# Patient Record
Sex: FEMALE | Race: BLACK OR AFRICAN AMERICAN | NOT HISPANIC OR LATINO
[De-identification: names, ages, dates, MRNs, and addresses within clinical notes are randomized per-mention and may not be internally consistent; named-entity substitution may affect disease eponyms.]

---

## 2017-03-07 ENCOUNTER — RECORD ABSTRACTING (OUTPATIENT)
Age: 42
End: 2017-03-07

## 2017-03-07 DIAGNOSIS — D50.8 OTHER IRON DEFICIENCY ANEMIAS: ICD-10-CM

## 2017-03-07 DIAGNOSIS — Z82.49 FAMILY HISTORY OF ISCHEMIC HEART DISEASE AND OTHER DISEASES OF THE CIRCULATORY SYSTEM: ICD-10-CM

## 2017-03-07 DIAGNOSIS — Z83.3 FAMILY HISTORY OF DIABETES MELLITUS: ICD-10-CM

## 2017-03-08 PROBLEM — Z82.49 FAMILY HISTORY OF CEREBRAL ANEURYSM: Status: ACTIVE | Noted: 2017-03-08

## 2017-03-08 PROBLEM — Z82.49 FAMILY HISTORY OF HYPERTENSION: Status: ACTIVE | Noted: 2017-03-08

## 2017-03-08 PROBLEM — Z83.3 FAMILY HISTORY OF DIABETES MELLITUS: Status: ACTIVE | Noted: 2017-03-08

## 2017-03-08 PROBLEM — D50.8 OTHER IRON DEFICIENCY ANEMIA: Status: ACTIVE | Noted: 2017-03-08

## 2017-03-08 RX ORDER — CHLORHEXIDINE GLUCONATE 4 %
325 (65 FE) LIQUID (ML) TOPICAL
Refills: 0 | Status: ACTIVE | COMMUNITY
Start: 2017-03-08

## 2017-03-08 RX ORDER — ELASTIC BANDAGE 2"X2.2YD
0.8 BANDAGE TOPICAL
Refills: 0 | Status: ACTIVE | COMMUNITY
Start: 2017-03-08

## 2017-03-15 ENCOUNTER — APPOINTMENT (OUTPATIENT)
Dept: ANTEPARTUM | Facility: CLINIC | Age: 42
End: 2017-03-15

## 2017-03-15 ENCOUNTER — EMERGENCY (EMERGENCY)
Facility: HOSPITAL | Age: 42
LOS: 1 days | Discharge: HOME | End: 2017-03-15
Attending: EMERGENCY MEDICINE | Admitting: EMERGENCY MEDICINE
Payer: MEDICAID

## 2017-03-15 ENCOUNTER — ASOB RESULT (OUTPATIENT)
Age: 42
End: 2017-03-15

## 2017-03-15 VITALS
TEMPERATURE: 100 F | RESPIRATION RATE: 16 BRPM | DIASTOLIC BLOOD PRESSURE: 99 MMHG | HEART RATE: 89 BPM | OXYGEN SATURATION: 100 % | SYSTOLIC BLOOD PRESSURE: 159 MMHG

## 2017-03-15 VITALS
OXYGEN SATURATION: 100 % | RESPIRATION RATE: 18 BRPM | HEART RATE: 90 BPM | SYSTOLIC BLOOD PRESSURE: 178 MMHG | DIASTOLIC BLOOD PRESSURE: 100 MMHG

## 2017-03-15 DIAGNOSIS — Z98.89 OTHER SPECIFIED POSTPROCEDURAL STATES: Chronic | ICD-10-CM

## 2017-03-15 LAB
ALBUMIN SERPL ELPH-MCNC: 3.8 G/DL — SIGNIFICANT CHANGE UP (ref 3.3–5)
ALP SERPL-CCNC: 74 U/L — SIGNIFICANT CHANGE UP (ref 40–120)
ALT FLD-CCNC: 42 U/L — HIGH (ref 4–33)
AST SERPL-CCNC: 23 U/L — SIGNIFICANT CHANGE UP (ref 4–32)
BASOPHILS # BLD AUTO: 0.01 K/UL — SIGNIFICANT CHANGE UP (ref 0–0.2)
BASOPHILS NFR BLD AUTO: 0.2 % — SIGNIFICANT CHANGE UP (ref 0–2)
BILIRUB SERPL-MCNC: 0.2 MG/DL — SIGNIFICANT CHANGE UP (ref 0.2–1.2)
BUN SERPL-MCNC: 5 MG/DL — LOW (ref 7–23)
CALCIUM SERPL-MCNC: 9.3 MG/DL — SIGNIFICANT CHANGE UP (ref 8.4–10.5)
CHLORIDE SERPL-SCNC: 98 MMOL/L — SIGNIFICANT CHANGE UP (ref 98–107)
CO2 SERPL-SCNC: 22 MMOL/L — SIGNIFICANT CHANGE UP (ref 22–31)
CREAT SERPL-MCNC: 0.73 MG/DL — SIGNIFICANT CHANGE UP (ref 0.5–1.3)
EOSINOPHIL # BLD AUTO: 0.21 K/UL — SIGNIFICANT CHANGE UP (ref 0–0.5)
EOSINOPHIL NFR BLD AUTO: 4.7 % — SIGNIFICANT CHANGE UP (ref 0–6)
GLUCOSE SERPL-MCNC: 120 MG/DL — HIGH (ref 70–99)
HCG SERPL-ACNC: SIGNIFICANT CHANGE UP MIU/ML
HCT VFR BLD CALC: 37.2 % — SIGNIFICANT CHANGE UP (ref 34.5–45)
HGB BLD-MCNC: 11.8 G/DL — SIGNIFICANT CHANGE UP (ref 11.5–15.5)
IMM GRANULOCYTES NFR BLD AUTO: 0.2 % — SIGNIFICANT CHANGE UP (ref 0–1.5)
LYMPHOCYTES # BLD AUTO: 1.58 K/UL — SIGNIFICANT CHANGE UP (ref 1–3.3)
LYMPHOCYTES # BLD AUTO: 35 % — SIGNIFICANT CHANGE UP (ref 13–44)
MCHC RBC-ENTMCNC: 26.6 PG — LOW (ref 27–34)
MCHC RBC-ENTMCNC: 31.7 % — LOW (ref 32–36)
MCV RBC AUTO: 84 FL — SIGNIFICANT CHANGE UP (ref 80–100)
MONOCYTES # BLD AUTO: 0.66 K/UL — SIGNIFICANT CHANGE UP (ref 0–0.9)
MONOCYTES NFR BLD AUTO: 14.6 % — HIGH (ref 2–14)
NEUTROPHILS # BLD AUTO: 2.04 K/UL — SIGNIFICANT CHANGE UP (ref 1.8–7.4)
NEUTROPHILS NFR BLD AUTO: 45.3 % — SIGNIFICANT CHANGE UP (ref 43–77)
PLATELET # BLD AUTO: 261 K/UL — SIGNIFICANT CHANGE UP (ref 150–400)
PMV BLD: 10.1 FL — SIGNIFICANT CHANGE UP (ref 7–13)
POTASSIUM SERPL-MCNC: 3.8 MMOL/L — SIGNIFICANT CHANGE UP (ref 3.5–5.3)
POTASSIUM SERPL-SCNC: 3.8 MMOL/L — SIGNIFICANT CHANGE UP (ref 3.5–5.3)
PROT SERPL-MCNC: 7.3 G/DL — SIGNIFICANT CHANGE UP (ref 6–8.3)
RBC # BLD: 4.43 M/UL — SIGNIFICANT CHANGE UP (ref 3.8–5.2)
RBC # FLD: 15.5 % — HIGH (ref 10.3–14.5)
SODIUM SERPL-SCNC: 138 MMOL/L — SIGNIFICANT CHANGE UP (ref 135–145)
WBC # BLD: 4.51 K/UL — SIGNIFICANT CHANGE UP (ref 3.8–10.5)
WBC # FLD AUTO: 4.51 K/UL — SIGNIFICANT CHANGE UP (ref 3.8–10.5)

## 2017-03-15 PROCEDURE — 76705 ECHO EXAM OF ABDOMEN: CPT | Mod: 26

## 2017-03-15 PROCEDURE — 99284 EMERGENCY DEPT VISIT MOD MDM: CPT

## 2017-03-15 PROCEDURE — 76830 TRANSVAGINAL US NON-OB: CPT | Mod: 26

## 2017-03-15 RX ORDER — SODIUM CHLORIDE 9 MG/ML
1000 INJECTION INTRAMUSCULAR; INTRAVENOUS; SUBCUTANEOUS ONCE
Qty: 0 | Refills: 0 | Status: COMPLETED | OUTPATIENT
Start: 2017-03-15 | End: 2017-03-15

## 2017-03-15 RX ORDER — MORPHINE SULFATE 50 MG/1
4 CAPSULE, EXTENDED RELEASE ORAL ONCE
Qty: 0 | Refills: 0 | Status: DISCONTINUED | OUTPATIENT
Start: 2017-03-15 | End: 2017-03-15

## 2017-03-15 RX ORDER — FAMOTIDINE 10 MG/ML
20 INJECTION INTRAVENOUS ONCE
Qty: 0 | Refills: 0 | Status: COMPLETED | OUTPATIENT
Start: 2017-03-15 | End: 2017-03-15

## 2017-03-15 RX ADMIN — MORPHINE SULFATE 4 MILLIGRAM(S): 50 CAPSULE, EXTENDED RELEASE ORAL at 20:28

## 2017-03-15 RX ADMIN — SODIUM CHLORIDE 1000 MILLILITER(S): 9 INJECTION INTRAMUSCULAR; INTRAVENOUS; SUBCUTANEOUS at 20:08

## 2017-03-15 RX ADMIN — FAMOTIDINE 20 MILLIGRAM(S): 10 INJECTION INTRAVENOUS at 23:03

## 2017-03-15 NOTE — ED ADULT NURSE NOTE - OBJECTIVE STATEMENT
Patient received to room 8 awake, alert, oriented x3, able to make needs known verbally.  Patient complaining of LLQ pain and positive pregnancy.  Patient vitals recorded, hypertensive, MD aware.  Labwork obtained, peripheral IV started, IV fluids administered as per provider order.  Call bell within reach, safety maintained, will continue to monitor.

## 2017-03-15 NOTE — ED ADULT TRIAGE NOTE - CHIEF COMPLAINT QUOTE
states" I am 14 weeks pregnant and feel dizzy, weak and lower abdominal pain and feel bloated and has discharge. " patient had a sonogram done few weeks ago with confirmed IUP. patient is high risk pregnancy with cerclage

## 2017-03-15 NOTE — ED PROVIDER NOTE - ATTENDING CONTRIBUTION TO CARE
41f, pmhx short cervix and cerclage, uterine fibroids, . per pt, was supposed to get fibroid removed but found out she was pregnant. pt suffers from years of chronic lower abdominal pain. acute exac of chronic pain today so came to ED. pt states she has f/u in 2 weeks but has been difficult arranging her f/u. no urianry symptoms. +n/v during the pregnancy. pain is the same in nature as previous. exam, vs wnl, nad. hs and lungs normal, abdo benign. US shows 1 cm cervix, +IUP. RUQ us neg also. I had a d/w pt on pain control while pregnant, advised tylenol. pt prefers to live with pain then endanger the baby. declined meds for n/v. will d/w OB about best plan for short cervix.

## 2017-03-15 NOTE — ED PROVIDER NOTE - OBJECTIVE STATEMENT
41F  h/o uterine fibroids, multiple miscarriages presenting with abdominal pain. Notes chronic lower abdominal pain from fibroids that acutely worsened today, suprapubic radiating to LLQ and L flank associated with chills, nausea. Pt had ultrasound done earlier today which showed IUP. Denies F, CP/SOB, V/D, vaginal bleeding. 41F  h/o uterine fibroids, multiple miscarriages presenting with abdominal pain. Notes chronic lower abdominal pain from fibroids that acutely worsened today, suprapubic radiating to LLQ and L flank associated with chills, nausea. Pt had ultrasound done earlier today which showed IUP. Denies F, CP/SOB, V/D, vaginal bleeding.    OB CRISTELA Abreu (HIP)

## 2017-03-15 NOTE — ED PROVIDER NOTE - MEDICAL DECISION MAKING DETAILS
41F  h/o fibroids, multiple miscarriages, presenting with acutely worsened abdominal/pelvic pain  -labs, fluids, US

## 2017-03-15 NOTE — ED PROVIDER NOTE - PROGRESS NOTE DETAILS
Yamile: US showed cervix 1 cm. d/w OB. OB looked on allscripts. pt has an appt in 2 days for an OB ultrasound and nuchal translucency. her appt is at 1 pm at [facility]. she also has a high risk ob appt on 3/27. per OB resident, if more urgent cerclage needs to be done, that can be arranged after her US because it's at the same place. pt provided with appt info.  The patient was given verbal and written discharge instructions. Specifically, instructions when to return to the ED and when to seek follow-up from their pcp was discussed. Any specialty follow-up was discussed, including how to make an appointment.  Instructions were discussed in simple, plain language and was understood by the patient. The patient understands that should their symptoms worsen or any new symptoms arise, they should return to the ED immediately for further evaluation.

## 2017-03-16 ENCOUNTER — RESULT REVIEW (OUTPATIENT)
Age: 42
End: 2017-03-16

## 2017-03-17 ENCOUNTER — APPOINTMENT (OUTPATIENT)
Dept: ANTEPARTUM | Facility: CLINIC | Age: 42
End: 2017-03-17

## 2017-03-17 ENCOUNTER — INPATIENT (INPATIENT)
Facility: HOSPITAL | Age: 42
LOS: 0 days | Discharge: HOME | DRG: 770 | End: 2017-03-17
Attending: OBSTETRICS & GYNECOLOGY | Admitting: OBSTETRICS & GYNECOLOGY
Payer: MEDICAID

## 2017-03-17 ENCOUNTER — TRANSCRIPTION ENCOUNTER (OUTPATIENT)
Age: 42
End: 2017-03-17

## 2017-03-17 VITALS
SYSTOLIC BLOOD PRESSURE: 148 MMHG | OXYGEN SATURATION: 99 % | HEART RATE: 86 BPM | DIASTOLIC BLOOD PRESSURE: 70 MMHG | RESPIRATION RATE: 18 BRPM

## 2017-03-17 VITALS
RESPIRATION RATE: 18 BRPM | HEART RATE: 74 BPM | SYSTOLIC BLOOD PRESSURE: 185 MMHG | DIASTOLIC BLOOD PRESSURE: 80 MMHG | TEMPERATURE: 97 F | OXYGEN SATURATION: 96 %

## 2017-03-17 DIAGNOSIS — Z98.89 OTHER SPECIFIED POSTPROCEDURAL STATES: Chronic | ICD-10-CM

## 2017-03-17 DIAGNOSIS — O02.1 MISSED ABORTION: ICD-10-CM

## 2017-03-17 DIAGNOSIS — O03.9 COMPLETE OR UNSPECIFIED SPONTANEOUS ABORTION WITHOUT COMPLICATION: ICD-10-CM

## 2017-03-17 LAB
ALBUMIN SERPL ELPH-MCNC: 3.7 G/DL — SIGNIFICANT CHANGE UP (ref 3.3–5)
ALP SERPL-CCNC: 84 U/L — SIGNIFICANT CHANGE UP (ref 40–120)
ALT FLD-CCNC: 37 U/L RC — SIGNIFICANT CHANGE UP (ref 10–45)
ANION GAP SERPL CALC-SCNC: 18 MMOL/L — HIGH (ref 5–17)
APPEARANCE UR: CLEAR — SIGNIFICANT CHANGE UP
APTT BLD: 31.3 SEC — SIGNIFICANT CHANGE UP (ref 27.5–37.4)
AST SERPL-CCNC: 22 U/L — SIGNIFICANT CHANGE UP (ref 10–40)
BASOPHILS # BLD AUTO: 0 K/UL — SIGNIFICANT CHANGE UP (ref 0–0.2)
BASOPHILS # BLD AUTO: 0 K/UL — SIGNIFICANT CHANGE UP (ref 0–0.2)
BASOPHILS NFR BLD AUTO: 0.1 % — SIGNIFICANT CHANGE UP (ref 0–2)
BASOPHILS NFR BLD AUTO: 0.2 % — SIGNIFICANT CHANGE UP (ref 0–2)
BILIRUB SERPL-MCNC: 0.2 MG/DL — SIGNIFICANT CHANGE UP (ref 0.2–1.2)
BILIRUB UR-MCNC: NEGATIVE — SIGNIFICANT CHANGE UP
BLD GP AB SCN SERPL QL: NEGATIVE — SIGNIFICANT CHANGE UP
BUN SERPL-MCNC: 5 MG/DL — LOW (ref 7–23)
CALCIUM SERPL-MCNC: 9.7 MG/DL — SIGNIFICANT CHANGE UP (ref 8.4–10.5)
CHLORIDE SERPL-SCNC: 97 MMOL/L — SIGNIFICANT CHANGE UP (ref 96–108)
CO2 SERPL-SCNC: 21 MMOL/L — LOW (ref 22–31)
COLOR SPEC: YELLOW — SIGNIFICANT CHANGE UP
CREAT SERPL-MCNC: 0.75 MG/DL — SIGNIFICANT CHANGE UP (ref 0.5–1.3)
DIFF PNL FLD: ABNORMAL
EOSINOPHIL # BLD AUTO: 0.1 K/UL — SIGNIFICANT CHANGE UP (ref 0–0.5)
EOSINOPHIL # BLD AUTO: 0.1 K/UL — SIGNIFICANT CHANGE UP (ref 0–0.5)
EOSINOPHIL NFR BLD AUTO: 0.8 % — SIGNIFICANT CHANGE UP (ref 0–6)
EOSINOPHIL NFR BLD AUTO: 2.6 % — SIGNIFICANT CHANGE UP (ref 0–6)
EPI CELLS # UR: SIGNIFICANT CHANGE UP /HPF
GAS PNL BLDV: SIGNIFICANT CHANGE UP
GLUCOSE SERPL-MCNC: 165 MG/DL — HIGH (ref 70–99)
GLUCOSE UR QL: NEGATIVE — SIGNIFICANT CHANGE UP
HCG SERPL-ACNC: SIGNIFICANT CHANGE UP MIU/ML
HCT VFR BLD CALC: 25.9 % — LOW (ref 34.5–45)
HCT VFR BLD CALC: 36.7 % — SIGNIFICANT CHANGE UP (ref 34.5–45)
HGB BLD-MCNC: 12 G/DL — SIGNIFICANT CHANGE UP (ref 11.5–15.5)
HGB BLD-MCNC: 8.6 G/DL — LOW (ref 11.5–15.5)
INR BLD: 1.11 RATIO — SIGNIFICANT CHANGE UP (ref 0.88–1.16)
KETONES UR-MCNC: NEGATIVE — SIGNIFICANT CHANGE UP
LEUKOCYTE ESTERASE UR-ACNC: NEGATIVE — SIGNIFICANT CHANGE UP
LYMPHOCYTES # BLD AUTO: 1 K/UL — SIGNIFICANT CHANGE UP (ref 1–3.3)
LYMPHOCYTES # BLD AUTO: 1.2 K/UL — SIGNIFICANT CHANGE UP (ref 1–3.3)
LYMPHOCYTES # BLD AUTO: 14.8 % — SIGNIFICANT CHANGE UP (ref 13–44)
LYMPHOCYTES # BLD AUTO: 24.4 % — SIGNIFICANT CHANGE UP (ref 13–44)
MCHC RBC-ENTMCNC: 27.6 PG — SIGNIFICANT CHANGE UP (ref 27–34)
MCHC RBC-ENTMCNC: 28.5 PG — SIGNIFICANT CHANGE UP (ref 27–34)
MCHC RBC-ENTMCNC: 32.7 GM/DL — SIGNIFICANT CHANGE UP (ref 32–36)
MCHC RBC-ENTMCNC: 33.3 GM/DL — SIGNIFICANT CHANGE UP (ref 32–36)
MCV RBC AUTO: 84.5 FL — SIGNIFICANT CHANGE UP (ref 80–100)
MCV RBC AUTO: 85.5 FL — SIGNIFICANT CHANGE UP (ref 80–100)
MONOCYTES # BLD AUTO: 0.4 K/UL — SIGNIFICANT CHANGE UP (ref 0–0.9)
MONOCYTES # BLD AUTO: 0.6 K/UL — SIGNIFICANT CHANGE UP (ref 0–0.9)
MONOCYTES NFR BLD AUTO: 11.5 % — SIGNIFICANT CHANGE UP (ref 2–14)
MONOCYTES NFR BLD AUTO: 5.7 % — SIGNIFICANT CHANGE UP (ref 2–14)
NEUTROPHILS # BLD AUTO: 3 K/UL — SIGNIFICANT CHANGE UP (ref 1.8–7.4)
NEUTROPHILS # BLD AUTO: 5.4 K/UL — SIGNIFICANT CHANGE UP (ref 1.8–7.4)
NEUTROPHILS NFR BLD AUTO: 61.2 % — SIGNIFICANT CHANGE UP (ref 43–77)
NEUTROPHILS NFR BLD AUTO: 78.6 % — HIGH (ref 43–77)
NITRITE UR-MCNC: NEGATIVE — SIGNIFICANT CHANGE UP
PH UR: 6 — SIGNIFICANT CHANGE UP (ref 4.8–8)
PLATELET # BLD AUTO: 189 K/UL — SIGNIFICANT CHANGE UP (ref 150–400)
PLATELET # BLD AUTO: 227 K/UL — SIGNIFICANT CHANGE UP (ref 150–400)
POTASSIUM SERPL-MCNC: 3.6 MMOL/L — SIGNIFICANT CHANGE UP (ref 3.5–5.3)
POTASSIUM SERPL-SCNC: 3.6 MMOL/L — SIGNIFICANT CHANGE UP (ref 3.5–5.3)
PROT SERPL-MCNC: 7.2 G/DL — SIGNIFICANT CHANGE UP (ref 6–8.3)
PROT UR-MCNC: 150 MG/DL
PROTHROM AB SERPL-ACNC: 12 SEC — SIGNIFICANT CHANGE UP (ref 10–13.1)
RBC # BLD: 3.03 M/UL — LOW (ref 3.8–5.2)
RBC # BLD: 4.35 M/UL — SIGNIFICANT CHANGE UP (ref 3.8–5.2)
RBC # FLD: 13.5 % — SIGNIFICANT CHANGE UP (ref 10.3–14.5)
RBC # FLD: 13.9 % — SIGNIFICANT CHANGE UP (ref 10.3–14.5)
RBC CASTS # UR COMP ASSIST: >50 /HPF (ref 0–2)
RH IG SCN BLD-IMP: POSITIVE — SIGNIFICANT CHANGE UP
SODIUM SERPL-SCNC: 136 MMOL/L — SIGNIFICANT CHANGE UP (ref 135–145)
SP GR SPEC: 1.02 — SIGNIFICANT CHANGE UP (ref 1.01–1.02)
UROBILINOGEN FLD QL: NEGATIVE — SIGNIFICANT CHANGE UP
WBC # BLD: 5 K/UL — SIGNIFICANT CHANGE UP (ref 3.8–10.5)
WBC # BLD: 6.9 K/UL — SIGNIFICANT CHANGE UP (ref 3.8–10.5)
WBC # FLD AUTO: 5 K/UL — SIGNIFICANT CHANGE UP (ref 3.8–10.5)
WBC # FLD AUTO: 6.9 K/UL — SIGNIFICANT CHANGE UP (ref 3.8–10.5)
WBC UR QL: SIGNIFICANT CHANGE UP /HPF (ref 0–5)

## 2017-03-17 PROCEDURE — 88304 TISSUE EXAM BY PATHOLOGIST: CPT | Mod: 26

## 2017-03-17 PROCEDURE — 99285 EMERGENCY DEPT VISIT HI MDM: CPT

## 2017-03-17 PROCEDURE — 58120 DILATION AND CURETTAGE: CPT

## 2017-03-17 PROCEDURE — 88305 TISSUE EXAM BY PATHOLOGIST: CPT | Mod: 26

## 2017-03-17 PROCEDURE — 88309 TISSUE EXAM BY PATHOLOGIST: CPT | Mod: 26

## 2017-03-17 PROCEDURE — 76830 TRANSVAGINAL US NON-OB: CPT | Mod: 26

## 2017-03-17 RX ORDER — KETOROLAC TROMETHAMINE 30 MG/ML
30 SYRINGE (ML) INJECTION ONCE
Qty: 0 | Refills: 0 | Status: DISCONTINUED | OUTPATIENT
Start: 2017-03-17 | End: 2017-03-17

## 2017-03-17 RX ORDER — SODIUM CHLORIDE 9 MG/ML
1000 INJECTION INTRAMUSCULAR; INTRAVENOUS; SUBCUTANEOUS ONCE
Qty: 0 | Refills: 0 | Status: COMPLETED | OUTPATIENT
Start: 2017-03-17 | End: 2017-03-17

## 2017-03-17 RX ORDER — ACETAMINOPHEN 500 MG
975 TABLET ORAL ONCE
Qty: 0 | Refills: 0 | Status: COMPLETED | OUTPATIENT
Start: 2017-03-17 | End: 2017-03-17

## 2017-03-17 RX ORDER — SODIUM CHLORIDE 9 MG/ML
1000 INJECTION, SOLUTION INTRAVENOUS
Qty: 0 | Refills: 0 | Status: DISCONTINUED | OUTPATIENT
Start: 2017-03-17 | End: 2017-03-17

## 2017-03-17 RX ORDER — ONDANSETRON 8 MG/1
4 TABLET, FILM COATED ORAL ONCE
Qty: 0 | Refills: 0 | Status: DISCONTINUED | OUTPATIENT
Start: 2017-03-17 | End: 2017-03-17

## 2017-03-17 RX ORDER — HYDROMORPHONE HYDROCHLORIDE 2 MG/ML
0.5 INJECTION INTRAMUSCULAR; INTRAVENOUS; SUBCUTANEOUS
Qty: 0 | Refills: 0 | Status: DISCONTINUED | OUTPATIENT
Start: 2017-03-17 | End: 2017-03-17

## 2017-03-17 RX ORDER — MORPHINE SULFATE 50 MG/1
4 CAPSULE, EXTENDED RELEASE ORAL ONCE
Qty: 0 | Refills: 0 | Status: DISCONTINUED | OUTPATIENT
Start: 2017-03-17 | End: 2017-03-17

## 2017-03-17 RX ORDER — OXYTOCIN 10 UNIT/ML
10 VIAL (ML) INJECTION ONCE
Qty: 0 | Refills: 0 | Status: COMPLETED | OUTPATIENT
Start: 2017-03-17 | End: 2017-03-17

## 2017-03-17 RX ADMIN — Medication 30 MILLIGRAM(S): at 12:40

## 2017-03-17 RX ADMIN — Medication 975 MILLIGRAM(S): at 11:45

## 2017-03-17 RX ADMIN — SODIUM CHLORIDE 1000 MILLILITER(S): 9 INJECTION INTRAMUSCULAR; INTRAVENOUS; SUBCUTANEOUS at 11:58

## 2017-03-17 RX ADMIN — Medication 10 UNIT(S): at 14:06

## 2017-03-17 RX ADMIN — Medication 30 MILLIGRAM(S): at 14:06

## 2017-03-17 RX ADMIN — Medication 30 MILLIGRAM(S): at 20:00

## 2017-03-17 RX ADMIN — SODIUM CHLORIDE 125 MILLILITER(S): 9 INJECTION, SOLUTION INTRAVENOUS at 18:27

## 2017-03-17 RX ADMIN — Medication 30 MILLIGRAM(S): at 19:30

## 2017-03-17 RX ADMIN — MORPHINE SULFATE 4 MILLIGRAM(S): 50 CAPSULE, EXTENDED RELEASE ORAL at 15:01

## 2017-03-17 RX ADMIN — MORPHINE SULFATE 4 MILLIGRAM(S): 50 CAPSULE, EXTENDED RELEASE ORAL at 15:54

## 2017-03-17 RX ADMIN — Medication 975 MILLIGRAM(S): at 11:21

## 2017-03-17 NOTE — DISCHARGE NOTE ADULT - PATIENT PORTAL LINK FT
“You can access the Follow[employer] Patient Portal, offered by [facility], by registering with the following website: [email or web address]”

## 2017-03-17 NOTE — DISCHARGE NOTE ADULT - CARE PLAN
Principal Discharge DX:	Miscarriage  Goal:	resolution of anemia  Instructions for follow-up, activity and diet:	No restrictions  Follow up in clinic in 2 weeks

## 2017-03-17 NOTE — ED PROVIDER NOTE - NS ED ROS FT
No fever/chills, no change in vision, no throat pain, no chest pain, no nausea/vomiting,  no dysuria, no joint pain, no rashes, no focal neurologic complaints, no known mental health issues

## 2017-03-17 NOTE — H&P ADULT. - ATTENDING COMMENTS
Pt s/p passage of fetus and placenta in ED and with continued bleeding. Estimated loss approx 800-1000cc total. Pt counseled regarding r/b/a of suction D&C for retained POCs. Informed consent signed. Pt to proceed to OR for suction D&C.

## 2017-03-17 NOTE — ED PROVIDER NOTE - MEDICAL DECISION MAKING DETAILS
First Trimeester Vaginal bleeding - hemodynamically stable - possible threatened  1)ABS/UA/HCG/T&S/Transvaginal US 2) pelvic exam 3) reassess

## 2017-03-17 NOTE — BRIEF OPERATIVE NOTE - OPERATION/FINDINGS
large fibroid uterus. retained productions of conception removed with suction curettage and sharp curettage.

## 2017-03-17 NOTE — ED PROVIDER NOTE - ATTENDING CONTRIBUTION TO CARE
I have seen and evaluated this patient with the resident.   I agree with the findings  unless other wise stated.  I have made appropriate changes in documentations where needed, After my face to face bedside evaluation, I am further  noting: Pt with pregnancy and lower abdominal cramps has large uterine fibroids h/o multiple miscarriages Pt has moderate bleeding and seems  in progress admitted to OB service for definitive care --Garvey

## 2017-03-17 NOTE — ED PROVIDER NOTE - PHYSICAL EXAMINATION
AAOX3, NAD, NCAT, EOMI, PERRL, MMM, Neck Supple, RRR, CTABL, ABD Firm, distended out of proportion to EGA, lower abd tenderness. : brb, scant. No CVAT, EXT warm, well perfused, No Edema, Distal Pulses Intact, No Rash,  MAEx4, Sensation to soft touch grossly intact, normal strength/tone. AAOX3, NAD, NCAT, EOMI, PERRL, MMM, Neck Supple, RRR, CTABL, ABD Firm, distended out of proportion to EGA, lower abd tenderness. : Chaperone: Preeti melgoza, nL ext genitalia, scant dark blood in vaginal vault, no adnexal or cmt tenderness., scant. No CVAT, EXT warm, well perfused, No Edema, Distal Pulses Intact, No Rash,  MAEx4, Sensation to soft touch grossly intact, normal strength/tone.

## 2017-03-17 NOTE — DISCHARGE NOTE ADULT - CARE PROVIDER_API CALL
Kraig Ortiz), Obstetrics and Gynecology  [address] [place] [address]  Phone: [phone]  Fax: [phone]

## 2017-03-17 NOTE — DISCHARGE NOTE ADULT - HOSPITAL COURSE
41  at 13w2d presented to ED with vaginal bleeding, abdominal pain, and gas pain. Found to have fetus in cervical canal on sono. after delivery of fetus intact, . patient received hemabate and 10IM pitocin in ED given for bleeding, additional . placenta delivered spontaneously with additional EBL of 500 and patient continued to bleed. Decision was made to admit patient for dilation and vacuum curettage, for which EBL was 50cc.  She had a CBC immediately postop which was _______ 41  at 13w2d presented to ED with vaginal bleeding, abdominal pain, and gas pain. Found to have fetus in cervical canal on sono. after delivery of fetus intact, . patient received hemabate and 10IM pitocin in ED given for bleeding, additional . placenta delivered spontaneously with additional EBL of 500 and patient continued to bleed. Decision was made to admit patient for dilation and vacuum curettage, for which EBL was 50cc.  She had a CBC immediately postop which was 25.9.

## 2017-03-17 NOTE — ED PROVIDER NOTE - OBJECTIVE STATEMENT
41F  multiple miscarriages, hx of large fibroid, EGA 13wks by ultrasound pw lower abdominal pain since last night. Endorses vaginal bleeding that began minutes ago.     OBGYN: Roberto

## 2017-03-17 NOTE — H&P ADULT. - HISTORY OF PRESENT ILLNESS
41  at 13w2d presented to ED with vaginal bleeding, abdominal pain, and gas pain. found to have fetus in cervical canal on sono. after delivery of fetus intact, . patient received hemabate and 10IM pitocin in ED given for bleeding, additional . placenta delivered spontaneously with additional EBL of 500 and patient continued to bleed. decision was made to admit patient for dilation and vacuum curettage. 41  at 13w2d presented to ED with vaginal bleeding, abdominal pain, and gas pain. found to have fetus in cervical canal on sono. after delivery of fetus intact, . patient received hemabate and 10IM pitocin in ED given for bleeding, additional . placenta delivered spontaneously with additional EBL of 500 and patient continued to bleed. decision was made to admit patient for dilation and vacuum curettage.   POB: 1 c/s at 6mo for PTL c/b elevated BP, previa, cerclage. 2 first trimester SB, 3snd trimester SAB (16-20w)  PGYN: fibroid (10cm)  PMH: anemia, thyroid cyst  PSH: LTCSx1, gastric bypass , D+C  ALL: NKDA  Meds: PNV, Iron     pelvic exam by dr bey at time of fetal delivery: 5cm dilated     CBC: WBC 5, H/H 12/36.7 Plt 227   TVUS;  in progress, cervix open, 7cm subserosal fibroid noted 41  at 13w2d presented to ED with vaginal bleeding, abdominal pain, and gas pain. found to have fetus in cervical canal on sono. after delivery of fetus intact, . patient received hemabate and 10IM pitocin in ED given for bleeding, additional . placenta delivered spontaneously with additional EBL of 500 and patient continued to bleed. decision was made to admit patient for dilation and vacuum curettage.   POB: 1 c/s at 6mo for PTL c/b elevated BP, previa, cerclage. 2 first trimester SB, 3snd trimester SAB (16-20w)  PGYN: fibroid (10cm largest)  PMH: anemia, thyroid cyst  PSH: LTCSx1, gastric bypass , D+C  ALL: NKDA  Meds: PNV, Iron     pelvic exam by dr bey at time of fetal delivery: 5cm dilated     CBC: WBC 5, H/H 12/36.7 Plt 227   TVUS;  in progress, cervix open, 7cm subserosal fibroid noted

## 2017-03-17 NOTE — DISCHARGE NOTE ADULT - MEDICATION SUMMARY - MEDICATIONS TO TAKE
I will START or STAY ON the medications listed below when I get home from the hospital:    ibuprofen  --  by mouth   -- Indication: For pain    ferrous sulfate 325 mg (65 mg elemental iron) oral tablet  -- 1 tab(s) by mouth 3 times a day  -- Indication: For home

## 2017-03-17 NOTE — ED PROVIDER NOTE - FAMILY HISTORY
<<-----Click on this checkbox to enter Family History Family history of diabetes mellitus     Grandparent  Still living? Unknown  Family history of hypertension, Age at diagnosis: Age Unknown

## 2017-03-17 NOTE — ED ADULT NURSE NOTE - OBJECTIVE STATEMENT
40yo f a&ox4 ambulated into ED c/o vaginal bleeding and abd pain. pt is 14 weeks pregnant , 1 child at home age 12. pt states she has h/o fibroids and had fibroid removal planned but then found out she was pregnant. pt also states she is anemic and has had transfusion in the past. pt denies sob, denies n/v/d, denies diaphoresis, denies cp, denies pain when urinating.

## 2017-03-17 NOTE — ED PROVIDER NOTE - PROGRESS NOTE DETAILS
OBGYN Consulted for persistent pain. Pt delivered fetus at bedside with OB present. Placenta not delivered. OB requesting Pitocin.

## 2017-03-19 ENCOUNTER — EMERGENCY (EMERGENCY)
Facility: HOSPITAL | Age: 42
LOS: 1 days | Discharge: HOME | End: 2017-03-19
Attending: EMERGENCY MEDICINE | Admitting: EMERGENCY MEDICINE
Payer: MEDICAID

## 2017-03-19 VITALS
TEMPERATURE: 99 F | DIASTOLIC BLOOD PRESSURE: 94 MMHG | SYSTOLIC BLOOD PRESSURE: 172 MMHG | WEIGHT: 220.02 LBS | HEIGHT: 66 IN | RESPIRATION RATE: 22 BRPM | HEART RATE: 111 BPM | OXYGEN SATURATION: 99 %

## 2017-03-19 DIAGNOSIS — Z98.89 OTHER SPECIFIED POSTPROCEDURAL STATES: Chronic | ICD-10-CM

## 2017-03-19 DIAGNOSIS — Z98.890 OTHER SPECIFIED POSTPROCEDURAL STATES: ICD-10-CM

## 2017-03-19 DIAGNOSIS — R10.33 PERIUMBILICAL PAIN: ICD-10-CM

## 2017-03-19 DIAGNOSIS — R51 HEADACHE: ICD-10-CM

## 2017-03-19 DIAGNOSIS — K59.00 CONSTIPATION, UNSPECIFIED: ICD-10-CM

## 2017-03-19 DIAGNOSIS — R50.9 FEVER, UNSPECIFIED: ICD-10-CM

## 2017-03-19 DIAGNOSIS — R11.2 NAUSEA WITH VOMITING, UNSPECIFIED: ICD-10-CM

## 2017-03-19 LAB
ALBUMIN SERPL ELPH-MCNC: 3.3 G/DL — SIGNIFICANT CHANGE UP (ref 3.3–5)
ALP SERPL-CCNC: 67 U/L — SIGNIFICANT CHANGE UP (ref 40–120)
ALT FLD-CCNC: 19 U/L RC — SIGNIFICANT CHANGE UP (ref 10–45)
ANION GAP SERPL CALC-SCNC: 14 MMOL/L — SIGNIFICANT CHANGE UP (ref 5–17)
APPEARANCE UR: CLEAR — SIGNIFICANT CHANGE UP
APTT BLD: 28.5 SEC — SIGNIFICANT CHANGE UP (ref 27.5–37.4)
AST SERPL-CCNC: 14 U/L — SIGNIFICANT CHANGE UP (ref 10–40)
BASOPHILS # BLD AUTO: 0 K/UL — SIGNIFICANT CHANGE UP (ref 0–0.2)
BASOPHILS NFR BLD AUTO: 0.2 % — SIGNIFICANT CHANGE UP (ref 0–2)
BILIRUB SERPL-MCNC: 0.1 MG/DL — LOW (ref 0.2–1.2)
BILIRUB UR-MCNC: NEGATIVE — SIGNIFICANT CHANGE UP
BUN SERPL-MCNC: 6 MG/DL — LOW (ref 7–23)
CALCIUM SERPL-MCNC: 8.6 MG/DL — SIGNIFICANT CHANGE UP (ref 8.4–10.5)
CHLORIDE SERPL-SCNC: 102 MMOL/L — SIGNIFICANT CHANGE UP (ref 96–108)
CO2 SERPL-SCNC: 22 MMOL/L — SIGNIFICANT CHANGE UP (ref 22–31)
COLOR SPEC: YELLOW — SIGNIFICANT CHANGE UP
CREAT SERPL-MCNC: 0.81 MG/DL — SIGNIFICANT CHANGE UP (ref 0.5–1.3)
DIFF PNL FLD: NEGATIVE — SIGNIFICANT CHANGE UP
EOSINOPHIL # BLD AUTO: 0 K/UL — SIGNIFICANT CHANGE UP (ref 0–0.5)
EOSINOPHIL NFR BLD AUTO: 0.9 % — SIGNIFICANT CHANGE UP (ref 0–6)
EPI CELLS # UR: SIGNIFICANT CHANGE UP /HPF
GLUCOSE SERPL-MCNC: 140 MG/DL — HIGH (ref 70–99)
GLUCOSE UR QL: NEGATIVE — SIGNIFICANT CHANGE UP
HCG SERPL-ACNC: 3822 MIU/ML — SIGNIFICANT CHANGE UP
HCT VFR BLD CALC: 20.5 % — CRITICAL LOW (ref 34.5–45)
HGB BLD-MCNC: 6.8 G/DL — CRITICAL LOW (ref 11.5–15.5)
INR BLD: 1.05 RATIO — SIGNIFICANT CHANGE UP (ref 0.88–1.16)
KETONES UR-MCNC: NEGATIVE — SIGNIFICANT CHANGE UP
LEUKOCYTE ESTERASE UR-ACNC: NEGATIVE — SIGNIFICANT CHANGE UP
LIDOCAIN IGE QN: 31 U/L — SIGNIFICANT CHANGE UP (ref 7–60)
LYMPHOCYTES # BLD AUTO: 1.6 K/UL — SIGNIFICANT CHANGE UP (ref 1–3.3)
LYMPHOCYTES # BLD AUTO: 30.4 % — SIGNIFICANT CHANGE UP (ref 13–44)
MAGNESIUM SERPL-MCNC: 1.7 MG/DL — SIGNIFICANT CHANGE UP (ref 1.6–2.6)
MCHC RBC-ENTMCNC: 28.2 PG — SIGNIFICANT CHANGE UP (ref 27–34)
MCHC RBC-ENTMCNC: 33.1 GM/DL — SIGNIFICANT CHANGE UP (ref 32–36)
MCV RBC AUTO: 85 FL — SIGNIFICANT CHANGE UP (ref 80–100)
MONOCYTES # BLD AUTO: 0.8 K/UL — SIGNIFICANT CHANGE UP (ref 0–0.9)
MONOCYTES NFR BLD AUTO: 16.1 % — HIGH (ref 2–14)
NEUTROPHILS # BLD AUTO: 2.7 K/UL — SIGNIFICANT CHANGE UP (ref 1.8–7.4)
NEUTROPHILS NFR BLD AUTO: 52.4 % — SIGNIFICANT CHANGE UP (ref 43–77)
NITRITE UR-MCNC: NEGATIVE — SIGNIFICANT CHANGE UP
PH UR: 6.5 — SIGNIFICANT CHANGE UP (ref 4.8–8)
PHOSPHATE SERPL-MCNC: 3.5 MG/DL — SIGNIFICANT CHANGE UP (ref 2.5–4.5)
PLATELET # BLD AUTO: 188 K/UL — SIGNIFICANT CHANGE UP (ref 150–400)
POTASSIUM SERPL-MCNC: 3.7 MMOL/L — SIGNIFICANT CHANGE UP (ref 3.5–5.3)
POTASSIUM SERPL-SCNC: 3.7 MMOL/L — SIGNIFICANT CHANGE UP (ref 3.5–5.3)
PROT SERPL-MCNC: 6.2 G/DL — SIGNIFICANT CHANGE UP (ref 6–8.3)
PROT UR-MCNC: 100 MG/DL
PROTHROM AB SERPL-ACNC: 11.4 SEC — SIGNIFICANT CHANGE UP (ref 10–13.1)
RBC # BLD: 2.41 M/UL — LOW (ref 3.8–5.2)
RBC # FLD: 13.7 % — SIGNIFICANT CHANGE UP (ref 10.3–14.5)
SODIUM SERPL-SCNC: 138 MMOL/L — SIGNIFICANT CHANGE UP (ref 135–145)
SP GR SPEC: 1.02 — SIGNIFICANT CHANGE UP (ref 1.01–1.02)
UROBILINOGEN FLD QL: 1
WBC # BLD: 5.2 K/UL — SIGNIFICANT CHANGE UP (ref 3.8–10.5)
WBC # FLD AUTO: 5.2 K/UL — SIGNIFICANT CHANGE UP (ref 3.8–10.5)
WBC UR QL: SIGNIFICANT CHANGE UP /HPF (ref 0–5)

## 2017-03-19 PROCEDURE — 99291 CRITICAL CARE FIRST HOUR: CPT

## 2017-03-19 RX ORDER — SODIUM CHLORIDE 9 MG/ML
1000 INJECTION INTRAMUSCULAR; INTRAVENOUS; SUBCUTANEOUS ONCE
Qty: 0 | Refills: 0 | Status: COMPLETED | OUTPATIENT
Start: 2017-03-19 | End: 2017-03-19

## 2017-03-19 RX ORDER — MORPHINE SULFATE 50 MG/1
4 CAPSULE, EXTENDED RELEASE ORAL ONCE
Qty: 0 | Refills: 0 | Status: DISCONTINUED | OUTPATIENT
Start: 2017-03-19 | End: 2017-03-19

## 2017-03-19 RX ORDER — ACETAMINOPHEN 500 MG
1000 TABLET ORAL ONCE
Qty: 0 | Refills: 0 | Status: COMPLETED | OUTPATIENT
Start: 2017-03-19 | End: 2017-03-19

## 2017-03-19 RX ORDER — ONDANSETRON 8 MG/1
4 TABLET, FILM COATED ORAL ONCE
Qty: 0 | Refills: 0 | Status: COMPLETED | OUTPATIENT
Start: 2017-03-19 | End: 2017-03-19

## 2017-03-19 RX ADMIN — MORPHINE SULFATE 4 MILLIGRAM(S): 50 CAPSULE, EXTENDED RELEASE ORAL at 22:25

## 2017-03-19 RX ADMIN — ONDANSETRON 4 MILLIGRAM(S): 8 TABLET, FILM COATED ORAL at 22:23

## 2017-03-19 RX ADMIN — SODIUM CHLORIDE 1000 MILLILITER(S): 9 INJECTION INTRAMUSCULAR; INTRAVENOUS; SUBCUTANEOUS at 22:23

## 2017-03-19 NOTE — ED ADULT NURSE NOTE - OBJECTIVE STATEMENT
41 y.o F  arrived to ED c/o suprapubic and mid epigastric abdominal pain as well as HA. A&Ox3. Pt states she was here on Friday, had a miscarriage and placenta removed. As per pt has had vaginal bleeding since Friday saturating several pads a day. Felt warm, never took temp, afebrile upon arrival. c/o chills, nausea, vomiting, constipation. Respirations nonlabored, abdomen tender throughout all quadrants, neuro exam intact. Denies CP, SOB, diarrhea, dizziness, vision changes. Safety and comfort provided, mom at bedside. 41 y.o F  arrived to ED c/o suprapubic and mid epigastric abdominal pain as well as HA. A&Ox3. Pt states she was here on Friday, had a miscarriage and placenta removed. As per pt has had vaginal bleeding since Friday saturating several pads a day. Felt warm, never took temp, afebrile upon arrival. PMH uterine fibroids. c/o chills, nausea, vomiting, constipation. Respirations nonlabored, abdomen tender throughout all quadrants, neuro exam intact. Denies CP, SOB, diarrhea, dizziness, vision changes. Safety and comfort provided, mom at bedside.

## 2017-03-19 NOTE — ED PROVIDER NOTE - RESPIRATORY, MLM
Breath sounds clear and equal bilaterally. **ATTENDING ADDENDUM (Dr. Lucius Lafleur): NO wheezing, rales, rhonchi, crackles, stridor, drooling, retractions, nasal flaring, or tripoding.

## 2017-03-19 NOTE — ED PROVIDER NOTE - SHIFT CHANGE DETAILS
**ATTENDING ADDENDUM (Dr. Lucius Lafleur): (1) continue PRBC transfusion re: anemia, decreased hemoglobin and hematocrit noted on labs (2) followup final recommendations from all consultants (3) admission pending completion of all ED diagnostics (4) pain management as needed

## 2017-03-19 NOTE — ED PROVIDER NOTE - CARE PLAN
Principal Discharge DX:	Abdominal pain Principal Discharge DX:	Abdominal pain  Goal:	ATTENDING ADDENDUM (Dr. Lucius Lafleur): Goals of care include resolution of emergent/urgent symptoms and concerns, and restoration to baseline level of homeostasis.

## 2017-03-19 NOTE — ED PROVIDER NOTE - MEDICAL DECISION MAKING DETAILS
41y Female  PMH anemia, fibroids, thyroid problems, no history of preeclampsia PSH Gastric bypass and  reporting fever, headache, abdominal pain, constipation, vaginal bleeding s/p emergency D&C 2 days ago for miscarriage (at 14 weeks), will obtain labwork, CTAP, provide analgesics, antiemetics, blood as needed, ob/gyn and surgery consults for recent D&C and distant history of gastric bypass 41y Female  PMH anemia, fibroids, thyroid problems, no history of preeclampsia PSH Gastric bypass and  reporting fever, headache, abdominal pain, constipation, vaginal bleeding s/p emergency D&C 2 days ago for miscarriage (at 14 weeks), will obtain labwork, CTAP, provide analgesics, antiemetics, blood as needed, ob/gyn and surgery consults for recent D&C and distant history of gastric bypass  **ATTENDING MEDICAL DECISION MAKING/SYNTHESIS (Dr. Lucius Lafleur): I have reviewed the Chief Complaint, the HPI, the ROS, and have directly performed and confirmed the findings on the Physical Examination. I have reviewed the medical decision making with all providers, as applicable. The PROBLEM REPRESENTATION at this time is: 41-year-old woman with known history of fibroid uterus, gastric bypass, and anemia,  (multiple spontaneous abortions) POD#2 s/p D&C now presenting with fevers, Hypertension, abdominal pain, vaginal bleeding and nausea/vomiting for two days. The MOST LIKELY DIAGNOSIS, and the LIST OF DIFFERENTIAL DIAGNOSES, includes (but is not limited to) the following: septic /endometritis s/p D&C (possible), uterine rupture or other equivalent complication s/p obstetrical procedure (e.g. bowel perforation; possible), DIC (NO evidence of this diagnosis at this time), other cause for acute surgical abdomen (e.g. bariatric surgery-associated or other e.g. acute appendicitis, cholecystitis, colitis, or equivalent), dehydration, electrolyte-metabolic-endocrine derangements, pulmonary embolism (unlikely), other cause for surgical abdomen (possible). The likelihood of each of these diagnoses has been appropriately considered in the context of this patient's presentation and my evaluation. PLAN: as described in EMR, including diagnostics, therapeutics and consultation as clinically warranted. I will continue to reevaluate the patient, including the results of all testing, and monitor response to therapy throughout the patient's course in the ED.

## 2017-03-19 NOTE — ED PROVIDER NOTE - OBJECTIVE STATEMENT
41y Female PMH anemia, fibroids, thyroid problems reporting headache, constipation, s/p D&C last week for miscarriage. 41y Female PMH anemia, fibroids, thyroid problems, PSH Gastric bypass and  reporting headache, constipation, s/p emergency D&C 2 days ago for miscarriage. Feeling febrile with chills. Still bleeding a lot. Has not been taking antibiotics, has been taking Motrin. +constipation (no BM since last D&C), last passed gas. Still having significant vaginal bleeding. +nausea and vomiting.    PCP: In [place]  Surgeon: (unsure gastric bypass 20 years ago)  Ob/Gyn: Clinic 41y Female  PMH anemia, fibroids, thyroid problems, no history of preeclampsia PSH Gastric bypass and  reporting headache, constipation, s/p emergency D&C 2 days ago for miscarriage. Feeling febrile with chills. Still bleeding a lot. Has not been taking antibiotics, has been taking Motrin. +constipation (no BM since last D&C), last passed gas. Still having significant vaginal bleeding. +nausea and vomiting.    PCP: In [place]  Surgeon: (unsure gastric bypass 20 years ago)  Ob/Gyn: Clinic 41y Female  PMH anemia, fibroids, thyroid problems, no history of preeclampsia PSH Gastric bypass and  reporting headache, constipation, s/p emergency D&C 2 days ago for miscarriage (at 14 weeks). Feeling febrile with chills. Still bleeding a lot. Has not been taking antibiotics, has been taking Motrin. +constipation (no BM since last D&C), last passed gas. Still having significant vaginal bleeding, having to change abdominal pads every hour. +nausea and vomiting.    PCP: In [place]  Surgeon: (unsure gastric bypass 20 years ago)  Ob/Gyn: Clinic 41y Female  PMH anemia, fibroids, thyroid problems, no history of preeclampsia PSH Gastric bypass and  reporting headache, constipation, s/p emergency D&C 2 days ago for miscarriage (at 14 weeks). Feeling febrile with chills. Still bleeding from vagina. Has not been taking antibiotics, has been taking Motrin. +constipation (no BM since last D&C), last passed gas. Still having significant vaginal bleeding, having to change abdominal pads every hour. +nausea and vomiting.    PCP: In [place]  Surgeon: (unsure gastric bypass 20 years ago)  Ob/Gyn: Clinic 41y Female  PMH anemia, fibroids, thyroid problems, no history of preeclampsia PSH Gastric bypass and  reporting headache, constipation, s/p emergency D&C 2 days ago for miscarriage (at 14 weeks). Feeling febrile with chills. Still bleeding from vagina. Has not been taking antibiotics, has been taking Motrin. +constipation (no BM since last D&C), last passed gas. Still having significant vaginal bleeding, having to change abdominal pads every hour. +nausea and vomiting.  PCP: In [place]  Surgeon: (unsure gastric bypass 20 years ago)  Ob/Gyn: Clinic  **ATTENDING ADDENDUM (Dr. Lucius Lafleur): I attest that I have directly and personally interviewed and examined this patient and elicited a comparable history of present illness and review of systems as documented, along with my EM resident. I attest that I have made significant contributions to the documentation where necessary and as noted in the EMR.

## 2017-03-19 NOTE — ED PROVIDER NOTE - PROGRESS NOTE DETAILS
Will not start magnesium as per Ob/Gyn after 14 weeks, unlikely postpartum preeclampsia.  - Daniel Euceda MD **ATTENDING ADDENDUM (Dr. Lucius Lafleur): POSITIVE pain, only modestly relieved with morphine; nausea only mildly relieved with antiemetics. Will continue to observe and monitor closely. Anticipatory guidance provided. Will not start magnesium as per Ob/Gyn after 14 weeks, unlikely postpartum preeclampsia.  - Daniel Euceda MD  **ATTENDING ADDENDUM (Dr. Lucius Lafleur): agree with goals/plan of ED care as noted in EMR. Anticipatory guidance provided. **ATTENDING ADDENDUM (Dr. Lucius Lafleur): patient serially evaluated throughout ED course. NO acute deterioration up to this time in the ED. Noted with anemia: agree with transfusion of packed red blood cells given drop. Recalled OB-GYN; in OR. To consult. General surgery to be called re: patient with history of gastric bypass in the past. Will call. Awaiting completion of ED diagnostics and CT. Will continue to observe and monitor closely. Likely admission pending completion of all consultations. **ATTENDING ADDENDUM (Dr. Lucius Lafleur): Surgery called at this time for consultation; awaiting CT. **ATTENDING ADDENDUM (Dr. Lucius Lafleur): POSITIVE pain, only modestly relieved with morphine; nausea only mildly relieved with antiemetics. Will continue to observe and monitor closely. Anticipatory guidance provided. Awaiting completion of consultations and all ED diagnostics. **ATTENDING ADDENDUM (Dr. Lucius Lafleur): ob I have received sign out on this patient, briefly: 40yo F, h/o uterine fibroids and multiple misscarriages, s/p D&C for miscarriage 2 days ago, p/w pain and vaginal bleeding, found to have acute anemia, receiving 2 U PRBC; CT scan performed, read pending, and consultation from OB/GYN pending CT results.  Will continue to monitor. Louis spoke with surgery - no surgical issues noted on ct scan.  spoke with ob/gyn resident and chief, who feel patient can have transfusion of 2 u and be discharged if stable, declined to admit patient at this time.  Will continue to monitor in ED and consider calling ob/gyn attg - patientremained tachycardic until just recently; finished 1 U PRBCs; have ordered rpt cbc to assess for appropriate response.  Vaginal bleeding reportedly stopped as per gyn resident.  -Jose J Spoke with GYN attending, recommended to receive 2nd Unit, repeat cbc and if appropriate, will be eligible for dc; since patient already receiving 2nd unit, no indication for admission if patient remains stable.  HR has now improved and patient no longer tachycardic, feeling better and has never been hypotensive.  -Jose J after 2nd unit, appropriate rise in hgb, no additional bleeding, patient reports feeling much better, requesting medicine for ha and to help her with constipation (will give reglan and miralax, encourage patient to continue bowel regimen as oupatient).  Plan for discharge.  -Jose J Patient now requesting  to speak with; will not give reason why when asked (states it was for something that happened on friday, but will not elaborate) - sw to see patient. -Jose J Patient now requesting  to speak with; will not give reason why when asked (states it was for something that happened on friday, but will not elaborate) - sw to see patient. -Jose J  [id number]: patient now complaining that GYN took pictures of fetus during d&c, wants copies of pictures.  Patient asking to speak to gyn team again, states she does not understand what they were saying.  Patient is also now stating that she has been constipated, and that is the real reason she came to ED - wants constipation to be fixed).  Explained to patient after a procedure and having been on iron supplements, constipation is a common complaint, will recommend bowel regimen (miralax, may also try senna/colace), and can offer enema.  Gyn to see patient, sw to see patient, will then dc. Louis **ATTENDING ADDENDUM (Dr. Lucius Lafleur): Surgery called at this time for consultation; awaiting CT. Will continue to observe and monitor closely. Anticipatory guidance provided. **ATTENDING ADDENDUM (Dr. Lucius Lafleur): recalled OB-GYN; awaiting completion of consultation and recommendations for continued care. Likely inpatient admission to OB-GYN (unlikely that patient's symptoms are related to surgical history of gastric bypass). Agree with PRBCs as previously ordered. Will continue to observe and monitor closely until definitive disposition is reached. Anticipatory guidance provided.

## 2017-03-19 NOTE — ED PROVIDER NOTE - PLAN OF CARE
ATTENDING ADDENDUM (Dr. Lucius Lafleur): Goals of care include resolution of emergent/urgent symptoms and concerns, and restoration to baseline level of homeostasis.

## 2017-03-19 NOTE — ED PROVIDER NOTE - NS ED ROS FT
ROS: GENERAL: no fevers, no chills HEENT: no epistaxis, no eye pain, no ear pain, no throat pain CARDIAC: no chest pain, no shortness of breath PULM: no cough, no shortness of breath GI: + nausea, + vomiting, no diarrhea, + abdominal pain, no hematemesis, no bright red blood per rectum : no dysuria, no hematuria EXTREMITIES: no arm pain, no leg pain, no back pain SKIN: no purpura, no petechiae NEURO: + headache, no neck pain, no loss of strength/sensation HEME: no easy bruising, no easy bleeding ROS: GENERAL: + fevers, + chills HEENT: no epistaxis, no eye pain, no ear pain, no throat pain CARDIAC: no chest pain, no shortness of breath PULM: no cough, no shortness of breath GI: + nausea, + vomiting, no diarrhea, + abdominal pain, no hematemesis, no bright red blood per rectum : no dysuria, no hematuria EXTREMITIES: no arm pain, no leg pain, no back pain SKIN: no purpura, no petechiae NEURO: + headache, no neck pain, no loss of strength/sensation HEME: no easy bruising, no easy bleeding

## 2017-03-19 NOTE — ED PROVIDER NOTE - EYES, MLM
Clear bilaterally, pupils equal, round and reactive to light. **ATTENDING ADDENDUM (Dr. Lucius Lafleur): Extraocular muscle movements intact. NO nystagmus.

## 2017-03-19 NOTE — ED PROVIDER NOTE - CRITICAL CARE PROVIDED
documentation/consult w/ pt's family directly relating to pts condition/additional history taking/**ATTENDING ADDENDUM (Dr. Lucius Lafleur): Anticipatory guidance provided./interpretation of diagnostic studies/consultation with other physicians/direct patient care (not related to procedure)

## 2017-03-19 NOTE — ED PROVIDER NOTE - NOTES
**ATTENDING ADDENDUM (Dr. Lucius Lafleur): with significant pain POD#2 s/p D+C, with distension, pain, nausea, and crampy diffuse tenderness For gastric bypass (distant history), r/o SBO **ATTENDING ADDENDUM (Dr. Lucius Lafleur): with significant pain POD#2 s/p D+C, with distension, pain, nausea, and crampy diffuse tenderness POSITIVE history of fibroid uterus POSITIVE history of Hypertension and gastric bypass For gastric bypass (distant history), r/o SBO **ATTENDING ADDENDUM (Dr. Lucius Lafleur): r/o possible ileus

## 2017-03-19 NOTE — ED PROVIDER NOTE - PHYSICAL EXAMINATION
PE: CONSTITUTIONAL: Nontoxic, in moderate apparent distress. ENMT: Airway patent, nasal mucosa clear, mouth with normal mucosa. HEAD: NCAT EYES: PERRL, EOMI bilaterally CARDIAC: RRR, no m/r/g, no pedal edema RESPIRATORY: CTA bilaterally, no adventitious sounds GI: Abdomen distended, diffuse tenderness but no rebound or guarding MSK: Spine appears normal, range of motion is not limited, no muscle/joint tenderness NEURO: CNII-XII grossly intact, 5/5 strength, full sensation all extremities, gait intact SKIN: Skin tone normal in color, warm and dry. No evidence of rash. PE: CONSTITUTIONAL: Nontoxic, in moderate apparent distress. ENMT: Airway patent, nasal mucosa clear, mouth with normal mucosa. HEAD: NCAT EYES: PERRL, EOMI bilaterally CARDIAC: RRR, no m/r/g, no pedal edema RESPIRATORY: CTA bilaterally, no adventitious sounds GI: Abdomen distended, diffuse tenderness but no rebound or guarding MSK: Spine appears normal, range of motion is not limited, no muscle/joint tenderness NEURO: CNII-XII grossly intact, 5/5 strength, full sensation all extremities, gait intact SKIN: Skin tone normal in color, warm and dry. No evidence of rash.  **ATTENDING ADDENDUM (Dr. Lucius Lafleur): I have reviewed and substantially contributed to the elements of the PE as documented above. I have directly performed an examination of this patient in conjunction with the other members (EM resident/PA/NP) of the patient care team.

## 2017-03-19 NOTE — ED PROVIDER NOTE - CHPI ED SYMPTOMS POS
PAIN/ABDOMINAL DISTENSION/**ATTENDING ADDENDUM (Dr. Lucius Lafleur): POSITIVE vaginal bleeding/CRAMPS/TENDERNESS/FEVER/NAUSEA/VOMITING

## 2017-03-19 NOTE — ED PROVIDER NOTE - ABDOMINAL EXAM
**ATTENDING ADDENDUM (Dr. Lucius Lafleur): NO rigidity or rebound. POSITIVE tenderness in the lower quadrants and midline of abdomen (over area of presumed uterus)./soft/nondistended/tender.../no pulsating masses

## 2017-03-19 NOTE — ED PROVIDER NOTE - ATTENDING CONTRIBUTION TO CARE
**ATTENDING ADDENDUM (Dr. Lucius Lafleur): I attest that I have directly examined this patient and reviewed and formulated the diagnostic and therapeutic management plan in collaboration with the EM resident. Please see MDM note and remainder of EMR for findings from CC, HPI, ROS, and PE. ([place])

## 2017-03-20 VITALS
HEART RATE: 85 BPM | SYSTOLIC BLOOD PRESSURE: 158 MMHG | RESPIRATION RATE: 19 BRPM | TEMPERATURE: 98 F | DIASTOLIC BLOOD PRESSURE: 85 MMHG | OXYGEN SATURATION: 100 %

## 2017-03-20 LAB
HCT VFR BLD CALC: 22.3 % — LOW (ref 34.5–45)
HCT VFR BLD CALC: 25.3 % — LOW (ref 34.5–45)
HGB BLD-MCNC: 7.2 G/DL — LOW (ref 11.5–15.5)
HGB BLD-MCNC: 8.3 G/DL — LOW (ref 11.5–15.5)
MCHC RBC-ENTMCNC: 28 PG — SIGNIFICANT CHANGE UP (ref 27–34)
MCHC RBC-ENTMCNC: 28.2 PG — SIGNIFICANT CHANGE UP (ref 27–34)
MCHC RBC-ENTMCNC: 32.5 GM/DL — SIGNIFICANT CHANGE UP (ref 32–36)
MCHC RBC-ENTMCNC: 32.9 GM/DL — SIGNIFICANT CHANGE UP (ref 32–36)
MCV RBC AUTO: 85.7 FL — SIGNIFICANT CHANGE UP (ref 80–100)
MCV RBC AUTO: 86 FL — SIGNIFICANT CHANGE UP (ref 80–100)
PLATELET # BLD AUTO: 204 K/UL — SIGNIFICANT CHANGE UP (ref 150–400)
PLATELET # BLD AUTO: 205 K/UL — SIGNIFICANT CHANGE UP (ref 150–400)
RBC # BLD: 2.59 M/UL — LOW (ref 3.8–5.2)
RBC # BLD: 2.95 M/UL — LOW (ref 3.8–5.2)
RBC # FLD: 13.4 % — SIGNIFICANT CHANGE UP (ref 10.3–14.5)
RBC # FLD: 13.5 % — SIGNIFICANT CHANGE UP (ref 10.3–14.5)
WBC # BLD: 5.2 K/UL — SIGNIFICANT CHANGE UP (ref 3.8–10.5)
WBC # BLD: 6.2 K/UL — SIGNIFICANT CHANGE UP (ref 3.8–10.5)
WBC # FLD AUTO: 5.2 K/UL — SIGNIFICANT CHANGE UP (ref 3.8–10.5)
WBC # FLD AUTO: 6.2 K/UL — SIGNIFICANT CHANGE UP (ref 3.8–10.5)

## 2017-03-20 PROCEDURE — 84100 ASSAY OF PHOSPHORUS: CPT

## 2017-03-20 PROCEDURE — 81001 URINALYSIS AUTO W/SCOPE: CPT

## 2017-03-20 PROCEDURE — 99284 EMERGENCY DEPT VISIT MOD MDM: CPT | Mod: 25

## 2017-03-20 PROCEDURE — 86923 COMPATIBILITY TEST ELECTRIC: CPT

## 2017-03-20 PROCEDURE — 70450 CT HEAD/BRAIN W/O DYE: CPT | Mod: 26

## 2017-03-20 PROCEDURE — 85610 PROTHROMBIN TIME: CPT

## 2017-03-20 PROCEDURE — 96376 TX/PRO/DX INJ SAME DRUG ADON: CPT | Mod: XU

## 2017-03-20 PROCEDURE — 70450 CT HEAD/BRAIN W/O DYE: CPT

## 2017-03-20 PROCEDURE — 83690 ASSAY OF LIPASE: CPT

## 2017-03-20 PROCEDURE — 71045 X-RAY EXAM CHEST 1 VIEW: CPT

## 2017-03-20 PROCEDURE — 74177 CT ABD & PELVIS W/CONTRAST: CPT | Mod: 26

## 2017-03-20 PROCEDURE — 71010: CPT | Mod: 26

## 2017-03-20 PROCEDURE — 96375 TX/PRO/DX INJ NEW DRUG ADDON: CPT | Mod: XU

## 2017-03-20 PROCEDURE — 85027 COMPLETE CBC AUTOMATED: CPT

## 2017-03-20 PROCEDURE — 83735 ASSAY OF MAGNESIUM: CPT

## 2017-03-20 PROCEDURE — 85730 THROMBOPLASTIN TIME PARTIAL: CPT

## 2017-03-20 PROCEDURE — P9016: CPT

## 2017-03-20 PROCEDURE — 84702 CHORIONIC GONADOTROPIN TEST: CPT

## 2017-03-20 PROCEDURE — 74177 CT ABD & PELVIS W/CONTRAST: CPT

## 2017-03-20 PROCEDURE — 80053 COMPREHEN METABOLIC PANEL: CPT

## 2017-03-20 PROCEDURE — 96374 THER/PROPH/DIAG INJ IV PUSH: CPT | Mod: XU

## 2017-03-20 RX ORDER — ACETAMINOPHEN 500 MG
1000 TABLET ORAL ONCE
Qty: 0 | Refills: 0 | Status: COMPLETED | OUTPATIENT
Start: 2017-03-20 | End: 2017-03-20

## 2017-03-20 RX ORDER — HYDROMORPHONE HYDROCHLORIDE 2 MG/ML
1 INJECTION INTRAMUSCULAR; INTRAVENOUS; SUBCUTANEOUS ONCE
Qty: 0 | Refills: 0 | Status: DISCONTINUED | OUTPATIENT
Start: 2017-03-20 | End: 2017-03-20

## 2017-03-20 RX ORDER — METOCLOPRAMIDE HCL 10 MG
10 TABLET ORAL ONCE
Qty: 0 | Refills: 0 | Status: COMPLETED | OUTPATIENT
Start: 2017-03-20 | End: 2017-03-20

## 2017-03-20 RX ORDER — POLYETHYLENE GLYCOL 3350 17 G/17G
17 POWDER, FOR SOLUTION ORAL
Qty: 119 | Refills: 0 | OUTPATIENT
Start: 2017-03-20 | End: 2017-03-27

## 2017-03-20 RX ORDER — SODIUM CHLORIDE 9 MG/ML
1000 INJECTION INTRAMUSCULAR; INTRAVENOUS; SUBCUTANEOUS ONCE
Qty: 0 | Refills: 0 | Status: COMPLETED | OUTPATIENT
Start: 2017-03-20 | End: 2017-03-20

## 2017-03-20 RX ORDER — FERROUS SULFATE 325(65) MG
1 TABLET ORAL
Qty: 90 | Refills: 0
Start: 2017-03-20 | End: 2017-04-19

## 2017-03-20 RX ORDER — POLYETHYLENE GLYCOL 3350 17 G/17G
17 POWDER, FOR SOLUTION ORAL ONCE
Qty: 0 | Refills: 0 | Status: COMPLETED | OUTPATIENT
Start: 2017-03-20 | End: 2017-03-20

## 2017-03-20 RX ADMIN — Medication 1000 MILLIGRAM(S): at 00:29

## 2017-03-20 RX ADMIN — SODIUM CHLORIDE 2000 MILLILITER(S): 9 INJECTION INTRAMUSCULAR; INTRAVENOUS; SUBCUTANEOUS at 03:43

## 2017-03-20 RX ADMIN — Medication 400 MILLIGRAM(S): at 07:36

## 2017-03-20 RX ADMIN — POLYETHYLENE GLYCOL 3350 17 GRAM(S): 17 POWDER, FOR SOLUTION ORAL at 07:37

## 2017-03-20 RX ADMIN — Medication 400 MILLIGRAM(S): at 00:10

## 2017-03-20 RX ADMIN — MORPHINE SULFATE 4 MILLIGRAM(S): 50 CAPSULE, EXTENDED RELEASE ORAL at 00:29

## 2017-03-20 RX ADMIN — HYDROMORPHONE HYDROCHLORIDE 1 MILLIGRAM(S): 2 INJECTION INTRAMUSCULAR; INTRAVENOUS; SUBCUTANEOUS at 05:21

## 2017-03-20 RX ADMIN — HYDROMORPHONE HYDROCHLORIDE 1 MILLIGRAM(S): 2 INJECTION INTRAMUSCULAR; INTRAVENOUS; SUBCUTANEOUS at 02:16

## 2017-03-20 RX ADMIN — Medication 1 ENEMA: at 08:48

## 2017-03-20 RX ADMIN — Medication 10 MILLIGRAM(S): at 07:36

## 2017-03-20 NOTE — ED ADULT NURSE REASSESSMENT NOTE - NS ED NURSE REASSESS COMMENT FT1
0040: OB at the bedside. 0040: OB at the bedside.  ED Attending Ciarra aware of BP.  No interventions at this time.  Transfusion still in progress.

## 2017-03-20 NOTE — ED ADULT NURSE REASSESSMENT NOTE - NS ED NURSE REASSESS COMMENT FT1
Received report from night RNs Darlyn. Patient currently reporting pain ("not as bad as when I first got here, but I am still having lower abdominal pain.") Patient medicated with IV ofirmev and reglan. Pt provided with extra blankets, patient repositioned, safety maintained. Patient requests to speak with ,  Greer aware. Will continue to monitor.

## 2017-03-20 NOTE — ED ADULT NURSE REASSESSMENT NOTE - NS ED NURSE REASSESS COMMENT FT1
Signed blood transfusion consent in chart. Pt. educated and verbalized understanding of transfusion related reactions. VSS, as documented on transfusion record. Blood verified and administered with second RN present.

## 2017-03-20 NOTE — ED ADULT NURSE REASSESSMENT NOTE - NS ED NURSE REASSESS COMMENT FT1
PRBCs 1 unit initiated at 0025. 0020 VS showing temperature 100.3, IV tylenol completed at 0010. MD Jorgensen aware of temp prior to starting PRBCs, as per MD ok to start transfusion. Consent in chart, 2 RNs at bedside, pt educated on signs of reaction, verbalized understanding.

## 2017-03-27 ENCOUNTER — APPOINTMENT (OUTPATIENT)
Dept: ANTEPARTUM | Facility: CLINIC | Age: 42
End: 2017-03-27

## 2017-03-27 ENCOUNTER — APPOINTMENT (OUTPATIENT)
Dept: MATERNAL FETAL MEDICINE | Facility: CLINIC | Age: 42
End: 2017-03-27

## 2017-04-04 ENCOUNTER — APPOINTMENT (OUTPATIENT)
Dept: MATERNAL FETAL MEDICINE | Facility: CLINIC | Age: 42
End: 2017-04-04

## 2017-05-25 ENCOUNTER — APPOINTMENT (OUTPATIENT)
Dept: MATERNAL FETAL MEDICINE | Facility: CLINIC | Age: 42
End: 2017-05-25

## 2017-06-14 ENCOUNTER — APPOINTMENT (OUTPATIENT)
Dept: MATERNAL FETAL MEDICINE | Facility: CLINIC | Age: 42
End: 2017-06-14

## 2017-07-23 PROCEDURE — 85027 COMPLETE CBC AUTOMATED: CPT

## 2017-07-23 PROCEDURE — 85014 HEMATOCRIT: CPT

## 2017-07-23 PROCEDURE — 99285 EMERGENCY DEPT VISIT HI MDM: CPT | Mod: 25

## 2017-07-23 PROCEDURE — 83605 ASSAY OF LACTIC ACID: CPT

## 2017-07-23 PROCEDURE — 85730 THROMBOPLASTIN TIME PARTIAL: CPT

## 2017-07-23 PROCEDURE — 82947 ASSAY GLUCOSE BLOOD QUANT: CPT

## 2017-07-23 PROCEDURE — 96374 THER/PROPH/DIAG INJ IV PUSH: CPT | Mod: XU

## 2017-07-23 PROCEDURE — 82435 ASSAY OF BLOOD CHLORIDE: CPT

## 2017-07-23 PROCEDURE — 81001 URINALYSIS AUTO W/SCOPE: CPT

## 2017-07-23 PROCEDURE — 86901 BLOOD TYPING SEROLOGIC RH(D): CPT

## 2017-07-23 PROCEDURE — 88309 TISSUE EXAM BY PATHOLOGIST: CPT

## 2017-07-23 PROCEDURE — 86923 COMPATIBILITY TEST ELECTRIC: CPT

## 2017-07-23 PROCEDURE — 85610 PROTHROMBIN TIME: CPT

## 2017-07-23 PROCEDURE — 88305 TISSUE EXAM BY PATHOLOGIST: CPT

## 2017-07-23 PROCEDURE — 82803 BLOOD GASES ANY COMBINATION: CPT

## 2017-07-23 PROCEDURE — P9016: CPT

## 2017-07-23 PROCEDURE — 82330 ASSAY OF CALCIUM: CPT

## 2017-07-23 PROCEDURE — 86850 RBC ANTIBODY SCREEN: CPT

## 2017-07-23 PROCEDURE — 96372 THER/PROPH/DIAG INJ SC/IM: CPT | Mod: XU

## 2017-07-23 PROCEDURE — 80053 COMPREHEN METABOLIC PANEL: CPT

## 2017-07-23 PROCEDURE — 84132 ASSAY OF SERUM POTASSIUM: CPT

## 2017-07-23 PROCEDURE — 76830 TRANSVAGINAL US NON-OB: CPT

## 2017-07-23 PROCEDURE — 88304 TISSUE EXAM BY PATHOLOGIST: CPT

## 2017-07-23 PROCEDURE — 86900 BLOOD TYPING SEROLOGIC ABO: CPT

## 2017-07-23 PROCEDURE — 84702 CHORIONIC GONADOTROPIN TEST: CPT

## 2017-07-23 PROCEDURE — 84295 ASSAY OF SERUM SODIUM: CPT

## 2018-05-13 NOTE — ED PROVIDER NOTE - ENMT, MLM
No significant past surgical history
Airway patent, Nasal mucosa clear. Mouth with normal mucosa. Throat has no vesicles, no oropharyngeal exudates and uvula is midline.

## 2018-05-23 ENCOUNTER — APPOINTMENT (OUTPATIENT)
Dept: MATERNAL FETAL MEDICINE | Facility: CLINIC | Age: 43
End: 2018-05-23

## 2018-05-23 ENCOUNTER — APPOINTMENT (OUTPATIENT)
Dept: ANTEPARTUM | Facility: CLINIC | Age: 43
End: 2018-05-23

## 2018-06-04 ENCOUNTER — OUTPATIENT (OUTPATIENT)
Dept: OUTPATIENT SERVICES | Facility: HOSPITAL | Age: 43
LOS: 1 days | End: 2018-06-04
Payer: MEDICAID

## 2018-06-04 DIAGNOSIS — Z98.89 OTHER SPECIFIED POSTPROCEDURAL STATES: Chronic | ICD-10-CM

## 2018-06-04 DIAGNOSIS — D25.1 INTRAMURAL LEIOMYOMA OF UTERUS: ICD-10-CM

## 2018-06-04 LAB
GLUCOSE BLDC GLUCOMTR-MCNC: 113 MG/DL — HIGH (ref 70–99)
HCG SERPL-ACNC: 1821 MIU/ML — HIGH

## 2018-06-04 PROCEDURE — 86850 RBC ANTIBODY SCREEN: CPT

## 2018-06-04 PROCEDURE — 36415 COLL VENOUS BLD VENIPUNCTURE: CPT

## 2018-06-04 PROCEDURE — 86880 COOMBS TEST DIRECT: CPT

## 2018-06-04 PROCEDURE — 84702 CHORIONIC GONADOTROPIN TEST: CPT

## 2018-06-04 PROCEDURE — 86901 BLOOD TYPING SEROLOGIC RH(D): CPT

## 2018-06-04 PROCEDURE — 86077 PHYS BLOOD BANK SERV XMATCH: CPT

## 2018-06-04 PROCEDURE — 86900 BLOOD TYPING SEROLOGIC ABO: CPT

## 2018-06-04 PROCEDURE — 82962 GLUCOSE BLOOD TEST: CPT

## 2018-06-04 PROCEDURE — 86904 BLOOD TYPING PATIENT SERUM: CPT

## 2018-06-04 PROCEDURE — 86870 RBC ANTIBODY IDENTIFICATION: CPT

## 2018-06-05 ENCOUNTER — OTHER (OUTPATIENT)
Age: 43
End: 2018-06-05

## 2018-06-25 ENCOUNTER — APPOINTMENT (OUTPATIENT)
Dept: MATERNAL FETAL MEDICINE | Facility: CLINIC | Age: 43
End: 2018-06-25
Payer: MEDICAID

## 2018-06-25 ENCOUNTER — OUTPATIENT (OUTPATIENT)
Dept: OUTPATIENT SERVICES | Facility: HOSPITAL | Age: 43
LOS: 1 days | End: 2018-06-25
Payer: MEDICAID

## 2018-06-25 ENCOUNTER — ASOB RESULT (OUTPATIENT)
Age: 43
End: 2018-06-25

## 2018-06-25 ENCOUNTER — APPOINTMENT (OUTPATIENT)
Dept: ANTEPARTUM | Facility: CLINIC | Age: 43
End: 2018-06-25
Payer: MEDICAID

## 2018-06-25 DIAGNOSIS — Z98.89 OTHER SPECIFIED POSTPROCEDURAL STATES: Chronic | ICD-10-CM

## 2018-06-25 DIAGNOSIS — O26.21 PREGNANCY CARE FOR PATIENT WITH RECURRENT PREGNANCY LOSS, FIRST TRIMESTER: ICD-10-CM

## 2018-06-25 DIAGNOSIS — O09.899 SUPERVISION OF OTHER HIGH RISK PREGNANCIES, UNSPECIFIED TRIMESTER: ICD-10-CM

## 2018-06-25 PROCEDURE — G0463: CPT

## 2018-06-25 PROCEDURE — 76817 TRANSVAGINAL US OBSTETRIC: CPT

## 2018-06-25 PROCEDURE — 99213 OFFICE O/P EST LOW 20 MIN: CPT | Mod: GC

## 2018-06-25 PROCEDURE — 76801 OB US < 14 WKS SINGLE FETUS: CPT

## 2018-06-25 PROCEDURE — 76817 TRANSVAGINAL US OBSTETRIC: CPT | Mod: 26

## 2018-06-25 PROCEDURE — 76801 OB US < 14 WKS SINGLE FETUS: CPT | Mod: 26

## 2018-06-26 DIAGNOSIS — O02.1 MISSED ABORTION: ICD-10-CM

## 2018-07-09 ENCOUNTER — OUTPATIENT (OUTPATIENT)
Dept: OUTPATIENT SERVICES | Facility: HOSPITAL | Age: 43
LOS: 1 days | Discharge: HOME | End: 2018-07-09

## 2018-07-09 ENCOUNTER — INPATIENT (INPATIENT)
Facility: HOSPITAL | Age: 43
LOS: 0 days | Discharge: HOME | DRG: 770 | End: 2018-07-10
Attending: OBSTETRICS & GYNECOLOGY | Admitting: OBSTETRICS & GYNECOLOGY
Payer: MEDICAID

## 2018-07-09 ENCOUNTER — RESULT REVIEW (OUTPATIENT)
Age: 43
End: 2018-07-09

## 2018-07-09 VITALS
HEART RATE: 86 BPM | OXYGEN SATURATION: 100 % | DIASTOLIC BLOOD PRESSURE: 94 MMHG | RESPIRATION RATE: 16 BRPM | SYSTOLIC BLOOD PRESSURE: 148 MMHG | TEMPERATURE: 99 F

## 2018-07-09 DIAGNOSIS — Z98.89 OTHER SPECIFIED POSTPROCEDURAL STATES: Chronic | ICD-10-CM

## 2018-07-09 LAB
ANION GAP SERPL CALC-SCNC: 15 MMOL/L — SIGNIFICANT CHANGE UP (ref 5–17)
APTT BLD: 26.5 SEC — LOW (ref 27.5–37.4)
BASOPHILS NFR BLD AUTO: 0.2 % — SIGNIFICANT CHANGE UP (ref 0–2)
BLD GP AB SCN SERPL QL: NEGATIVE — SIGNIFICANT CHANGE UP
BUN SERPL-MCNC: 13 MG/DL — SIGNIFICANT CHANGE UP (ref 7–23)
CALCIUM SERPL-MCNC: 8.4 MG/DL — SIGNIFICANT CHANGE UP (ref 8.4–10.5)
CHLORIDE SERPL-SCNC: 98 MMOL/L — SIGNIFICANT CHANGE UP (ref 96–108)
CO2 SERPL-SCNC: 23 MMOL/L — SIGNIFICANT CHANGE UP (ref 22–31)
CREAT SERPL-MCNC: 0.98 MG/DL — SIGNIFICANT CHANGE UP (ref 0.5–1.3)
EOSINOPHIL NFR BLD AUTO: 3.6 % — SIGNIFICANT CHANGE UP (ref 0–6)
GLUCOSE BLDC GLUCOMTR-MCNC: 140 MG/DL — HIGH (ref 70–99)
GLUCOSE SERPL-MCNC: 142 MG/DL — HIGH (ref 70–99)
HCG SERPL-ACNC: HIGH MIU/ML
HCT VFR BLD CALC: 32.9 % — LOW (ref 34.5–45)
HCT VFR BLD CALC: 33.8 % — LOW (ref 34.5–45)
HGB BLD-MCNC: 10.2 G/DL — LOW (ref 11.5–15.5)
HGB BLD-MCNC: 10.5 G/DL — LOW (ref 11.5–15.5)
INR BLD: 1.05 — SIGNIFICANT CHANGE UP (ref 0.88–1.16)
LYMPHOCYTES # BLD AUTO: 54.6 % — HIGH (ref 13–44)
MCHC RBC-ENTMCNC: 24.5 PG — LOW (ref 27–34)
MCHC RBC-ENTMCNC: 24.8 PG — LOW (ref 27–34)
MCHC RBC-ENTMCNC: 31 G/DL — LOW (ref 32–36)
MCHC RBC-ENTMCNC: 31.1 G/DL — LOW (ref 32–36)
MCV RBC AUTO: 79 FL — LOW (ref 80–100)
MCV RBC AUTO: 79.9 FL — LOW (ref 80–100)
MONOCYTES NFR BLD AUTO: 11.3 % — SIGNIFICANT CHANGE UP (ref 2–14)
NEUTROPHILS NFR BLD AUTO: 30.3 % — LOW (ref 43–77)
PLATELET # BLD AUTO: 201 K/UL — SIGNIFICANT CHANGE UP (ref 150–400)
PLATELET # BLD AUTO: 215 K/UL — SIGNIFICANT CHANGE UP (ref 150–400)
POTASSIUM SERPL-MCNC: 4.1 MMOL/L — SIGNIFICANT CHANGE UP (ref 3.5–5.3)
POTASSIUM SERPL-SCNC: 4.1 MMOL/L — SIGNIFICANT CHANGE UP (ref 3.5–5.3)
PROTHROM AB SERPL-ACNC: 11.7 SEC — SIGNIFICANT CHANGE UP (ref 9.8–12.7)
RBC # BLD: 4.12 M/UL — SIGNIFICANT CHANGE UP (ref 3.8–5.2)
RBC # BLD: 4.28 M/UL — SIGNIFICANT CHANGE UP (ref 3.8–5.2)
RBC # FLD: 20.6 % — HIGH (ref 10.3–16.9)
RBC # FLD: 20.6 % — HIGH (ref 10.3–16.9)
RH IG SCN BLD-IMP: POSITIVE — SIGNIFICANT CHANGE UP
SODIUM SERPL-SCNC: 136 MMOL/L — SIGNIFICANT CHANGE UP (ref 135–145)
WBC # BLD: 4 K/UL — SIGNIFICANT CHANGE UP (ref 3.8–10.5)
WBC # BLD: 4.2 K/UL — SIGNIFICANT CHANGE UP (ref 3.8–10.5)
WBC # FLD AUTO: 4 K/UL — SIGNIFICANT CHANGE UP (ref 3.8–10.5)
WBC # FLD AUTO: 4.2 K/UL — SIGNIFICANT CHANGE UP (ref 3.8–10.5)

## 2018-07-09 PROCEDURE — 93010 ELECTROCARDIOGRAM REPORT: CPT

## 2018-07-09 PROCEDURE — 99285 EMERGENCY DEPT VISIT HI MDM: CPT | Mod: 25

## 2018-07-09 RX ORDER — SODIUM CHLORIDE 9 MG/ML
1000 INJECTION, SOLUTION INTRAVENOUS
Qty: 0 | Refills: 0 | Status: DISCONTINUED | OUTPATIENT
Start: 2018-07-09 | End: 2018-07-09

## 2018-07-09 RX ORDER — OXYCODONE AND ACETAMINOPHEN 5; 325 MG/1; MG/1
1 TABLET ORAL EVERY 4 HOURS
Qty: 0 | Refills: 0 | Status: DISCONTINUED | OUTPATIENT
Start: 2018-07-09 | End: 2018-07-09

## 2018-07-09 RX ORDER — ACETAMINOPHEN 500 MG
650 TABLET ORAL ONCE
Qty: 0 | Refills: 0 | Status: DISCONTINUED | OUTPATIENT
Start: 2018-07-09 | End: 2018-07-10

## 2018-07-09 RX ORDER — ONDANSETRON 8 MG/1
4 TABLET, FILM COATED ORAL ONCE
Qty: 0 | Refills: 0 | Status: DISCONTINUED | OUTPATIENT
Start: 2018-07-09 | End: 2018-07-10

## 2018-07-09 RX ORDER — OXYCODONE AND ACETAMINOPHEN 5; 325 MG/1; MG/1
2 TABLET ORAL EVERY 6 HOURS
Qty: 0 | Refills: 0 | Status: DISCONTINUED | OUTPATIENT
Start: 2018-07-09 | End: 2018-07-10

## 2018-07-09 RX ADMIN — OXYCODONE AND ACETAMINOPHEN 2 TABLET(S): 5; 325 TABLET ORAL at 23:48

## 2018-07-09 RX ADMIN — Medication 110 MILLIGRAM(S): at 23:57

## 2018-07-09 NOTE — ED PROVIDER NOTE - MEDICAL DECISION MAKING DETAILS
Pt w/ known incomplete . Pt for admission for OR D&C. Gyn consulted and will see pt. Pre-op labs, EKG

## 2018-07-09 NOTE — H&P ADULT - NSHPPHYSICALEXAM_GEN_ALL_CORE
PHYSICAL EXAM:   Vital Signs Last 24 Hrs  T(C): 37.1 (09 Jul 2018 19:23), Max: 37.1 (09 Jul 2018 19:23)  T(F): 98.7 (09 Jul 2018 19:23), Max: 98.7 (09 Jul 2018 19:23)  HR: 86 (09 Jul 2018 19:23) (86 - 86)  BP: 148/94 (09 Jul 2018 19:23) (148/94 - 148/94)  BP(mean): --  RR: 16 (09 Jul 2018 19:23) (16 - 16)  SpO2: 100% (09 Jul 2018 19:23) (100% - 100%)    **************************  Constitutional: Alert & Oriented x3, distressed over miscarriage  Respiratory: regular inspiratory effort  Gastrointestinal: soft, slightly tender to palpation in suprapubic region, no rebound or guarding   Pelvic exam: ~40cc of clot in vagina on speculum exam; products of conception noted on patient's pad  Extremities: no calf tenderness or swelling

## 2018-07-09 NOTE — H&P ADULT - FAMILY HISTORY
Family history of diabetes mellitus     Grandparent  Still living? Unknown  Family history of hypertension, Age at diagnosis: Age Unknown

## 2018-07-09 NOTE — H&P ADULT - NSHPLABSRESULTS_GEN_ALL_CORE
LABS:                        10.5   4.2   )-----------( 215      ( 09 Jul 2018 18:40 )             33.8     07-09    136  |  98  |  13  ----------------------------<  142<H>  4.1   |  23  |  0.98    Ca    8.4      09 Jul 2018 18:40      PT/INR - ( 09 Jul 2018 18:40 )   PT: 11.7 sec;   INR: 1.05         PTT - ( 09 Jul 2018 18:40 )  PTT:26.5 sec    HCG Quantitative, Serum: 78414.0 mIU/mL (07-09 @ 18:40)

## 2018-07-09 NOTE — ED CLERICAL - NS ED CLERK NOTE PRE-ARRIVAL INFORMATION; ADDITIONAL PRE-ARRIVAL INFORMATION
43/F/ QUITA BURGOS ACTIVELY BLEEDING TRANSFER FROM [facility] TO ED FOR ADM FOR OR (DR. SMITH) ASK  FOR REFERRAL

## 2018-07-09 NOTE — ED PROVIDER NOTE - OBJECTIVE STATEMENT
Pt w/ PMHx fibroids, chronic anemia s/p transfusion (most recently last year), PSHx gastric bypass, c/s,  @ 11w2d by LMP, transferred from [facility] for admission for D&C. Pt w/ known incomplete , no FH on US..  Last week was spotting and today started having increased vaginal bleeding with pelvic cramping.  Was scheduled for D+C at [facility], however drank fluids just prior to procedure time, therefore case cancelled by anesthesia and pt sent here for add-on D+C.  She reports having soaked through one pad in approximately 1 hour of time since arriving at the ED.  Denies dizziness or SOB, palpitations.  Denies nausea, vomiting.  Reports strong pelvic cramping.

## 2018-07-09 NOTE — ED ADULT NURSE NOTE - CHIEF COMPLAINT
The patient is a 43y Female complaining of The patient is a 43y Female complaining of vaginal bleed.

## 2018-07-09 NOTE — ED ADULT NURSE NOTE - OBJECTIVE STATEMENT
Patient states was in [facility] for D & C procedure, has sudden spontaneous vaginal bleed while in waiting room w/ abdominal cramping pain and lightheadedness.  Patient states 8 weeks pregnant.    Labs done at [facility]  Hgb 10.8  Hct 35.7 PMHx. Chronic anemia and previous miscarraige. Patient states was in [facility] for D & C procedure, has sudden spontaneous vaginal bleed while in waiting room w/ abdominal cramping pain and lightheadedness.  Patient states 8 weeks pregnant, LMP May 4 .     Labs done at [facility]  Hgb 10.8  Hct 35.7 PMHx. Chronic anemia and previous miscarraige.

## 2018-07-09 NOTE — ED ADULT NURSE NOTE - CHPI ED SYMPTOMS NEG
no hematuria/no fever/no diarrhea/no chills/no dysuria/no blood in stool/no burning urination/no abdominal distension/no nausea/no vomiting

## 2018-07-09 NOTE — H&P ADULT - ASSESSMENT
44yo  @ 11w2d by LMP presenting with incomplete .  - admit to GYN for suction D+C  - NPO  - 2u PRBC on hold for OR due to h/o anemia and need for blood transfusions  - IV hydration

## 2018-07-09 NOTE — ED ADULT NURSE REASSESSMENT NOTE - NS ED NURSE REASSESS COMMENT FT1
Patient a/ox 3, states abdominal cramping pain decreased and tolerable, w/ continuous vaginal bleed.  NSR on EKG, vital signs stble.  Brought up to OR at this time in stable condition.

## 2018-07-09 NOTE — ED PROVIDER NOTE - PHYSICAL EXAMINATION
Constitutional: Well appearing, well nourished, awake, alert, oriented to person, place, time/situation and in no apparent distress.  ENMT: Airway patent. Normal MM  Eyes: Clear bilaterally. no conjunctival pallor  Cardiac: Normal rate, regular rhythm.  Heart sounds S1, S2.  No murmurs, rubs or gallops.  Respiratory: Breaths sounds equal and clear b/l. No increased WOB, tachypnea, hypoxia, or accessory mm use. Pt speaks in full sentences.   Gastrointestinal: Abd soft, mild ttp diffuse lower abd, obese, NABS. No guarding, rebound, or rigidity. No pulsatile abdominal masses. No organomegaly appreciated. No CVAT   : deferred to gyn  Musculoskeletal: Range of motion is not limited  Neuro: Alert and oriented, grossly non focal  Skin: Skin normal color for race, warm, dry and intact. No evidence of rash.  Psych: Alert and oriented to person, place, time/situation. normal mood and affect. no apparent risk to self or others.

## 2018-07-10 VITALS
HEART RATE: 64 BPM | RESPIRATION RATE: 16 BRPM | OXYGEN SATURATION: 100 % | SYSTOLIC BLOOD PRESSURE: 154 MMHG | DIASTOLIC BLOOD PRESSURE: 72 MMHG

## 2018-07-10 RX ADMIN — OXYCODONE AND ACETAMINOPHEN 2 TABLET(S): 5; 325 TABLET ORAL at 01:00

## 2018-07-10 NOTE — PACU DISCHARGE NOTE - COMMENTS
VSS. Pt discharged from PACU after meeting criteria. Mild cramping noted. IV removed by evening RN. Pt off monitor. Pt tolerated small amout of sandwich and water. Assissted in getting dressed. Discharge instruction gone over with patient and significant other with good understanding. Transportation arranged. Pt escorted to lobby by transport team in wheel chair. Allbelongings with patient.

## 2018-07-12 DIAGNOSIS — O99.011 ANEMIA COMPLICATING PREGNANCY, FIRST TRIMESTER: ICD-10-CM

## 2018-07-12 DIAGNOSIS — O24.111 PRE-EXISTING TYPE 2 DIABETES MELLITUS, IN PREGNANCY, FIRST TRIMESTER: ICD-10-CM

## 2018-07-12 DIAGNOSIS — O34.11 MATERNAL CARE FOR BENIGN TUMOR OF CORPUS UTERI, FIRST TRIMESTER: ICD-10-CM

## 2018-07-12 DIAGNOSIS — O02.1 MISSED ABORTION: ICD-10-CM

## 2018-07-12 DIAGNOSIS — D25.9 LEIOMYOMA OF UTERUS, UNSPECIFIED: ICD-10-CM

## 2018-07-12 DIAGNOSIS — Z3A.11 11 WEEKS GESTATION OF PREGNANCY: ICD-10-CM

## 2018-07-12 DIAGNOSIS — D25.1 INTRAMURAL LEIOMYOMA OF UTERUS: ICD-10-CM

## 2018-07-12 LAB — CHROM ANALY OVERALL INTERP SPEC-IMP: SIGNIFICANT CHANGE UP

## 2018-07-16 LAB — SURGICAL PATHOLOGY STUDY: SIGNIFICANT CHANGE UP

## 2018-08-13 LAB — MISCELLANEOUS TEST NAME: SIGNIFICANT CHANGE UP

## 2018-11-13 PROBLEM — E11.9 TYPE 2 DIABETES MELLITUS WITHOUT COMPLICATIONS: Chronic | Status: ACTIVE | Noted: 2018-06-04

## 2018-11-16 VITALS
WEIGHT: 231.26 LBS | SYSTOLIC BLOOD PRESSURE: 120 MMHG | RESPIRATION RATE: 18 BRPM | OXYGEN SATURATION: 100 % | DIASTOLIC BLOOD PRESSURE: 78 MMHG | HEIGHT: 66 IN | TEMPERATURE: 98 F | HEART RATE: 79 BPM

## 2018-12-26 PROCEDURE — 85610 PROTHROMBIN TIME: CPT

## 2018-12-26 PROCEDURE — 85730 THROMBOPLASTIN TIME PARTIAL: CPT

## 2018-12-26 PROCEDURE — 86850 RBC ANTIBODY SCREEN: CPT

## 2018-12-26 PROCEDURE — 82962 GLUCOSE BLOOD TEST: CPT

## 2018-12-26 PROCEDURE — 86922 COMPATIBILITY TEST ANTIGLOB: CPT

## 2018-12-26 PROCEDURE — 36415 COLL VENOUS BLD VENIPUNCTURE: CPT

## 2018-12-26 PROCEDURE — 80048 BASIC METABOLIC PNL TOTAL CA: CPT

## 2018-12-26 PROCEDURE — 84702 CHORIONIC GONADOTROPIN TEST: CPT

## 2018-12-26 PROCEDURE — 85027 COMPLETE CBC AUTOMATED: CPT

## 2018-12-26 PROCEDURE — 93005 ELECTROCARDIOGRAM TRACING: CPT

## 2018-12-26 PROCEDURE — 86900 BLOOD TYPING SEROLOGIC ABO: CPT

## 2018-12-26 PROCEDURE — 85025 COMPLETE CBC W/AUTO DIFF WBC: CPT

## 2018-12-26 PROCEDURE — 86901 BLOOD TYPING SEROLOGIC RH(D): CPT

## 2018-12-26 PROCEDURE — 99285 EMERGENCY DEPT VISIT HI MDM: CPT | Mod: 25

## 2018-12-26 PROCEDURE — 88305 TISSUE EXAM BY PATHOLOGIST: CPT

## 2018-12-26 PROCEDURE — 86921 COMPATIBILITY TEST INCUBATE: CPT

## 2019-01-28 ENCOUNTER — RESULT REVIEW (OUTPATIENT)
Age: 44
End: 2019-01-28

## 2019-01-28 ENCOUNTER — INPATIENT (INPATIENT)
Facility: HOSPITAL | Age: 44
LOS: 1 days | Discharge: HOME | DRG: 742 | End: 2019-01-30
Attending: OBSTETRICS & GYNECOLOGY | Admitting: OBSTETRICS & GYNECOLOGY
Payer: MEDICAID

## 2019-01-28 DIAGNOSIS — Z98.89 OTHER SPECIFIED POSTPROCEDURAL STATES: Chronic | ICD-10-CM

## 2019-01-28 LAB
GLUCOSE BLDC GLUCOMTR-MCNC: 125 MG/DL — HIGH (ref 70–99)
GLUCOSE BLDC GLUCOMTR-MCNC: 158 MG/DL — HIGH (ref 70–99)
GLUCOSE BLDC GLUCOMTR-MCNC: 253 MG/DL — HIGH (ref 70–99)
HCT VFR BLD CALC: 28.7 % — LOW (ref 34.5–45)
HGB BLD-MCNC: 8.2 G/DL — LOW (ref 11.5–15.5)
MCHC RBC-ENTMCNC: 22.1 PG — LOW (ref 27–34)
MCHC RBC-ENTMCNC: 28.6 G/DL — LOW (ref 32–36)
MCV RBC AUTO: 77.4 FL — LOW (ref 80–100)
PLATELET # BLD AUTO: 427 K/UL — HIGH (ref 150–400)
RBC # BLD: 3.71 M/UL — LOW (ref 3.8–5.2)
RBC # FLD: 20.3 % — HIGH (ref 10.3–16.9)
WBC # BLD: 6.9 K/UL — SIGNIFICANT CHANGE UP (ref 3.8–10.5)
WBC # FLD AUTO: 6.9 K/UL — SIGNIFICANT CHANGE UP (ref 3.8–10.5)

## 2019-01-28 PROCEDURE — 86077 PHYS BLOOD BANK SERV XMATCH: CPT

## 2019-01-28 RX ORDER — DEXTROSE 50 % IN WATER 50 %
15 SYRINGE (ML) INTRAVENOUS ONCE
Qty: 0 | Refills: 0 | Status: DISCONTINUED | OUTPATIENT
Start: 2019-01-28 | End: 2019-01-28

## 2019-01-28 RX ORDER — GLUCAGON INJECTION, SOLUTION 0.5 MG/.1ML
1 INJECTION, SOLUTION SUBCUTANEOUS ONCE
Qty: 0 | Refills: 0 | Status: DISCONTINUED | OUTPATIENT
Start: 2019-01-28 | End: 2019-01-30

## 2019-01-28 RX ORDER — DEXTROSE 50 % IN WATER 50 %
12.5 SYRINGE (ML) INTRAVENOUS ONCE
Qty: 0 | Refills: 0 | Status: DISCONTINUED | OUTPATIENT
Start: 2019-01-28 | End: 2019-01-30

## 2019-01-28 RX ORDER — HYDROMORPHONE HYDROCHLORIDE 2 MG/ML
30 INJECTION INTRAMUSCULAR; INTRAVENOUS; SUBCUTANEOUS
Qty: 0 | Refills: 0 | Status: DISCONTINUED | OUTPATIENT
Start: 2019-01-28 | End: 2019-01-28

## 2019-01-28 RX ORDER — NICOTINE POLACRILEX 2 MG
1 GUM BUCCAL DAILY
Qty: 0 | Refills: 0 | Status: DISCONTINUED | OUTPATIENT
Start: 2019-01-28 | End: 2019-01-30

## 2019-01-28 RX ORDER — DEXTROSE 50 % IN WATER 50 %
25 SYRINGE (ML) INTRAVENOUS ONCE
Qty: 0 | Refills: 0 | Status: DISCONTINUED | OUTPATIENT
Start: 2019-01-28 | End: 2019-01-30

## 2019-01-28 RX ORDER — NALOXONE HYDROCHLORIDE 4 MG/.1ML
0.1 SPRAY NASAL
Qty: 0 | Refills: 0 | Status: DISCONTINUED | OUTPATIENT
Start: 2019-01-28 | End: 2019-01-29

## 2019-01-28 RX ORDER — SODIUM CHLORIDE 9 MG/ML
1000 INJECTION, SOLUTION INTRAVENOUS
Qty: 0 | Refills: 0 | Status: DISCONTINUED | OUTPATIENT
Start: 2019-01-28 | End: 2019-01-29

## 2019-01-28 RX ORDER — METOCLOPRAMIDE HCL 10 MG
10 TABLET ORAL EVERY 6 HOURS
Qty: 0 | Refills: 0 | Status: DISCONTINUED | OUTPATIENT
Start: 2019-01-28 | End: 2019-01-30

## 2019-01-28 RX ORDER — INSULIN LISPRO 100/ML
VIAL (ML) SUBCUTANEOUS
Qty: 0 | Refills: 0 | Status: DISCONTINUED | OUTPATIENT
Start: 2019-01-28 | End: 2019-01-30

## 2019-01-28 RX ORDER — KETOROLAC TROMETHAMINE 30 MG/ML
30 SYRINGE (ML) INJECTION EVERY 6 HOURS
Qty: 0 | Refills: 0 | Status: DISCONTINUED | OUTPATIENT
Start: 2019-01-28 | End: 2019-01-30

## 2019-01-28 RX ORDER — ONDANSETRON 8 MG/1
4 TABLET, FILM COATED ORAL EVERY 6 HOURS
Qty: 0 | Refills: 0 | Status: DISCONTINUED | OUTPATIENT
Start: 2019-01-28 | End: 2019-01-30

## 2019-01-28 RX ORDER — ENOXAPARIN SODIUM 100 MG/ML
40 INJECTION SUBCUTANEOUS EVERY 24 HOURS
Qty: 0 | Refills: 0 | Status: DISCONTINUED | OUTPATIENT
Start: 2019-01-29 | End: 2019-01-30

## 2019-01-28 RX ORDER — SODIUM CHLORIDE 9 MG/ML
1000 INJECTION, SOLUTION INTRAVENOUS
Qty: 0 | Refills: 0 | Status: DISCONTINUED | OUTPATIENT
Start: 2019-01-28 | End: 2019-01-28

## 2019-01-28 RX ORDER — BUPIVACAINE 13.3 MG/ML
20 INJECTION, SUSPENSION, LIPOSOMAL INFILTRATION ONCE
Qty: 0 | Refills: 0 | Status: DISCONTINUED | OUTPATIENT
Start: 2019-01-28 | End: 2019-01-28

## 2019-01-28 RX ADMIN — HYDROMORPHONE HYDROCHLORIDE 30 MILLILITER(S): 2 INJECTION INTRAMUSCULAR; INTRAVENOUS; SUBCUTANEOUS at 20:00

## 2019-01-28 RX ADMIN — Medication 6: at 22:07

## 2019-01-28 RX ADMIN — Medication 30 MILLIGRAM(S): at 22:20

## 2019-01-28 RX ADMIN — Medication 30 MILLIGRAM(S): at 21:22

## 2019-01-28 NOTE — PROGRESS NOTE ADULT - ASSESSMENT
toradol for pain control  no further PCA use as patient would have to go to telemetry and currently is not in pain  f/u cbc

## 2019-01-28 NOTE — BRIEF OPERATIVE NOTE - PROCEDURE
<<-----Click on this checkbox to enter Procedure Abdominal myomectomy  01/28/2019    Active  [id number]

## 2019-01-28 NOTE — BRIEF OPERATIVE NOTE - PRE-OP
Start zofran and promethazine for nausea, can alternate every 3 hrs. Use pepto-bismuth and tylenol only. hydratre with gatorade and frozen pops. <<-----Click on this checkbox to enter Pre-Op Dx

## 2019-01-28 NOTE — H&P ADULT - ASSESSMENT
44yo P1 with heavy vaginal bleeding and pelvic pain presenting for scheduled abdominal myomectomy  -NPO  -IVF  -plan for admission  -exparel ordered

## 2019-01-28 NOTE — PROGRESS NOTE ADULT - ASSESSMENT
43y Female POD#0 s/p abdominal myomectomy.                                        1. Neuro/Pain:  dilaudid PCA, toradol  2  CV:   VS per routine  3. Pulm: Encourage ISS  4. GI: regular   5. :  Ribeiro in place, TOV in AM  6. Heme: AM CBC  7. ID: --  8. DVT ppx: SCDs  9. Dispo: per attending

## 2019-01-28 NOTE — PROGRESS NOTE ADULT - SUBJECTIVE AND OBJECTIVE BOX
GYN POC   Patient seen at bedside.  Pain controlled on PCA. Tolerating regular diet.  No OOB yet.  Ribeiro in place.    Denies CP, palpitations, SOB, fever, chills, nausea, vomiting.    Vital Signs Last 24 Hrs  T(C): 36.4 (28 Jan 2019 19:58), Max: 37.2 (28 Jan 2019 17:33)  T(F): 97.5 (28 Jan 2019 19:58), Max: 98.9 (28 Jan 2019 17:33)  HR: 67 (28 Jan 2019 19:58) (58 - 78)  BP: 116/67 (28 Jan 2019 19:58) (104/48 - 159/72)  BP(mean): 87 (28 Jan 2019 19:28) (69 - 87)  RR: 16 (28 Jan 2019 19:58) (14 - 114)  SpO2: 100% (28 Jan 2019 19:58) (94% - 100%)    Physical Exam:  Gen: No Acute Distress  Pulm: regular work of breathing  GI: obese, soft, appropriately tender, nondistended, no rebound, no guarding.  Dressing C/D/I  : Ribeiro in place  Ext: SCDs in place, wnl    I&O's Summary    28 Jan 2019 07:01  -  28 Jan 2019 20:36  --------------------------------------------------------  IN: 375 mL / OUT: 150 mL / NET: 225 mL      MEDICATIONS  (STANDING):  BUpivacaine liposome 1.3% Injectable (no eMAR) 20 milliLiter(s) Local Injection once  dextrose 5%. 1000 milliLiter(s) (50 mL/Hr) IV Continuous <Continuous>  dextrose 50% Injectable 12.5 Gram(s) IV Push once  dextrose 50% Injectable 25 Gram(s) IV Push once  dextrose 50% Injectable 25 Gram(s) IV Push once  HYDROmorphone PCA (1 mG/mL) 30 milliLiter(s) PCA Continuous PCA Continuous  insulin lispro (HumaLOG) corrective regimen sliding scale   SubCutaneous three times a day before meals  ketorolac   Injectable 30 milliGRAM(s) IV Push every 6 hours  lactated ringers. 1000 milliLiter(s) (125 mL/Hr) IV Continuous <Continuous>  nicotine -   7 mG/24Hr(s) Patch 1 patch Transdermal daily    MEDICATIONS  (PRN):  dextrose 40% Gel 15 Gram(s) Oral once PRN Blood Glucose LESS THAN 70 milliGRAM(s)/deciliter  glucagon  Injectable 1 milliGRAM(s) IntraMuscular once PRN Glucose LESS THAN 70 milligrams/deciliter  metoclopramide Injectable 10 milliGRAM(s) IV Push every 6 hours PRN Nausea and/or Vomiting  naloxone Injectable 0.1 milliGRAM(s) IV Push every 3 minutes PRN For ANY of the following changes in patient status:  A. RR LESS THAN 10 breaths per minute, B. Oxygen saturation LESS THAN 90%, C. Sedation score of 6  ondansetron Injectable 4 milliGRAM(s) IV Push every 6 hours PRN Nausea and/or Vomiting 2nd line    Allergies    No Known Allergies    Intolerances

## 2019-01-28 NOTE — H&P ADULT - NSHPPHYSICALEXAM_GEN_ALL_CORE
Vital Signs Last 24 Hrs  T(C): 37.2 (28 Jan 2019 17:33), Max: 37.2 (28 Jan 2019 17:33)  T(F): 98.9 (28 Jan 2019 17:33), Max: 98.9 (28 Jan 2019 17:33)  HR: 64 (28 Jan 2019 17:48) (60 - 66)  BP: 114/58 (28 Jan 2019 17:48) (113/54 - 159/72)  BP(mean): 83 (28 Jan 2019 17:48) (78 - 83)  RR: 12 (28 Jan 2019 17:48) (10 - 14)  SpO2: 94% (28 Jan 2019 17:48) (94% - 100%)

## 2019-01-28 NOTE — BRIEF OPERATIVE NOTE - OPERATION/FINDINGS
10cm uterus with large 9cm exophytic fundal fibroid; multiple small subserosal and intramural fibroids removed; adenomyosis; paratubal cyst

## 2019-01-28 NOTE — PROGRESS NOTE ADULT - SUBJECTIVE AND OBJECTIVE BOX
GYN PROGRESS NOTE PGY4    Patient re-evaluated at the bedside after stopping PCA. When assessed 30 min prior, was found to be "in and out of sleep" and having pushed PCA button multiple times. The PCA was then discontinued and removed at that time. At the re-assessment, patient sitting up and eating a sandwich still appears tired. Not currently in pain, received Toradol. receive ISS due to elevated FS. no s/sx of anemia other than fatigue, obtaining post op cbc now.    T(C): 36.4 (01-28-19 @ 19:58), Max: 37.2 (01-28-19 @ 17:33)  HR: 67 (01-28-19 @ 19:58) (58 - 78)  BP: 116/67 (01-28-19 @ 19:58) (104/48 - 159/72)  RR: 16 (01-28-19 @ 19:58) (14 - 114)  SpO2: 100% (01-28-19 @ 19:58) (94% - 100%)    GEN: patient appears well  LUNGS: no respiratory distress  ABD: soft, non tender, non distended, pressure dressing on  EXT: no calf tenderness        01-28 @ 07:01  -  01-28 @ 22:32  --------------------------------------------------------  IN: 750 mL / OUT: 150 mL / NET: 600 mL      MEDICATIONS  (STANDING):  dextrose 50% Injectable 12.5 Gram(s) IV Push once  dextrose 50% Injectable 25 Gram(s) IV Push once  dextrose 50% Injectable 25 Gram(s) IV Push once  insulin lispro (HumaLOG) corrective regimen sliding scale   SubCutaneous Before meals and at bedtime  ketorolac   Injectable 30 milliGRAM(s) IV Push every 6 hours  lactated ringers. 1000 milliLiter(s) (125 mL/Hr) IV Continuous <Continuous>  nicotine -   7 mG/24Hr(s) Patch 1 patch Transdermal daily    MEDICATIONS  (PRN):  glucagon  Injectable 1 milliGRAM(s) IntraMuscular once PRN Glucose LESS THAN 70 milligrams/deciliter  metoclopramide Injectable 10 milliGRAM(s) IV Push every 6 hours PRN Nausea and/or Vomiting  naloxone Injectable 0.1 milliGRAM(s) IV Push every 3 minutes PRN For ANY of the following changes in patient status:  A. RR LESS THAN 10 breaths per minute, B. Oxygen saturation LESS THAN 90%, C. Sedation score of 6  ondansetron Injectable 4 milliGRAM(s) IV Push every 6 hours PRN Nausea and/or Vomiting 2nd line

## 2019-01-28 NOTE — H&P ADULT - HISTORY OF PRESENT ILLNESS
44yo  presenting for scheduled abdominal myomectomy. Patient reports severe pelvic pain and heavy vaginal bleeding with menses related to the fibroid and desires definitive management. Pt wants to bear further children.     OBHx:   C/S  at 26w (pt reports placenta previa and foot noted in vagina) - received prophylactic cerclage in this pregnancy  MAB D&C x3 most recent 2018  MAB D&E x4 (reports these were all 2nd tri losses)    GYNHx: denies abnormal paps or STDs  PMH: CHTN on cozaar 25mg qd (pt does not take); T2DM on metformin 500mg qhs (pt does not take)   PSH: l/s gastric bypass ; c/s x1, multiple D&C/Es   SocHx: currently smoker, smokes 10 cigarettes per day  Meds: PO iron (noncompliant with other meds)  All: NKDA

## 2019-01-29 LAB
ANION GAP SERPL CALC-SCNC: 10 MMOL/L — SIGNIFICANT CHANGE UP (ref 5–17)
ANION GAP SERPL CALC-SCNC: 10 MMOL/L — SIGNIFICANT CHANGE UP (ref 5–17)
BASOPHILS NFR BLD AUTO: 0 % — SIGNIFICANT CHANGE UP (ref 0–2)
BUN SERPL-MCNC: 14 MG/DL — SIGNIFICANT CHANGE UP (ref 7–23)
BUN SERPL-MCNC: 20 MG/DL — SIGNIFICANT CHANGE UP (ref 7–23)
CALCIUM SERPL-MCNC: 8.6 MG/DL — SIGNIFICANT CHANGE UP (ref 8.4–10.5)
CALCIUM SERPL-MCNC: 8.7 MG/DL — SIGNIFICANT CHANGE UP (ref 8.4–10.5)
CHLORIDE SERPL-SCNC: 106 MMOL/L — SIGNIFICANT CHANGE UP (ref 96–108)
CHLORIDE SERPL-SCNC: 106 MMOL/L — SIGNIFICANT CHANGE UP (ref 96–108)
CO2 SERPL-SCNC: 20 MMOL/L — LOW (ref 22–31)
CO2 SERPL-SCNC: 21 MMOL/L — LOW (ref 22–31)
CREAT SERPL-MCNC: 1.17 MG/DL — SIGNIFICANT CHANGE UP (ref 0.5–1.3)
CREAT SERPL-MCNC: 1.18 MG/DL — SIGNIFICANT CHANGE UP (ref 0.5–1.3)
GLUCOSE BLDC GLUCOMTR-MCNC: 153 MG/DL — HIGH (ref 70–99)
GLUCOSE BLDC GLUCOMTR-MCNC: 162 MG/DL — HIGH (ref 70–99)
GLUCOSE BLDC GLUCOMTR-MCNC: 225 MG/DL — HIGH (ref 70–99)
GLUCOSE BLDC GLUCOMTR-MCNC: 237 MG/DL — HIGH (ref 70–99)
GLUCOSE SERPL-MCNC: 147 MG/DL — HIGH (ref 70–99)
GLUCOSE SERPL-MCNC: 179 MG/DL — HIGH (ref 70–99)
HBA1C BLD-MCNC: 6 % — HIGH (ref 4–5.6)
HCT VFR BLD CALC: 24.4 % — LOW (ref 34.5–45)
HCT VFR BLD CALC: 26.9 % — LOW (ref 34.5–45)
HGB BLD-MCNC: 7 G/DL — CRITICAL LOW (ref 11.5–15.5)
HGB BLD-MCNC: 7.6 G/DL — LOW (ref 11.5–15.5)
LYMPHOCYTES # BLD AUTO: 18 % — SIGNIFICANT CHANGE UP (ref 13–44)
MCHC RBC-ENTMCNC: 22 PG — LOW (ref 27–34)
MCHC RBC-ENTMCNC: 22.1 PG — LOW (ref 27–34)
MCHC RBC-ENTMCNC: 28.3 G/DL — LOW (ref 32–36)
MCHC RBC-ENTMCNC: 28.7 G/DL — LOW (ref 32–36)
MCV RBC AUTO: 77 FL — LOW (ref 80–100)
MCV RBC AUTO: 77.7 FL — LOW (ref 80–100)
MONOCYTES NFR BLD AUTO: 18 % — HIGH (ref 2–14)
NEUTROPHILS NFR BLD AUTO: 56 % — SIGNIFICANT CHANGE UP (ref 43–77)
PLATELET # BLD AUTO: 325 K/UL — SIGNIFICANT CHANGE UP (ref 150–400)
PLATELET # BLD AUTO: 361 K/UL — SIGNIFICANT CHANGE UP (ref 150–400)
POTASSIUM SERPL-MCNC: 3.8 MMOL/L — SIGNIFICANT CHANGE UP (ref 3.5–5.3)
POTASSIUM SERPL-MCNC: 4.5 MMOL/L — SIGNIFICANT CHANGE UP (ref 3.5–5.3)
POTASSIUM SERPL-SCNC: 3.8 MMOL/L — SIGNIFICANT CHANGE UP (ref 3.5–5.3)
POTASSIUM SERPL-SCNC: 4.5 MMOL/L — SIGNIFICANT CHANGE UP (ref 3.5–5.3)
RBC # BLD: 3.17 M/UL — LOW (ref 3.8–5.2)
RBC # BLD: 3.46 M/UL — LOW (ref 3.8–5.2)
RBC # FLD: 20 % — HIGH (ref 10.3–16.9)
RBC # FLD: 20.3 % — HIGH (ref 10.3–16.9)
SODIUM SERPL-SCNC: 136 MMOL/L — SIGNIFICANT CHANGE UP (ref 135–145)
SODIUM SERPL-SCNC: 137 MMOL/L — SIGNIFICANT CHANGE UP (ref 135–145)
WBC # BLD: 4.4 K/UL — SIGNIFICANT CHANGE UP (ref 3.8–10.5)
WBC # BLD: 4.6 K/UL — SIGNIFICANT CHANGE UP (ref 3.8–10.5)
WBC # FLD AUTO: 4.4 K/UL — SIGNIFICANT CHANGE UP (ref 3.8–10.5)
WBC # FLD AUTO: 4.6 K/UL — SIGNIFICANT CHANGE UP (ref 3.8–10.5)

## 2019-01-29 RX ORDER — ACETAMINOPHEN 500 MG
975 TABLET ORAL EVERY 8 HOURS
Qty: 0 | Refills: 0 | Status: DISCONTINUED | OUTPATIENT
Start: 2019-01-29 | End: 2019-01-30

## 2019-01-29 RX ORDER — OXYCODONE HYDROCHLORIDE 5 MG/1
5 TABLET ORAL EVERY 4 HOURS
Qty: 0 | Refills: 0 | Status: DISCONTINUED | OUTPATIENT
Start: 2019-01-29 | End: 2019-01-30

## 2019-01-29 RX ORDER — ACETAMINOPHEN 500 MG
650 TABLET ORAL EVERY 6 HOURS
Qty: 0 | Refills: 0 | Status: DISCONTINUED | OUTPATIENT
Start: 2019-01-29 | End: 2019-01-29

## 2019-01-29 RX ORDER — OXYCODONE HYDROCHLORIDE 5 MG/1
10 TABLET ORAL EVERY 6 HOURS
Qty: 0 | Refills: 0 | Status: DISCONTINUED | OUTPATIENT
Start: 2019-01-29 | End: 2019-01-30

## 2019-01-29 RX ADMIN — Medication 975 MILLIGRAM(S): at 21:37

## 2019-01-29 RX ADMIN — Medication 30 MILLIGRAM(S): at 23:30

## 2019-01-29 RX ADMIN — Medication 975 MILLIGRAM(S): at 14:00

## 2019-01-29 RX ADMIN — Medication 4: at 22:28

## 2019-01-29 RX ADMIN — Medication 30 MILLIGRAM(S): at 22:39

## 2019-01-29 RX ADMIN — Medication 975 MILLIGRAM(S): at 13:10

## 2019-01-29 RX ADMIN — Medication 4: at 08:22

## 2019-01-29 RX ADMIN — Medication 30 MILLIGRAM(S): at 17:30

## 2019-01-29 RX ADMIN — Medication 30 MILLIGRAM(S): at 17:03

## 2019-01-29 RX ADMIN — Medication 650 MILLIGRAM(S): at 06:37

## 2019-01-29 RX ADMIN — Medication 30 MILLIGRAM(S): at 09:50

## 2019-01-29 RX ADMIN — OXYCODONE HYDROCHLORIDE 5 MILLIGRAM(S): 5 TABLET ORAL at 10:20

## 2019-01-29 RX ADMIN — OXYCODONE HYDROCHLORIDE 5 MILLIGRAM(S): 5 TABLET ORAL at 09:24

## 2019-01-29 RX ADMIN — Medication 30 MILLIGRAM(S): at 04:00

## 2019-01-29 RX ADMIN — Medication 2: at 12:43

## 2019-01-29 RX ADMIN — ENOXAPARIN SODIUM 40 MILLIGRAM(S): 100 INJECTION SUBCUTANEOUS at 06:33

## 2019-01-29 RX ADMIN — Medication 650 MILLIGRAM(S): at 08:20

## 2019-01-29 RX ADMIN — Medication 30 MILLIGRAM(S): at 09:24

## 2019-01-29 RX ADMIN — Medication 975 MILLIGRAM(S): at 22:35

## 2019-01-29 RX ADMIN — Medication 30 MILLIGRAM(S): at 03:07

## 2019-01-29 NOTE — PROGRESS NOTE ADULT - ASSESSMENT
43y Female POD#1 s/p abdominal myomectomy                                        1. Neuro/Pain:  OPM  2  CV:   VS per routine  3. Pulm: Encourage ISS  4. GI: Reg  5. :  TOV today  6. Heme: f/u AM CBC; crossed for 2u given anti-E antibodies  7. ID: --  8. DVT ppx: SCDs, lovenox 40mg qd  9. Dispo: Likely POD#2 or 3

## 2019-01-29 NOTE — PROGRESS NOTE ADULT - ASSESSMENT
43y Female POD# 1   s/p     abdominal myomectomy                                    1. Neuro/Pain:  torodal, tylenol, oxycodone PRN severe pain   2  CV:   VS per routine , Heplock , hemodynamically stable  3. Pulm: Encourage ISS, reviewed IS with patient at bedside   4. GI: Reg, no nausea/vomiting, passing flatus  5. :  Voiding , s/p ash, Cr 1.18, repeat BMP this afternoon to trend Cr  6. Heme: AM CBC 7.6, no symptoms of anemia, repeat CBC this afternoon, crossed for 2u given anti-E antibodies  7. ID: --  8. DVT ppx: SCDs, lovenox 40mg qD  9. Dispo: Likely POD#2-3

## 2019-01-29 NOTE — PROGRESS NOTE ADULT - ATTENDING COMMENTS
Patient seen and examined by me. Patient is currently hemodynamically stable and afebrile.   Doing well and meeting milestones including tolerating po, flatus, voiding and ambulating.   Unclear if ambulation has signs of anemia so patient with continue to ambulate.     Surgery reviewed with patient.   Plan for oral pain management and encourage ambulation.

## 2019-01-29 NOTE — PROGRESS NOTE ADULT - SUBJECTIVE AND OBJECTIVE BOX
GYN Progress Note    Patient seen at bedside this afternoon. Resting comfortable in bed with no complaints. States pain is controlled with toradol and tylenol. Tried oxycodone with relief earlier today.   Tolerating regular diet  , passing flatus.  Ambulating without difficulty.   Urinating without discomfort.   Denies CP, palpitations, SOB, fever, chills, nausea, vomiting.    Vital Signs Last 24 Hrs  T(C): 36.7 (29 Jan 2019 12:31), Max: 37.2 (28 Jan 2019 17:33)  T(F): 98.1 (29 Jan 2019 12:31), Max: 98.9 (28 Jan 2019 17:33)  HR: 78 (29 Jan 2019 12:31) (58 - 85)  BP: 129/63 (29 Jan 2019 12:31) (104/48 - 159/72)  BP(mean): 87 (28 Jan 2019 19:28) (69 - 87)  RR: 17 (29 Jan 2019 12:31) (14 - 114)  SpO2: 99% (29 Jan 2019 12:31) (94% - 100%)    Physical Exam:  Gen: No Acute Distress, resting comfortable in bed  Pulm: Normal work of breathing, no wheezes/rhonchi/rales   GI: soft, appropriately tender, nondistended, +BS, no rebound, no guarding, Incision site clean/dry/intact   Ext: SCDs in place, [facility]    I&O's Summary    28 Jan 2019 07:01  -  29 Jan 2019 07:00  --------------------------------------------------------  IN: 1625 mL / OUT: 750 mL / NET: 875 mL    29 Jan 2019 07:01  -  29 Jan 2019 12:46  --------------------------------------------------------  IN: 0 mL / OUT: 300 mL / NET: -300 mL      MEDICATIONS  (STANDING):  acetaminophen   Tablet .. 975 milliGRAM(s) Oral every 8 hours  dextrose 50% Injectable 12.5 Gram(s) IV Push once  dextrose 50% Injectable 25 Gram(s) IV Push once  dextrose 50% Injectable 25 Gram(s) IV Push once  enoxaparin Injectable 40 milliGRAM(s) SubCutaneous every 24 hours  insulin lispro (HumaLOG) corrective regimen sliding scale   SubCutaneous Before meals and at bedtime  ketorolac   Injectable 30 milliGRAM(s) IV Push every 6 hours  nicotine -   7 mG/24Hr(s) Patch 1 patch Transdermal daily    MEDICATIONS  (PRN):  glucagon  Injectable 1 milliGRAM(s) IntraMuscular once PRN Glucose LESS THAN 70 milligrams/deciliter  metoclopramide Injectable 10 milliGRAM(s) IV Push every 6 hours PRN Nausea and/or Vomiting  ondansetron Injectable 4 milliGRAM(s) IV Push every 6 hours PRN Nausea and/or Vomiting 2nd line  oxyCODONE    IR 5 milliGRAM(s) Oral every 4 hours PRN Moderate Pain (4 - 6)  oxyCODONE    IR 10 milliGRAM(s) Oral every 6 hours PRN Severe Pain (7 - 10)      LABS:                        7.6    4.4   )-----------( 361      ( 29 Jan 2019 06:13 )             26.9     01-29    137  |  106  |  14  ----------------------------<  147<H>  4.5   |  21<L>  |  1.18    Ca    8.6      29 Jan 2019 06:13

## 2019-01-30 ENCOUNTER — TRANSCRIPTION ENCOUNTER (OUTPATIENT)
Age: 44
End: 2019-01-30

## 2019-01-30 VITALS
HEART RATE: 95 BPM | OXYGEN SATURATION: 98 % | DIASTOLIC BLOOD PRESSURE: 86 MMHG | SYSTOLIC BLOOD PRESSURE: 139 MMHG | RESPIRATION RATE: 17 BRPM | TEMPERATURE: 98 F

## 2019-01-30 LAB
ANION GAP SERPL CALC-SCNC: 11 MMOL/L — SIGNIFICANT CHANGE UP (ref 5–17)
BASOPHILS NFR BLD AUTO: 0.5 % — SIGNIFICANT CHANGE UP (ref 0–2)
BUN SERPL-MCNC: 13 MG/DL — SIGNIFICANT CHANGE UP (ref 7–23)
CALCIUM SERPL-MCNC: 8.2 MG/DL — LOW (ref 8.4–10.5)
CHLORIDE SERPL-SCNC: 104 MMOL/L — SIGNIFICANT CHANGE UP (ref 96–108)
CO2 SERPL-SCNC: 23 MMOL/L — SIGNIFICANT CHANGE UP (ref 22–31)
CREAT SERPL-MCNC: 0.96 MG/DL — SIGNIFICANT CHANGE UP (ref 0.5–1.3)
EOSINOPHIL NFR BLD AUTO: 1.6 % — SIGNIFICANT CHANGE UP (ref 0–6)
GLUCOSE BLDC GLUCOMTR-MCNC: 140 MG/DL — HIGH (ref 70–99)
GLUCOSE BLDC GLUCOMTR-MCNC: 145 MG/DL — HIGH (ref 70–99)
GLUCOSE BLDC GLUCOMTR-MCNC: 159 MG/DL — HIGH (ref 70–99)
GLUCOSE SERPL-MCNC: 145 MG/DL — HIGH (ref 70–99)
HCT VFR BLD CALC: 25.1 % — LOW (ref 34.5–45)
HGB BLD-MCNC: 7.1 G/DL — LOW (ref 11.5–15.5)
LYMPHOCYTES # BLD AUTO: 35.6 % — SIGNIFICANT CHANGE UP (ref 13–44)
MCHC RBC-ENTMCNC: 21.5 PG — LOW (ref 27–34)
MCHC RBC-ENTMCNC: 28.3 G/DL — LOW (ref 32–36)
MCV RBC AUTO: 76.1 FL — LOW (ref 80–100)
MONOCYTES NFR BLD AUTO: 17.4 % — HIGH (ref 2–14)
NEUTROPHILS NFR BLD AUTO: 44.9 % — SIGNIFICANT CHANGE UP (ref 43–77)
PLATELET # BLD AUTO: 337 K/UL — SIGNIFICANT CHANGE UP (ref 150–400)
POTASSIUM SERPL-MCNC: 3.9 MMOL/L — SIGNIFICANT CHANGE UP (ref 3.5–5.3)
POTASSIUM SERPL-SCNC: 3.9 MMOL/L — SIGNIFICANT CHANGE UP (ref 3.5–5.3)
RBC # BLD: 3.3 M/UL — LOW (ref 3.8–5.2)
RBC # FLD: 19.9 % — HIGH (ref 10.3–16.9)
SODIUM SERPL-SCNC: 138 MMOL/L — SIGNIFICANT CHANGE UP (ref 135–145)
WBC # BLD: 3.7 K/UL — LOW (ref 3.8–10.5)
WBC # FLD AUTO: 3.7 K/UL — LOW (ref 3.8–10.5)

## 2019-01-30 PROCEDURE — 86922 COMPATIBILITY TEST ANTIGLOB: CPT

## 2019-01-30 PROCEDURE — 86900 BLOOD TYPING SEROLOGIC ABO: CPT

## 2019-01-30 PROCEDURE — 86880 COOMBS TEST DIRECT: CPT

## 2019-01-30 PROCEDURE — P9016: CPT

## 2019-01-30 PROCEDURE — 86901 BLOOD TYPING SEROLOGIC RH(D): CPT

## 2019-01-30 PROCEDURE — 85025 COMPLETE CBC W/AUTO DIFF WBC: CPT

## 2019-01-30 PROCEDURE — 36430 TRANSFUSION BLD/BLD COMPNT: CPT

## 2019-01-30 PROCEDURE — 86921 COMPATIBILITY TEST INCUBATE: CPT

## 2019-01-30 PROCEDURE — 80048 BASIC METABOLIC PNL TOTAL CA: CPT

## 2019-01-30 PROCEDURE — 88304 TISSUE EXAM BY PATHOLOGIST: CPT

## 2019-01-30 PROCEDURE — 85027 COMPLETE CBC AUTOMATED: CPT

## 2019-01-30 PROCEDURE — 36415 COLL VENOUS BLD VENIPUNCTURE: CPT

## 2019-01-30 PROCEDURE — 83036 HEMOGLOBIN GLYCOSYLATED A1C: CPT

## 2019-01-30 PROCEDURE — 86850 RBC ANTIBODY SCREEN: CPT

## 2019-01-30 PROCEDURE — 88305 TISSUE EXAM BY PATHOLOGIST: CPT

## 2019-01-30 PROCEDURE — 86870 RBC ANTIBODY IDENTIFICATION: CPT

## 2019-01-30 PROCEDURE — 82962 GLUCOSE BLOOD TEST: CPT

## 2019-01-30 RX ORDER — OXYCODONE HYDROCHLORIDE 5 MG/1
1 TABLET ORAL
Qty: 0 | Refills: 0 | DISCHARGE
Start: 2019-01-30

## 2019-01-30 RX ORDER — ACETAMINOPHEN 500 MG
3 TABLET ORAL
Qty: 0 | Refills: 0 | DISCHARGE
Start: 2019-01-30

## 2019-01-30 RX ORDER — IBUPROFEN 200 MG
1 TABLET ORAL
Qty: 28 | Refills: 0 | OUTPATIENT
Start: 2019-01-30 | End: 2019-02-05

## 2019-01-30 RX ORDER — IBUPROFEN 200 MG
1 TABLET ORAL
Qty: 0 | Refills: 0 | DISCHARGE
Start: 2019-01-30

## 2019-01-30 RX ORDER — IBUPROFEN 200 MG
600 TABLET ORAL EVERY 6 HOURS
Qty: 0 | Refills: 0 | Status: DISCONTINUED | OUTPATIENT
Start: 2019-01-30 | End: 2019-01-30

## 2019-01-30 RX ADMIN — Medication 975 MILLIGRAM(S): at 14:25

## 2019-01-30 RX ADMIN — Medication 2: at 17:33

## 2019-01-30 RX ADMIN — Medication 30 MILLIGRAM(S): at 05:06

## 2019-01-30 RX ADMIN — Medication 600 MILLIGRAM(S): at 17:34

## 2019-01-30 RX ADMIN — Medication 600 MILLIGRAM(S): at 12:00

## 2019-01-30 RX ADMIN — Medication 600 MILLIGRAM(S): at 18:20

## 2019-01-30 RX ADMIN — Medication 600 MILLIGRAM(S): at 12:45

## 2019-01-30 RX ADMIN — ENOXAPARIN SODIUM 40 MILLIGRAM(S): 100 INJECTION SUBCUTANEOUS at 05:06

## 2019-01-30 RX ADMIN — Medication 975 MILLIGRAM(S): at 05:06

## 2019-01-30 RX ADMIN — Medication 975 MILLIGRAM(S): at 06:00

## 2019-01-30 RX ADMIN — Medication 975 MILLIGRAM(S): at 15:26

## 2019-01-30 RX ADMIN — Medication 30 MILLIGRAM(S): at 06:00

## 2019-01-30 NOTE — DISCHARGE NOTE ADULT - CARE PROVIDER_API CALL
Ronald Rahman)  Obstetrics and Gynecology  [address] [place] [address]  Phone: [phone]  Fax: [phone]

## 2019-01-30 NOTE — DISCHARGE NOTE ADULT - PLAN OF CARE
healthy recovery Please follow up with Dr. Rahman in 1-2 weeks. Call your doctor if you have severe, worsening pain, heavy vaginal bleeding, or fevers greater than 100.4 F. It was a pleasure taking care of you.

## 2019-01-30 NOTE — PROGRESS NOTE ADULT - ASSESSMENT
43y Female POD#1 s/p abdominal myomectomy                                        1. Neuro/Pain:  OPM  2  CV:   VS per routine  3. Pulm: Encourage ISS  4. GI: Reg  5. :  Voiding  6. Heme: s/p 1u PRBC  7. ID: --  8. DVT ppx: SCDs  9. Dispo: discharge today if ambulating well after transfusion

## 2019-01-30 NOTE — PROGRESS NOTE ADULT - ASSESSMENT
43y Female POD#2 s/p abdominal myomectomy                                        1. Neuro/Pain:  OPM  2  CV:   VS per routine  3. Pulm: Encourage ISS  4. GI: diabetic regular diet; continue insulin sliding scale  5. :  Voiding  6. Heme: Stable Hb at 7.1 this morning, previously 7/0 --> continue to monitor for symptoms   7. ID: --  8. DVT ppx: SCDs, lovenox 40mg qd  9. Dispo: Likely POD#2

## 2019-01-30 NOTE — DISCHARGE NOTE ADULT - MEDICATION SUMMARY - MEDICATIONS TO TAKE
I will START or STAY ON the medications listed below when I get home from the hospital:    acetaminophen 325 mg oral tablet  -- 3 tab(s) by mouth every 8 hours  -- Indication: For Pain    oxyCODONE 5 mg oral tablet  -- 1 tab(s) by mouth every 4 hours, As needed, Moderate Pain (4 - 6)  -- Indication: For Pain    ibuprofen 600 mg oral tablet  -- 1 tab(s) by mouth every 6 hours  -- Indication: For Pain    metFORMIN 500 mg oral tablet  -- 1 tab(s) by mouth 2 times a day  -- Indication: For Diabetes    amLODIPine 5 mg oral tablet  -- 1 tab(s) by mouth once a day  -- Indication: For High Blood Pressure    ferrous sulfate 325 mg (65 mg elemental iron) oral tablet  -- 1 tab(s) by mouth 3 times a day  -- Indication: For Anemia

## 2019-01-30 NOTE — PROGRESS NOTE ADULT - SUBJECTIVE AND OBJECTIVE BOX
DELAY IN NOTE DUE TO PATIENT CARE    GYN Progress Note    Patient seen at bedside. Reports feeling tired, but denies lightheadedness or dizziness. Has not been OOB in hallway.   Pain controlled on OPMs.  Tolerating regular diet.  OOB  Voiding    Denies CP, palpitations, SOB, fever, chills, nausea, vomiting.    Vital Signs Last 24 Hrs  T(C): 36.7 (30 Jan 2019 08:44), Max: 37 (30 Jan 2019 04:25)  T(F): 98 (30 Jan 2019 08:44), Max: 98.6 (30 Jan 2019 04:25)  HR: 95 (30 Jan 2019 08:44) (86 - 98)  BP: 139/86 (30 Jan 2019 08:44) (135/71 - 139/86)  BP(mean): --  RR: 17 (30 Jan 2019 08:44) (17 - 18)  SpO2: 98% (30 Jan 2019 08:44) (98% - 100%)    Physical Exam:  Gen: No Acute Distress  Pulm: Normal work of breathing  GI: soft, appropriately tender, nondistended, +BS, no rebound, no guarding.  Incision C/D/I  Ext: SCDs in place, [facility]    I&O's Summary    29 Jan 2019 07:01  -  30 Jan 2019 07:00  --------------------------------------------------------  IN: 0 mL / OUT: 1250 mL / NET: -1250 mL    30 Jan 2019 07:01  -  30 Jan 2019 17:55  --------------------------------------------------------  IN: 0 mL / OUT: 700 mL / NET: -700 mL      MEDICATIONS  (STANDING):  acetaminophen   Tablet .. 975 milliGRAM(s) Oral every 8 hours  dextrose 50% Injectable 12.5 Gram(s) IV Push once  dextrose 50% Injectable 25 Gram(s) IV Push once  dextrose 50% Injectable 25 Gram(s) IV Push once  enoxaparin Injectable 40 milliGRAM(s) SubCutaneous every 24 hours  ibuprofen  Tablet. 600 milliGRAM(s) Oral every 6 hours  insulin lispro (HumaLOG) corrective regimen sliding scale   SubCutaneous Before meals and at bedtime  nicotine -   7 mG/24Hr(s) Patch 1 patch Transdermal daily    MEDICATIONS  (PRN):  glucagon  Injectable 1 milliGRAM(s) IntraMuscular once PRN Glucose LESS THAN 70 milligrams/deciliter  metoclopramide Injectable 10 milliGRAM(s) IV Push every 6 hours PRN Nausea and/or Vomiting  ondansetron Injectable 4 milliGRAM(s) IV Push every 6 hours PRN Nausea and/or Vomiting 2nd line  oxyCODONE    IR 5 milliGRAM(s) Oral every 4 hours PRN Moderate Pain (4 - 6)  oxyCODONE    IR 10 milliGRAM(s) Oral every 6 hours PRN Severe Pain (7 - 10)      LABS:                        7.1    3.7   )-----------( 337      ( 30 Jan 2019 05:40 )             25.1     01-30    138  |  104  |  13  ----------------------------<  145<H>  3.9   |  23  |  0.96    Ca    8.2<L>      30 Jan 2019 05:40

## 2019-01-30 NOTE — PROGRESS NOTE ADULT - SUBJECTIVE AND OBJECTIVE BOX
GYN Progress Note    Patient seen at bedside. She was resting and not wanting to talk to me during rounds. Overnight, initially was refusing insulin but accepted it after counseling. Did not have any lightheadedness or dizziness, and did not receive any blood transfusions.  She reports have 20 out of 10 pain.   Tolerating regular diet.   OOB  Voiding    Denies CP, palpitations, SOB, fever, chills, nausea, vomiting.    Vital Signs Last 24 Hrs  T(C): 37 (30 Jan 2019 04:25), Max: 37.2 (29 Jan 2019 16:56)  T(F): 98.6 (30 Jan 2019 04:25), Max: 98.9 (29 Jan 2019 16:56)  HR: 86 (30 Jan 2019 04:25) (78 - 98)  BP: 139/70 (30 Jan 2019 04:25) (128/75 - 139/70)  BP(mean): --  RR: 17 (30 Jan 2019 04:25) (17 - 18)  SpO2: 100% (30 Jan 2019 04:25) (99% - 100%)    Physical Exam:  Gen: No Acute Distress; resting comfortably   Pulm: Normal work of breathing  GI: soft, appropriately tender, nondistended, +BS, no rebound, no guarding.  Incision C/D/I  Ext: SCDs in place, [facility]    I&O's Summary    29 Jan 2019 07:01  -  30 Jan 2019 07:00  --------------------------------------------------------  IN: 0 mL / OUT: 1250 mL / NET: -1250 mL      MEDICATIONS  (STANDING):  acetaminophen   Tablet .. 975 milliGRAM(s) Oral every 8 hours  dextrose 50% Injectable 12.5 Gram(s) IV Push once  dextrose 50% Injectable 25 Gram(s) IV Push once  dextrose 50% Injectable 25 Gram(s) IV Push once  enoxaparin Injectable 40 milliGRAM(s) SubCutaneous every 24 hours  insulin lispro (HumaLOG) corrective regimen sliding scale   SubCutaneous Before meals and at bedtime  ketorolac   Injectable 30 milliGRAM(s) IV Push every 6 hours  nicotine -   7 mG/24Hr(s) Patch 1 patch Transdermal daily    MEDICATIONS  (PRN):  glucagon  Injectable 1 milliGRAM(s) IntraMuscular once PRN Glucose LESS THAN 70 milligrams/deciliter  metoclopramide Injectable 10 milliGRAM(s) IV Push every 6 hours PRN Nausea and/or Vomiting  ondansetron Injectable 4 milliGRAM(s) IV Push every 6 hours PRN Nausea and/or Vomiting 2nd line  oxyCODONE    IR 5 milliGRAM(s) Oral every 4 hours PRN Moderate Pain (4 - 6)  oxyCODONE    IR 10 milliGRAM(s) Oral every 6 hours PRN Severe Pain (7 - 10)      LABS:                        7.1    3.7   )-----------( 337      ( 30 Jan 2019 05:40 )             25.1     01-30    138  |  104  |  13  ----------------------------<  145<H>  3.9   |  23  |  0.96    Ca    8.2<L>      30 Jan 2019 05:40

## 2019-01-30 NOTE — PROGRESS NOTE ADULT - ATTENDING COMMENTS
Patient seen and examined this AM. She was found sitting up in bed and eating breakfast.   Denies severe pain at this time.  Patient is passing milestones including flatus, voiding, ambulating.   Patient states that she desires blood because she feels "very exhausted"  Patient will be transfused prior to home based on symptoms although stable H/H and vitals.   Plan for follow up in 2 weeks.   Wound care and supplementation reviewed   Patient verbalized understanding and plan for discharge to home today after transfusion.

## 2019-01-30 NOTE — DISCHARGE NOTE ADULT - CARE PLAN
Principal Discharge DX:	Uterine leiomyoma, unspecified location  Goal:	healthy recovery  Assessment and plan of treatment:	Please follow up with Dr. Rahman in 1-2 weeks. Call your doctor if you have severe, worsening pain, heavy vaginal bleeding, or fevers greater than 100.4 F. It was a pleasure taking care of you.

## 2019-01-30 NOTE — DISCHARGE NOTE ADULT - HOSPITAL COURSE
Patient meeting all postoperative milestones. Hb dropped to 7.0 with symptoms requiring 2 units PRBC transfusion. Pt cleared for discharge at this time. Patient meeting all postoperative milestones. Hb dropped to 7.0 with symptoms requiring 1 unit PRBC transfusion. Pt cleared for discharge at this time.

## 2019-01-30 NOTE — DISCHARGE NOTE ADULT - PATIENT PORTAL LINK FT
You can access the [email or web address], offered by [facility], by registering with the following website: [email or web address]

## 2019-02-01 DIAGNOSIS — E11.9 TYPE 2 DIABETES MELLITUS WITHOUT COMPLICATIONS: ICD-10-CM

## 2019-02-01 DIAGNOSIS — E04.1 NONTOXIC SINGLE THYROID NODULE: ICD-10-CM

## 2019-02-01 DIAGNOSIS — Z79.84 LONG TERM (CURRENT) USE OF ORAL HYPOGLYCEMIC DRUGS: ICD-10-CM

## 2019-02-01 DIAGNOSIS — D25.1 INTRAMURAL LEIOMYOMA OF UTERUS: ICD-10-CM

## 2019-02-01 DIAGNOSIS — Z83.3 FAMILY HISTORY OF DIABETES MELLITUS: ICD-10-CM

## 2019-02-01 DIAGNOSIS — Z98.84 BARIATRIC SURGERY STATUS: ICD-10-CM

## 2019-02-01 DIAGNOSIS — D25.9 LEIOMYOMA OF UTERUS, UNSPECIFIED: ICD-10-CM

## 2019-02-01 DIAGNOSIS — Z82.49 FAMILY HISTORY OF ISCHEMIC HEART DISEASE AND OTHER DISEASES OF THE CIRCULATORY SYSTEM: ICD-10-CM

## 2019-02-01 DIAGNOSIS — Z79.899 OTHER LONG TERM (CURRENT) DRUG THERAPY: ICD-10-CM

## 2019-02-01 DIAGNOSIS — D62 ACUTE POSTHEMORRHAGIC ANEMIA: ICD-10-CM

## 2019-02-04 LAB — SURGICAL PATHOLOGY STUDY: SIGNIFICANT CHANGE UP

## 2019-03-08 ENCOUNTER — EMERGENCY (EMERGENCY)
Facility: HOSPITAL | Age: 44
LOS: 1 days | Discharge: HOME | End: 2019-03-08
Attending: EMERGENCY MEDICINE | Admitting: EMERGENCY MEDICINE
Payer: COMMERCIAL

## 2019-03-08 ENCOUNTER — EMERGENCY (EMERGENCY)
Facility: HOSPITAL | Age: 44
LOS: 1 days | Discharge: HOME | End: 2019-03-08
Attending: EMERGENCY MEDICINE | Admitting: EMERGENCY MEDICINE
Payer: MEDICAID

## 2019-03-08 VITALS
OXYGEN SATURATION: 100 % | DIASTOLIC BLOOD PRESSURE: 73 MMHG | RESPIRATION RATE: 18 BRPM | HEART RATE: 83 BPM | TEMPERATURE: 98 F | SYSTOLIC BLOOD PRESSURE: 117 MMHG

## 2019-03-08 VITALS
TEMPERATURE: 98 F | WEIGHT: 225.09 LBS | HEIGHT: 66 IN | RESPIRATION RATE: 18 BRPM | OXYGEN SATURATION: 96 % | DIASTOLIC BLOOD PRESSURE: 91 MMHG | HEART RATE: 106 BPM | SYSTOLIC BLOOD PRESSURE: 130 MMHG

## 2019-03-08 VITALS
RESPIRATION RATE: 18 BRPM | TEMPERATURE: 98 F | OXYGEN SATURATION: 99 % | SYSTOLIC BLOOD PRESSURE: 151 MMHG | DIASTOLIC BLOOD PRESSURE: 110 MMHG | HEART RATE: 96 BPM

## 2019-03-08 DIAGNOSIS — Z98.890 OTHER SPECIFIED POSTPROCEDURAL STATES: Chronic | ICD-10-CM

## 2019-03-08 DIAGNOSIS — Z98.89 OTHER SPECIFIED POSTPROCEDURAL STATES: Chronic | ICD-10-CM
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
## 2019-03-08 LAB
ALBUMIN SERPL ELPH-MCNC: 3.6 G/DL — SIGNIFICANT CHANGE UP (ref 3.3–5)
ALP SERPL-CCNC: 54 U/L — SIGNIFICANT CHANGE UP (ref 40–120)
ALT FLD-CCNC: 15 U/L — SIGNIFICANT CHANGE UP (ref 10–45)
ANION GAP SERPL CALC-SCNC: 12 MMOL/L — SIGNIFICANT CHANGE UP (ref 5–17)
AST SERPL-CCNC: 20 U/L — SIGNIFICANT CHANGE UP (ref 10–40)
BASOPHILS # BLD AUTO: 0 K/UL — SIGNIFICANT CHANGE UP (ref 0–0.2)
BASOPHILS NFR BLD AUTO: 0 % — SIGNIFICANT CHANGE UP (ref 0–2)
BILIRUB SERPL-MCNC: <0.2 MG/DL — SIGNIFICANT CHANGE UP (ref 0.2–1.2)
BUN SERPL-MCNC: 11 MG/DL — SIGNIFICANT CHANGE UP (ref 7–23)
CALCIUM SERPL-MCNC: 8.5 MG/DL — SIGNIFICANT CHANGE UP (ref 8.4–10.5)
CHLORIDE SERPL-SCNC: 103 MMOL/L — SIGNIFICANT CHANGE UP (ref 96–108)
CO2 SERPL-SCNC: 22 MMOL/L — SIGNIFICANT CHANGE UP (ref 22–31)
CREAT SERPL-MCNC: 1.19 MG/DL — SIGNIFICANT CHANGE UP (ref 0.5–1.3)
EOSINOPHIL # BLD AUTO: 0.05 K/UL — SIGNIFICANT CHANGE UP (ref 0–0.5)
EOSINOPHIL NFR BLD AUTO: 1.1 % — SIGNIFICANT CHANGE UP (ref 0–6)
GLUCOSE SERPL-MCNC: 185 MG/DL — HIGH (ref 70–99)
HCG SERPL-ACNC: <.1 MIU/ML — SIGNIFICANT CHANGE UP
HCT VFR BLD CALC: 29.8 % — LOW (ref 34.5–45)
HGB BLD-MCNC: 8.8 G/DL — LOW (ref 11.5–15.5)
IMM GRANULOCYTES NFR BLD AUTO: 0.2 % — SIGNIFICANT CHANGE UP (ref 0–1.5)
LIDOCAIN IGE QN: 58 U/L — SIGNIFICANT CHANGE UP (ref 7–60)
LYMPHOCYTES # BLD AUTO: 2.08 K/UL — SIGNIFICANT CHANGE UP (ref 1–3.3)
LYMPHOCYTES # BLD AUTO: 44.3 % — HIGH (ref 13–44)
MCHC RBC-ENTMCNC: 22.4 PG — LOW (ref 27–34)
MCHC RBC-ENTMCNC: 29.5 GM/DL — LOW (ref 32–36)
MCV RBC AUTO: 76 FL — LOW (ref 80–100)
MONOCYTES # BLD AUTO: 0.3 K/UL — SIGNIFICANT CHANGE UP (ref 0–0.9)
MONOCYTES NFR BLD AUTO: 6.4 % — SIGNIFICANT CHANGE UP (ref 2–14)
NEUTROPHILS # BLD AUTO: 2.26 K/UL — SIGNIFICANT CHANGE UP (ref 1.8–7.4)
NEUTROPHILS NFR BLD AUTO: 48 % — SIGNIFICANT CHANGE UP (ref 43–77)
NRBC # BLD: 0 /100 WBCS — SIGNIFICANT CHANGE UP (ref 0–0)
PLATELET # BLD AUTO: 177 K/UL — SIGNIFICANT CHANGE UP (ref 150–400)
POTASSIUM SERPL-MCNC: 3.6 MMOL/L — SIGNIFICANT CHANGE UP (ref 3.5–5.3)
POTASSIUM SERPL-SCNC: 3.6 MMOL/L — SIGNIFICANT CHANGE UP (ref 3.5–5.3)
PROT SERPL-MCNC: 6 G/DL — SIGNIFICANT CHANGE UP (ref 6–8.3)
RBC # BLD: 3.92 M/UL — SIGNIFICANT CHANGE UP (ref 3.8–5.2)
RBC # FLD: 19.9 % — HIGH (ref 10.3–14.5)
SODIUM SERPL-SCNC: 137 MMOL/L — SIGNIFICANT CHANGE UP (ref 135–145)
WBC # BLD: 4.7 K/UL — SIGNIFICANT CHANGE UP (ref 3.8–10.5)
WBC # FLD AUTO: 4.7 K/UL — SIGNIFICANT CHANGE UP (ref 3.8–10.5)

## 2019-03-08 PROCEDURE — 85025 COMPLETE CBC W/AUTO DIFF WBC: CPT

## 2019-03-08 PROCEDURE — 83690 ASSAY OF LIPASE: CPT

## 2019-03-08 PROCEDURE — 99284 EMERGENCY DEPT VISIT MOD MDM: CPT | Mod: 25

## 2019-03-08 PROCEDURE — 96375 TX/PRO/DX INJ NEW DRUG ADDON: CPT

## 2019-03-08 PROCEDURE — 96374 THER/PROPH/DIAG INJ IV PUSH: CPT

## 2019-03-08 PROCEDURE — 99282 EMERGENCY DEPT VISIT SF MDM: CPT

## 2019-03-08 PROCEDURE — 36415 COLL VENOUS BLD VENIPUNCTURE: CPT

## 2019-03-08 PROCEDURE — 84702 CHORIONIC GONADOTROPIN TEST: CPT

## 2019-03-08 PROCEDURE — 80053 COMPREHEN METABOLIC PANEL: CPT

## 2019-03-08 RX ORDER — CEPHALEXIN 500 MG
1 CAPSULE ORAL
Qty: 20 | Refills: 0
Start: 2019-03-08 | End: 2019-03-17

## 2019-03-08 RX ORDER — DIPHENHYDRAMINE HCL 50 MG
50 CAPSULE ORAL ONCE
Qty: 0 | Refills: 0 | Status: COMPLETED | OUTPATIENT
Start: 2019-03-08 | End: 2019-03-08

## 2019-03-08 RX ORDER — FAMOTIDINE 10 MG/ML
20 INJECTION INTRAVENOUS ONCE
Qty: 0 | Refills: 0 | Status: COMPLETED | OUTPATIENT
Start: 2019-03-08 | End: 2019-03-08

## 2019-03-08 RX ORDER — DEXAMETHASONE 0.5 MG/5ML
10 ELIXIR ORAL ONCE
Qty: 0 | Refills: 0 | Status: COMPLETED | OUTPATIENT
Start: 2019-03-08 | End: 2019-03-08

## 2019-03-08 RX ORDER — ONDANSETRON 8 MG/1
4 TABLET, FILM COATED ORAL ONCE
Qty: 0 | Refills: 0 | Status: COMPLETED | OUTPATIENT
Start: 2019-03-08 | End: 2019-03-08

## 2019-03-08 RX ORDER — DIPHENHYDRAMINE HCL 50 MG
1 CAPSULE ORAL
Qty: 15 | Refills: 0
Start: 2019-03-08 | End: 2019-03-12

## 2019-03-08 RX ORDER — EPINEPHRINE 0.3 MG/.3ML
0.3 INJECTION INTRAMUSCULAR; SUBCUTANEOUS
Qty: 0.3 | Refills: 0
Start: 2019-03-08

## 2019-03-08 RX ORDER — FAMOTIDINE 10 MG/ML
1 INJECTION INTRAVENOUS
Qty: 10 | Refills: 0
Start: 2019-03-08 | End: 2019-03-12

## 2019-03-08 RX ADMIN — ONDANSETRON 4 MILLIGRAM(S): 8 TABLET, FILM COATED ORAL at 05:20

## 2019-03-08 RX ADMIN — FAMOTIDINE 20 MILLIGRAM(S): 10 INJECTION INTRAVENOUS at 05:19

## 2019-03-08 RX ADMIN — Medication 102 MILLIGRAM(S): at 05:20

## 2019-03-08 RX ADMIN — Medication 50 MILLIGRAM(S): at 05:19

## 2019-03-08 NOTE — ED PROVIDER NOTE - CLINICAL SUMMARY MEDICAL DECISION MAKING FREE TEXT BOX
facial swelling, itching, urticaria, no airway involvement, no resp distress, no intraoral swelling  -check labs  -decadron, benadryl, pepcid, zofran

## 2019-03-08 NOTE — ED PROVIDER NOTE - OBJECTIVE STATEMENT
43F hx pre-dm, htn, c/o facial swelling and itching. pt states ongoing since yesterday. states feels like face swelling worsening. no lip swelling, no tongue swelling. no cough, no SOB.  no drooling.  feeling nauseated.  pt s/p recent myomectomy. no new meds, no new foods, no new soaps/detergents/lotions. no prior allergic reactions.  also states drank alcohol tonight.

## 2019-03-08 NOTE — ED PROVIDER NOTE - NSFOLLOWUPINSTRUCTIONS_ED_ALL_ED_FT
Follow up with allergy within one week.      General Allergic Reaction    WHAT YOU NEED TO KNOW:    An allergic reaction is your body's response to an allergen. Allergens include medicines, food, insect stings, animal dander, mold, latex, chemicals, and dust mites. Pollen from trees, grass, and weeds can also cause an allergic reaction. An allergic reaction can range from mild to severe.    DISCHARGE INSTRUCTIONS:    Call 911 for signs or symptoms of anaphylaxis, such as trouble breathing, swelling in your mouth or throat, or wheezing. You may also have itching, a rash, hives, or feel like you are going to faint.    Return to the emergency department if:     You have a skin rash, hives, swelling, or itching that is starting to get worse.      Your throat tightens, or your lips or tongue swell.      You have trouble swallowing or speaking.      You have worsening nausea, diarrhea, or abdominal cramps, or you are vomiting.      You have chest pain or tightness.    Contact your healthcare provider if:     You have questions or concerns about your condition or care.        Medicines: You may need any of the following:     Medicines may be given to relieve certain allergy symptoms such as itching, sneezing, and swelling. You may take them as a pill or use drops in your nose or eyes. Topical treatments may be given to put directly on your skin to help decrease itching or swelling.      Epinephrine may be prescribed if you are at risk for anaphylaxis. This is a severe allergic reaction that can be life-threatening. Your healthcare provider will tell you if you need to keep epinephrine with you. You will be taught when and how to use it.      Take your medicine as directed. Contact your healthcare provider if you think your medicine is not helping or if you have side effects. Tell him of her if you are allergic to any medicine. Keep a list of the medicines, vitamins, and herbs you take. Include the amounts, and when and why you take them. Bring the list or the pill bottles to follow-up visits. Carry your medicine list with you in case of an emergency.    Follow up with your healthcare provider as directed: Write down your questions so you remember to ask them during your visits.     Manage your symptoms:     Avoid allergens. You may need to have allergy testing with your healthcare provider or a specialist to find your allergens.      Use cold compresses on your skin or eyes. This will help soothe skin or eyes affected by the allergic reaction. You can make a cold compress by soaking a washcloth in cool water. Wring out the extra water before you apply the washcloth.      Rinse your nasal passages with a saline solution. Daily rinsing may help clear allergens out of your nose. Use distilled water if possible. You can also boil tap water and then let it cool before you use it. Do not use tap water without boiling it first.      Do not smoke. Nicotine and other chemicals in cigarettes and cigars can make an allergic reaction worse, and can also cause lung damage. Ask your healthcare provider for information if you currently smoke and need help to quit. E-cigarettes or smokeless tobacco still contain nicotine. Talk to your healthcare provider before you use these products.          © Copyright [employer] 2019 All illustrations and images included in CareNotes are the copyrighted property of [employer] or [employer].      back to top                      © Copyright [employer] 2019

## 2019-03-08 NOTE — ED PROVIDER NOTE - PROGRESS NOTE DETAILS
Pt received from Dr. Chavez at s/o; pt with allergic rxn, tx'd w/ decadron, benadryl, pepcid and now sleeping comfortably, in no distress, hds. Will continue to monitor and re-assess in an hr. Pt resting comfortable. + swelling to b/l periorbital region. no lip, tongue or posterior pharynx swelling noted on exam. Lungs cta B/L. will continue to observe. Pt reports still swollen around eyes but no airway sx's. mild crusty d/c noted to b/l eyelashes. no conjunctival injection. no erythema to suggest cellulitis and no injection to suggest conjunctivitis. will add keflex and d/c home. return precautions d/w pt

## 2019-03-08 NOTE — ED ADULT TRIAGE NOTE - CHIEF COMPLAINT QUOTE
Patient c/o of swelling of face, started yesterday, w/ generalized pruritus, also c/o of abdominal cramping pain and muscle cramps.  Denies SOB or difficulty breathing, denies any throat itching or swelling, no mouth or tongue swelling, states can feel nausea and heartburn symptoms.

## 2019-03-08 NOTE — ED PROVIDER NOTE - CARE PROVIDER_API CALL
Amy Jimenez)  Rheumatology  [address] [place] [address], [place] [address]  Phone: [phone]  Fax: [phone]  Follow Up Time:

## 2019-03-08 NOTE — ED ADULT NURSE NOTE - OBJECTIVE STATEMENT
42 y/o female with hx of anemia, fibroids arrived to [facility] ER reporting facial swelling accompanied with nausea, abdominal cramping, and generalized pruritus since yesterday afternoon. Upon assessment, facial swelling noted, patient able to speak sentences, abdomen soft, lung fields WNL, breathing is equal and unlabored, pulses palpable, no visible acute injuries. Pt verbalized that she ate popeyes chicken prior to symptoms starting. Pt denies blurred vision, slurred speech, diarrhea, fever, chills, LOC, SOB, weakness, fatigue, recent travel, recent injury, and palpitations. Care in progress

## 2019-03-08 NOTE — ED ADULT TRIAGE NOTE - ARRIVAL INFO ADDITIONAL COMMENTS
pt c/o facial swelling and right sided head pain.  was seen here last night for same and dx with allergies.  pt did not  all the rx medication.  c/o worsening symptoms tonight and thinks it is all from a fall she had on tuesday.

## 2019-03-08 NOTE — ED ADULT NURSE REASSESSMENT NOTE - NS ED NURSE REASSESS COMMENT FT1
Received patient from night RN, resting in stretcher with family at bedside. Facial swelling noted on assessment, able to speak full sentences and lungs clear with unlabored and equal breathing. Denies difficulty breathing, CP, difficulty swallowing, N/V, abdominal pain. Plan of care explained, warm blanket provided.

## 2019-03-08 NOTE — ED POST DISCHARGE NOTE - ADDITIONAL DOCUMENTATION
keflex, epi pen and pepcid need pre authorization by insurance. d/w pharmacy and will change to amoxicillin and pt to get pepcid OTC. pt will contact pmd for pre authorization for epi pen. plan d/w pt.

## 2019-03-08 NOTE — ED ADULT NURSE NOTE - PAIN RATING/NUMBER SCALE (0-10): ACTIVITY
7
[facility],   [address] [place], [place]  Phone: [phone]  Fax: (   )    -  Follow Up Time:

## 2019-03-08 NOTE — ED PROVIDER NOTE - THROAT FINDINGS
no swelling, no trismus/no redness/no exudate/uvula midline/no vesicles/NO STRIDOR/NO DROOLING/NO TONGUE ELEVATION
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
## 2019-03-09 VITALS
HEART RATE: 89 BPM | SYSTOLIC BLOOD PRESSURE: 149 MMHG | OXYGEN SATURATION: 98 % | RESPIRATION RATE: 16 BRPM | DIASTOLIC BLOOD PRESSURE: 95 MMHG

## 2019-03-09 PROCEDURE — 99282 EMERGENCY DEPT VISIT SF MDM: CPT

## 2019-03-09 RX ORDER — DIPHENHYDRAMINE HCL 50 MG
25 CAPSULE ORAL ONCE
Qty: 0 | Refills: 0 | Status: COMPLETED | OUTPATIENT
Start: 2019-03-09 | End: 2019-03-09

## 2019-03-09 RX ADMIN — Medication 25 MILLIGRAM(S): at 00:54

## 2019-03-09 NOTE — ED PROVIDER NOTE - EYES, MLM
Clear bilaterally, pupils equal, round and reactive to light.  periorbital edema bilaterally without redness/warmth/tenderness

## 2019-03-09 NOTE — ED PROVIDER NOTE - OBJECTIVE STATEMENT
returns with persistent swelling of face/ mostly around eyes.  Says it feels very tight.  Took a dose of prednisone and benadryl this afternoon but says hasn't gotten better.  Wants to make sure it wasn't related to a fall she had several days ago when she hit top of head.  No loc at that time and denies headache.  Notes she had fake eyelashes applied a few days prior to swelling onset and didn't mention that last night.  Took them off.  Denies throat swelling or trouble breathing

## 2019-03-09 NOTE — ED PROVIDER NOTE - NSFOLLOWUPINSTRUCTIONS_ED_ALL_ED_FT
Please see your primary care provider as needed.  Continue the prednisone daily as prescribed and diphenhydramine 3-4 times per day for next 4 days. Also try cool wash cloth on eyes when resting.  Return to the ER if symptoms worsen or other concerns.    Contact Dermatitis  Dermatitis is redness, soreness, and swelling (inflammation) of the skin. Contact dermatitis is a reaction to certain substances that touch the skin. There are two types of contact dermatitis:    Irritant contact dermatitis. This type is caused by something that irritates your skin, such as dry hands from washing them too much. This type does not require previous exposure to the substance for a reaction to occur. This type is more common.  Allergic contact dermatitis. This type is caused by a substance that you are allergic to, such as a nickel allergy or poison ivy. This type only occurs if you have been exposed to the substance (allergen) before. Upon a repeat exposure, your body reacts to the substance. This type is less common.    What are the causes?  Many different substances can cause contact dermatitis. Irritant contact dermatitis is most commonly caused by exposure to:    Makeup.  Soaps.  Detergents.  Bleaches.  Acids.  Metal salts, such as nickel.    Allergic contact dermatitis is most commonly caused by exposure to:    Poisonous plants.  Chemicals.  Jewelry.  Latex.  Medicines.  Preservatives in products, such as clothing.    What increases the risk?  This condition is more likely to develop in:    People who have jobs that expose them to irritants or allergens.  People who have certain medical conditions, such as asthma or eczema.    What are the signs or symptoms?  Symptoms of this condition may occur anywhere on your body where the irritant has touched you or is touched by you. Symptoms include:    Dryness or flaking.  Redness.  Cracks.  Itching.  Pain or a burning feeling.  Blisters.  Drainage of small amounts of blood or clear fluid from skin cracks.    With allergic contact dermatitis, there may also be swelling in areas such as the eyelids, mouth, or genitals.    How is this diagnosed?  This condition is diagnosed with a medical history and physical exam. A patch skin test may be performed to help determine the cause. If the condition is related to your job, you may need to see an occupational medicine specialist.    How is this treated?  Treatment for this condition includes figuring out what caused the reaction and protecting your skin from further contact. Treatment may also include:    Steroid creams or ointments. Oral steroid medicines may be needed in more severe cases.  Antibiotics or antibacterial ointments, if a skin infection is present.  Antihistamine lotion or an antihistamine taken by mouth to ease itching.  A bandage (dressing).    Follow these instructions at home:  Skin Care     Moisturize your skin as needed.  Apply cool compresses to the affected areas.  Try taking a bath with:    Epsom salts. Follow the instructions on the packaging. You can get these at your local pharmacy or grocery store.  Baking soda. Pour a small amount into the bath as directed by your health care provider.  Colloidal oatmeal. Follow the instructions on the packaging. You can get this at your local pharmacy or grocery store.    Try applying baking soda paste to your skin. Stir water into baking soda until it reaches a paste-like consistency.  Do not scratch your skin.  Bathe less frequently, such as every other day.  Bathe in lukewarm water. Avoid using hot water.  Medicines     Take or apply over-the-counter and prescription medicines only as told by your health care provider.  If you were prescribed an antibiotic medicine, take or apply your antibiotic as told by your health care provider. Do not stop using the antibiotic even if your condition starts to improve.  General instructions     Keep all follow-up visits as told by your health care provider. This is important.  Avoid the substance that caused your reaction. If you do not know what caused it, keep a journal to try to track what caused it. Write down:    What you eat.  What cosmetic products you use.  What you drink.  What you wear in the affected area. This includes jewelry.    If you were given a dressing, take care of it as told by your health care provider. This includes when to change and remove it.  Contact a health care provider if:  Your condition does not improve with treatment.  Your condition gets worse.  You have signs of infection such as swelling, tenderness, redness, soreness, or warmth in the affected area.  You have a fever.  You have new symptoms.  Get help right away if:  You have a severe headache, neck pain, or neck stiffness.  You vomit.  You feel very sleepy.  You notice red streaks coming from the affected area.  Your bone or joint underneath the affected area becomes painful after the skin has healed.  The affected area turns darker.  You have difficulty breathing.  This information is not intended to replace advice given to you by your health care provider. Make sure you discuss any questions you have with your health care provider.

## 2019-03-09 NOTE — ED PROVIDER NOTE - CLINICAL SUMMARY MEDICAL DECISION MAKING FREE TEXT BOX
exam/ story consistent with contact dermatitis from eyelash application.  already removed fake lashes.  seen by dr. palmer last night who states looks stable tonight on reassessment.  counseled to continue benadryl/ prednisone/ cool compress. return precautions discussed

## 2019-03-12 DIAGNOSIS — L25.9 UNSPECIFIED CONTACT DERMATITIS, UNSPECIFIED CAUSE: ICD-10-CM

## 2019-03-12 DIAGNOSIS — I10 ESSENTIAL (PRIMARY) HYPERTENSION: ICD-10-CM

## 2019-03-12 DIAGNOSIS — T78.40XA ALLERGY, UNSPECIFIED, INITIAL ENCOUNTER: ICD-10-CM

## 2019-03-12 DIAGNOSIS — Z79.1 LONG TERM (CURRENT) USE OF NON-STEROIDAL ANTI-INFLAMMATORIES (NSAID): ICD-10-CM

## 2019-03-12 DIAGNOSIS — E11.9 TYPE 2 DIABETES MELLITUS WITHOUT COMPLICATIONS: ICD-10-CM

## 2019-03-12 DIAGNOSIS — Z79.891 LONG TERM (CURRENT) USE OF OPIATE ANALGESIC: ICD-10-CM

## 2019-03-12 DIAGNOSIS — R22.0 LOCALIZED SWELLING, MASS AND LUMP, HEAD: ICD-10-CM

## 2019-03-12 DIAGNOSIS — Z79.899 OTHER LONG TERM (CURRENT) DRUG THERAPY: ICD-10-CM

## 2019-03-12 DIAGNOSIS — Z79.84 LONG TERM (CURRENT) USE OF ORAL HYPOGLYCEMIC DRUGS: ICD-10-CM

## 2019-03-12 DIAGNOSIS — Z79.52 LONG TERM (CURRENT) USE OF SYSTEMIC STEROIDS: ICD-10-CM

## 2019-03-12 DIAGNOSIS — Z79.2 LONG TERM (CURRENT) USE OF ANTIBIOTICS: ICD-10-CM

## 2019-05-07 ENCOUNTER — APPOINTMENT (OUTPATIENT)
Dept: HUMAN REPRODUCTION | Facility: CLINIC | Age: 44
End: 2019-05-07

## 2019-05-28 ENCOUNTER — APPOINTMENT (OUTPATIENT)
Dept: HUMAN REPRODUCTION | Facility: CLINIC | Age: 44
End: 2019-05-28

## 2019-10-31 ENCOUNTER — EMERGENCY (EMERGENCY)
Facility: HOSPITAL | Age: 44
LOS: 1 days | Discharge: HOME | End: 2019-10-31
Attending: EMERGENCY MEDICINE | Admitting: EMERGENCY MEDICINE
Payer: MEDICAID

## 2019-10-31 VITALS
WEIGHT: 220.9 LBS | SYSTOLIC BLOOD PRESSURE: 134 MMHG | RESPIRATION RATE: 17 BRPM | TEMPERATURE: 98 F | HEIGHT: 66 IN | DIASTOLIC BLOOD PRESSURE: 84 MMHG | HEART RATE: 63 BPM

## 2019-10-31 VITALS
RESPIRATION RATE: 16 BRPM | TEMPERATURE: 98 F | OXYGEN SATURATION: 98 % | SYSTOLIC BLOOD PRESSURE: 144 MMHG | DIASTOLIC BLOOD PRESSURE: 87 MMHG | HEART RATE: 87 BPM

## 2019-10-31 DIAGNOSIS — Z98.89 OTHER SPECIFIED POSTPROCEDURAL STATES: Chronic | ICD-10-CM

## 2019-10-31 DIAGNOSIS — R51 HEADACHE: ICD-10-CM

## 2019-10-31 DIAGNOSIS — Z98.890 OTHER SPECIFIED POSTPROCEDURAL STATES: Chronic | ICD-10-CM

## 2019-10-31 LAB
ABO RH CONFIRMATION: SIGNIFICANT CHANGE UP
ALBUMIN SERPL ELPH-MCNC: 3.2 G/DL — LOW (ref 3.3–5)
ALP SERPL-CCNC: 59 U/L — SIGNIFICANT CHANGE UP (ref 40–120)
ALT FLD-CCNC: 18 U/L — SIGNIFICANT CHANGE UP (ref 10–45)
ANION GAP SERPL CALC-SCNC: 9 MMOL/L — SIGNIFICANT CHANGE UP (ref 5–17)
ANISOCYTOSIS BLD QL: SIGNIFICANT CHANGE UP
APPEARANCE UR: CLEAR — SIGNIFICANT CHANGE UP
AST SERPL-CCNC: 27 U/L — SIGNIFICANT CHANGE UP (ref 10–40)
BACTERIA # UR AUTO: ABNORMAL /HPF
BASOPHILS # BLD AUTO: 0.06 K/UL — SIGNIFICANT CHANGE UP (ref 0–0.2)
BASOPHILS NFR BLD AUTO: 1.1 % — SIGNIFICANT CHANGE UP (ref 0–2)
BILIRUB SERPL-MCNC: 0.1 MG/DL — LOW (ref 0.2–1.2)
BILIRUB UR-MCNC: NEGATIVE — SIGNIFICANT CHANGE UP
BLD GP AB SCN SERPL QL: SIGNIFICANT CHANGE UP
BUN SERPL-MCNC: 12 MG/DL — SIGNIFICANT CHANGE UP (ref 7–23)
CALCIUM SERPL-MCNC: 8.9 MG/DL — SIGNIFICANT CHANGE UP (ref 8.4–10.5)
CHLORIDE SERPL-SCNC: 101 MMOL/L — SIGNIFICANT CHANGE UP (ref 96–108)
CO2 SERPL-SCNC: 25 MMOL/L — SIGNIFICANT CHANGE UP (ref 22–31)
COLOR SPEC: YELLOW — SIGNIFICANT CHANGE UP
CREAT SERPL-MCNC: 0.97 MG/DL — SIGNIFICANT CHANGE UP (ref 0.5–1.3)
DIFF PNL FLD: NEGATIVE — SIGNIFICANT CHANGE UP
EOSINOPHIL # BLD AUTO: 0.16 K/UL — SIGNIFICANT CHANGE UP (ref 0–0.5)
EOSINOPHIL NFR BLD AUTO: 3 % — SIGNIFICANT CHANGE UP (ref 0–6)
EPI CELLS # UR: ABNORMAL
GLUCOSE SERPL-MCNC: 119 MG/DL — HIGH (ref 70–99)
GLUCOSE UR QL: NEGATIVE — SIGNIFICANT CHANGE UP
HCT VFR BLD CALC: 25.4 % — LOW (ref 34.5–45)
HGB BLD-MCNC: 6.8 G/DL — CRITICAL LOW (ref 11.5–15.5)
HYPOCHROMIA BLD QL: SLIGHT — SIGNIFICANT CHANGE UP
IMM GRANULOCYTES NFR BLD AUTO: 0.2 % — SIGNIFICANT CHANGE UP (ref 0–1.5)
KETONES UR-MCNC: NEGATIVE — SIGNIFICANT CHANGE UP
LEUKOCYTE ESTERASE UR-ACNC: NEGATIVE — SIGNIFICANT CHANGE UP
LG PLATELETS BLD QL AUTO: SLIGHT — SIGNIFICANT CHANGE UP
LYMPHOCYTES # BLD AUTO: 1.89 K/UL — SIGNIFICANT CHANGE UP (ref 1–3.3)
LYMPHOCYTES # BLD AUTO: 35.9 % — SIGNIFICANT CHANGE UP (ref 13–44)
MACROCYTES BLD QL: SIGNIFICANT CHANGE UP
MCHC RBC-ENTMCNC: 19.5 PG — LOW (ref 27–34)
MCHC RBC-ENTMCNC: 26.8 GM/DL — LOW (ref 32–36)
MCV RBC AUTO: 72.8 FL — LOW (ref 80–100)
MICROCYTES BLD QL: SLIGHT — SIGNIFICANT CHANGE UP
MONOCYTES # BLD AUTO: 0.89 K/UL — SIGNIFICANT CHANGE UP (ref 0–0.9)
MONOCYTES NFR BLD AUTO: 16.9 % — HIGH (ref 2–14)
NEUTROPHILS # BLD AUTO: 2.25 K/UL — SIGNIFICANT CHANGE UP (ref 1.8–7.4)
NEUTROPHILS NFR BLD AUTO: 42.9 % — LOW (ref 43–77)
NITRITE UR-MCNC: NEGATIVE — SIGNIFICANT CHANGE UP
NRBC # BLD: 0 /100 WBCS — SIGNIFICANT CHANGE UP (ref 0–0)
PH UR: 6 — SIGNIFICANT CHANGE UP (ref 5–8)
PLAT MORPH BLD: ABNORMAL
PLATELET # BLD AUTO: 1181 K/UL — CRITICAL HIGH (ref 150–400)
PLATELET CLUMP BLD QL SMEAR: ABNORMAL
PLATELET COUNT - ESTIMATE: ABNORMAL
POTASSIUM SERPL-MCNC: 3.8 MMOL/L — SIGNIFICANT CHANGE UP (ref 3.5–5.3)
POTASSIUM SERPL-SCNC: 3.8 MMOL/L — SIGNIFICANT CHANGE UP (ref 3.5–5.3)
PROT SERPL-MCNC: 7.1 G/DL — SIGNIFICANT CHANGE UP (ref 6–8.3)
PROT UR-MCNC: 30 MG/DL
RBC # BLD: 3.49 M/UL — LOW (ref 3.8–5.2)
RBC # FLD: 25 % — HIGH (ref 10.3–14.5)
RBC BLD AUTO: ABNORMAL
RBC CASTS # UR COMP ASSIST: SIGNIFICANT CHANGE UP /HPF (ref 0–4)
SODIUM SERPL-SCNC: 135 MMOL/L — SIGNIFICANT CHANGE UP (ref 135–145)
SP GR SPEC: 1.02 — SIGNIFICANT CHANGE UP (ref 1.01–1.02)
UROBILINOGEN FLD QL: NEGATIVE — SIGNIFICANT CHANGE UP
WBC # BLD: 5.26 K/UL — SIGNIFICANT CHANGE UP (ref 3.8–10.5)
WBC # FLD AUTO: 5.26 K/UL — SIGNIFICANT CHANGE UP (ref 3.8–10.5)
WBC UR QL: SIGNIFICANT CHANGE UP /HPF (ref 0–5)

## 2019-10-31 PROCEDURE — 36415 COLL VENOUS BLD VENIPUNCTURE: CPT

## 2019-10-31 PROCEDURE — 36430 TRANSFUSION BLD/BLD COMPNT: CPT

## 2019-10-31 PROCEDURE — 99285 EMERGENCY DEPT VISIT HI MDM: CPT | Mod: 25

## 2019-10-31 PROCEDURE — 86901 BLOOD TYPING SEROLOGIC RH(D): CPT

## 2019-10-31 PROCEDURE — 70450 CT HEAD/BRAIN W/O DYE: CPT

## 2019-10-31 PROCEDURE — 81001 URINALYSIS AUTO W/SCOPE: CPT

## 2019-10-31 PROCEDURE — 86900 BLOOD TYPING SEROLOGIC ABO: CPT

## 2019-10-31 PROCEDURE — P9016: CPT

## 2019-10-31 PROCEDURE — 96365 THER/PROPH/DIAG IV INF INIT: CPT | Mod: XU

## 2019-10-31 PROCEDURE — 70450 CT HEAD/BRAIN W/O DYE: CPT | Mod: 26

## 2019-10-31 PROCEDURE — 81025 URINE PREGNANCY TEST: CPT

## 2019-10-31 PROCEDURE — 80053 COMPREHEN METABOLIC PANEL: CPT

## 2019-10-31 PROCEDURE — 85027 COMPLETE CBC AUTOMATED: CPT

## 2019-10-31 PROCEDURE — 86850 RBC ANTIBODY SCREEN: CPT

## 2019-10-31 PROCEDURE — 96375 TX/PRO/DX INJ NEW DRUG ADDON: CPT | Mod: XU

## 2019-10-31 PROCEDURE — 86923 COMPATIBILITY TEST ELECTRIC: CPT

## 2019-10-31 PROCEDURE — 99284 EMERGENCY DEPT VISIT MOD MDM: CPT

## 2019-10-31 RX ORDER — FAMOTIDINE 10 MG/ML
20 INJECTION INTRAVENOUS ONCE
Refills: 0 | Status: COMPLETED | OUTPATIENT
Start: 2019-10-31 | End: 2019-10-31

## 2019-10-31 RX ORDER — DIPHENHYDRAMINE HCL 50 MG
1 CAPSULE ORAL
Qty: 15 | Refills: 0
Start: 2019-10-31 | End: 2019-11-04

## 2019-10-31 RX ORDER — LORATADINE 10 MG/1
10 TABLET ORAL ONCE
Refills: 0 | Status: COMPLETED | OUTPATIENT
Start: 2019-10-31 | End: 2019-10-31

## 2019-10-31 RX ORDER — DEXAMETHASONE 0.5 MG/5ML
6 ELIXIR ORAL ONCE
Refills: 0 | Status: COMPLETED | OUTPATIENT
Start: 2019-10-31 | End: 2019-10-31

## 2019-10-31 RX ADMIN — FAMOTIDINE 100 MILLIGRAM(S): 10 INJECTION INTRAVENOUS at 01:45

## 2019-10-31 RX ADMIN — Medication 6 MILLIGRAM(S): at 01:50

## 2019-10-31 RX ADMIN — LORATADINE 10 MILLIGRAM(S): 10 TABLET ORAL at 01:57

## 2019-10-31 RX ADMIN — FAMOTIDINE 20 MILLIGRAM(S): 10 INJECTION INTRAVENOUS at 02:15

## 2019-10-31 NOTE — ED PROVIDER NOTE - PATIENT PORTAL LINK FT
You can access the FollowMyHealth Patient Portal offered by [facility] by registering at the following website: [email or web address]. By joining [employer]’s FollowMyHealth portal, you will also be able to view your health information using other applications (apps) compatible with our system.

## 2019-10-31 NOTE — ED ADULT NURSE REASSESSMENT NOTE - NS ED NURSE REASSESS COMMENT FT1
Assumed care of patient. Blood transfusion still running, no adverse reaction at this time. No current complaints. Pt aware of plan of care.

## 2019-10-31 NOTE — ED ADULT TRIAGE NOTE - CHIEF COMPLAINT QUOTE
Pt presents to ED w/ c/o headache since yesterday & itchiness all over body. Denies allergies or coming into contact with anything unusual. Denies taking medications PTA, nausea, vomiting, SOB, or chest pain.

## 2019-10-31 NOTE — ED PROVIDER NOTE - NSFOLLOWUPINSTRUCTIONS_ED_ALL_ED_FT
Anemia    Anemia is a condition in which the concentration of red blood cells or hemoglobin in the blood is below normal. Hemoglobin is a substance in red blood cells that carries oxygen to the tissues of the body. Anemia results in not enough oxygen reaching these tissues which can cause symptoms such as weakness, dizziness/lightheadedness, shortness of breath, chest pain, paleness, or nausea. The cause of your anemia may or may not be determined immediately. If your hemoglobin was dangerously low, you may have received a blood transfusion. Usually reactions to transfusions occur immediately but monitor yourself for any fevers, rash, or shortness of breath.    SEEK IMMEDIATE MEDICAL CARE IF YOU HAVE ANY OF THE FOLLOWING SYMPTOMS: extreme weakness/chest pain/shortness of breath, black or bloody stools, vomiting blood, fainting, fever, or any signs of dehydration. Anemia    Anemia is a condition in which the concentration of red blood cells or hemoglobin in the blood is below normal. Hemoglobin is a substance in red blood cells that carries oxygen to the tissues of the body. Anemia results in not enough oxygen reaching these tissues which can cause symptoms such as weakness, dizziness/lightheadedness, shortness of breath, chest pain, paleness, or nausea. The cause of your anemia may or may not be determined immediately. If your hemoglobin was dangerously low, you may have received a blood transfusion. Usually reactions to transfusions occur immediately but monitor yourself for any fevers, rash, or shortness of breath.    SEEK IMMEDIATE MEDICAL CARE IF YOU HAVE ANY OF THE FOLLOWING SYMPTOMS: extreme weakness/chest pain/shortness of breath, black or bloody stools, vomiting blood, fainting, fever, or any signs of dehydration.    See your GYN.

## 2019-10-31 NOTE — ED PROVIDER NOTE - CLINICAL SUMMARY MEDICAL DECISION MAKING FREE TEXT BOX
pt presented with rash and itching and mild headache. pt has h/o chronic anemia due to fibroid and has been transfused in past. pt also has incidental finding of elevated platelets and Kelvin Lewis on call oncologist was called ,case discussed, mod thrombocytosis is due to chronic anemia and pt can be followed up as out pt.

## 2019-10-31 NOTE — ED ADULT NURSE NOTE - OBJECTIVE STATEMENT
P tis alert, came to the ER due to itchiness of her extremities, no SOB or dizziness. Pt also with headache,

## 2019-10-31 NOTE — ED PROVIDER NOTE - OBJECTIVE STATEMENT
43 y/o F with no sig PMhx presents to Ed c/o itching all over body with mild headache started yesterday, no fever, no SOB, no N/v no vision changes.

## 2019-12-03 NOTE — DISCHARGE NOTE ADULT - DISCHARGE DATE
Received refill request from Hospital for Special Care 
17-Mar-2017

## 2019-12-06 ENCOUNTER — EMERGENCY (EMERGENCY)
Facility: HOSPITAL | Age: 44
LOS: 1 days | Discharge: HOME | End: 2019-12-06
Attending: EMERGENCY MEDICINE | Admitting: EMERGENCY MEDICINE
Payer: MEDICAID

## 2019-12-06 VITALS
TEMPERATURE: 97 F | HEART RATE: 102 BPM | DIASTOLIC BLOOD PRESSURE: 83 MMHG | RESPIRATION RATE: 20 BRPM | OXYGEN SATURATION: 98 % | SYSTOLIC BLOOD PRESSURE: 135 MMHG

## 2019-12-06 DIAGNOSIS — S01.112A LACERATION WITHOUT FOREIGN BODY OF LEFT EYELID AND PERIOCULAR AREA, INITIAL ENCOUNTER: ICD-10-CM

## 2019-12-06 DIAGNOSIS — Z98.89 OTHER SPECIFIED POSTPROCEDURAL STATES: Chronic | ICD-10-CM
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
## 2019-12-06 DIAGNOSIS — S00.83XA CONTUSION OF OTHER PART OF HEAD, INITIAL ENCOUNTER: ICD-10-CM
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
## 2019-12-06 DIAGNOSIS — T74.11XA ADULT PHYSICAL ABUSE, CONFIRMED, INITIAL ENCOUNTER: ICD-10-CM

## 2019-12-06 DIAGNOSIS — Z98.890 OTHER SPECIFIED POSTPROCEDURAL STATES: Chronic | ICD-10-CM
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
## 2019-12-06 PROCEDURE — 12011 RPR F/E/E/N/L/M 2.5 CM/<: CPT

## 2019-12-06 PROCEDURE — 99283 EMERGENCY DEPT VISIT LOW MDM: CPT | Mod: 25
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
## 2019-12-07 PROCEDURE — 12011 RPR F/E/E/N/L/M 2.5 CM/<: CPT

## 2019-12-07 PROCEDURE — 82962 GLUCOSE BLOOD TEST: CPT

## 2019-12-07 PROCEDURE — 99283 EMERGENCY DEPT VISIT LOW MDM: CPT | Mod: 25

## 2019-12-07 NOTE — ED PROVIDER NOTE - PATIENT PORTAL LINK FT
You can access the FollowMyHealth Patient Portal offered by [facility] by registering at the following website: [email or web address]. By joining [employer]’s FollowMyHealth portal, you will also be able to view your health information using other applications (apps) compatible with our system.
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
## 2019-12-07 NOTE — ED ADULT NURSE REASSESSMENT NOTE - NS ED NURSE REASSESS COMMENT FT1
pt refused treatment and mentioned that she will just go out and smoke. no loss of consciousness during the stay in the ED. walks steadily.

## 2019-12-07 NOTE — ED PROVIDER NOTE - MUSCULOSKELETAL, MLM
Spine and extremities grossly appear. normal, range of motion is not limited. Spine and extremities grossly appears normal, range of motion is not limited.
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
## 2019-12-07 NOTE — ED PROVIDER NOTE - CHPI ED SYMPTOMS NEG
no vomiting/no loss of consciousness/no change in level of consciousness/no chest wall tenderness/no chest pain/no weakness/no seizure
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
## 2019-12-07 NOTE — ED PROVIDER NOTE - EYES, MLM
L periorbital tenderness with mild ecchymosis over L maxillary region, EOMI, PEERLA. Clear conjunctiva. 2CM laceration below L eyebrow with mild bleeding.

## 2019-12-07 NOTE — ED PROVIDER NOTE - NSFOLLOWUPINSTRUCTIONS_ED_ALL_ED_FT
I have discussed the discharge plan with the patient. The patient agrees with the plan, as discussed.  The patient understands Emergency Department diagnosis is a preliminary diagnosis often based on limited information and that the patient must adhere to the follow-up plan as discussed.  The patient understands that if the symptoms worsen or if prescribed medications do not have the desired/planned effect that the patient may return to the Emergency Department at any time for further evaluation and treatment.      Facial or Scalp Contusion     A facial or scalp contusion is a deep bruise (contusion) on the face or head. Injuries to the face and head generally cause a lot of swelling, especially around the eyes. Contusions are the result of an injury that caused bleeding under the skin. The contusion may turn blue, purple, or yellow. Minor injuries will give you a painless contusion, but more severe contusions may stay painful and swollen for a few weeks.  What are the causes?  A facial or scalp contusion is caused by a blunt injury, fall, or trauma to the face or head area.  What are the signs or symptoms?  Symptoms of this condition include:  Swelling of the injured area.Discoloration of the injured area.Tenderness, soreness, or pain in the injured area.How is this diagnosed?  This condition is diagnosed based on your medical history and a physical exam. An X-ray exam, CT scan, or MRI may be needed to check for any additional injuries, such as broken bones (fractures).  How is this treated?  Often, the best treatment for a facial or scalp contusion is applying cold compresses to the injured area. Over-the-counter medicines may also be recommended for pain control.  Follow these instructions at home:  Take over-the-counter and prescription medicines only as told by your health care provider.If directed, apply ice to the injured area.  Put ice in a plastic bag.Place a towel between your skin and the bag.Leave the ice on for 20 minutes, 2–3 times a day.Keep all follow-up visits as told by your health care provider. This is important.Contact a health care provider if:  You have trouble biting or chewing.Your pain or swelling gets worse.You have pain when you move your eyes.Get help right away if:  You have severe pain or a headache that is not relieved by medicine.You have unusual sleepiness, confusion, or personality changes.You vomit.You have a nosebleed that does not stop.You have double vision or blurred vision.You have a continuous clear fluid draining from your nose or ear.You have trouble walking or using your arms or legs.You have severe dizziness.Summary  A facial or scalp contusion is a deep bruise (contusion) on the face or head.Contusions are the result of an injury that caused bleeding under the skin.Minor injuries will give you a painless contusion, but more severe contusions may stay painful and swollen for a few weeks.Often, the best treatment for a facial or scalp contusion is applying cold compresses to the injured area.This information is not intended to replace advice given to you by your health care provider. Make sure you discuss any questions you have with your health care provider.      Log Out.    [email or web address] CareNotes®     :  [facility]             SKIN ADHESIVE CARE - AfterCare(R) Instructions(ER/ED)     Skin Adhesive Care    WHAT YOU NEED TO KNOW:    Skin adhesive is medical glue used to close wounds. It is a substitute for staples and stitches. Skin adhesive wound closures take less time and do not require anesthesia. You have less pain and a lower risk of infection than with staples or stitches. Skin adhesive will fall off after the wound is healed.     DISCHARGE INSTRUCTIONS:    Self-care:     Keep your wound clean and dry for 1 to 5 days. You can shower 24 hours after the skin adhesive is applied. Lightly pat your wound dry after you shower.      Do not soak your wound in water, such as in a bath or hot tub.      Do not scrub your wound or pick at the adhesive. This can make your wound reopen.       Do not apply ointments to your wound. These include antibiotic and other ointments that contain petroleum jelly. These products will remove skin adhesive and reopen your wound.     Follow up with your healthcare provider as directed: Write down your questions so you remember to ask them during your visits.     Contact your healthcare provider if:     You have a fever.       Your wound is red and warm to touch.       You have questions or concerns about your condition or care.     Return to the emergency department if:     Your wound has fluid draining from it.       Your wound opens.
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
## 2020-01-09 ENCOUNTER — EMERGENCY (EMERGENCY)
Facility: HOSPITAL | Age: 45
LOS: 1 days | Discharge: HOME | End: 2020-01-09
Attending: EMERGENCY MEDICINE | Admitting: EMERGENCY MEDICINE
Payer: MEDICAID

## 2020-01-09 VITALS
TEMPERATURE: 98 F | HEIGHT: 66 IN | OXYGEN SATURATION: 100 % | WEIGHT: 250 LBS | SYSTOLIC BLOOD PRESSURE: 148 MMHG | DIASTOLIC BLOOD PRESSURE: 90 MMHG | RESPIRATION RATE: 18 BRPM | HEART RATE: 115 BPM

## 2020-01-09 VITALS
HEART RATE: 100 BPM | SYSTOLIC BLOOD PRESSURE: 143 MMHG | TEMPERATURE: 100 F | OXYGEN SATURATION: 100 % | DIASTOLIC BLOOD PRESSURE: 84 MMHG | RESPIRATION RATE: 18 BRPM

## 2020-01-09 DIAGNOSIS — Z98.89 OTHER SPECIFIED POSTPROCEDURAL STATES: Chronic | ICD-10-CM

## 2020-01-09 DIAGNOSIS — Z98.890 OTHER SPECIFIED POSTPROCEDURAL STATES: Chronic | ICD-10-CM

## 2020-01-09 LAB
ANION GAP SERPL CALC-SCNC: 15 MMOL/L — SIGNIFICANT CHANGE UP (ref 5–17)
APTT BLD: 29.1 SEC — SIGNIFICANT CHANGE UP (ref 27.5–36.3)
BASOPHILS # BLD AUTO: 0.01 K/UL — SIGNIFICANT CHANGE UP (ref 0–0.2)
BASOPHILS NFR BLD AUTO: 0.3 % — SIGNIFICANT CHANGE UP (ref 0–2)
BLD GP AB SCN SERPL QL: NEGATIVE — SIGNIFICANT CHANGE UP
BUN SERPL-MCNC: 8 MG/DL — SIGNIFICANT CHANGE UP (ref 7–23)
CALCIUM SERPL-MCNC: 8.6 MG/DL — SIGNIFICANT CHANGE UP (ref 8.4–10.5)
CHLORIDE SERPL-SCNC: 99 MMOL/L — SIGNIFICANT CHANGE UP (ref 96–108)
CO2 SERPL-SCNC: 22 MMOL/L — SIGNIFICANT CHANGE UP (ref 22–31)
CREAT SERPL-MCNC: 1.02 MG/DL — SIGNIFICANT CHANGE UP (ref 0.5–1.3)
EOSINOPHIL # BLD AUTO: 0.01 K/UL — SIGNIFICANT CHANGE UP (ref 0–0.5)
EOSINOPHIL NFR BLD AUTO: 0.3 % — SIGNIFICANT CHANGE UP (ref 0–6)
FLU A RESULT: SIGNIFICANT CHANGE UP
FLU A RESULT: SIGNIFICANT CHANGE UP
FLUAV AG NPH QL: SIGNIFICANT CHANGE UP
FLUBV AG NPH QL: DETECTED
GLUCOSE SERPL-MCNC: 153 MG/DL — HIGH (ref 70–99)
HCT VFR BLD CALC: 24.7 % — LOW (ref 34.5–45)
HGB BLD-MCNC: 6.9 G/DL — CRITICAL LOW (ref 11.5–15.5)
IMM GRANULOCYTES NFR BLD AUTO: 0.3 % — SIGNIFICANT CHANGE UP (ref 0–1.5)
INR BLD: 0.96 — SIGNIFICANT CHANGE UP (ref 0.88–1.16)
LYMPHOCYTES # BLD AUTO: 0.61 K/UL — LOW (ref 1–3.3)
LYMPHOCYTES # BLD AUTO: 17 % — SIGNIFICANT CHANGE UP (ref 13–44)
MCHC RBC-ENTMCNC: 20.5 PG — LOW (ref 27–34)
MCHC RBC-ENTMCNC: 27.9 GM/DL — LOW (ref 32–36)
MCV RBC AUTO: 73.5 FL — LOW (ref 80–100)
MONOCYTES # BLD AUTO: 0.34 K/UL — SIGNIFICANT CHANGE UP (ref 0–0.9)
MONOCYTES NFR BLD AUTO: 9.5 % — SIGNIFICANT CHANGE UP (ref 2–14)
NEUTROPHILS # BLD AUTO: 2.61 K/UL — SIGNIFICANT CHANGE UP (ref 1.8–7.4)
NEUTROPHILS NFR BLD AUTO: 72.6 % — SIGNIFICANT CHANGE UP (ref 43–77)
NRBC # BLD: 0 /100 WBCS — SIGNIFICANT CHANGE UP (ref 0–0)
PLATELET # BLD AUTO: 88 K/UL — LOW (ref 150–400)
POTASSIUM SERPL-MCNC: 3.6 MMOL/L — SIGNIFICANT CHANGE UP (ref 3.5–5.3)
POTASSIUM SERPL-SCNC: 3.6 MMOL/L — SIGNIFICANT CHANGE UP (ref 3.5–5.3)
PROTHROM AB SERPL-ACNC: 10.9 SEC — SIGNIFICANT CHANGE UP (ref 10–12.9)
RBC # BLD: 3.36 M/UL — LOW (ref 3.8–5.2)
RBC # FLD: 27.6 % — HIGH (ref 10.3–14.5)
RH IG SCN BLD-IMP: POSITIVE — SIGNIFICANT CHANGE UP
RSV RESULT: SIGNIFICANT CHANGE UP
RSV RNA RESP QL NAA+PROBE: SIGNIFICANT CHANGE UP
SODIUM SERPL-SCNC: 136 MMOL/L — SIGNIFICANT CHANGE UP (ref 135–145)
WBC # BLD: 3.59 K/UL — LOW (ref 3.8–10.5)
WBC # FLD AUTO: 3.59 K/UL — LOW (ref 3.8–10.5)

## 2020-01-09 PROCEDURE — 71046 X-RAY EXAM CHEST 2 VIEWS: CPT

## 2020-01-09 PROCEDURE — 36415 COLL VENOUS BLD VENIPUNCTURE: CPT

## 2020-01-09 PROCEDURE — 85610 PROTHROMBIN TIME: CPT

## 2020-01-09 PROCEDURE — 87631 RESP VIRUS 3-5 TARGETS: CPT

## 2020-01-09 PROCEDURE — 80048 BASIC METABOLIC PNL TOTAL CA: CPT

## 2020-01-09 PROCEDURE — 86850 RBC ANTIBODY SCREEN: CPT

## 2020-01-09 PROCEDURE — 85730 THROMBOPLASTIN TIME PARTIAL: CPT

## 2020-01-09 PROCEDURE — 99284 EMERGENCY DEPT VISIT MOD MDM: CPT

## 2020-01-09 PROCEDURE — 71046 X-RAY EXAM CHEST 2 VIEWS: CPT | Mod: 26

## 2020-01-09 PROCEDURE — 85025 COMPLETE CBC W/AUTO DIFF WBC: CPT

## 2020-01-09 PROCEDURE — 86901 BLOOD TYPING SEROLOGIC RH(D): CPT

## 2020-01-09 RX ORDER — IBUPROFEN 200 MG
600 TABLET ORAL ONCE
Refills: 0 | Status: COMPLETED | OUTPATIENT
Start: 2020-01-09 | End: 2020-01-09

## 2020-01-09 RX ORDER — ONDANSETRON 8 MG/1
1 TABLET, FILM COATED ORAL
Qty: 10 | Refills: 0
Start: 2020-01-09

## 2020-01-09 RX ORDER — ONDANSETRON 8 MG/1
4 TABLET, FILM COATED ORAL ONCE
Refills: 0 | Status: COMPLETED | OUTPATIENT
Start: 2020-01-09 | End: 2020-01-09

## 2020-01-09 RX ORDER — FERROUS SULFATE 325(65) MG
1 TABLET ORAL
Qty: 60 | Refills: 0
Start: 2020-01-09 | End: 2020-02-07

## 2020-01-09 RX ORDER — IBUPROFEN 200 MG
1 TABLET ORAL
Qty: 30 | Refills: 0
Start: 2020-01-09

## 2020-01-09 RX ORDER — SODIUM CHLORIDE 9 MG/ML
1000 INJECTION INTRAMUSCULAR; INTRAVENOUS; SUBCUTANEOUS ONCE
Refills: 0 | Status: COMPLETED | OUTPATIENT
Start: 2020-01-09 | End: 2020-01-09

## 2020-01-09 RX ADMIN — Medication 600 MILLIGRAM(S): at 17:08

## 2020-01-09 RX ADMIN — ONDANSETRON 4 MILLIGRAM(S): 8 TABLET, FILM COATED ORAL at 17:10

## 2020-01-09 RX ADMIN — SODIUM CHLORIDE 1000 MILLILITER(S): 9 INJECTION INTRAMUSCULAR; INTRAVENOUS; SUBCUTANEOUS at 17:08

## 2020-01-09 NOTE — ED ADULT NURSE NOTE - OBJECTIVE STATEMENT
44 y.o F a&ox4 walked in form front triage. c.o flu-like symptoms. Pt reports x 5 days of intermittent chills, subjective fevers, body aches, + nausea, yellow mucous producing cough. c/o LLQ abd pain x mos. no SOB, chest pain, n/v/d, numbness, tingling, weakness, headaches.

## 2020-01-09 NOTE — ED ADULT NURSE NOTE - NSIMPLEMENTINTERV_GEN_ALL_ED
Implemented All Universal Safety Interventions:  [place] to call system. Call bell, personal items and telephone within reach. Instruct patient to call for assistance. Room bathroom lighting operational. Non-slip footwear when patient is off stretcher. Physically safe environment: no spills, clutter or unnecessary equipment. Stretcher in lowest position, wheels locked, appropriate side rails in place.

## 2020-01-09 NOTE — ED PROVIDER NOTE - NSFOLLOWUPINSTRUCTIONS_ED_ALL_ED_FT
Please see your primary care provider in 2-3 days.  Call for appointment.  If you have any problems with followup, please call the ED Referral Coordinator at [phone].  Return to the ER if symptoms worsen or other concerns.    Influenza    WHAT YOU NEED TO KNOW:    Influenza (the flu) is an infection caused by the influenza virus. The flu is easily spread when an infected person coughs, sneezes, or has close contact with others. You may be able to spread the flu to others for 1 week or longer after signs or symptoms appear.     DISCHARGE INSTRUCTIONS:    Call your local emergency number (911 in the [place]) if:     You have trouble breathing, and your lips look purple or blue.      You have a seizure.    Call your doctor if:     You are dizzy, or you are urinating less or not at all.       You have a headache with a stiff neck, and you feel tired or confused.      You have new pain or pressure in your chest.      Your symptoms, such as shortness of breath, vomiting, or diarrhea, get worse.       Your symptoms, such as fever and coughing, seem to get better, but then get worse.       You have new muscle pain or weakness.      You have questions or concerns about your condition or care.    Medicines: You may need any of the following:     Acetaminophen decreases pain and fever. It is available without a doctor's order. Ask how much to take and how often to take it. Follow directions. Read the labels of all other medicines you are using to see if they also contain acetaminophen, or ask your doctor or pharmacist. Acetaminophen can cause liver damage if not taken correctly. Do not use more than 4 grams (4,000 milligrams) total of acetaminophen in one day.       NSAIDs, such as ibuprofen, help decrease swelling, pain, and fever. This medicine is available with or without a doctor's order. NSAIDs can cause stomach bleeding or kidney problems in certain people. If you take blood thinner medicine, always ask your healthcare provider if NSAIDs are safe for you. Always read the medicine label and follow directions.      Antivirals help fight a viral infection.      Take your medicine as directed. Contact your healthcare provider if you think your medicine is not helping or if you have side effects. Tell him or her if you are allergic to any medicine. Keep a list of the medicines, vitamins, and herbs you take. Include the amounts, and when and why you take them. Bring the list or the pill bottles to follow-up visits. Carry your medicine list with you in case of an emergency.    Rest as much as you can to help you recover.    Drink liquids as directed to help prevent dehydration. Ask how much liquid to drink each day and which liquids are best for you.    Prevent the spread of influenza:     Wash your hands often. Use soap and water. Wash your hands after you use the bathroom, change a child's diapers, or sneeze. Wash your hands before you prepare or eat food. Use gel hand cleanser that has 60% alcohol, when soap and water are not available. Do not touch your eyes, nose, or mouth unless you have washed your hands first.Handwashing           Cover your mouth when you sneeze or cough. Cough into a tissue or the bend of your arm. If you use a tissue, throw it away immediately and wash your hands.       Clean shared items with a germ-killing . Clean table surfaces, doorknobs, and light switches. Do not share towels, silverware, and dishes with people who are sick. Wash bed sheets, towels, silverware, and dishes with soap and water.       Wear a mask over your mouth and nose if you are sick. The face mask may help protect others from becoming infected with the flu. Wear the mask when in common areas of your home or if you seek care with a healthcare provider.       Stay away from others if you are sick. Stay at home until 24 hours after your fever and symptoms are gone.      Influenza vaccine helps prevent influenza (flu). Everyone older than 6 months should get a yearly influenza vaccine. Get the vaccine as soon as it is available, usually in September or October each year.    Follow up with your healthcare provider as directed: Write down your questions so you remember to ask them during your visits. Please see your primary care provider in 2-3 days.  Call for appointment.  Your blood count is borderline low today. Restart iron and follow up with your doctor for recheck.  If you have any problems with followup, please call the ED Referral Coordinator at [phone].  Return to the ER if symptoms worsen or other concerns.    Influenza    WHAT YOU NEED TO KNOW:    Influenza (the flu) is an infection caused by the influenza virus. The flu is easily spread when an infected person coughs, sneezes, or has close contact with others. You may be able to spread the flu to others for 1 week or longer after signs or symptoms appear.     DISCHARGE INSTRUCTIONS:    Call your local emergency number (911 in the [place]) if:     You have trouble breathing, and your lips look purple or blue.      You have a seizure.    Call your doctor if:     You are dizzy, or you are urinating less or not at all.       You have a headache with a stiff neck, and you feel tired or confused.      You have new pain or pressure in your chest.      Your symptoms, such as shortness of breath, vomiting, or diarrhea, get worse.       Your symptoms, such as fever and coughing, seem to get better, but then get worse.       You have new muscle pain or weakness.      You have questions or concerns about your condition or care.    Medicines: You may need any of the following:     Acetaminophen decreases pain and fever. It is available without a doctor's order. Ask how much to take and how often to take it. Follow directions. Read the labels of all other medicines you are using to see if they also contain acetaminophen, or ask your doctor or pharmacist. Acetaminophen can cause liver damage if not taken correctly. Do not use more than 4 grams (4,000 milligrams) total of acetaminophen in one day.       NSAIDs, such as ibuprofen, help decrease swelling, pain, and fever. This medicine is available with or without a doctor's order. NSAIDs can cause stomach bleeding or kidney problems in certain people. If you take blood thinner medicine, always ask your healthcare provider if NSAIDs are safe for you. Always read the medicine label and follow directions.      Antivirals help fight a viral infection.      Take your medicine as directed. Contact your healthcare provider if you think your medicine is not helping or if you have side effects. Tell him or her if you are allergic to any medicine. Keep a list of the medicines, vitamins, and herbs you take. Include the amounts, and when and why you take them. Bring the list or the pill bottles to follow-up visits. Carry your medicine list with you in case of an emergency.    Rest as much as you can to help you recover.    Drink liquids as directed to help prevent dehydration. Ask how much liquid to drink each day and which liquids are best for you.    Prevent the spread of influenza:     Wash your hands often. Use soap and water. Wash your hands after you use the bathroom, change a child's diapers, or sneeze. Wash your hands before you prepare or eat food. Use gel hand cleanser that has 60% alcohol, when soap and water are not available. Do not touch your eyes, nose, or mouth unless you have washed your hands first.Handwashing           Cover your mouth when you sneeze or cough. Cough into a tissue or the bend of your arm. If you use a tissue, throw it away immediately and wash your hands.       Clean shared items with a germ-killing . Clean table surfaces, doorknobs, and light switches. Do not share towels, silverware, and dishes with people who are sick. Wash bed sheets, towels, silverware, and dishes with soap and water.       Wear a mask over your mouth and nose if you are sick. The face mask may help protect others from becoming infected with the flu. Wear the mask when in common areas of your home or if you seek care with a healthcare provider.       Stay away from others if you are sick. Stay at home until 24 hours after your fever and symptoms are gone.      Influenza vaccine helps prevent influenza (flu). Everyone older than 6 months should get a yearly influenza vaccine. Get the vaccine as soon as it is available, usually in September or October each year.    Follow up with your healthcare provider as directed: Write down your questions so you remember to ask them during your visits.

## 2020-01-09 NOTE — ED PROVIDER NOTE - CLINICAL SUMMARY MEDICAL DECISION MAKING FREE TEXT BOX
viral syndrome, otherwise well appearing.  flu swab + for flu b.  initially tachycardic but improved with ibuprofen.  plan symptomatic treatment with ibuprofen/ zofran.  tamiflu of limited benefit due to more than 3 days of symptoms.  return precautions discussed viral syndrome, otherwise well appearing.  flu swab + for flu b.  initially tachycardic but improved with ibuprofen.  plan symptomatic treatment with ibuprofen/ zofran.  tamiflu of limited benefit due to more than 3 days of symptoms.  return precautions discussed  history of anemia without current bleeding.  labs done with hgb of 6.9, similar to prior visit in october when 6.8.  discussed with patient, requesting rx for iron pills and will f/u with pmd.

## 2020-01-09 NOTE — ED PROVIDER NOTE - OBJECTIVE STATEMENT
here feeling sick for the past few days.  Reports headache, chest pain, abdominal pain, feeling gassy, cough with mucous, chills/ feeling cold, nausea.  No fever.  Hasn't taken anything for symptoms. History of anemia secondary to iron deficiency/ vaginal bleeding from fibroids but says no recent bleeding.  Last transfusion about 2 months ago.

## 2020-01-09 NOTE — ED PROVIDER NOTE - PATIENT PORTAL LINK FT
You can access the FollowMyHealth Patient Portal offered by [facility] by registering at the following website: [email or web address]. By joining [employer]’s FollowMyHealth portal, you will also be able to view your health information using other applications (apps) compatible with our system.

## 2020-01-09 NOTE — ED ADULT TRIAGE NOTE - ARRIVAL INFO ADDITIONAL COMMENTS
c.o productive cough, chills, body ache, headache, abd pain, chest pain for 4 days. denies any fever.

## 2020-01-14 DIAGNOSIS — Z79.84 LONG TERM (CURRENT) USE OF ORAL HYPOGLYCEMIC DRUGS: ICD-10-CM

## 2020-01-14 DIAGNOSIS — Z79.2 LONG TERM (CURRENT) USE OF ANTIBIOTICS: ICD-10-CM

## 2020-01-14 DIAGNOSIS — Z79.1 LONG TERM (CURRENT) USE OF NON-STEROIDAL ANTI-INFLAMMATORIES (NSAID): ICD-10-CM

## 2020-01-14 DIAGNOSIS — R07.89 OTHER CHEST PAIN: ICD-10-CM

## 2020-01-14 DIAGNOSIS — Z79.891 LONG TERM (CURRENT) USE OF OPIATE ANALGESIC: ICD-10-CM

## 2020-01-14 DIAGNOSIS — E11.9 TYPE 2 DIABETES MELLITUS WITHOUT COMPLICATIONS: ICD-10-CM

## 2020-01-14 DIAGNOSIS — J10.1 INFLUENZA DUE TO OTHER IDENTIFIED INFLUENZA VIRUS WITH OTHER RESPIRATORY MANIFESTATIONS: ICD-10-CM

## 2020-01-28 ENCOUNTER — TRANSCRIPTION ENCOUNTER (OUTPATIENT)
Age: 45
End: 2020-01-28

## 2020-01-29 ENCOUNTER — INPATIENT (INPATIENT)
Facility: HOSPITAL | Age: 45
LOS: 0 days | Discharge: LEFT BEFORE DISCHARGE | DRG: 761 | End: 2020-01-29
Attending: OBSTETRICS & GYNECOLOGY | Admitting: OBSTETRICS & GYNECOLOGY
Payer: MEDICAID

## 2020-01-29 VITALS
RESPIRATION RATE: 18 BRPM | HEART RATE: 72 BPM | WEIGHT: 210.1 LBS | TEMPERATURE: 98 F | DIASTOLIC BLOOD PRESSURE: 88 MMHG | SYSTOLIC BLOOD PRESSURE: 144 MMHG | HEIGHT: 66 IN | OXYGEN SATURATION: 97 %

## 2020-01-29 VITALS
RESPIRATION RATE: 18 BRPM | SYSTOLIC BLOOD PRESSURE: 141 MMHG | TEMPERATURE: 99 F | HEART RATE: 91 BPM | DIASTOLIC BLOOD PRESSURE: 96 MMHG

## 2020-01-29 DIAGNOSIS — Z98.890 OTHER SPECIFIED POSTPROCEDURAL STATES: Chronic | ICD-10-CM

## 2020-01-29 DIAGNOSIS — Z98.89 OTHER SPECIFIED POSTPROCEDURAL STATES: Chronic | ICD-10-CM

## 2020-01-29 LAB
ALBUMIN SERPL ELPH-MCNC: 3.7 G/DL — SIGNIFICANT CHANGE UP (ref 3.3–5)
ALP SERPL-CCNC: 40 U/L — SIGNIFICANT CHANGE UP (ref 40–120)
ALT FLD-CCNC: 9 U/L — LOW (ref 10–45)
ANION GAP SERPL CALC-SCNC: 12 MMOL/L — SIGNIFICANT CHANGE UP (ref 5–17)
AST SERPL-CCNC: 17 U/L — SIGNIFICANT CHANGE UP (ref 10–40)
BASOPHILS # BLD AUTO: 0.09 K/UL — SIGNIFICANT CHANGE UP (ref 0–0.2)
BASOPHILS NFR BLD AUTO: 2.6 % — HIGH (ref 0–2)
BILIRUB SERPL-MCNC: <0.2 MG/DL — SIGNIFICANT CHANGE UP (ref 0.2–1.2)
BLD GP AB SCN SERPL QL: NEGATIVE — SIGNIFICANT CHANGE UP
BUN SERPL-MCNC: 7 MG/DL — SIGNIFICANT CHANGE UP (ref 7–23)
CALCIUM SERPL-MCNC: 8.2 MG/DL — LOW (ref 8.4–10.5)
CHLORIDE SERPL-SCNC: 102 MMOL/L — SIGNIFICANT CHANGE UP (ref 96–108)
CO2 SERPL-SCNC: 23 MMOL/L — SIGNIFICANT CHANGE UP (ref 22–31)
CREAT SERPL-MCNC: 0.88 MG/DL — SIGNIFICANT CHANGE UP (ref 0.5–1.3)
EOSINOPHIL # BLD AUTO: 0.03 K/UL — SIGNIFICANT CHANGE UP (ref 0–0.5)
EOSINOPHIL NFR BLD AUTO: 0.9 % — SIGNIFICANT CHANGE UP (ref 0–6)
GLUCOSE SERPL-MCNC: 121 MG/DL — HIGH (ref 70–99)
HCT VFR BLD CALC: 21 % — CRITICAL LOW (ref 34.5–45)
HGB BLD-MCNC: 5.7 G/DL — CRITICAL LOW (ref 11.5–15.5)
LYMPHOCYTES # BLD AUTO: 1.25 K/UL — SIGNIFICANT CHANGE UP (ref 1–3.3)
LYMPHOCYTES # BLD AUTO: 36.5 % — SIGNIFICANT CHANGE UP (ref 13–44)
MCHC RBC-ENTMCNC: 19.9 PG — LOW (ref 27–34)
MCHC RBC-ENTMCNC: 27.1 GM/DL — LOW (ref 32–36)
MCV RBC AUTO: 73.4 FL — LOW (ref 80–100)
MONOCYTES # BLD AUTO: 0.3 K/UL — SIGNIFICANT CHANGE UP (ref 0–0.9)
MONOCYTES NFR BLD AUTO: 8.7 % — SIGNIFICANT CHANGE UP (ref 2–14)
NEUTROPHILS # BLD AUTO: 1.75 K/UL — LOW (ref 1.8–7.4)
NEUTROPHILS NFR BLD AUTO: 51.3 % — SIGNIFICANT CHANGE UP (ref 43–77)
NRBC # BLD: SIGNIFICANT CHANGE UP /100 WBCS (ref 0–0)
PLATELET # BLD AUTO: 554 K/UL — HIGH (ref 150–400)
POTASSIUM SERPL-MCNC: 3.5 MMOL/L — SIGNIFICANT CHANGE UP (ref 3.5–5.3)
POTASSIUM SERPL-SCNC: 3.5 MMOL/L — SIGNIFICANT CHANGE UP (ref 3.5–5.3)
PROT SERPL-MCNC: 6.2 G/DL — SIGNIFICANT CHANGE UP (ref 6–8.3)
RBC # BLD: 2.86 M/UL — LOW (ref 3.8–5.2)
RBC # FLD: 28 % — HIGH (ref 10.3–14.5)
RH IG SCN BLD-IMP: POSITIVE — SIGNIFICANT CHANGE UP
SODIUM SERPL-SCNC: 137 MMOL/L — SIGNIFICANT CHANGE UP (ref 135–145)
WBC # BLD: 3.42 K/UL — LOW (ref 3.8–10.5)
WBC # FLD AUTO: 3.42 K/UL — LOW (ref 3.8–10.5)

## 2020-01-29 PROCEDURE — 86922 COMPATIBILITY TEST ANTIGLOB: CPT

## 2020-01-29 PROCEDURE — 85025 COMPLETE CBC W/AUTO DIFF WBC: CPT

## 2020-01-29 PROCEDURE — 84702 CHORIONIC GONADOTROPIN TEST: CPT

## 2020-01-29 PROCEDURE — 86901 BLOOD TYPING SEROLOGIC RH(D): CPT

## 2020-01-29 PROCEDURE — 76856 US EXAM PELVIC COMPLETE: CPT

## 2020-01-29 PROCEDURE — 36415 COLL VENOUS BLD VENIPUNCTURE: CPT

## 2020-01-29 PROCEDURE — 99285 EMERGENCY DEPT VISIT HI MDM: CPT | Mod: 25

## 2020-01-29 PROCEDURE — 80053 COMPREHEN METABOLIC PANEL: CPT

## 2020-01-29 PROCEDURE — 76856 US EXAM PELVIC COMPLETE: CPT | Mod: 26

## 2020-01-29 PROCEDURE — 36430 TRANSFUSION BLD/BLD COMPNT: CPT

## 2020-01-29 PROCEDURE — P9016: CPT

## 2020-01-29 PROCEDURE — 86850 RBC ANTIBODY SCREEN: CPT

## 2020-01-29 PROCEDURE — 76830 TRANSVAGINAL US NON-OB: CPT

## 2020-01-29 PROCEDURE — 99285 EMERGENCY DEPT VISIT HI MDM: CPT

## 2020-01-29 PROCEDURE — 76830 TRANSVAGINAL US NON-OB: CPT | Mod: 26

## 2020-01-29 RX ORDER — SODIUM CHLORIDE 9 MG/ML
3 INJECTION INTRAMUSCULAR; INTRAVENOUS; SUBCUTANEOUS EVERY 8 HOURS
Refills: 0 | Status: DISCONTINUED | OUTPATIENT
Start: 2020-01-29 | End: 2020-01-29

## 2020-01-29 RX ORDER — TRANEXAMIC ACID 100 MG/ML
1000 INJECTION, SOLUTION INTRAVENOUS ONCE
Refills: 0 | Status: DISCONTINUED | OUTPATIENT
Start: 2020-01-29 | End: 2020-01-29

## 2020-01-29 RX ADMIN — SODIUM CHLORIDE 3 MILLILITER(S): 9 INJECTION INTRAMUSCULAR; INTRAVENOUS; SUBCUTANEOUS at 21:57

## 2020-01-29 NOTE — ED ADULT NURSE NOTE - NSIMPLEMENTINTERV_GEN_ALL_ED
Implemented All Fall Risk Interventions:  [place] to call system. Call bell, personal items and telephone within reach. Instruct patient to call for assistance. Room bathroom lighting operational. Non-slip footwear when patient is off stretcher. Physically safe environment: no spills, clutter or unnecessary equipment. Stretcher in lowest position, wheels locked, appropriate side rails in place. Provide visual cue, wrist band, yellow gown, etc. Monitor gait and stability. Monitor for mental status changes and reorient to person, place, and time. Review medications for side effects contributing to fall risk. Reinforce activity limits and safety measures with patient and family.

## 2020-01-29 NOTE — ED PROVIDER NOTE - PHYSICAL EXAMINATION
General: Awake, alert and oriented, well developed  HEENT: Conjunctival Pallor. Mucosal Pallor. Airway patent, PERRL, eyes clear B/L  Neck: FROM, supple, no palpable cervical LAD.   CV: Normal S1-S2, no murmurs, rubs or gallops  Pulm: CTAB, breath sounds with good aeration bilaterally, no coughing, no wheezing.   Abd: Soft, Tender to palpation in LLQ and Suprapubic region, nondistended, no guarding, no rebound tender, normal bowel sounds.  Neuro: Moving all extremities, normal tone.  MSK: Spine appears normal, range of motion is not limited, no muscle or joint tenderness  Skin: Skin normal color for race, warm, dry and intact. No evidence of rash.

## 2020-01-29 NOTE — H&P ADULT - ASSESSMENT
45yo  presenting with symptomatic anemia currently day 3 of menses and still bleeding. Patient to be admitted to GYN for blood transfusion and to start IV TXA for bleeding control. Strict pad counts. Patient counseled on definitive management including hysterectomy, patient is hopeful for another child and is not interested at this time.

## 2020-01-29 NOTE — ED PROVIDER NOTE - NS ED ROS FT
General: (+) Fatigue. No fever, chills, weight gain or weight loss, changes in appetite.  HEENT: (+) Headache, Blurry vision and Dizziness with exertion. No nasal congestion, cough, rhinorrhea, sore throat.  Cardio: (+) Palpitations w/ chest discomfort, pallor, no chest pain.  Pulm: SOB with exertion. No pleuritic Chest pain.  GI: (+) Abd pain (suprapubic/LLQ). No N/V, diarrhea, constipation   /Renal: No dysuria, foul smelling urine, increased frequency, flank pain, hematuria  MSK: (+) Back pain. No extremity pain, no edema, joint pain or swelling, gait changes  Endo: no temperature intolerance  Heme: (+)Conjunctival pallor. No bruising or abnormal bleeding  Skin: no rashes.

## 2020-01-29 NOTE — H&P ADULT - HISTORY OF PRESENT ILLNESS
44yo  presenting for fatigue and lightheadedness in the setting of menorrhagia Patient on day 3 of her menses with reported heavy vaginal bleeding and passing large clots for 3 days. Patient reports difficulty being able to walk 5 blocks or up stairs without getting short of breath.       OBHx:   C/S  at 26w (pt reports placenta previa and foot noted in vagina) - received prophylactic cerclage in this pregnancy  MAB D&C x4 most recent 2018  MAB D&E x4 (reports these were all 2nd tri losses)    GYNHx: denies abnormal paps or STDs, h/o known uterine firbiods s/p abdominal myomectomy 2019  PMH: CHTN on cozaar 25mg qd (pt does not take); T2DM on metformin 500mg qhs (pt does not take)   PSH: l/s gastric bypass ; c/s x1, multiple D&C/Es, abdominal myomectomy 2019   SocHx: currently smoker, smokes 10 cigarettes per day  Meds: PO iron (noncompliant with other meds)  All: NKDA 43yo  presenting for fatigue and lightheadedness in the setting of menorrhagia Patient on day 3 of her menses with reported heavy vaginal bleeding and passing large clots for 3 days. Patient reports difficulty being able to walk 5 blocks or up stairs without getting short of breath.       OBHx:   C/S  at 26w (pt reports placenta previa and foot noted in vagina) - received prophylactic cerclage in this pregnancy  MAB D&C x4 most recent 2018  MAB D&E x4 (reports these were all 2nd tri losses)    GYNHx: denies abnormal paps or STDs, h/o known uterine firbiods s/p abdominal myomectomy 2019  PMH: CHTN on cozaar 25mg qd (pt does not take); T2DM on metformin 500mg qhs (pt does not take)   PSH: l/s gastric bypass ; c/s x1, multiple D&C/Es, abdominal myomectomy 2019   SocHx: currently smoker, smokes 10 cigarettes per day  Meds: PO iron (noncompliant with other meds)  All: NKDA

## 2020-01-29 NOTE — H&P ADULT - NSHPPHYSICALEXAM_GEN_ALL_CORE
Vital Signs Last 24 Hrs  T(C): 37.3 (29 Jan 2020 21:10), Max: 37.3 (29 Jan 2020 21:10)  T(F): 99.1 (29 Jan 2020 21:10), Max: 99.1 (29 Jan 2020 21:10)  HR: 91 (29 Jan 2020 21:10) (72 - 93)  BP: 141/96 (29 Jan 2020 21:10) (128/76 - 144/88)  BP(mean): --  RR: 18 (29 Jan 2020 21:10) (18 - 18)  SpO2: 99% (29 Jan 2020 21:10) (97% - 99%)    Gen: NAD, resting comfortably  Pulm: CTABL  CV: RRR  Abd: obese, soft, non tender, +BS  Pelvic: 5 cc clot in vaginal vault, Vital Signs Last 24 Hrs  T(C): 37.3 (29 Jan 2020 21:10), Max: 37.3 (29 Jan 2020 21:10)  T(F): 99.1 (29 Jan 2020 21:10), Max: 99.1 (29 Jan 2020 21:10)  HR: 91 (29 Jan 2020 21:10) (72 - 93)  BP: 141/96 (29 Jan 2020 21:10) (128/76 - 144/88)  BP(mean): --  RR: 18 (29 Jan 2020 21:10) (18 - 18)  SpO2: 99% (29 Jan 2020 21:10) (97% - 99%)    Gen: NAD, resting comfortably  Pulm: CTABL  CV: RRR  Abd: obese, soft, non tender, +BS  Pelvic: 5 cc clot in vaginal vault, active bleeding from cervix, no CMT, no adnexal masses appreciated

## 2020-01-29 NOTE — ED ADULT NURSE NOTE - OBJECTIVE STATEMENT
Pt sent from hematologist office for "low blood count." Pt endorses heavy vaginal bleeding, currently menstruating at this time. Endorses history of fibroids, and frequent blood transfusions. CO dizziness, weakness, headache, dyspnea on exertion. Denies nausea/vomiting/chills. VSS at this time.

## 2020-01-29 NOTE — PROVIDER CONTACT NOTE (OTHER) - SITUATION
pt admit for anemia / eloped got dressed and started to walk out of the ED / I was able to stop her and remove the IV but she refused to stay and speak with med team said she just had to leave

## 2020-01-29 NOTE — ED PROVIDER NOTE - OBJECTIVE STATEMENT
44yo F w/ PMHx of Chronic Anemia 2/2 Iron def and vaginal bleeding p/w 3 days of painful heavy vaginal bleeding.     PT's LMP began 3 days ago (1/27/20) and has experienced heavy blow  PT period started with heavy flow with       PT regularly has heavy periods  Reports headache, palpitations     No fever, N/V,     History of anemia secondary to iron deficiency/ vaginal bleeding   Last transfusion about 2.5 months ago. 46yo F w/ PMHx of Chronic Anemia 2/2 Iron def and vaginal bleeding p/w 3 days of painful heavy vaginal bleeding.     PT's LMP began 3 days ago (1/27/20), with heavy flow, saturating 10-14 pads a day with dark red blood and large clots. Reports 8-9/10 pelvic pain that does     PT does not believe there is any possibility of being pregnant, Reports having unprotected vaginal intercourse with , does not take OCP and does not have an IUD placed.     At baseline PT has heavy periods at regular ~28d intervals. Periods are typically accompanied by HA, back & pelvic pain.  Reports headache, palpitations     No fever, N/V,     History of anemia secondary to iron deficiency/ vaginal bleeding   Last transfusion about 2.5 months ago. 44yo  female w/ PMHx of 8 miscarriages and Chronic Anemia 2/2 Iron def / vaginal bleeding p/w 3 days of painful heavy vaginal bleeding.     PT's LMP began 3 days ago (20), with heavy flow that saturates 10-14 pads a day, dark red color with large clots. Reports HA, back pain and 8-9/10 pelvic pain that does not radiate. Reports SOB, palpitations w/ chest discomfort, dizziness and blurry vision with exertion. Endorses generalized fatigue and cramps. Denies any recent general/vaginal trauma, denies having an IDU placed. Seen earlier this week for routine physical (20), labs showed Hb of 7.1 , in the context of heavy flow period was referred to ED for transfusion by OutPT Hematologist. PT previously had transfusions of low Hb, last transfusion about 2.5 months ago. No fever, N/V, pleuritic chest pain, sick contacts, recent travel, constipation, dysuria, polyuria, focal weakness or changes in sensation. PT does not believe there is any possibility of being pregnant, Reports having unprotected vaginal intercourse with , does not take OCP. At baseline PT has heavy periods using 6-10 pads/day, at regular ~28d intervals, typically accompanied by HA, back & pelvic pain. History of 8 miscarriages and 1 emergent C/S at 28wk in the setting of placenta previa and breach presentation. 44yo  female w/ PMHx of 8 miscarriages and Chronic Anemia 2/2 Iron def / vaginal bleeding p/w 3 days of painful heavy vaginal bleeding.     PT's LMP began 3 days ago (20), with heavy flow that saturates 10-14 pads a day, dark red color with large clots. Reports HA, back pain and 7/10 pelvic pain that does not radiate. Reports SOB, palpitations w/ chest discomfort, dizziness and blurry vision with exertion. Endorses generalized fatigue and cramps. Denies any recent general/vaginal trauma, denies having an IDU placed. Seen earlier this week for routine physical (20), labs showed Hb of 7.1 , in the context of heavy flow period was referred to ED for transfusion by OutPT Hematologist. PT previously had transfusions of low Hb, last transfusion about 2.5 months ago. No fever, N/V, pleuritic chest pain, sick contacts, recent travel, constipation, dysuria, polyuria, focal weakness or changes in sensation.    At baseline PT has heavy periods using 6-10 pads/day, at regular ~28d intervals, typically accompanied by HA, back & pelvic pain. History of 8 miscarriages and 1 emergent C/S at 28wk in the setting of placenta previa and breach presentation. PT does not believe there is any possibility of being pregnant, Reports having unprotected vaginal intercourse with , does not take OCP.

## 2020-01-29 NOTE — ED PROVIDER NOTE - CLINICAL SUMMARY MEDICAL DECISION MAKING FREE TEXT BOX
46yo  female PMHx of 8 miscarriages, menorrhagia, chronic anemia w heavy menstrual flow w dark blood clots referred by hematologist since 20 saturated 10/ pads/day w dark red blood and large clots, SOB/dizziness/blurry vision/palpitations with exertion, fatigue, no trauma. Notes HA, back pain and Suprapubic/LLQ pain and tenderness. Otherwise well appearing. Ordered CBC, coags, CMP, type and cross, quatitative beta-hCG, CBC showed Hb of 5.7, awaiting transfusion, pending pelvic US, GYN consult.

## 2020-01-29 NOTE — ED ADULT TRIAGE NOTE - CHIEF COMPLAINT QUOTE
pt with hx of fibroids and ? endometriosis presents today with vaginal bleeding, currently menstruating. Last transfusion 3 weeks ago, sent from urgent care Hgb 7.

## 2020-01-29 NOTE — ED ADULT NURSE REASSESSMENT NOTE - NS ED NURSE REASSESS COMMENT FT1
Patient to receive blood transfusion. Labs reviewed. Patient consent in chart. Patient educated on possible signs of blood transfusion and informed to notify staff if patient were to develop any severe respiratory distress, flank pain, or any other major concerns. Patient with two large bore IV access sites. Patient to receive VS per policy and procedure. Blood product checked with second RN Sae. Will continue to monitor with VS per protocol and for possible transfusion reactions.

## 2020-01-29 NOTE — ED PROVIDER NOTE - ATTENDING CONTRIBUTION TO CARE
46 yo hx of 8 miscarriages, menorrhagia, chronic anemia w heavy menstrual flow w dark blood clots referred by hematologist. Saturating 10/14 pads w dark red clots, more tired than usual. no trauma. sob when walking. cramps in legs. Dizziness w exercise. Some suprapubic pain and llq and lower back pain. assoc headache. Well appearing, nad, nc/at, lung cta, heart reg, abd soft, nt, ext no gross deformity, no gross neuro deficits, pending.labs, US, gyn consult.

## 2020-01-29 NOTE — PROVIDER CONTACT NOTE (OTHER) - ASSESSMENT
PT awake and alert sound mind got dressed and left the ED  refused to wait for med team for AMA  discussion and paperwork

## 2020-01-29 NOTE — H&P ADULT - NSHPLABSRESULTS_GEN_ALL_CORE
5.7    3.42  )-----------( 554      ( 29 Jan 2020 16:13 )             21.0       < from: US Transvaginal (01.29.20 @ 18:19) >    FINDINGS:   The uterus is slightlyenlarged, measuring 10.0 x 7.3 x 6.3 cm. There is a 2.5 x 2.0 x 2.3 cm anterior intramural uterine fibroid/possibly submucosal. There is a 2.5 x 1.7 x 3.2 cm intramural fundal uterine fibroid. The endometrium is 1.4 cm in thickness, which is normal.    The right ovary is normal in size, measuring 2.7 x 2.2 x 2.6 cm with a calculated volume of 8.3 mL. The left ovary is normal in size, measuring 3.0 x 1.7 x 2.6 cm with a calculated volume of 6.7 mL. Doppler evaluation demonstrates flow to both ovaries with no evidence of torsion.    No free fluid is seen in the cul-de-sac.      IMPRESSION:    Mildly enlarged fibroid uterus. One of the discrete fibroids may be submucosal involving the anterior uterine body measuring up to 2.5 cm. Further characterization may be of value to include a pelvic pelvic MRI as clinically indicated.      < end of copied text >

## 2020-01-30 ENCOUNTER — INPATIENT (INPATIENT)
Facility: HOSPITAL | Age: 45
LOS: 0 days | Discharge: LEFT BEFORE DISCHARGE | DRG: 812 | End: 2020-01-31
Attending: OBSTETRICS & GYNECOLOGY | Admitting: OBSTETRICS & GYNECOLOGY
Payer: MEDICAID

## 2020-01-30 VITALS
RESPIRATION RATE: 18 BRPM | HEART RATE: 85 BPM | WEIGHT: 293 LBS | HEIGHT: 66 IN | TEMPERATURE: 98 F | SYSTOLIC BLOOD PRESSURE: 156 MMHG | DIASTOLIC BLOOD PRESSURE: 81 MMHG

## 2020-01-30 DIAGNOSIS — Z98.89 OTHER SPECIFIED POSTPROCEDURAL STATES: Chronic | ICD-10-CM

## 2020-01-30 DIAGNOSIS — Z98.890 OTHER SPECIFIED POSTPROCEDURAL STATES: Chronic | ICD-10-CM

## 2020-01-30 PROCEDURE — 36415 COLL VENOUS BLD VENIPUNCTURE: CPT

## 2020-01-30 PROCEDURE — 86922 COMPATIBILITY TEST ANTIGLOB: CPT

## 2020-01-30 PROCEDURE — 86901 BLOOD TYPING SEROLOGIC RH(D): CPT

## 2020-01-30 PROCEDURE — 85730 THROMBOPLASTIN TIME PARTIAL: CPT

## 2020-01-30 PROCEDURE — 99285 EMERGENCY DEPT VISIT HI MDM: CPT

## 2020-01-30 PROCEDURE — 80053 COMPREHEN METABOLIC PANEL: CPT

## 2020-01-30 PROCEDURE — P9016: CPT

## 2020-01-30 PROCEDURE — 36430 TRANSFUSION BLD/BLD COMPNT: CPT

## 2020-01-30 PROCEDURE — 86850 RBC ANTIBODY SCREEN: CPT

## 2020-01-30 PROCEDURE — 99285 EMERGENCY DEPT VISIT HI MDM: CPT | Mod: 25

## 2020-01-30 PROCEDURE — 85025 COMPLETE CBC W/AUTO DIFF WBC: CPT

## 2020-01-30 PROCEDURE — 85610 PROTHROMBIN TIME: CPT

## 2020-01-30 RX ORDER — TRANEXAMIC ACID 100 MG/ML
1000 INJECTION, SOLUTION INTRAVENOUS ONCE
Refills: 0 | Status: COMPLETED | OUTPATIENT
Start: 2020-01-30 | End: 2020-01-30

## 2020-01-30 RX ORDER — ACETAMINOPHEN 500 MG
650 TABLET ORAL ONCE
Refills: 0 | Status: COMPLETED | OUTPATIENT
Start: 2020-01-30 | End: 2020-01-30

## 2020-01-30 RX ADMIN — TRANEXAMIC ACID 220 MILLIGRAM(S): 100 INJECTION, SOLUTION INTRAVENOUS at 21:21

## 2020-01-30 NOTE — ED PROVIDER NOTE - INPATIENT RESIDENT/ACP NOTIFIED
Patient: Georgia Sen MRN: [id number]  SSN: [id number]    YOB: 1942  Age: 70 y.o. 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  1.6 cm resection specimen with 1 of 1 lymph node positive for sentinel node metastases.  [id number] (stage IIA)    SITE TREATED AND DOSE DELIVERED:  Right breast and lymphatics have received 30 Gy of 50 Gy in 15/25 fractions. Boost to follow. SUBJECTIVE:  Georgia Sen is a 70 y.o. female being treated for right breast cancer. She is doing well without significant complaints. She has some soreness in the inframammary crease starting end of week 3. Energy level down a bit. OBJECTIVE:  Moderate skin reaction, no desquamation. There were no vitals taken for this visit. Lab Results   Component Value Date/Time    Sodium 140 10/18/2017 02:55 PM    Potassium 3.9 10/18/2017 02:55 PM    Chloride 107 10/18/2017 02:55 PM    CO2 28 10/18/2017 02:55 PM    Anion gap 5 10/18/2017 02:55 PM    Glucose 86 10/18/2017 02:55 PM    BUN 14 10/18/2017 02:55 PM    Creatinine 0.69 10/18/2017 02:55 PM    GFR est AA >60 10/18/2017 02:55 PM    GFR est non-AA >60 10/18/2017 02:55 PM    Calcium 8.9 10/18/2017 02:55 PM    Albumin 3.5 10/18/2017 02:55 PM    Protein, total 7.3 10/18/2017 02:55 PM    Globulin 3.8 10/18/2017 02:55 PM    A-G Ratio 0.9 10/18/2017 02:55 PM    AST (SGOT) 30 10/18/2017 02:55 PM    ALT (SGPT) 55 10/18/2017 02:55 PM     Lab Results   Component Value Date/Time    WBC 6.4 10/18/2017 02:55 PM    HGB 13.0 10/18/2017 02:55 PM    HCT 39.2 10/18/2017 02:55 PM    PLATELET 058 86/11/9596 02:55 PM       ASSESSMENT and PLAN:  Georgia Sen is tolerating radiation as anticipated for the current dose and fraction. We will continue on as planned with another treatment visit anticipated next week.         Kaylee Crum MD   December 12, 2017 res

## 2020-01-30 NOTE — CHART NOTE - NSCHARTNOTEFT_GEN_A_CORE
ED alerted residents that patient eloped from ED prior to being transferred to her room. Per ED staff patient refused to wait for resident to come to ED to talk to her/ sign AMA forms, only waited long enough for IV to be removed. Attempted to call patient on cell phone x 2 with no answer and no voice mail box set up. Attending Dr. Fenton aware.

## 2020-01-30 NOTE — H&P ADULT - ASSESSMENT
43yo  presenting with symptomatic anemia currently day 4 of menses and still bleeding. Returning to ED for second unit of blood due to leaving AMA after first.   Patient to be admitted to GYN for blood transfusion and to start IV TXA for bleeding control. Strict pad counts. Patient counseled on definitive management including hysterectomy, patient is hopeful for another child and is not interested at this time.   She has an apt with [facility] faculty on     d/w Dr. Fenton

## 2020-01-30 NOTE — ED ADULT NURSE REASSESSMENT NOTE - NS ED NURSE REASSESS COMMENT FT1
rec'd pt calm, a+o x 3, breathing with ease on room air. Pt denies pain at this time. Reports mild fatigue. Pt with PRBC infusing per order and protocol. verification done. Pt tolerating well, no adverse reaction noted. Pt with 20G HL LAC noted, patent. Pt made aware of plan of care and in agreement. Safety precautions in place. Close monitoring continues.

## 2020-01-30 NOTE — ED ADULT NURSE NOTE - CHIEF COMPLAINT QUOTE
Patient c/o of chest pain and palpitations,  was here in ED yesterday and was supposed to be admitted but left because " I felt frustated, it was so noisy and so many people."  Hgb 5.7 yesterday, received 1 unit PRBC , was supposed to receive a 2nd unit but left the ED.   did not sign any AMA form, states took off own ID and left.  EKG in progress.

## 2020-01-30 NOTE — ED ADULT NURSE NOTE - NSIMPLEMENTINTERV_GEN_ALL_ED
Implemented All Universal Safety Interventions:  [place] to call system. Call bell, personal items and telephone within reach. Instruct patient to call for assistance. Room bathroom lighting operational. Non-slip footwear when patient is off stretcher. Physically safe environment: no spills, clutter or unnecessary equipment. Stretcher in lowest position, wheels locked, appropriate side rails in place.

## 2020-01-30 NOTE — ED ADULT NURSE NOTE - OBJECTIVE STATEMENT
Patient presents to the ED c/o palpitations.  Patient was supposed to be admitted yesterday after heavy vaginal bleeding.  Patient was found to be anemic , transfused with 1 unit PRBC and then eloped from the ED "I was tired and frustrated."  reports using 3 maxipads today and having lower abdominal cramping.  AA&OX3, abdomen soft, NDNT.

## 2020-01-30 NOTE — ED ADULT TRIAGE NOTE - CHIEF COMPLAINT QUOTE
Patient c/o of chest pain and palpitations,  was here in ED yesterday and was supposed to be admitted but left because " I felt frustated, it was so noisy and so many people."  Hgb 5.7 yesterday, received 1 unit PRBC , was supposed to receive a 2nd unit but left the ED.   did not sign any AMA form,  took off own ID and left. Patient c/o of chest pain and palpitations,  was here in ED yesterday and was supposed to be admitted but left because " I felt frustated, it was so noisy and so many people."  Hgb 5.7 yesterday, received 1 unit PRBC , was supposed to receive a 2nd unit but left the ED.   did not sign any AMA form, states took off own ID and left.  EKG in progress.

## 2020-01-30 NOTE — H&P ADULT - NSICDXFAMILYHX_GEN_ALL_CORE_FT
FAMILY HISTORY:  Family history of diabetes mellitus    Grandparent  Still living? Unknown  Family history of hypertension, Age at diagnosis: Age Unknown

## 2020-01-30 NOTE — H&P ADULT - NSHPLABSRESULTS_GEN_ALL_CORE
6.6    3.59   )----------(  445       ( 30 Jan 2020 14:59 )               24.1      139    |  103    |  8      ----------------------------<  142        ( 30 Jan 2020 14:59 )  3.6     |  23     |  0.89     Ca    8.7        ( 30 Jan 2020 14:59 )    TPro  6.6    /  Alb  3.9    /  TBili  0.2    /  DBili  x      /  AST  15     /  ALT  10     /  AlkPhos  47     ( 30 Jan 2020 14:59 )    LIVER FUNCTIONS - ( 30 Jan 2020 14:59 )  Alb: 3.9 g/dL / Pro: 6.6 g/dL / ALK PHOS: 47 U/L / ALT: 10 U/L / AST: 15 U/L / GGT: x           PT/INR -  11.1 sec / 0.97    ( 30 Jan 2020 14:59 )       PTT -  29.5 sec   ( 30 Jan 2020 14:59 )  CAPILLARY BLOOD GLUCOSE              < from: US Transvaginal (01.29.20 @ 18:19) >    FINDINGS:   The uterus is slightlyenlarged, measuring 10.0 x 7.3 x 6.3 cm. There is a 2.5 x 2.0 x 2.3 cm anterior intramural uterine fibroid/possibly submucosal. There is a 2.5 x 1.7 x 3.2 cm intramural fundal uterine fibroid. The endometrium is 1.4 cm in thickness, which is normal.    The right ovary is normal in size, measuring 2.7 x 2.2 x 2.6 cm with a calculated volume of 8.3 mL. The left ovary is normal in size, measuring 3.0 x 1.7 x 2.6 cm with a calculated volume of 6.7 mL. Doppler evaluation demonstrates flow to both ovaries with no evidence of torsion.    No free fluid is seen in the cul-de-sac.      IMPRESSION:    Mildly enlarged fibroid uterus. One of the discrete fibroids may be submucosal involving the anterior uterine body measuring up to 2.5 cm. Further characterization may be of value to include a pelvic pelvic MRI as clinically indicated.      < end of copied text >

## 2020-01-30 NOTE — ED ADULT NURSE REASSESSMENT NOTE - NS ED NURSE REASSESS COMMENT FT1
Pt refuses to receive further medication in the emergency dept. Pt informed of probable long wait for inpatient bed, continues to refuse. GYN resident Hieu cook. Pt refuses to receive further medication in the emergency dept. Pt informed of probable long wait for inpatient bed, continues to refuse. GYN resident Hieu cook, aware of reason for delay.

## 2020-01-30 NOTE — ED PROVIDER NOTE - CLINICAL SUMMARY MEDICAL DECISION MAKING FREE TEXT BOX
44F [facility] , c/s  @ 26wks, l/s gastric bypass ; c/s x1, multiple D&C/Es, abdominal myomectomy , DM, smoking p/w persistnet vaginal bleeding, palpitations and sob on exertion w/ lightheadedness, improved but still persistent since leaving AMA this am. Exam no acute distress, tachycardia. Concern symptomatic anemia, dysfunctional vaginal bleeding, doubt arrythmia or pe given hx. 44F [facility] , c/s 2005 @ 26wks, l/s gastric bypass ; c/s x1, multiple D&C/Es, abdominal myomectomy , DM, smoking p/w persistnet vaginal bleeding, palpitations and sob on exertion w/ lightheadedness, improved but still persistent since leaving AMA this am. Exam no acute distress, tachycardia. Concern symptomatic anemia, dysfunctional vaginal bleeding, menses, doubt arrythmia or pe given hx. OB/GYN recs pt consider hysterectomy as definitive tx due to persistent and ongoing recurrence. Pt amenable to PRBC & admission, gyn completed pelvic & agrees w/ plan given pt still has sig blood loss. transfusion consent completed by gyn team.

## 2020-01-30 NOTE — ED PROVIDER NOTE - PHYSICAL EXAMINATION
VITAL SIGNS: I have reviewed nursing notes and confirm.  CONSTITUTIONAL: Well-developed; well-nourished; in no acute distress.  SKIN: Skin is warm and dry, no acute rash.  HEAD: Normocephalic; atraumatic.  EYES:  EOM intact; conjunctiva and sclera clear.  ENT: No nasal discharge; airway clear.  NECK: Supple; Voluntary FROM  CARD: No rubs appreciated, Regular rate and rhythm.  RESP: No wheezes, no rales. No respiratory distress  ABD: Soft; non-distended; non-tender; no rebound or guarding  EXT: Normal ROM. No cyanosis or edema.  NEURO: Alert, oriented. Grossly unremarkable.  PSYCH: Cooperative, appropriate. VITAL SIGNS: I have reviewed nursing notes and confirm.  CONSTITUTIONAL: Well-developed; well-nourished; in no acute distress.  SKIN: Skin is warm and dry, no acute rash.  HEAD: Normocephalic; atraumatic.  EYES:  EOM intact; conjunctiva and sclera clear.  ENT: No nasal discharge; airway clear.  NECK: Supple; Voluntary FROM  CARD: No rubs appreciated, Regular rate and rhythm.  RESP: No wheezes, no rales. No respiratory distress  ABD: Soft; non-distended; non-tender; no rebound or guarding  EXT: Normal ROM. No cyanosis or edema.  : deferred.   NEURO: Alert, oriented. Grossly unremarkable.  PSYCH: Cooperative, appropriate.

## 2020-01-30 NOTE — H&P ADULT - NSHPPHYSICALEXAM_GEN_ALL_CORE
Vital Signs Last 24 Hrs  T(C): 36.9 (30 Jan 2020 15:21), Max: 37.3 (29 Jan 2020 21:10)  T(F): 98.5 (30 Jan 2020 15:21), Max: 99.1 (29 Jan 2020 21:10)  HR: 75 (30 Jan 2020 15:21) (75 - 93)  BP: 129/74 (30 Jan 2020 15:21) (128/76 - 156/81)  BP(mean): --  RR: 18 (30 Jan 2020 15:21) (18 - 18)  SpO2: 100% (30 Jan 2020 15:21) (98% - 100%)    Gen: NAD, resting comfortably  Pulm: CTABL  CV: RRR  Abd: obese, soft, non tender, +BS  Pelvic: 15-20 cc clot in vaginal vault, active bleeding from cervix, no CMT, no adnexal masses appreciated

## 2020-01-30 NOTE — ED PROVIDER NOTE - OBJECTIVE STATEMENT
44F [facility] , c/s  @ 26wks, l/s gastric bypass ; c/s x1, multiple D&C/Es, abdominal myomectomy , DM, smoking p/w persistnet vaginal bleeding, palpitations and sob on exertion w/ lightheadedness, improved but still persistent since leaving AMA this am. 44F [facility] , c/s  @ 26wks, l/s gastric bypass ; c/s x1, multiple D&C/Es, abdominal myomectomy , DM, smoking p/w persistent vaginal bleeding, palpitations and sob on exertion w/ lightheadedness, improved but still persistent since leaving AMA this am. States multiple pads needed since her discharge, approx 4.

## 2020-01-30 NOTE — H&P ADULT - HISTORY OF PRESENT ILLNESS
43yo  representing to ER after leaving AMA last night after receiving one unit of pRBCs for chronic anemia. Pt continues to complain of fatigue and lightheadedness in the setting of menorrhagia. Patient on day 4 of her menses with reported heavy vaginal bleeding. Denies any clots today. Has gone through three "double pads". Patient reports difficulty being able to walk 5 blocks or up stairs without getting short of breath.     OBHx:   C/S  at 26w (pt reports placenta previa and foot noted in vagina) - received prophylactic cerclage in this pregnancy  MAB D&C x4 most recent 2018  MAB D&E x4 (reports these were all 2nd tri losses)    GYNHx: denies abnormal paps or STDs, h/o known uterine firbiods s/p abdominal myomectomy 2019  PMH: CHTN on cozaar 25mg qd (pt does not take); T2DM on metformin 500mg qhs (pt does not take)   PSH: l/s gastric bypass ; c/s x1, multiple D&C/Es, abdominal myomectomy 2019   SocHx: currently smoker, smokes 10 cigarettes per day  Meds: PO iron (noncompliant with other meds)  All: NKDA

## 2020-01-31 ENCOUNTER — EMERGENCY (EMERGENCY)
Facility: HOSPITAL | Age: 45
LOS: 1 days | Discharge: HOME | End: 2020-01-31
Attending: EMERGENCY MEDICINE | Admitting: EMERGENCY MEDICINE
Payer: MEDICAID

## 2020-01-31 VITALS
RESPIRATION RATE: 16 BRPM | WEIGHT: 210.1 LBS | DIASTOLIC BLOOD PRESSURE: 78 MMHG | TEMPERATURE: 98 F | SYSTOLIC BLOOD PRESSURE: 186 MMHG | HEIGHT: 66 IN | HEART RATE: 99 BPM | OXYGEN SATURATION: 97 %

## 2020-01-31 VITALS
RESPIRATION RATE: 18 BRPM | DIASTOLIC BLOOD PRESSURE: 80 MMHG | HEART RATE: 76 BPM | TEMPERATURE: 98 F | SYSTOLIC BLOOD PRESSURE: 145 MMHG | OXYGEN SATURATION: 100 %

## 2020-01-31 VITALS
HEART RATE: 74 BPM | DIASTOLIC BLOOD PRESSURE: 87 MMHG | OXYGEN SATURATION: 98 % | TEMPERATURE: 98 F | RESPIRATION RATE: 18 BRPM | SYSTOLIC BLOOD PRESSURE: 154 MMHG

## 2020-01-31 DIAGNOSIS — D64.9 ANEMIA, UNSPECIFIED: ICD-10-CM

## 2020-01-31 DIAGNOSIS — R10.30 LOWER ABDOMINAL PAIN, UNSPECIFIED: ICD-10-CM

## 2020-01-31 DIAGNOSIS — Z98.89 OTHER SPECIFIED POSTPROCEDURAL STATES: Chronic | ICD-10-CM

## 2020-01-31 DIAGNOSIS — K56.699 OTHER INTESTINAL OBSTRUCTION UNSPECIFIED AS TO PARTIAL VERSUS COMPLETE OBSTRUCTION: ICD-10-CM

## 2020-01-31 DIAGNOSIS — Z98.890 OTHER SPECIFIED POSTPROCEDURAL STATES: Chronic | ICD-10-CM

## 2020-01-31 LAB
ALBUMIN SERPL ELPH-MCNC: 4 G/DL — SIGNIFICANT CHANGE UP (ref 3.3–5)
ALP SERPL-CCNC: 43 U/L — SIGNIFICANT CHANGE UP (ref 40–120)
ALT FLD-CCNC: 10 U/L — SIGNIFICANT CHANGE UP (ref 10–45)
ANION GAP SERPL CALC-SCNC: 13 MMOL/L — SIGNIFICANT CHANGE UP (ref 5–17)
APTT BLD: 29.9 SEC — SIGNIFICANT CHANGE UP (ref 27.5–36.3)
AST SERPL-CCNC: 17 U/L — SIGNIFICANT CHANGE UP (ref 10–40)
BILIRUB SERPL-MCNC: 0.2 MG/DL — SIGNIFICANT CHANGE UP (ref 0.2–1.2)
BLD GP AB SCN SERPL QL: NEGATIVE — SIGNIFICANT CHANGE UP
BUN SERPL-MCNC: 8 MG/DL — SIGNIFICANT CHANGE UP (ref 7–23)
CALCIUM SERPL-MCNC: 8.5 MG/DL — SIGNIFICANT CHANGE UP (ref 8.4–10.5)
CHLORIDE SERPL-SCNC: 101 MMOL/L — SIGNIFICANT CHANGE UP (ref 96–108)
CO2 SERPL-SCNC: 22 MMOL/L — SIGNIFICANT CHANGE UP (ref 22–31)
CREAT SERPL-MCNC: 0.89 MG/DL — SIGNIFICANT CHANGE UP (ref 0.5–1.3)
GLUCOSE SERPL-MCNC: 157 MG/DL — HIGH (ref 70–99)
INR BLD: 0.91 — SIGNIFICANT CHANGE UP (ref 0.88–1.16)
POTASSIUM SERPL-MCNC: 3.3 MMOL/L — LOW (ref 3.5–5.3)
POTASSIUM SERPL-SCNC: 3.3 MMOL/L — LOW (ref 3.5–5.3)
PROT SERPL-MCNC: 7 G/DL — SIGNIFICANT CHANGE UP (ref 6–8.3)
PROTHROM AB SERPL-ACNC: 10.4 SEC — SIGNIFICANT CHANGE UP (ref 10–12.9)
RH IG SCN BLD-IMP: POSITIVE — SIGNIFICANT CHANGE UP
SODIUM SERPL-SCNC: 136 MMOL/L — SIGNIFICANT CHANGE UP (ref 135–145)

## 2020-01-31 PROCEDURE — 85610 PROTHROMBIN TIME: CPT

## 2020-01-31 PROCEDURE — 86901 BLOOD TYPING SEROLOGIC RH(D): CPT

## 2020-01-31 PROCEDURE — 86850 RBC ANTIBODY SCREEN: CPT

## 2020-01-31 PROCEDURE — 80053 COMPREHEN METABOLIC PANEL: CPT

## 2020-01-31 PROCEDURE — 74177 CT ABD & PELVIS W/CONTRAST: CPT | Mod: 26

## 2020-01-31 PROCEDURE — 84702 CHORIONIC GONADOTROPIN TEST: CPT

## 2020-01-31 PROCEDURE — 85730 THROMBOPLASTIN TIME PARTIAL: CPT

## 2020-01-31 PROCEDURE — 99285 EMERGENCY DEPT VISIT HI MDM: CPT

## 2020-01-31 PROCEDURE — 36415 COLL VENOUS BLD VENIPUNCTURE: CPT

## 2020-01-31 PROCEDURE — 99284 EMERGENCY DEPT VISIT MOD MDM: CPT

## 2020-01-31 PROCEDURE — 85025 COMPLETE CBC W/AUTO DIFF WBC: CPT

## 2020-01-31 PROCEDURE — 83605 ASSAY OF LACTIC ACID: CPT

## 2020-01-31 PROCEDURE — 74177 CT ABD & PELVIS W/CONTRAST: CPT

## 2020-01-31 PROCEDURE — 36430 TRANSFUSION BLD/BLD COMPNT: CPT

## 2020-01-31 RX ORDER — SODIUM CHLORIDE 9 MG/ML
1000 INJECTION INTRAMUSCULAR; INTRAVENOUS; SUBCUTANEOUS ONCE
Refills: 0 | Status: COMPLETED | OUTPATIENT
Start: 2020-01-31 | End: 2020-01-31

## 2020-01-31 RX ORDER — IOHEXOL 300 MG/ML
30 INJECTION, SOLUTION INTRAVENOUS ONCE
Refills: 0 | Status: COMPLETED | OUTPATIENT
Start: 2020-01-31 | End: 2020-01-31

## 2020-01-31 RX ADMIN — IOHEXOL 30 MILLILITER(S): 300 INJECTION, SOLUTION INTRAVENOUS at 15:33

## 2020-01-31 RX ADMIN — SODIUM CHLORIDE 1000 MILLILITER(S): 9 INJECTION INTRAMUSCULAR; INTRAVENOUS; SUBCUTANEOUS at 14:28

## 2020-01-31 NOTE — ED PROVIDER NOTE - CLINICAL SUMMARY MEDICAL DECISION MAKING FREE TEXT BOX
Patient with chronic anemia asymptomatic not orthostatic well appearing. CT scan showing partial SBO surgery was consulted. Awaiting disp. No transfusion due to no symptoms and received a unit yesterday.

## 2020-01-31 NOTE — ED PROVIDER NOTE - OBJECTIVE STATEMENT
43 y/o F with h/o gastric bypass 20 years ago, fibroid uterus, and GERD presents today c/o nausea and lower abdominal pain. Pt was sent by her private doctor due to an XR taken 2 weeks ago concerning for SBO; however, pt admits to multiple episodes of BMs toady and is passing gas. Denies fever, chest pain, SOB, bloody stool, or urinary symptoms. Pt was recently seen yesterday and received 2 units of blood for her chronic anemia due to fibroids. 45 y/o F with h/o gastric bypass 20 years ago, fibroid uterus, and GERD presents today c/o nausea and lower abdominal pain. Pt was sent by her private doctor due to an XR taken 2 weeks ago concerning for SBO; however, pt admits to multiple episodes of BMs toady and is passing flatulence. Denies fever, chest pain, SOB, bloody stool, or urinary symptoms. Pt was recently seen yesterday and received 2 units of blood for her chronic anemia due to fibroids. Report of vaginal spotting only using two pads. She required to take her iron supplements and her pmd is trying to get approval for IV iron infusion.

## 2020-01-31 NOTE — ED ADULT NURSE NOTE - CHIEF COMPLAINT QUOTE
Patient c/o generalized weakness and feels lightheaded. Patient reports residual right sided weakness from previous stroke   pt states "my doctor told me to come back because I have a bowel obstruction and I'm anemic" pt complaining of abd pain N/V but still having bowel movements and passing gas. hx gastric bypass

## 2020-01-31 NOTE — ED PROVIDER NOTE - PATIENT PORTAL LINK FT
You can access the FollowMyHealth Patient Portal offered by [facility] by registering at the following website: [email or web address]. By joining [employer]’s FollowMyHealth portal, you will also be able to view your health information using other applications (apps) compatible with our system.

## 2020-01-31 NOTE — ED PROVIDER NOTE - PMH
Anemia    Anemia    Diabetes    Fibroids    GERD (gastroesophageal reflux disease)    Thyroid nodule

## 2020-01-31 NOTE — ED ADULT NURSE NOTE - OBJECTIVE STATEMENT
44 year old F patient, A+OX3, ambulatory w steady gait, presents to ED sent in by PMD for possible SBO.  No distress noted,  c/o of vomitting & abd pain for multiple months intermittently.  Hx of gastric bypass.  NO distress noted.  Muffled bowel sounds head, pt is large in stature.  States last BM this morning.  Patient also states hx of anemia, received 2 transfusions yesterday.

## 2020-01-31 NOTE — ED ADULT NURSE NOTE - CHPI ED NUR SYMPTOMS NEG
no fever/no decreased eating/drinking/no tingling/no weakness/no chills/no vomiting/no dizziness/no nausea

## 2020-01-31 NOTE — ED PROVIDER NOTE - NSPTACCESSSVCSAPPTDETAILS_ED_ALL_ED_FT
- follow up with Dr. Montejo in 1-2 weeks: [address] [place] [id number]  [phone]   -Prescribe protonix 40mg twice a day.  recommend following up with a gastroenterologist for an EGD and primary care doctor for outpatient iron infusions.   -Please test PTH level and trace mineral levels: copper, manganese, selenium, zinc, iodine, chromium, cobalt.

## 2020-01-31 NOTE — CHART NOTE - NSCHARTNOTEFT_GEN_A_CORE
ED staff alerted resident that patient had removed her IV by herself gotten dressed and started walking out the door. GYN resident able to catch patient in ED hallway, encouraged patient to stay for repeat CBC as she may need additional unit of blood. Patient reports that she is tired of waiting in ER holding and that she wants to go home. Patient reports that she is feeling better and does not want to stay for repeat CBC. Discussed with patient risks of leaving against medical advice including continued bleeding, risk of inappropriate rise in hemoglobin which could result in symptomatic anemia. Patient verbalized understanding and signed AMA form, witnessed by ED nursing.

## 2020-01-31 NOTE — ED PROVIDER NOTE - CARE PROVIDER_API CALL
Aaron Montejo (MD)  Surgery  [address] [place] [address]  Phone: [phone]  Fax: [phone]  Follow Up Time:

## 2020-01-31 NOTE — ED ADULT TRIAGE NOTE - CHIEF COMPLAINT QUOTE
pt states "my doctor told me to come back because I have a bowel obstruction and I'm anemic" pt complaining of abd pain N/V but still having bowel movements and passing gas. hx gastric bypass

## 2020-01-31 NOTE — ED PROVIDER NOTE - NSFOLLOWUPINSTRUCTIONS_ED_ALL_ED_FT
I have discussed the discharge plan with the patient. The patient agrees with the plan, as discussed.  The patient understands Emergency Department diagnosis is a preliminary diagnosis often based on limited information and that the patient must adhere to the follow-up plan as discussed.  The patient understands that if the symptoms worsen or if prescribed medications do not have the desired/planned effect that the patient may return to the Emergency Department at any time for further evaluation and treatment.      Anemia     Anemia is a condition in which you do not have enough red blood cells or hemoglobin. Hemoglobin is a substance in red blood cells that carries oxygen. When you do not have enough red blood cells or hemoglobin (are anemic), your body cannot get enough oxygen and your organs may not work properly. As a result, you may feel very tired or have other problems.  What are the causes?  Common causes of anemia include:  Excessive bleeding. Anemia can be caused by excessive bleeding inside or outside the body, including bleeding from the intestine or from periods in women.Poor nutrition.Long-lasting (chronic) kidney, thyroid, and liver disease.Bone marrow disorders.Cancer and treatments for cancer.HIV (human immunodeficiency virus) and AIDS (acquired immunodeficiency syndrome).Treatments for HIV and AIDS.Spleen problems.Blood disorders.Infections, medicines, and autoimmune disorders that destroy red blood cells.What are the signs or symptoms?  Symptoms of this condition include:  Minor weakness.Dizziness.Headache.Feeling heartbeats that are irregular or faster than normal (palpitations).Shortness of breath, especially with exercise.Paleness.Cold sensitivity.Indigestion.Nausea.Difficulty sleeping.Difficulty concentrating.Symptoms may occur suddenly or develop slowly. If your anemia is mild, you may not have symptoms.  How is this diagnosed?  This condition is diagnosed based on:  Blood tests.Your medical history.A physical exam.Bone marrow biopsy.Your health care provider may also check your stool (feces) for blood and may do additional testing to look for the cause of your bleeding.  You may also have other tests, including:  Imaging tests, such as a CT scan or MRI.Endoscopy.Colonoscopy.How is this treated?  Treatment for this condition depends on the cause. If you continue to lose a lot of blood, you may need to be treated at a hospital. Treatment may include:  Taking supplements of iron, vitamin B12, or folic acid.Taking a hormone medicine (erythropoietin) that can help to stimulate red blood cell growth.Having a blood transfusion. This may be needed if you lose a lot of blood.Making changes to your diet.Having surgery to remove your spleen.Follow these instructions at home:  Take over-the-counter and prescription medicines only as told by your health care provider.Take supplements only as told by your health care provider.Follow any diet instructions that you were given.Keep all follow-up visits as told by your health care provider. This is important.Contact a health care provider if:  You develop new bleeding anywhere in the body.Get help right away if:  You are very weak.You are short of breath.You have pain in your abdomen or chest.You are dizzy or feel faint.You have trouble concentrating.You have bloody or black, tarry stools.You vomit repeatedly or you vomit up blood.Summary  Anemia is a condition in which you do not have enough red blood cells or enough of a substance in your red blood cells that carries oxygen (hemoglobin).Symptoms may occur suddenly or develop slowly.If your anemia is mild, you may not have symptoms.This condition is diagnosed with blood tests as well as a medical history and physical exam. Other tests may be needed.Treatment for this condition depends on the cause of the anemia.This information is not intended to replace advice given to you by your health care provider. Make sure you discuss any questions you have with your health care provider.

## 2020-01-31 NOTE — CONSULT NOTE ADULT - SUBJECTIVE AND OBJECTIVE BOX
44F with PMH of HTN, T2DM, PSH of laparoscopic gastric bypass  at [facility],  , abdominal myomectomy  sent in by PCP Dr. Ortiz as her AXR done for chronic abd pain showed dilated loops of small bowel concerning for SBO. Pt reports chronic abdominal pain for years along with nausea and vomiting. Pain described as epigastrium and lower quadrants, sharp on dull, comes and goes, 6/10 at its worst. Complains of chronic vomiting after eating, NBNB, most recent episode yesterday. Denies fever, chills. Denies diarrhoea, occasional constipation. Passing flatus and having regular bowel movements, most recently today. Tolerating PO. Denies colonoscopy.         PAST MEDICAL & SURGICAL HISTORY:  GERD (gastroesophageal reflux disease)  Anemia  Diabetes  Thyroid nodule  Fibroids  Anemia  H/O: myomectomy  History of D&C  H/O: : x1  H/O gastric bypass:       Home Medications:  acetaminophen 325 mg oral tablet: 3 tab(s) orally every 8 hours (2019 09:38)  amLODIPine 5 mg oral tablet: 1 tab(s) orally once a day (2019 10:57)  ferrous sulfate 325 mg (65 mg elemental iron) oral tablet: 1 tab(s) orally 3 times a day (2019 10:57)  ibuprofen 600 mg oral tablet: 1 tab(s) orally every 6 hours (2019 09:38)  metFORMIN 500 mg oral tablet: 1 tab(s) orally 2 times a day (2019 10:57)  oxyCODONE 5 mg oral tablet: 1 tab(s) orally every 4 hours, As needed, Moderate Pain (4 - 6) (2019 09:38)        Allergies    No Known Allergies    Intolerances        FAMILY HISTORY:  Family history of hypertension (Grandparent)  Family history of diabetes mellitus      ROS: otherwise negative.    Vital Signs Last 24 Hrs  T(C): 36.7 (2020 17:10), Max: 37.1 (2020 20:36)  T(F): 98 (2020 17:10), Max: 98.7 (2020 20:36)  HR: 74 (2020 17:10) (74 - 99)  BP: 154/87 (2020 17:10) (134/77 - 186/78)  BP(mean): --  RR: 18 (2020 17:10) (16 - 19)  SpO2: 98% (2020 17:10) (97% - 100%)    I&O's Summary      Physical Exam:  General: NAD, resting comfortably in bed  HEENT: MMM  Pulmonary: nonlabored breathing, normal resp effort  Cardiovascular: RRR  Abdominal: soft, TTP LLQ, nondistended, no rebound or guarding  Extremities: WWP, no edema, no calf tenderness  Neuro: no focal deficits  Psych: pleasant    LABS:                        7.5    3.27  )-----------( 386      ( 2020 14:27 )             26.5         136  |  101  |  8   ----------------------------<  157<H>  3.3<L>   |  22  |  0.89    Ca    8.5      2020 14:24    TPro  7.0  /  Alb  4.0  /  TBili  0.2  /  DBili  x   /  AST  17  /  ALT  10  /  AlkPhos  43      PT/INR - ( 2020 14:24 )   PT: 10.4 sec;   INR: 0.91          PTT - ( 2020 14:24 )  PTT:29.9 sec    CAPILLARY BLOOD GLUCOSE        LIVER FUNCTIONS - ( 2020 14:24 )  Alb: 4.0 g/dL / Pro: 7.0 g/dL / ALK PHOS: 43 U/L / ALT: 10 U/L / AST: 17 U/L / GGT: x             Cultures:      RADIOLOGY & ADDITIONAL STUDIES:  < from: CT Abdomen and Pelvis w/ Oral Cont and w/ IV Cont (20 @ 17:28) >  Impression: 1. Since 3/20/2017, again post gastric bypass surgery with a new dilated small bowel loop near surgical anastomosis left abdomen. This may represent an ileus versus partial obstruction near anastomosis.    2. Previously seen large subserosal fibroid right fundal region has been removed.                Thank you for the opportunity to participate in the care of this patient.    < end of copied text >

## 2020-01-31 NOTE — CONSULT NOTE ADULT - ASSESSMENT
44F with PMH of HTN, T2DM, PSH of laparoscopic gastric bypass  at [facility],  , abdominal myomectomy  sent in by PCP Dr. Ortiz as her AXR done for chronic abd pain showed dilated loops of small bowel concerning for SBO. Pt reports chronic abdominal pain for years along with nausea and vomiting with radiographic evidence of possible partial SBO or ileus.     Plan 44F with PMH of HTN, T2DM, PSH of laparoscopic gastric bypass  at [facility],  , abdominal myomectomy  sent in by PCP Dr. Ortiz as her AXR done for chronic abd pain showed dilated loops of small bowel concerning for SBO. Pt reports chronic abdominal pain for years along with nausea and vomiting with radiographic evidence of possible partial SBO or ileus.     Plan  Iron infusion  PTH level  Pending attending decision after reviewing CT

## 2020-02-01 ENCOUNTER — OUTPATIENT (OUTPATIENT)
Dept: OUTPATIENT SERVICES | Facility: HOSPITAL | Age: 45
LOS: 1 days | End: 2020-02-01
Payer: MEDICAID

## 2020-02-01 DIAGNOSIS — Z98.890 OTHER SPECIFIED POSTPROCEDURAL STATES: Chronic | ICD-10-CM

## 2020-02-01 DIAGNOSIS — Z98.89 OTHER SPECIFIED POSTPROCEDURAL STATES: Chronic | ICD-10-CM

## 2020-02-01 PROCEDURE — G9001: CPT

## 2020-02-04 ENCOUNTER — APPOINTMENT (OUTPATIENT)
Dept: OBGYN | Facility: CLINIC | Age: 45
End: 2020-02-04

## 2020-02-04 DIAGNOSIS — Z79.1 LONG TERM (CURRENT) USE OF NON-STEROIDAL ANTI-INFLAMMATORIES (NSAID): ICD-10-CM

## 2020-02-04 DIAGNOSIS — Z79.891 LONG TERM (CURRENT) USE OF OPIATE ANALGESIC: ICD-10-CM

## 2020-02-04 DIAGNOSIS — D64.9 ANEMIA, UNSPECIFIED: ICD-10-CM

## 2020-02-04 DIAGNOSIS — Z79.2 LONG TERM (CURRENT) USE OF ANTIBIOTICS: ICD-10-CM

## 2020-02-04 DIAGNOSIS — Z53.29 PROCEDURE AND TREATMENT NOT CARRIED OUT BECAUSE OF PATIENT'S DECISION FOR OTHER REASONS: ICD-10-CM

## 2020-02-04 DIAGNOSIS — Z79.84 LONG TERM (CURRENT) USE OF ORAL HYPOGLYCEMIC DRUGS: ICD-10-CM

## 2020-02-04 DIAGNOSIS — Z79.52 LONG TERM (CURRENT) USE OF SYSTEMIC STEROIDS: ICD-10-CM

## 2020-02-04 DIAGNOSIS — D21.9 BENIGN NEOPLASM OF CONNECTIVE AND OTHER SOFT TISSUE, UNSPECIFIED: ICD-10-CM

## 2020-02-04 DIAGNOSIS — E04.1 NONTOXIC SINGLE THYROID NODULE: ICD-10-CM

## 2020-02-04 DIAGNOSIS — Z98.84 BARIATRIC SURGERY STATUS: ICD-10-CM

## 2020-02-04 DIAGNOSIS — Z79.899 OTHER LONG TERM (CURRENT) DRUG THERAPY: ICD-10-CM

## 2020-02-04 DIAGNOSIS — N92.0 EXCESSIVE AND FREQUENT MENSTRUATION WITH REGULAR CYCLE: ICD-10-CM

## 2020-02-04 DIAGNOSIS — E11.9 TYPE 2 DIABETES MELLITUS WITHOUT COMPLICATIONS: ICD-10-CM

## 2020-02-05 NOTE — CHART NOTE - NSCHARTNOTEFT_GEN_A_CORE
Addendum:  GYN gave patient Dorothy Guan blood transfusion for acute on chronic anemia due to menorrhagia

## 2020-02-05 NOTE — CHART NOTE - NSCHARTNOTEFT_GEN_A_CORE
Addendum:  GYN gave patient Dorothy Guan blood transfusion for acute on chronic symptomatic anemia due to menorrhagia.

## 2020-02-06 DIAGNOSIS — Z53.29 PROCEDURE AND TREATMENT NOT CARRIED OUT BECAUSE OF PATIENT'S DECISION FOR OTHER REASONS: ICD-10-CM

## 2020-02-06 DIAGNOSIS — D62 ACUTE POSTHEMORRHAGIC ANEMIA: ICD-10-CM

## 2020-02-06 DIAGNOSIS — N92.0 EXCESSIVE AND FREQUENT MENSTRUATION WITH REGULAR CYCLE: ICD-10-CM

## 2020-02-06 DIAGNOSIS — E11.9 TYPE 2 DIABETES MELLITUS WITHOUT COMPLICATIONS: ICD-10-CM

## 2020-02-06 DIAGNOSIS — Z98.84 BARIATRIC SURGERY STATUS: ICD-10-CM

## 2020-02-06 DIAGNOSIS — D64.9 ANEMIA, UNSPECIFIED: ICD-10-CM

## 2020-02-06 DIAGNOSIS — F17.210 NICOTINE DEPENDENCE, CIGARETTES, UNCOMPLICATED: ICD-10-CM

## 2020-02-12 PROBLEM — K21.9 GASTRO-ESOPHAGEAL REFLUX DISEASE WITHOUT ESOPHAGITIS: Chronic | Status: ACTIVE | Noted: 2020-01-31

## 2020-02-24 DIAGNOSIS — Z71.89 OTHER SPECIFIED COUNSELING: ICD-10-CM

## 2020-02-26 ENCOUNTER — APPOINTMENT (OUTPATIENT)
Dept: BARIATRICS | Facility: CLINIC | Age: 45
End: 2020-02-26
Payer: MEDICAID

## 2020-02-26 VITALS
SYSTOLIC BLOOD PRESSURE: 148 MMHG | WEIGHT: 213 LBS | TEMPERATURE: 97.8 F | HEIGHT: 66 IN | OXYGEN SATURATION: 100 % | HEART RATE: 74 BPM | BODY MASS INDEX: 34.23 KG/M2 | DIASTOLIC BLOOD PRESSURE: 87 MMHG

## 2020-02-26 DIAGNOSIS — F41.9 ANXIETY DISORDER, UNSPECIFIED: ICD-10-CM

## 2020-02-26 DIAGNOSIS — I10 ESSENTIAL (PRIMARY) HYPERTENSION: ICD-10-CM

## 2020-02-26 DIAGNOSIS — K56.609 UNSPECIFIED INTESTINAL OBSTRUCTION, UNSPECIFIED AS TO PARTIAL VERSUS COMPLETE OBSTRUCTION: ICD-10-CM

## 2020-02-26 DIAGNOSIS — E11.9 TYPE 2 DIABETES MELLITUS W/OUT COMPLICATIONS: ICD-10-CM

## 2020-02-26 DIAGNOSIS — F32.9 MAJOR DEPRESSIVE DISORDER, SINGLE EPISODE, UNSPECIFIED: ICD-10-CM

## 2020-02-26 PROCEDURE — 99204 OFFICE O/P NEW MOD 45 MIN: CPT

## 2020-02-26 NOTE — HISTORY OF PRESENT ILLNESS
[de-identified] : 44 year old female who saw pcp for chronic abdominal pain that he did us and sent to er for bowel obstruction\par had benign abdomen and and had ct scan which i reviewed which looked fine to me there is no obstruction but a loop that is dilated at anastomosis which I think is the merge seen at anastomosis as there is no proximal dilation with stricture etc\par bowel same diameter proximal and distal and no swirl\par I think her pain is functional bowel disorder when I asked to describe and suggest she see Dr Martinez\par While can laparoscope think this is low yield and has had complaints for many years and bypass 17 years ago\par will check blood work post rygb

## 2020-02-26 NOTE — ASSESSMENT
[FreeTextEntry1] : check blood work\par refer to Dr Martinez\par think that this is more IBS than bowel obstruction\par willing to laparoscope is pain exacerbates

## 2020-05-15 ENCOUNTER — RESULT REVIEW (OUTPATIENT)
Age: 45
End: 2020-05-15

## 2020-05-18 ENCOUNTER — FORM ENCOUNTER (OUTPATIENT)
Age: 45
End: 2020-05-18

## 2020-05-21 ENCOUNTER — APPOINTMENT (OUTPATIENT)
Dept: OBGYN | Facility: CLINIC | Age: 45
End: 2020-05-21

## 2020-06-04 ENCOUNTER — RESULT REVIEW (OUTPATIENT)
Age: 45
End: 2020-06-04

## 2020-10-02 NOTE — ED ADULT NURSE NOTE - NSSISCREENINGQ1_ED_A_ED
Kathy Smith  42 y.o. female  1974  [address] [facility] [address]  [address]       Encounter Date: 10/2/2020           Established Patient Visit Note: Sydnee Norman MD    Reason for Appointment:  Chief Complaint   Patient presents with    Follow Up Chronic Condition       History of Present Illness:  History provided by patient    Kathy Smith is a 42 y.o. female who presents today for:    Anxiety and Depression  Duration: diagnosed around 2015  Inciting Factors: during original diagnosis, patient was dealing with a difficult supervisor  Medicine: Zoloft 50mg, Wellbutrin 150mg daily  Symptoms: poor mood, poor sleep, feeling sad and angry  Suicide: denies any present or past attempts of suicide  Counseling: Kim Patel 1.5 years ago  Interval History: Patient reports that mood is holding steady with the Bupropion. She has noticed a little bid of stomach upset and diarrhea, but she reports not overly severe. She is tapering off of sertraline. She has been exercising more recently. She has lost 4 lbs. She started talk therapy yesterday, which she reports as helpful. She states that it is a new person Izola Reasons). She reports that it seems like a good fit. She reports that she is down to 202.6. She has been talking with Dr. Mari Sahu who has her on a diet that is helping. She is focusing on good fats, vegetables, and moderation. She has decreased her soda intake. She reports minimal fast food. She reports that over the last few days her left ear has felt itchy. She is using drops for ear wax. Review of Systems  ROS       Allergies: Patient has no known allergies.     Medications: (Updated to reflect final medication list after visit)    Current Outpatient Medications:     buPROPion XL (WELLBUTRIN XL) 150 mg tablet, Take 1 Tab by mouth every morning., Disp: 90 Tab, Rfl: 1    sertraline (ZOLOFT) 50 mg tablet, Take 1 Tab by mouth daily. , Disp: 90 Tab, Rfl: 1    meloxicam (MOBIC) 15 mg tablet, TAKE 1 TABLET BY MOUTH EVERY DAY, Disp: , Rfl:     diclofenac (VOLTAREN) 1 % gel, Apply 2 g to affected area two (2) times daily as needed for Pain., Disp: 100 g, Rfl: 2    LO LOESTRIN FE 1 mg-10 mcg (24)/10 mcg (2) tab, TAKE 1 TABLET BY ORAL ROUTE ONCE DAILY, Disp: , Rfl: 4    cholecalciferol (VITAMIN D3) 1,000 unit tablet, Take  by mouth daily. , Disp: , Rfl:     cpap machine kit, by Does Not Apply route., Disp: , Rfl:     SUMAtriptan (IMITREX) 50 mg tablet, Take 1 Tab by mouth once as needed for Migraine (may repeat in 2 hours after initial dose) for up to 1 dose., Disp: 10 Tab, Rfl: 0    fluticasone (FLONASE) 50 mcg/actuation nasal spray, nightly., Disp: , Rfl:     multivitamin (ONE A DAY) tablet, Take 1 Tab by mouth daily. , Disp: , Rfl:     fexofenadine (ALLEGRA) 180 mg tablet, Take 180 mg by mouth daily. , Disp: , Rfl:     History  Patient Care Team:  Brinda Khoury MD as PCP - General (Family Medicine)  Brinda Khoury MD as PCP - [facility]  Jeremy Teran MD (Endocrinology)  Sunil Mckeon MD as Physician (Obstetrics & Gynecology)    Past Medical History: she has a past medical history of Allergic rhinitis, Infertility, LEONIE on CPAP (4/4/2018), Reactive depression (situational), Situational stress, and Vitamin D deficiency (2/2015). Past Surgical History: she has a past surgical history that includes hx orthopaedic (2010). Family Medical History: family history includes Asthma in her brother and father; Heart Disease (age of onset: 76) in her father; Alfonse Gravel (age of onset: 40) in her brother; Obesity in her mother. Social History: she reports that she has never smoked. She has never used smokeless tobacco. She reports current alcohol use. She reports that she does not use drugs. Objective:     Physical Exam  Constitutional:       Appearance: Normal appearance.  She is well-groomed and normal weight. Eyes:      General: Lids are normal. Vision grossly intact. Gaze aligned appropriately. Conjunctiva/sclera:      Right eye: Right conjunctiva is not injected. Left eye: Left conjunctiva is not injected. Neck:      Musculoskeletal: Full passive range of motion without pain and normal range of motion. Pulmonary:      Effort: Pulmonary effort is normal. No tachypnea, bradypnea, accessory muscle usage or respiratory distress. Skin:     Coloration: Skin is not ashen, cyanotic, jaundiced or mottled. Neurological:      General: No focal deficit present. Mental Status: She is alert. Mental status is at baseline. Psychiatric:         Attention and Perception: Attention and perception normal.         Mood and Affect: Mood and affect normal.         Speech: Speech normal.         Behavior: Behavior normal. Behavior is cooperative. Assessment & Plan:      ICD-10-CM ICD-9-CM    1. Anxiety and depression  F41.9 300.00     F32.9 311    2. Elevated ALT measurement  Q46.04 399.8 METABOLIC PANEL, COMPREHENSIVE   3. Mixed hyperlipidemia  E78.2 272.2    4. Prediabetes  R73.03 790.29      Anxiety and Depression: Chronic, stable continue current regimen  HLD: Chronic, discussed fish oil 2000 bid and recommendations for diet  Prediabetes: A1c 5.8; discussed recommendations for diet and will continue to monitor A1c  ALT Elevation: recheck in 3 months    I was in the office while conducting this encounter. Consent:  She and/or her healthcare decision maker is aware that this patient-initiated Telehealth encounter is a billable service, with coverage as determined by her insurance carrier. She is aware that she may receive a bill and has provided verbal consent to proceed: Yes    This virtual visit was conducted via [employer] me.   Pursuant to the emergency declaration under the [address] [address] and Response Supplemental Appropriations Act, this Virtual  Visit was conducted to reduce the patient's risk of exposure to COVID-19 and provide continuity of care for an established patient. Services were provided through a video synchronous discussion virtually to substitute for in-person clinic visit. Due to this being a TeleHealth evaluation, many elements of the physical examination are unable to be assessed. Total Time: minutes: 11-20 minutes. I have discussed the diagnosis with the patient and the intended plan as seen in the above orders. The patient has received an after-visit summary along with patient information handout. I have discussed medication side effects and warnings with the patient as well. Disposition  Follow-up and Dispositions    · Return in about 3 months (around 1/2/2021), or if symptoms worsen or fail to improve, for anxiety and depresdsion.            Shanda Shen MD No

## 2021-03-17 ENCOUNTER — APPOINTMENT (OUTPATIENT)
Dept: GASTROENTEROLOGY | Facility: CLINIC | Age: 46
End: 2021-03-17

## 2021-03-25 ENCOUNTER — APPOINTMENT (OUTPATIENT)
Dept: GASTROENTEROLOGY | Facility: CLINIC | Age: 46
End: 2021-03-25

## 2021-04-19 ENCOUNTER — APPOINTMENT (OUTPATIENT)
Dept: GASTROENTEROLOGY | Facility: CLINIC | Age: 46
End: 2021-04-19
Payer: MEDICAID

## 2021-04-19 VITALS
HEART RATE: 119 BPM | BODY MASS INDEX: 36.96 KG/M2 | DIASTOLIC BLOOD PRESSURE: 99 MMHG | TEMPERATURE: 97.6 F | OXYGEN SATURATION: 98 % | HEIGHT: 66 IN | WEIGHT: 230 LBS | SYSTOLIC BLOOD PRESSURE: 148 MMHG

## 2021-04-19 DIAGNOSIS — R10.13 EPIGASTRIC PAIN: ICD-10-CM

## 2021-04-19 DIAGNOSIS — R19.7 DIARRHEA, UNSPECIFIED: ICD-10-CM

## 2021-04-19 PROCEDURE — 99072 ADDL SUPL MATRL&STAF TM PHE: CPT

## 2021-04-19 PROCEDURE — 99204 OFFICE O/P NEW MOD 45 MIN: CPT

## 2021-04-19 RX ORDER — OMEPRAZOLE 40 MG/1
40 CAPSULE, DELAYED RELEASE ORAL
Qty: 30 | Refills: 5 | Status: ACTIVE | COMMUNITY
Start: 2021-04-19

## 2021-04-19 RX ORDER — HYOSCYAMINE SULFATE 0.12 MG/1
0.12 TABLET ORAL
Qty: 30 | Refills: 5 | Status: ACTIVE | COMMUNITY
Start: 2021-04-19

## 2021-04-20 LAB
AMYLASE/CREAT SERPL: 101 U/L
IGA SER QL IEP: 249 MG/DL
LPL SERPL-CCNC: 60 U/L

## 2021-04-22 DIAGNOSIS — A04.8 OTHER SPECIFIED BACTERIAL INTESTINAL INFECTIONS: ICD-10-CM

## 2021-04-22 LAB
H PYLORI AB SER-ACNC: 5.5 UNITS
H PYLORI IGA SER-ACNC: 26.1 UNITS
TTG IGA SER IA-ACNC: <1.2 U/ML
TTG IGA SER-ACNC: NEGATIVE

## 2021-04-22 RX ORDER — TETRACYCLINE HYDROCHLORIDE 500 MG/1
500 CAPSULE ORAL EVERY 6 HOURS
Qty: 56 | Refills: 0 | Status: ACTIVE | COMMUNITY
Start: 2021-04-22 | End: 1900-01-01

## 2021-04-22 RX ORDER — BISMUTH SUBSALICYLATE 262 MG/1
262 TABLET, CHEWABLE ORAL 4 TIMES DAILY
Qty: 56 | Refills: 0 | Status: ACTIVE | COMMUNITY
Start: 2021-04-22 | End: 1900-01-01

## 2021-04-22 RX ORDER — METRONIDAZOLE 250 MG/1
250 TABLET ORAL EVERY 6 HOURS
Qty: 56 | Refills: 0 | Status: ACTIVE | COMMUNITY
Start: 2021-04-22 | End: 1900-01-01

## 2021-04-22 RX ORDER — OMEPRAZOLE 40 MG/1
40 CAPSULE, DELAYED RELEASE ORAL TWICE DAILY
Qty: 28 | Refills: 0 | Status: ACTIVE | COMMUNITY
Start: 2021-04-22 | End: 1900-01-01

## 2021-06-08 ENCOUNTER — NON-APPOINTMENT (OUTPATIENT)
Age: 46
End: 2021-06-08

## 2021-06-21 ENCOUNTER — APPOINTMENT (OUTPATIENT)
Dept: GASTROENTEROLOGY | Facility: CLINIC | Age: 46
End: 2021-06-21

## 2021-08-23 RX ORDER — SIMETHICONE 125 MG/1
125 TABLET, CHEWABLE ORAL
Qty: 4 | Refills: 0 | Status: ACTIVE | COMMUNITY
Start: 2021-04-19 | End: 1900-01-01

## 2021-08-23 RX ORDER — POLYETHYLENE GLYCOL-3350 AND ELECTROLYTES 236; 6.74; 5.86; 2.97; 22.74 G/274.31G; G/274.31G; G/274.31G; G/274.31G; G/274.31G
236 POWDER, FOR SOLUTION ORAL
Qty: 1 | Refills: 0 | Status: ACTIVE | COMMUNITY
Start: 2021-04-19 | End: 1900-01-01

## 2021-08-24 ENCOUNTER — OUTPATIENT (OUTPATIENT)
Dept: OUTPATIENT SERVICES | Facility: HOSPITAL | Age: 46
LOS: 1 days | Discharge: HOME | End: 2021-08-24
Payer: MEDICAID

## 2021-08-24 ENCOUNTER — APPOINTMENT (OUTPATIENT)
Dept: GASTROENTEROLOGY | Facility: HOSPITAL | Age: 46
End: 2021-08-24

## 2021-08-24 ENCOUNTER — TRANSCRIPTION ENCOUNTER (OUTPATIENT)
Age: 46
End: 2021-08-24

## 2021-08-24 ENCOUNTER — RESULT REVIEW (OUTPATIENT)
Age: 46
End: 2021-08-24

## 2021-08-24 DIAGNOSIS — Z98.89 OTHER SPECIFIED POSTPROCEDURAL STATES: Chronic | ICD-10-CM

## 2021-08-24 DIAGNOSIS — Z98.890 OTHER SPECIFIED POSTPROCEDURAL STATES: Chronic | ICD-10-CM

## 2021-08-24 LAB — GLUCOSE BLDC GLUCOMTR-MCNC: 163 MG/DL — HIGH (ref 70–99)

## 2021-08-24 PROCEDURE — 88305 TISSUE EXAM BY PATHOLOGIST: CPT | Mod: 26

## 2021-08-24 PROCEDURE — 88305 TISSUE EXAM BY PATHOLOGIST: CPT

## 2021-08-24 PROCEDURE — 43239 EGD BIOPSY SINGLE/MULTIPLE: CPT

## 2021-08-24 PROCEDURE — 45330 DIAGNOSTIC SIGMOIDOSCOPY: CPT

## 2021-08-24 PROCEDURE — 45331 SIGMOIDOSCOPY AND BIOPSY: CPT

## 2021-08-24 PROCEDURE — 82962 GLUCOSE BLOOD TEST: CPT

## 2021-08-26 LAB — SURGICAL PATHOLOGY STUDY: SIGNIFICANT CHANGE UP

## 2021-08-30 DIAGNOSIS — Z79.84 LONG TERM (CURRENT) USE OF ORAL HYPOGLYCEMIC DRUGS: ICD-10-CM

## 2021-08-30 DIAGNOSIS — D64.9 ANEMIA, UNSPECIFIED: ICD-10-CM

## 2021-08-30 DIAGNOSIS — K63.89 OTHER SPECIFIED DISEASES OF INTESTINE: ICD-10-CM

## 2021-08-30 DIAGNOSIS — E11.9 TYPE 2 DIABETES MELLITUS WITHOUT COMPLICATIONS: ICD-10-CM

## 2021-08-30 DIAGNOSIS — R10.9 UNSPECIFIED ABDOMINAL PAIN: ICD-10-CM

## 2021-08-30 DIAGNOSIS — R00.2 PALPITATIONS: ICD-10-CM

## 2021-08-30 DIAGNOSIS — I10 ESSENTIAL (PRIMARY) HYPERTENSION: ICD-10-CM

## 2021-08-30 DIAGNOSIS — F17.200 NICOTINE DEPENDENCE, UNSPECIFIED, UNCOMPLICATED: ICD-10-CM

## 2021-08-30 DIAGNOSIS — E66.9 OBESITY, UNSPECIFIED: ICD-10-CM

## 2021-08-30 DIAGNOSIS — R19.07 GENERALIZED INTRA-ABDOMINAL AND PELVIC SWELLING, MASS AND LUMP: ICD-10-CM

## 2021-08-30 DIAGNOSIS — F41.8 OTHER SPECIFIED ANXIETY DISORDERS: ICD-10-CM

## 2021-08-30 DIAGNOSIS — Z79.811 LONG TERM (CURRENT) USE OF AROMATASE INHIBITORS: ICD-10-CM

## 2021-09-23 NOTE — ED PROVIDER NOTE - CONSTITUTIONAL, MLM
A user error has taken place: encounter opened in error, closed for administrative reasons. normal... Well appearing, well nourished, awake, alert, oriented to person, place, time/situation and in no apparent distress.

## 2021-10-13 ENCOUNTER — APPOINTMENT (OUTPATIENT)
Dept: BARIATRICS | Facility: CLINIC | Age: 46
End: 2021-10-13
Payer: MEDICAID

## 2021-10-13 DIAGNOSIS — Z98.84 BARIATRIC SURGERY STATUS: ICD-10-CM

## 2021-10-13 PROCEDURE — 99213 OFFICE O/P EST LOW 20 MIN: CPT | Mod: 95

## 2021-10-13 NOTE — END OF VISIT
[FreeTextEntry3] : Documented by Rajiv Gongora acting as a scribe for Dr. Aaron Montejo on 10/13/2021.

## 2021-10-13 NOTE — PLAN
[FreeTextEntry1] : WIll have patient come for in-office visit and consider exam for incisions and determine next steps.

## 2021-10-13 NOTE — ADDENDUM
[FreeTextEntry1] : All medical record entries made by the Scribe were at my, Dr. Aaron Montejo, direction and personally dictated by me on 10/13/2021. I have reviewed the chart and agree that the record accurately reflects my personal performance of the history, physical exam, assessment and plan. I have also personally directed, reviewed, and agreed with the chart.

## 2021-10-13 NOTE — HISTORY OF PRESENT ILLNESS
[Home] : at home, [unfilled] , at the time of the visit. [Medical Office: ([facility])___] : at the medical office located in  [Verbal consent obtained from patient] : the patient, [unfilled] [de-identified] : Dorothy Guan returns today; EGD following bypass was normal. I was not impressed with the CT although cannot r/o internal hernia. I believe her complaints of abdominal pain reflect functional syndrome such as irritable bowel. During call, patient said she was teary-eyed due to recent receipt of a COVID vaccine, and definitely believe that there is an emotional component.

## 2021-11-29 ENCOUNTER — EMERGENCY (EMERGENCY)
Facility: HOSPITAL | Age: 46
LOS: 1 days | Discharge: HOME | End: 2021-11-29
Attending: EMERGENCY MEDICINE | Admitting: EMERGENCY MEDICINE
Payer: MEDICAID

## 2021-11-29 VITALS
SYSTOLIC BLOOD PRESSURE: 171 MMHG | HEART RATE: 90 BPM | DIASTOLIC BLOOD PRESSURE: 112 MMHG | TEMPERATURE: 98 F | RESPIRATION RATE: 18 BRPM | WEIGHT: 214.07 LBS | OXYGEN SATURATION: 99 % | HEIGHT: 66 IN

## 2021-11-29 VITALS
SYSTOLIC BLOOD PRESSURE: 157 MMHG | RESPIRATION RATE: 17 BRPM | TEMPERATURE: 98 F | DIASTOLIC BLOOD PRESSURE: 92 MMHG | OXYGEN SATURATION: 99 % | HEART RATE: 89 BPM

## 2021-11-29 DIAGNOSIS — R42 DIZZINESS AND GIDDINESS: ICD-10-CM

## 2021-11-29 DIAGNOSIS — Z98.89 OTHER SPECIFIED POSTPROCEDURAL STATES: Chronic | ICD-10-CM

## 2021-11-29 DIAGNOSIS — Z20.822 CONTACT WITH AND (SUSPECTED) EXPOSURE TO COVID-19: ICD-10-CM

## 2021-11-29 DIAGNOSIS — R51.9 HEADACHE, UNSPECIFIED: ICD-10-CM

## 2021-11-29 DIAGNOSIS — R00.2 PALPITATIONS: ICD-10-CM

## 2021-11-29 DIAGNOSIS — K21.9 GASTRO-ESOPHAGEAL REFLUX DISEASE WITHOUT ESOPHAGITIS: ICD-10-CM

## 2021-11-29 DIAGNOSIS — D64.9 ANEMIA, UNSPECIFIED: ICD-10-CM

## 2021-11-29 DIAGNOSIS — E11.9 TYPE 2 DIABETES MELLITUS WITHOUT COMPLICATIONS: ICD-10-CM

## 2021-11-29 DIAGNOSIS — Z79.84 LONG TERM (CURRENT) USE OF ORAL HYPOGLYCEMIC DRUGS: ICD-10-CM

## 2021-11-29 DIAGNOSIS — I10 ESSENTIAL (PRIMARY) HYPERTENSION: ICD-10-CM

## 2021-11-29 DIAGNOSIS — R06.02 SHORTNESS OF BREATH: ICD-10-CM

## 2021-11-29 DIAGNOSIS — R94.31 ABNORMAL ELECTROCARDIOGRAM [ECG] [EKG]: ICD-10-CM

## 2021-11-29 DIAGNOSIS — Z98.890 OTHER SPECIFIED POSTPROCEDURAL STATES: Chronic | ICD-10-CM

## 2021-11-29 LAB
ANION GAP SERPL CALC-SCNC: 16 MMOL/L — SIGNIFICANT CHANGE UP (ref 5–17)
APTT BLD: 30.4 SEC — SIGNIFICANT CHANGE UP (ref 27.5–35.5)
BASOPHILS # BLD AUTO: 0.06 K/UL — SIGNIFICANT CHANGE UP (ref 0–0.2)
BASOPHILS NFR BLD AUTO: 1.7 % — SIGNIFICANT CHANGE UP (ref 0–2)
BLD GP AB SCN SERPL QL: NEGATIVE — SIGNIFICANT CHANGE UP
BUN SERPL-MCNC: 9 MG/DL — SIGNIFICANT CHANGE UP (ref 7–23)
CALCIUM SERPL-MCNC: 9.2 MG/DL — SIGNIFICANT CHANGE UP (ref 8.4–10.5)
CHLORIDE SERPL-SCNC: 99 MMOL/L — SIGNIFICANT CHANGE UP (ref 96–108)
CO2 SERPL-SCNC: 23 MMOL/L — SIGNIFICANT CHANGE UP (ref 22–31)
CREAT SERPL-MCNC: 1.05 MG/DL — SIGNIFICANT CHANGE UP (ref 0.5–1.3)
D DIMER BLD IA.RAPID-MCNC: 350 NG/ML DDU — HIGH
EOSINOPHIL # BLD AUTO: 0 K/UL — SIGNIFICANT CHANGE UP (ref 0–0.5)
EOSINOPHIL NFR BLD AUTO: 0 % — SIGNIFICANT CHANGE UP (ref 0–6)
GLUCOSE SERPL-MCNC: 148 MG/DL — HIGH (ref 70–99)
HCG SERPL-ACNC: <0 MIU/ML — SIGNIFICANT CHANGE UP
HCT VFR BLD CALC: 29.7 % — LOW (ref 34.5–45)
HGB BLD-MCNC: 8.4 G/DL — LOW (ref 11.5–15.5)
INR BLD: 0.96 — SIGNIFICANT CHANGE UP (ref 0.88–1.16)
LYMPHOCYTES # BLD AUTO: 1.32 K/UL — SIGNIFICANT CHANGE UP (ref 1–3.3)
LYMPHOCYTES # BLD AUTO: 35.1 % — SIGNIFICANT CHANGE UP (ref 13–44)
MCHC RBC-ENTMCNC: 21.3 PG — LOW (ref 27–34)
MCHC RBC-ENTMCNC: 28.3 GM/DL — LOW (ref 32–36)
MCV RBC AUTO: 75.4 FL — LOW (ref 80–100)
MONOCYTES # BLD AUTO: 0.43 K/UL — SIGNIFICANT CHANGE UP (ref 0–0.9)
MONOCYTES NFR BLD AUTO: 11.4 % — SIGNIFICANT CHANGE UP (ref 2–14)
NEUTROPHILS # BLD AUTO: 1.92 K/UL — SIGNIFICANT CHANGE UP (ref 1.8–7.4)
NEUTROPHILS NFR BLD AUTO: 50 % — SIGNIFICANT CHANGE UP (ref 43–77)
NRBC # BLD: SIGNIFICANT CHANGE UP /100 WBCS (ref 0–0)
PLATELET # BLD AUTO: 815 K/UL — HIGH (ref 150–400)
POTASSIUM SERPL-MCNC: 3.7 MMOL/L — SIGNIFICANT CHANGE UP (ref 3.5–5.3)
POTASSIUM SERPL-SCNC: 3.7 MMOL/L — SIGNIFICANT CHANGE UP (ref 3.5–5.3)
PROTHROM AB SERPL-ACNC: 11.5 SEC — SIGNIFICANT CHANGE UP (ref 10.6–13.6)
RBC # BLD: 3.94 M/UL — SIGNIFICANT CHANGE UP (ref 3.8–5.2)
RBC # FLD: 26.3 % — HIGH (ref 10.3–14.5)
RH IG SCN BLD-IMP: POSITIVE — SIGNIFICANT CHANGE UP
SARS-COV-2 RNA SPEC QL NAA+PROBE: NEGATIVE — SIGNIFICANT CHANGE UP
SODIUM SERPL-SCNC: 138 MMOL/L — SIGNIFICANT CHANGE UP (ref 135–145)
TROPONIN T SERPL-MCNC: 0.01 NG/ML — SIGNIFICANT CHANGE UP (ref 0–0.01)
WBC # BLD: 3.77 K/UL — LOW (ref 3.8–10.5)
WBC # FLD AUTO: 3.77 K/UL — LOW (ref 3.8–10.5)

## 2021-11-29 PROCEDURE — 93010 ELECTROCARDIOGRAM REPORT: CPT

## 2021-11-29 PROCEDURE — 71275 CT ANGIOGRAPHY CHEST: CPT | Mod: 26,ME

## 2021-11-29 PROCEDURE — 99285 EMERGENCY DEPT VISIT HI MDM: CPT

## 2021-11-29 PROCEDURE — G1004: CPT

## 2021-11-29 PROCEDURE — 71046 X-RAY EXAM CHEST 2 VIEWS: CPT | Mod: 26

## 2021-11-29 RX ORDER — ACETAMINOPHEN 500 MG
650 TABLET ORAL ONCE
Refills: 0 | Status: COMPLETED | OUTPATIENT
Start: 2021-11-29 | End: 2021-11-29

## 2021-11-29 RX ORDER — ONDANSETRON 8 MG/1
4 TABLET, FILM COATED ORAL ONCE
Refills: 0 | Status: COMPLETED | OUTPATIENT
Start: 2021-11-29 | End: 2021-11-29

## 2021-11-29 RX ADMIN — ONDANSETRON 4 MILLIGRAM(S): 8 TABLET, FILM COATED ORAL at 23:55

## 2021-11-29 RX ADMIN — Medication 650 MILLIGRAM(S): at 23:55

## 2021-11-29 RX ADMIN — Medication 650 MILLIGRAM(S): at 20:05

## 2021-11-29 RX ADMIN — Medication 650 MILLIGRAM(S): at 19:35

## 2021-11-29 NOTE — ED PROVIDER NOTE - PROGRESS NOTE DETAILS
mila: pt received at sign out from dr gunderson as pending cta -- pt well appearing, non toxic, hemodynamically stable, c/o sob but speaking rapidly in very long full sentences, cp relieved w/toradol, cta w/o central pe but suboptimal study (as per dr liz no AC indicated), importance of f/u w/pmd and pulmonary discussed at length, to con't prn ibuprofen, strict return precautions discussed

## 2021-11-29 NOTE — ED PROVIDER NOTE - NSFOLLOWUPINSTRUCTIONS_ED_ALL_ED_FT
REST, AVOID HEAVY LIFTING, PUSHING, PULLING, TAKE IBUPROFEN AS NEEDED FOR YOUR PAIN, FOLLOW UP WITH PULMONOLOGIST AND PMD    Chest Pain    Chest pain can be caused by many different conditions which may or may not be dangerous. Causes include heartburn, lung infections, heart attack, blood clot in lungs, skin infections, strain or damage to muscle, cartilage, or bones, etc. In addition to a history and physical examination, an electrocardiogram (ECG) or other lab tests may have been performed to determine the cause of your chest pain. Follow up with your primary care provider or with a cardiologist as instructed.     SEEK IMMEDIATE MEDICAL CARE IF YOU HAVE ANY OF THE FOLLOWING SYMPTOMS: worsening chest pain, coughing up blood, unexplained back/neck/jaw pain, severe abdominal pain, dizziness or lightheadedness, fainting, shortness of breath, sweaty or clammy skin, vomiting, or racing heart beat. These symptoms may represent a serious problem that is an emergency. Do not wait to see if the symptoms will go away. Get medical help right away. Call 911 and do not drive yourself to the hospital.   Shortness of breath    Shortness of breath (dyspnea) means you have trouble breathing and could indicate a medical problem. Causes include lung disease, heart disease, low amount of red blood cells (anemia), poor physical fitness, being overweight, smoking, etc. Your health care provider today may not be able to find a cause for your shortness of breath after your exam. In this case, it is important to have a follow-up exam with your primary care physician as instructed. If medicines were prescribed, take them as directed for the full length of time directed. Refrain from tobacco products.    SEEK IMMEDIATE MEDICAL CARE IF YOU HAVE ANY OF THE FOLLOWING SYMPTOMS: worsening shortness of breath, chest pain, back pain, abdominal pain, fever, coughing up blood, lightheadedness/dizziness.

## 2021-11-29 NOTE — ED ADULT TRIAGE NOTE - CHIEF COMPLAINT QUOTE
PT BIBA w/ c/o of nausea, weakness, fatigue, L sided CP, palpitations x friday s/p receiving covid VACC. EKG in prog

## 2021-11-29 NOTE — ED PROVIDER NOTE - OBJECTIVE STATEMENT
history of htn, dm, chronic anemia, here with palpitations, feeling hot/cold, headache, dizziness, diarrhea, shortness of breath, left sided chest pain that sometimes radiates to abdomen intermittently since Saturday. Got 2nd pfizer covid shot on Friday. Says she didn't want to get it but was going to lose her job if she didn't. LMP 2 weeks ago. Took aspirin pta.

## 2021-11-29 NOTE — ED PROVIDER NOTE - CLINICAL SUMMARY MEDICAL DECISION MAKING FREE TEXT BOX
palpitations/ pain / sob/ headache/ dizziness post covid vaccine. vitals stable / exam non focal. suspect vaccine related symptoms but lasting longer than expected. labs/cxr/ecg ordered to eval infection, pneumonia, acs, anemia, electrolyte abnormality, pe. wnl except d dimer elevated. cta chest ordered. signed out to Dr. Daugherty/ [employer] pending results/ reassessment.

## 2021-11-29 NOTE — ED ADULT NURSE NOTE - OBJECTIVE STATEMENT
BIBEMS for c/o L chest pain with palpitations and n/v/abd pain x 3 days s/p receiving Covid booster on Friday. Denies hematemesis/black stools. Denies tingling/numbness to extremties, denies radiation of pain. ASA given by EMS. No apparent distress on arrival, no resp difficulties, able to speak in complete sentences, basin at bedside for nausea without retching/vomiting yet. VSS x htn diastolic 112.

## 2021-11-29 NOTE — ED ADULT TRIAGE NOTE - OTHER COMPLAINTS
LinguaSys Access Code: Activation code not generated  Current LinguaSys Status: Active    [employer]Mediohart  A secure, online tool to manage your health information     [employer]’s LinguaSys® is a secure, online tool that connects you to your personalized health information from the privacy of your home -- day or night - making it very easy for you to manage your healthcare. Once the activation process is completed, you can even access your medical information using the LinguaSys felix, which is available for free in the [employer] Felix store or Google Play store.     LinguaSys provides the following levels of access (as shown below):   My Chart Features   [facility] Primary Care Doctor [facility]  Specialists [facility]  Urgent  Care Non-[facility]  Primary Care  Doctor   Email your healthcare team securely and privately 24/7 X X X X   Manage appointments: schedule your next appointment; view details of past/upcoming appointments X      Request prescription refills. X      View recent personal medical records, including lab and immunizations X X X X   View health record, including health history, allergies, medications X X X X   Read reports about your outpatient visits, procedures, consult and ER notes X X X X   See your discharge summary, which is a recap of your hospital and/or ER visit that includes your diagnosis, lab results, and care plan. X X       How to register for LinguaSys:  1. Go to  [email or web address].  2. Click on the Sign Up Now box, which takes you to the New Member Sign Up page. You will need to provide the following information:  a. Enter your Lingu[employer]Sys Access Code exactly as it appears at the top of this page. (You will not need to use this code after you’ve completed the sign-up process. If you do not sign up before the expiration date, you must request a new code.)   b. Enter your date of birth.   c. Enter your home email address.   d. Click Submit, and follow the next screen’s instructions.  3. Create a LinguaSys ID. This will  be your [employer] login ID and cannot be changed, so think of one that is secure and easy to remember.  4. Create a [employer] password. You can change your password at any time.  5. Enter your Password Reset Question and Answer. This can be used at a later time if you forget your password.   6. Enter your e-mail address. This allows you to receive e-mail notifications when new information is available in [employer].  7. Click Sign Up. You can now view your health information.    For assistance activating your [employer] account, call [phone]         324 ASA given en route

## 2021-11-29 NOTE — ED ADULT TRIAGE NOTE - AS TEMP SITE
How Severe Are Your Spot(S)?: moderate Have Your Spot(S) Been Treated In The Past?: has not been treated Hpi Title: Evaluation of Skin Lesions oral

## 2021-11-29 NOTE — ED ADULT NURSE NOTE - NSICDXPASTMEDICALHX_GEN_ALL_CORE_FT
PAST MEDICAL HISTORY:  Anemia     Anemia     Diabetes     Fibroids     GERD (gastroesophageal reflux disease)     Thyroid nodule

## 2021-11-29 NOTE — ED PROVIDER NOTE - PATIENT PORTAL LINK FT
You can access the FollowMyHealth Patient Portal offered by [facility] by registering at the following website: [email or web address]. By joining [employer]’s FollowMyHealth portal, you will also be able to view your health information using other applications (apps) compatible with our system.

## 2021-11-30 PROCEDURE — G1004: CPT

## 2021-11-30 PROCEDURE — 85610 PROTHROMBIN TIME: CPT

## 2021-11-30 PROCEDURE — 96374 THER/PROPH/DIAG INJ IV PUSH: CPT | Mod: XU

## 2021-11-30 PROCEDURE — 86900 BLOOD TYPING SEROLOGIC ABO: CPT

## 2021-11-30 PROCEDURE — 87635 SARS-COV-2 COVID-19 AMP PRB: CPT

## 2021-11-30 PROCEDURE — 86850 RBC ANTIBODY SCREEN: CPT

## 2021-11-30 PROCEDURE — 85730 THROMBOPLASTIN TIME PARTIAL: CPT

## 2021-11-30 PROCEDURE — 93005 ELECTROCARDIOGRAM TRACING: CPT

## 2021-11-30 PROCEDURE — 86901 BLOOD TYPING SEROLOGIC RH(D): CPT

## 2021-11-30 PROCEDURE — 80048 BASIC METABOLIC PNL TOTAL CA: CPT

## 2021-11-30 PROCEDURE — 36415 COLL VENOUS BLD VENIPUNCTURE: CPT

## 2021-11-30 PROCEDURE — 85025 COMPLETE CBC W/AUTO DIFF WBC: CPT

## 2021-11-30 PROCEDURE — 99284 EMERGENCY DEPT VISIT MOD MDM: CPT | Mod: 25

## 2021-11-30 PROCEDURE — 96375 TX/PRO/DX INJ NEW DRUG ADDON: CPT | Mod: XU

## 2021-11-30 PROCEDURE — 84702 CHORIONIC GONADOTROPIN TEST: CPT

## 2021-11-30 PROCEDURE — 84484 ASSAY OF TROPONIN QUANT: CPT

## 2021-11-30 PROCEDURE — 71275 CT ANGIOGRAPHY CHEST: CPT | Mod: ME

## 2021-11-30 PROCEDURE — 71046 X-RAY EXAM CHEST 2 VIEWS: CPT

## 2021-11-30 PROCEDURE — 85379 FIBRIN DEGRADATION QUANT: CPT

## 2021-11-30 RX ORDER — KETOROLAC TROMETHAMINE 30 MG/ML
15 SYRINGE (ML) INJECTION ONCE
Refills: 0 | Status: DISCONTINUED | OUTPATIENT
Start: 2021-11-30 | End: 2021-11-30

## 2021-11-30 RX ADMIN — Medication 15 MILLIGRAM(S): at 00:55

## 2021-11-30 RX ADMIN — Medication 650 MILLIGRAM(S): at 00:22

## 2021-11-30 RX ADMIN — Medication 15 MILLIGRAM(S): at 01:15

## 2021-11-30 NOTE — ED ADULT NURSE REASSESSMENT NOTE - NS ED NURSE REASSESS COMMENT FT1
DECLAN Power at bedside speaking with pt.
EDT at bedside for repeat VS
have spoken with SSA x2, still awaiting CT readiness for exam
pt. asking to speak with provider for update, states still experiencing some sob, DECLAN Power notified, assessment on-going
pt. back from CT, nad or change, assessment on-going, awaiting results/further orders
still awaiting CTA results
CC: Patient is a 76y old  Male who presents with a chief complaint of slip down stairs  SOURCE:   HPI: Patient is a 76y old  Male who presents with a chief complaint of slip down stairs  Patient states isolated injury, no LOC       pt c/o + -headache  /10 on the pain scale   + - Nausea /+ - Vomiting  admits denies weakness  admits denies numbness/ tingling  admist denies visual changes  admist denies C/T/LS  Spine pain    PAST MEDICAL:      SURGICAL HISTORY:      SOCIAL HISTORY: + - EtOH, + - tobacco, + - drugs    FAMILY HISTORY:      ROS:  CONSTITUTIONAL: No fever, weight loss, or fatigue  EYES: No eye pain, visual disturbances, or discharge  ENMT:  No difficulty hearing, tinnitus, vertigo; No sinus or throat pain  NECK: No pain or stiffness  RESPIRATORY: No cough, wheezing, chills or hemoptysis; No shortness of breath  CARDIOVASCULAR: No chest pain, palpitations, dizziness, or leg swelling  GASTROINTESTINAL: No abdominal or epigastric pain. No nausea, vomiting, or hematemesis; No diarrhea or constipation. No melena or hematochezia.  GENITOURINARY: No dysuria, frequency, hematuria, or incontinence  NEUROLOGICAL: No headaches, memory loss, loss of strength, numbness, or tremors  SKIN: No itching, burning, rashes, or lesions   LYMPH NODES: No enlarged glands  ENDOCRINE: No heat or cold intolerance; No hair loss  MUSCULOSKELETAL: No joint pain or swelling; No muscle, back, or extremity pain  PSYCHIATRIC: No depression, anxiety, mood swings, or difficulty sleeping  HEME/LYMPH: No easy bruising, or bleeding gums  ALLERGY AND IMMUNOLOGIC: No hives or eczema      MEDICATIONS  (STANDING):      Allergies: No Known Allergies      Vital Signs Last 24 Hrs  T(C): 36.4 (20 May 2021 20:31), Max: 36.4 (20 May 2021 20:31)  T(F): 97.5 (20 May 2021 20:31), Max: 97.5 (20 May 2021 20:31)  HR: 66 (20 May 2021 20:31) (66 - 66)  BP: 161/87 (20 May 2021 20:31) (161/87 - 161/87)  BP(mean): --  RR: 18 (20 May 2021 20:31) (18 - 18)  SpO2: 96% (20 May 2021 20:31) (96% - 96%)    PHYSICAL EXAM:  GENERAL: NAD, well-groomed, well-developed  HEAD:  Atraumatic, Normocephalic  EYES: EOMI, PERRLA, conjunctiva and sclera clear  ENMT; Moist mucous membranes, Good dentition  NECK: Supple, No JVD  NERVOUS SYSTEM:  Alert & Oriented X3,       Good concentration; Motor Strength 5/5 B/L upper and lower extremities  SPINE:   CHEST/LUNG: Clear  bilaterally; No rales, rhonchi, wheezing, or rubs  HEART: Regular rate and rhythm; No murmurs, rubs, or gallops  ABDOMEN: Soft, Nontender, Nondistended; Bowel sounds present  EXTREMITIES:  2+ Peripheral Pulses, No clubbing, cyanosis, or edema  LYMPH: No lymphadenopathy noted  SKIN: No rashes or lesions      RADIOLOGY & ADDITIONAL STUDIES:  EXAM: CT REFORM SPINE L  EXAM: CT ABDOMEN AND PELVIS  PROCEDURE DATE: 05/21/2021  INTERPRETATION: CLINICAL INFORMATION: Flank and left lower back pain. Status post slip and fall down 2 stairs.  COMPARISON: CT of the abdomen and pelvis and lumbar spine from 6/15/2019.  CONTRAST/COMPLICATIONS:  IV Contrast: NONE Evaluation of the visceral organs is limited without intravenous contrast  Oral Contrast: NONE  Complications: None reported at time of study completion  PROCEDURE:  CT of the Abdomen and Pelvis was performed.  Sagittal and coronal reformats were performed.  Coned-down axial, sagittal and coronal reformats of the lumbar spine utilizing the source images from the CT of the abdomen and pelvis.  FINDINGS:  LOWER CHEST: Bibasilar subsegmental atelectasis. Right lower lobe calcified granuloma. Mild coronary artery calcifications.  LIVER: Within normal limits.  BILE DUCTS: Normal caliber.  GALLBLADDER: Within normal limits.  SPLEEN: Within normal limits.  PANCREAS: Within normal limits.  ADRENALS: Within normal limits.  KIDNEYS/URETERS: No nephrolithiasis, hydronephrosis or obstructive uropathy.  BLADDER: Within normal limits.  REPRODUCTIVE ORGANS: Prostate within normal limits.  BOWEL: No bowel obstruction or inflammation. Appendix is normal.  PERITONEUM: No ascites.  VESSELS: Within normal limits.  RETROPERITONEUM/LYMPH NODES: No lymphadenopathy.  ABDOMINAL WALL: Small fat-containing umbilical and left inguinal hernias.  BONES: Acute nondisplaced left L1 and L2 transverse process fractures. No additional fracture or dislocation.  LUMBAR SPINE:  5 lumbar type vertebral bodies. Lumbar vertebral body heights and alignment are maintained. Acute nondisplaced left L1 and L2 transverse process fractures. Disc space heights are preserved.  At L3-L4, there is a broad-based disc bulge resulting in flattening of the ventral thecal sac and minimal bilateral neural foraminal stenosis.  At L4-L5, there is a broad-based disc bulge and ligamentum flavum hypertrophy resulting in mild spinal canal stenosis and mild right and minimal left neural foraminal stenosis.  At L5-S1, there is a broad-based disc bulge resulting in flattening of the ventral thecal sac and minimal bilateral neural foraminal stenosis.    IMPRESSION:  1. No nephrolithiasis, hydronephrosis or obstructive uropathy.  2. Acute nondisplaced left L1 and L2 transverse process fractures.                    
pt. was medicated as noted, hao. well, assessment on-going, awaiting further orders
awaiting dispo. papers
pt. was medicated for continued pain as noted, hao. well, assessment on-going
CC: Patient is a 76y old  Male who presents with a chief complaint of slip down stairs  SOURCE:   HPI: Patient is a 76y old  Male who presents with a chief complaint of slip down stairs  Patient states isolated injury, no LOC       pt c/o + -headache  /10 on the pain scale   + - Nausea /+ - Vomiting  admits denies weakness  admits denies numbness/ tingling  admist denies visual changes  admist denies C/T/LS  Spine pain    PAST MEDICAL:      SURGICAL HISTORY:      SOCIAL HISTORY: + - EtOH, + - tobacco, + - drugs    FAMILY HISTORY:      ROS:  CONSTITUTIONAL: No fever, weight loss, or fatigue  EYES: No eye pain, visual disturbances, or discharge  ENMT:  No difficulty hearing, tinnitus, vertigo; No sinus or throat pain  NECK: No pain or stiffness  RESPIRATORY: No cough, wheezing, chills or hemoptysis; No shortness of breath  CARDIOVASCULAR: No chest pain, palpitations, dizziness, or leg swelling  GASTROINTESTINAL: No abdominal or epigastric pain. No nausea, vomiting, or hematemesis; No diarrhea or constipation. No melena or hematochezia.  GENITOURINARY: No dysuria, frequency, hematuria, or incontinence  NEUROLOGICAL: No headaches, memory loss, loss of strength, numbness, or tremors  SKIN: No itching, burning, rashes, or lesions   LYMPH NODES: No enlarged glands  ENDOCRINE: No heat or cold intolerance; No hair loss  MUSCULOSKELETAL: No joint pain or swelling; No muscle, back, or extremity pain  PSYCHIATRIC: No depression, anxiety, mood swings, or difficulty sleeping  HEME/LYMPH: No easy bruising, or bleeding gums  ALLERGY AND IMMUNOLOGIC: No hives or eczema      MEDICATIONS  (STANDING):      Allergies: No Known Allergies      Vital Signs Last 24 Hrs  T(C): 36.4 (20 May 2021 20:31), Max: 36.4 (20 May 2021 20:31)  T(F): 97.5 (20 May 2021 20:31), Max: 97.5 (20 May 2021 20:31)  HR: 66 (20 May 2021 20:31) (66 - 66)  BP: 161/87 (20 May 2021 20:31) (161/87 - 161/87)  BP(mean): --  RR: 18 (20 May 2021 20:31) (18 - 18)  SpO2: 96% (20 May 2021 20:31) (96% - 96%)    PHYSICAL EXAM:  GENERAL: NAD, well-groomed, well-developed  HEAD:  Atraumatic, Normocephalic  EYES: EOMI, PERRLA, conjunctiva and sclera clear  ENMT; Moist mucous membranes, Good dentition  NECK: Supple, No JVD  NERVOUS SYSTEM:  Alert & Oriented X3,       Good concentration; Motor Strength 5/5 B/L upper and lower extremities  SPINE:   CHEST/LUNG: Clear  bilaterally; No rales, rhonchi, wheezing, or rubs  HEART: Regular rate and rhythm; No murmurs, rubs, or gallops  ABDOMEN: Soft, Nontender, Nondistended; Bowel sounds present  EXTREMITIES:  2+ Peripheral Pulses, No clubbing, cyanosis, or edema  LYMPH: No lymphadenopathy noted  SKIN: No rashes or lesions      RADIOLOGY & ADDITIONAL STUDIES:  EXAM: CT REFORM SPINE L  EXAM: CT ABDOMEN AND PELVIS  PROCEDURE DATE: 05/21/2021  INTERPRETATION: CLINICAL INFORMATION: Flank and left lower back pain. Status post slip and fall down 2 stairs.  COMPARISON: CT of the abdomen and pelvis and lumbar spine from 6/15/2019.  CONTRAST/COMPLICATIONS:  IV Contrast: NONE Evaluation of the visceral organs is limited without intravenous contrast  Oral Contrast: NONE  Complications: None reported at time of study completion  PROCEDURE:  CT of the Abdomen and Pelvis was performed.  Sagittal and coronal reformats were performed.  Coned-down axial, sagittal and coronal reformats of the lumbar spine utilizing the source images from the CT of the abdomen and pelvis.  FINDINGS:  LOWER CHEST: Bibasilar subsegmental atelectasis. Right lower lobe calcified granuloma. Mild coronary artery calcifications.  LIVER: Within normal limits.  BILE DUCTS: Normal caliber.  GALLBLADDER: Within normal limits.  SPLEEN: Within normal limits.  PANCREAS: Within normal limits.  ADRENALS: Within normal limits.  KIDNEYS/URETERS: No nephrolithiasis, hydronephrosis or obstructive uropathy.  BLADDER: Within normal limits.  REPRODUCTIVE ORGANS: Prostate within normal limits.  BOWEL: No bowel obstruction or inflammation. Appendix is normal.  PERITONEUM: No ascites.  VESSELS: Within normal limits.  RETROPERITONEUM/LYMPH NODES: No lymphadenopathy.  ABDOMINAL WALL: Small fat-containing umbilical and left inguinal hernias.  BONES: Acute nondisplaced left L1 and L2 transverse process fractures. No additional fracture or dislocation.  LUMBAR SPINE:  5 lumbar type vertebral bodies. Lumbar vertebral body heights and alignment are maintained. Acute nondisplaced left L1 and L2 transverse process fractures. Disc space heights are preserved.  At L3-L4, there is a broad-based disc bulge resulting in flattening of the ventral thecal sac and minimal bilateral neural foraminal stenosis.  At L4-L5, there is a broad-based disc bulge and ligamentum flavum hypertrophy resulting in mild spinal canal stenosis and mild right and minimal left neural foraminal stenosis.  At L5-S1, there is a broad-based disc bulge resulting in flattening of the ventral thecal sac and minimal bilateral neural foraminal stenosis.    IMPRESSION:  1. No nephrolithiasis, hydronephrosis or obstructive uropathy.  2. Acute nondisplaced left L1 and L2 transverse process fractures.      EXAM: MR SPINE LUMBAR  PROCEDURE DATE: 05/21/2021  INTERPRETATION: CLINICAL INFORMATION: Nondisplaced left sided L1 and L2 transverse process fractures.  TECHNIQUE: MRI of the lumbar spine is performed using accommodation of sagittal and axial T2-weighted images, sagittal and axial T1-weighted images, sagittal STIR images, and coronal T2-weighted images.  COMPARISON: CT of the lumbar spine from 5/21/2021.  FINDINGS:  Lumbar vertebral body heights are normal. Lumbar vertebral elements are well aligned. There is no marrow signal abnormality within the vertebral bodies, specifically, no marrow edema to suggest vertebral body fracture. Known nondisplaced left-sided L1 and L2 transverse process fractures are better seen on the CT. There is no evidence of ligamentous injury. There is mild left-sided paraspinal soft tissue edema in the region of the L1 and L2 transverse process fractures. There is no evidence of an epidural hematoma. There is multilevel disc degeneration without significant loss of disc space height. There are minimal disc bulges at L3-L4, L4-L5, and L5-S1 with combination of facet and ligamentous hypertrophy at L4-L5 resulting in mild segmental canal stenosis. There is no compression of the cauda equina nerve roots. There is no significant lateral recess or neural foraminal stenosis. The conus medullaris terminates at L1. There is no signal abnormality of the visualized thoracic cord.  The visualized portions of the abdomen and pelvis are unremarkable.  IMPRESSION:  Known nondisplaced left-sided L1 and L2 transverse process fractures better seen on CT. Mild ascites paraspinal soft tissue edema in the region of the transverse process fractures.  No evidence of lumbar vertebral body fracture or malalignment. No evidence of ligamentous injury. No compression of the cauda equina nerve roots.                
CC: Patient is a 76y old  Male who presents with a chief complaint of slip down stairs  SOURCE: Patient and wife, competent sources   HPI: Patient is a 76y old  Male who presents with a chief complaint of slip down stairs  Patient states isolated injury, no LOC        -headache     - Nausea / - Vomiting   denies weakness   denies numbness/ tingling    admits denies L  Spine pain since fall     PAST MEDICAL:  polymyalgia  arthritis  hdl  SURGICAL HISTORY:  right inguinal hernia  [place]  CATARACTS BL       SOCIAL HISTORY: +  EtOH 1 drink a day ( beer or liquor),  - tobacco,  - drugs    FAMILY HISTORY:  mother; ca breast w brain mets     ROS:  CONSTITUTIONAL: No fever, weight loss, or fatigue  EYES: No eye pain, visual disturbances, or discharge  ENMT:  No difficulty hearing, tinnitus, vertigo; No sinus or throat pain  NECK: No pain or stiffness  RESPIRATORY: No cough, wheezing, chills or hemoptysis; No shortness of breath  CARDIOVASCULAR: No chest pain, palpitations, dizziness, or leg swelling  GASTROINTESTINAL: No abdominal or epigastric pain. No nausea, vomiting, or hematemesis; No diarrhea or constipation. No melena or hematochezia.  GENITOURINARY: No dysuria, frequency, hematuria, or incontinence  NEUROLOGICAL: No headaches, memory loss, loss of strength, numbness, or tremors  SKIN: No itching, burning, rashes, or lesions   LYMPH NODES: No enlarged glands  ENDOCRINE: No heat or cold intolerance; No hair loss  MUSCULOSKELETAL: No joint pain or swelling; No muscle, back, or extremity pain  PSYCHIATRIC: No depression, anxiety, mood swings, or difficulty sleeping  HEME/LYMPH: No easy bruising, or bleeding gums  ALLERGY AND IMMUNOLOGIC: No hives or eczema      MEDICATIONS  (STANDING):  simvastatin, ramipril     Allergies: No Known Allergies      Vital Signs Last 24 Hrs  T(C): 36.4 (20 May 2021 20:31), Max: 36.4 (20 May 2021 20:31)  T(F): 97.5 (20 May 2021 20:31), Max: 97.5 (20 May 2021 20:31)  HR: 66 (20 May 2021 20:31) (66 - 66)  BP: 161/87 (20 May 2021 20:31) (161/87 - 161/87)  BP(mean): --  RR: 18 (20 May 2021 20:31) (18 - 18)  SpO2: 96% (20 May 2021 20:31) (96% - 96%)    PHYSICAL EXAM:  GENERAL: NAD, well-groomed, well-developed  HEAD:  Atraumatic, Normocephalic  EYES: EOMI, PERRLA, conjunctiva and sclera clear  ENMT; Moist mucous membranes, Good dentition  NECK: Supple, No JVD  NERVOUS SYSTEM:  Alert & Oriented X3, + [place]  per, eomi, cranial nerves 2-12 intact  +[place]  motor 5/5 all  sensroy intact all   SPINE: No C/T Spine tenderness No point spinal L spine tenderness + left para spineal tender l1-3 level   CHEST/LUNG: Clear  bilaterally; No rales, rhonchi, wheezing, or rubs  HEART: Regular rate and rhythm; No murmurs, rubs, or gallops  ABDOMEN: Soft, Nontender, Nondistended; Bowel sounds present  EXTREMITIES:  2+ Peripheral Pulses, No clubbing, cyanosis, or edema  LYMPH: No lymphadenopathy noted  SKIN: No rashes or lesions      RADIOLOGY & ADDITIONAL STUDIES:  EXAM: CT REFORM SPINE L  EXAM: CT ABDOMEN AND PELVIS  PROCEDURE DATE: 05/21/2021  INTERPRETATION: CLINICAL INFORMATION: Flank and left lower back pain. Status post slip and fall down 2 stairs.  COMPARISON: CT of the abdomen and pelvis and lumbar spine from 6/15/2019.  CONTRAST/COMPLICATIONS:  IV Contrast: NONE Evaluation of the visceral organs is limited without intravenous contrast  Oral Contrast: NONE  Complications: None reported at time of study completion  PROCEDURE:  CT of the Abdomen and Pelvis was performed.  Sagittal and coronal reformats were performed.  Coned-down axial, sagittal and coronal reformats of the lumbar spine utilizing the source images from the CT of the abdomen and pelvis.  FINDINGS:  LOWER CHEST: Bibasilar subsegmental atelectasis. Right lower lobe calcified granuloma. Mild coronary artery calcifications.  LIVER: Within normal limits.  BILE DUCTS: Normal caliber.  GALLBLADDER: Within normal limits.  SPLEEN: Within normal limits.  PANCREAS: Within normal limits.  ADRENALS: Within normal limits.  KIDNEYS/URETERS: No nephrolithiasis, hydronephrosis or obstructive uropathy.  BLADDER: Within normal limits.  REPRODUCTIVE ORGANS: Prostate within normal limits.  BOWEL: No bowel obstruction or inflammation. Appendix is normal.  PERITONEUM: No ascites.  VESSELS: Within normal limits.  RETROPERITONEUM/LYMPH NODES: No lymphadenopathy.  ABDOMINAL WALL: Small fat-containing umbilical and left inguinal hernias.  BONES: Acute nondisplaced left L1 and L2 transverse process fractures. No additional fracture or dislocation.  LUMBAR SPINE:  5 lumbar type vertebral bodies. Lumbar vertebral body heights and alignment are maintained. Acute nondisplaced left L1 and L2 transverse process fractures. Disc space heights are preserved.  At L3-L4, there is a broad-based disc bulge resulting in flattening of the ventral thecal sac and minimal bilateral neural foraminal stenosis.  At L4-L5, there is a broad-based disc bulge and ligamentum flavum hypertrophy resulting in mild spinal canal stenosis and mild right and minimal left neural foraminal stenosis.  At L5-S1, there is a broad-based disc bulge resulting in flattening of the ventral thecal sac and minimal bilateral neural foraminal stenosis.    IMPRESSION:  1. No nephrolithiasis, hydronephrosis or obstructive uropathy.  2. Acute nondisplaced left L1 and L2 transverse process fractures.      EXAM: MR SPINE LUMBAR  PROCEDURE DATE: 05/21/2021  INTERPRETATION: CLINICAL INFORMATION: Nondisplaced left sided L1 and L2 transverse process fractures.  TECHNIQUE: MRI of the lumbar spine is performed using accommodation of sagittal and axial T2-weighted images, sagittal and axial T1-weighted images, sagittal STIR images, and coronal T2-weighted images.  COMPARISON: CT of the lumbar spine from 5/21/2021.  FINDINGS:  Lumbar vertebral body heights are normal. Lumbar vertebral elements are well aligned. There is no marrow signal abnormality within the vertebral bodies, specifically, no marrow edema to suggest vertebral body fracture. Known nondisplaced left-sided L1 and L2 transverse process fractures are better seen on the CT. There is no evidence of ligamentous injury. There is mild left-sided paraspinal soft tissue edema in the region of the L1 and L2 transverse process fractures. There is no evidence of an epidural hematoma. There is multilevel disc degeneration without significant loss of disc space height. There are minimal disc bulges at L3-L4, L4-L5, and L5-S1 with combination of facet and ligamentous hypertrophy at L4-L5 resulting in mild segmental canal stenosis. There is no compression of the cauda equina nerve roots. There is no significant lateral recess or neural foraminal stenosis. The conus medullaris terminates at L1. There is no signal abnormality of the visualized thoracic cord.  The visualized portions of the abdomen and pelvis are unremarkable.  IMPRESSION:  Known nondisplaced left-sided L1 and L2 transverse process fractures better seen on CT. Mild ascites paraspinal soft tissue edema in the region of the transverse process fractures.  No evidence of lumbar vertebral body fracture or malalignment. No evidence of ligamentous injury. No compression of the cauda equina nerve roots.                
CC: Patient is a 76y old  Male who presents with a chief complaint of slip down stairs now with low back pain  SOURCE: Patient and wife, competent sources   HPI: Patient is a 76y old  Male who presents with a chief complaint of slip down stairs  Patient states isolated injury to lower back, denies any motor or sensory changes, no LOC        -headache     - Nausea / - Vomiting   denies weakness   denies numbness/ tingling    admits denies L  Spine pain since fall     PAST MEDICAL:  polymyalgia  arthritis  hdl  SURGICAL HISTORY:  right inguinal hernia  [place]  CATARACTS BL       SOCIAL HISTORY: +  EtOH 1 drink a day ( beer or liquor),  - tobacco,  - drugs    FAMILY HISTORY:  mother; ca breast w brain mets     ROS:  CONSTITUTIONAL: No fever, weight loss, or fatigue  EYES: No eye pain, visual disturbances, or discharge  ENMT:  No difficulty hearing, tinnitus, vertigo; No sinus or throat pain  NECK: No pain or stiffness  RESPIRATORY: No cough, wheezing, chills or hemoptysis; No shortness of breath  CARDIOVASCULAR: No chest pain, palpitations, dizziness, or leg swelling  GASTROINTESTINAL: No abdominal or epigastric pain. No nausea, vomiting, or hematemesis; No diarrhea or constipation. No melena or hematochezia.  GENITOURINARY: No dysuria, frequency, hematuria, or incontinence  NEUROLOGICAL: No headaches, memory loss, loss of strength, numbness, or tremors  SKIN: No itching, burning, rashes, or lesions   LYMPH NODES: No enlarged glands  ENDOCRINE: No heat or cold intolerance; No hair loss  MUSCULOSKELETAL: No joint pain or swelling; No muscle, back, or extremity pain  PSYCHIATRIC: No depression, anxiety, mood swings, or difficulty sleeping  HEME/LYMPH: No easy bruising, or bleeding gums  ALLERGY AND IMMUNOLOGIC: No hives or eczema      MEDICATIONS  (STANDING):  simvastatin, ramipril     Allergies: No Known Allergies      Vital Signs Last 24 Hrs  T(C): 36.4 (20 May 2021 20:31), Max: 36.4 (20 May 2021 20:31)  T(F): 97.5 (20 May 2021 20:31), Max: 97.5 (20 May 2021 20:31)  HR: 66 (20 May 2021 20:31) (66 - 66)  BP: 161/87 (20 May 2021 20:31) (161/87 - 161/87)  BP(mean): --  RR: 18 (20 May 2021 20:31) (18 - 18)  SpO2: 96% (20 May 2021 20:31) (96% - 96%)    PHYSICAL EXAM:  GENERAL: NAD, well-groomed, well-developed  HEAD:  Atraumatic, Normocephalic  EYES: EOMI, PERRLA, conjunctiva and sclera clear  ENMT; Moist mucous membranes, Good dentition  NECK: Supple, No JVD  NERVOUS SYSTEM:  Alert & Oriented X3, + [place]  per, eomi, cranial nerves 2-12 intact  +[place]  motor 5/5 all  sensroy intact all   SPINE: No C/T Spine tenderness No point spinal L spine tenderness + left para spineal tender l1-3 level   CHEST/LUNG: Clear  bilaterally; No rales, rhonchi, wheezing, or rubs  HEART: Regular rate and rhythm; No murmurs, rubs, or gallops  ABDOMEN: Soft, Nontender, Nondistended; Bowel sounds present  EXTREMITIES:  2+ Peripheral Pulses, No clubbing, cyanosis, or edema  LYMPH: No lymphadenopathy noted  SKIN: No rashes or lesions      RADIOLOGY & ADDITIONAL STUDIES:  EXAM: CT REFORM SPINE L  EXAM: CT ABDOMEN AND PELVIS  PROCEDURE DATE: 05/21/2021  INTERPRETATION: CLINICAL INFORMATION: Flank and left lower back pain. Status post slip and fall down 2 stairs.  COMPARISON: CT of the abdomen and pelvis and lumbar spine from 6/15/2019.  CONTRAST/COMPLICATIONS:  IV Contrast: NONE Evaluation of the visceral organs is limited without intravenous contrast  Oral Contrast: NONE  Complications: None reported at time of study completion  PROCEDURE:  CT of the Abdomen and Pelvis was performed.  Sagittal and coronal reformats were performed.  Coned-down axial, sagittal and coronal reformats of the lumbar spine utilizing the source images from the CT of the abdomen and pelvis.  FINDINGS:  LOWER CHEST: Bibasilar subsegmental atelectasis. Right lower lobe calcified granuloma. Mild coronary artery calcifications.  LIVER: Within normal limits.  BILE DUCTS: Normal caliber.  GALLBLADDER: Within normal limits.  SPLEEN: Within normal limits.  PANCREAS: Within normal limits.  ADRENALS: Within normal limits.  KIDNEYS/URETERS: No nephrolithiasis, hydronephrosis or obstructive uropathy.  BLADDER: Within normal limits.  REPRODUCTIVE ORGANS: Prostate within normal limits.  BOWEL: No bowel obstruction or inflammation. Appendix is normal.  PERITONEUM: No ascites.  VESSELS: Within normal limits.  RETROPERITONEUM/LYMPH NODES: No lymphadenopathy.  ABDOMINAL WALL: Small fat-containing umbilical and left inguinal hernias.  BONES: Acute nondisplaced left L1 and L2 transverse process fractures. No additional fracture or dislocation.  LUMBAR SPINE:  5 lumbar type vertebral bodies. Lumbar vertebral body heights and alignment are maintained. Acute nondisplaced left L1 and L2 transverse process fractures. Disc space heights are preserved.  At L3-L4, there is a broad-based disc bulge resulting in flattening of the ventral thecal sac and minimal bilateral neural foraminal stenosis.  At L4-L5, there is a broad-based disc bulge and ligamentum flavum hypertrophy resulting in mild spinal canal stenosis and mild right and minimal left neural foraminal stenosis.  At L5-S1, there is a broad-based disc bulge resulting in flattening of the ventral thecal sac and minimal bilateral neural foraminal stenosis.    IMPRESSION:  1. No nephrolithiasis, hydronephrosis or obstructive uropathy.  2. Acute nondisplaced left L1 and L2 transverse process fractures.      EXAM: MR SPINE LUMBAR  PROCEDURE DATE: 05/21/2021  INTERPRETATION: CLINICAL INFORMATION: Nondisplaced left sided L1 and L2 transverse process fractures.  TECHNIQUE: MRI of the lumbar spine is performed using accommodation of sagittal and axial T2-weighted images, sagittal and axial T1-weighted images, sagittal STIR images, and coronal T2-weighted images.  COMPARISON: CT of the lumbar spine from 5/21/2021.  FINDINGS:  Lumbar vertebral body heights are normal. Lumbar vertebral elements are well aligned. There is no marrow signal abnormality within the vertebral bodies, specifically, no marrow edema to suggest vertebral body fracture. Known nondisplaced left-sided L1 and L2 transverse process fractures are better seen on the CT. There is no evidence of ligamentous injury. There is mild left-sided paraspinal soft tissue edema in the region of the L1 and L2 transverse process fractures. There is no evidence of an epidural hematoma. There is multilevel disc degeneration without significant loss of disc space height. There are minimal disc bulges at L3-L4, L4-L5, and L5-S1 with combination of facet and ligamentous hypertrophy at L4-L5 resulting in mild segmental canal stenosis. There is no compression of the cauda equina nerve roots. There is no significant lateral recess or neural foraminal stenosis. The conus medullaris terminates at L1. There is no signal abnormality of the visualized thoracic cord.  The visualized portions of the abdomen and pelvis are unremarkable.  IMPRESSION:  Known nondisplaced left-sided L1 and L2 transverse process fractures better seen on CT. Mild ascites paraspinal soft tissue edema in the region of the transverse process fractures.  No evidence of lumbar vertebral body fracture or malalignment. No evidence of ligamentous injury. No compression of the cauda equina nerve roots.

## 2021-12-15 NOTE — ED ADULT NURSE REASSESSMENT NOTE - NS ED NURSE REASSESS COMMENT FT1
Pt taken to U/S with family
Reinforced visitor policy with patient/patient representative.      Informed patient that dual occupancy is possible during hospital stay.   
Pt medicated as per EMAR.  Pt awaiting GYN consult.

## 2022-01-26 ENCOUNTER — APPOINTMENT (OUTPATIENT)
Dept: PULMONOLOGY | Facility: CLINIC | Age: 47
End: 2022-01-26

## 2022-02-13 NOTE — ED ADULT NURSE NOTE - NS ED NURSE LEVEL OF CONSCIOUSNESS SPEECH
Noted DC Order. Attempted to set up transport. Calls placed to    [address] [place] [address] Ambulance  [facility]  None had availability to transport today. Pt made aware he would be staying until tomorrow, multiple family at bedside and aware. Speaking Coherently

## 2022-03-03 NOTE — ED ADULT NURSE NOTE - GENITOURINARY ASSESSMENT
[FreeTextEntry1] : 74 M with PMH  HTN, DM, HLD, OA presents for follow up for crc screening.  Also reports right lower quadrant pain.\par \par Right lower quadrant pain:\par -No fever\par -Will obtain recent blood work from PCPs office\par -We will obtain abdominal ultrasound\par -Will review once available\par \par Need for CRC screening:\par -Patient has ongoing active cardiac complaints and is scheduled to see cardiology, will likely undergo further work-up\par -Discussed with patient and daughter to complete work-up and then follow-up with us to schedule colonoscopy, they both verbalized understanding\par \par  I have discussed the indications, risks and benefits of procedure with patient. Risks include, but not limited to, bleeding, perforation, infection, and reaction to anesthesia.  Patient was given the opportunity to ask questions, all questions were answered. 
WDL

## 2022-04-04 VITALS
OXYGEN SATURATION: 98 % | HEART RATE: 103 BPM | RESPIRATION RATE: 18 BRPM | TEMPERATURE: 98 F | DIASTOLIC BLOOD PRESSURE: 99 MMHG | HEIGHT: 66 IN | SYSTOLIC BLOOD PRESSURE: 154 MMHG

## 2022-04-04 LAB
ALBUMIN SERPL ELPH-MCNC: 3.6 G/DL — SIGNIFICANT CHANGE UP (ref 3.3–5)
ALP SERPL-CCNC: 109 U/L — SIGNIFICANT CHANGE UP (ref 40–120)
ALT FLD-CCNC: SIGNIFICANT CHANGE UP (ref 10–45)
ANION GAP SERPL CALC-SCNC: 12 MMOL/L — SIGNIFICANT CHANGE UP (ref 5–17)
ANISOCYTOSIS BLD QL: SLIGHT — SIGNIFICANT CHANGE UP
AST SERPL-CCNC: SIGNIFICANT CHANGE UP (ref 10–40)
BASOPHILS # BLD AUTO: 0.07 K/UL — SIGNIFICANT CHANGE UP (ref 0–0.2)
BASOPHILS NFR BLD AUTO: 0.9 % — SIGNIFICANT CHANGE UP (ref 0–2)
BILIRUB SERPL-MCNC: 0.2 MG/DL — SIGNIFICANT CHANGE UP (ref 0.2–1.2)
BUN SERPL-MCNC: 11 MG/DL — SIGNIFICANT CHANGE UP (ref 7–23)
CALCIUM SERPL-MCNC: 8.8 MG/DL — SIGNIFICANT CHANGE UP (ref 8.4–10.5)
CHLORIDE SERPL-SCNC: 99 MMOL/L — SIGNIFICANT CHANGE UP (ref 96–108)
CO2 SERPL-SCNC: 20 MMOL/L — LOW (ref 22–31)
CREAT SERPL-MCNC: 0.85 MG/DL — SIGNIFICANT CHANGE UP (ref 0.5–1.3)
EGFR: 87 ML/MIN/1.73M2 — SIGNIFICANT CHANGE UP
ELLIPTOCYTES BLD QL SMEAR: SLIGHT — SIGNIFICANT CHANGE UP
EOSINOPHIL # BLD AUTO: 0 K/UL — SIGNIFICANT CHANGE UP (ref 0–0.5)
EOSINOPHIL NFR BLD AUTO: 0 % — SIGNIFICANT CHANGE UP (ref 0–6)
GIANT PLATELETS BLD QL SMEAR: PRESENT — SIGNIFICANT CHANGE UP
GLUCOSE SERPL-MCNC: 236 MG/DL — HIGH (ref 70–99)
HCT VFR BLD CALC: 34.1 % — LOW (ref 34.5–45)
HGB BLD-MCNC: 10 G/DL — LOW (ref 11.5–15.5)
HYPOCHROMIA BLD QL: SLIGHT — SIGNIFICANT CHANGE UP
LIDOCAIN IGE QN: 183 U/L — HIGH (ref 7–60)
LYMPHOCYTES # BLD AUTO: 1.08 K/UL — SIGNIFICANT CHANGE UP (ref 1–3.3)
LYMPHOCYTES # BLD AUTO: 14.1 % — SIGNIFICANT CHANGE UP (ref 13–44)
MANUAL SMEAR VERIFICATION: SIGNIFICANT CHANGE UP
MCHC RBC-ENTMCNC: 22.6 PG — LOW (ref 27–34)
MCHC RBC-ENTMCNC: 29.3 GM/DL — LOW (ref 32–36)
MCV RBC AUTO: 77 FL — LOW (ref 80–100)
MICROCYTES BLD QL: SLIGHT — SIGNIFICANT CHANGE UP
MONOCYTES # BLD AUTO: 0.14 K/UL — SIGNIFICANT CHANGE UP (ref 0–0.9)
MONOCYTES NFR BLD AUTO: 1.8 % — LOW (ref 2–14)
MYELOCYTES NFR BLD: 0.9 % — HIGH (ref 0–0)
NEUTROPHILS # BLD AUTO: 6.28 K/UL — SIGNIFICANT CHANGE UP (ref 1.8–7.4)
NEUTROPHILS NFR BLD AUTO: 82.3 % — HIGH (ref 43–77)
OVALOCYTES BLD QL SMEAR: SLIGHT — SIGNIFICANT CHANGE UP
PLAT MORPH BLD: NORMAL — SIGNIFICANT CHANGE UP
PLATELET # BLD AUTO: 259 K/UL — SIGNIFICANT CHANGE UP (ref 150–400)
POIKILOCYTOSIS BLD QL AUTO: SLIGHT — SIGNIFICANT CHANGE UP
POLYCHROMASIA BLD QL SMEAR: SLIGHT — SIGNIFICANT CHANGE UP
POTASSIUM SERPL-MCNC: SIGNIFICANT CHANGE UP (ref 3.5–5.3)
POTASSIUM SERPL-SCNC: SIGNIFICANT CHANGE UP (ref 3.5–5.3)
PROT SERPL-MCNC: 7 G/DL — SIGNIFICANT CHANGE UP (ref 6–8.3)
RBC # BLD: 4.43 M/UL — SIGNIFICANT CHANGE UP (ref 3.8–5.2)
RBC # FLD: 20.3 % — HIGH (ref 10.3–14.5)
RBC BLD AUTO: ABNORMAL
SODIUM SERPL-SCNC: 131 MMOL/L — LOW (ref 135–145)
WBC # BLD: 7.63 K/UL — SIGNIFICANT CHANGE UP (ref 3.8–10.5)
WBC # FLD AUTO: 7.63 K/UL — SIGNIFICANT CHANGE UP (ref 3.8–10.5)

## 2022-04-04 PROCEDURE — 76705 ECHO EXAM OF ABDOMEN: CPT | Mod: 26

## 2022-04-04 PROCEDURE — 71045 X-RAY EXAM CHEST 1 VIEW: CPT | Mod: 26

## 2022-04-04 PROCEDURE — 99285 EMERGENCY DEPT VISIT HI MDM: CPT | Mod: 25

## 2022-04-04 PROCEDURE — 93010 ELECTROCARDIOGRAM REPORT: CPT

## 2022-04-04 RX ORDER — MORPHINE SULFATE 50 MG/1
4 CAPSULE, EXTENDED RELEASE ORAL ONCE
Refills: 0 | Status: DISCONTINUED | OUTPATIENT
Start: 2022-04-04 | End: 2022-04-04

## 2022-04-04 RX ORDER — SODIUM CHLORIDE 9 MG/ML
1000 INJECTION INTRAMUSCULAR; INTRAVENOUS; SUBCUTANEOUS ONCE
Refills: 0 | Status: COMPLETED | OUTPATIENT
Start: 2022-04-04 | End: 2022-04-04

## 2022-04-04 RX ORDER — PANTOPRAZOLE SODIUM 20 MG/1
40 TABLET, DELAYED RELEASE ORAL ONCE
Refills: 0 | Status: COMPLETED | OUTPATIENT
Start: 2022-04-04 | End: 2022-04-04

## 2022-04-04 RX ORDER — ONDANSETRON 8 MG/1
4 TABLET, FILM COATED ORAL ONCE
Refills: 0 | Status: COMPLETED | OUTPATIENT
Start: 2022-04-04 | End: 2022-04-04

## 2022-04-04 RX ADMIN — PANTOPRAZOLE SODIUM 40 MILLIGRAM(S): 20 TABLET, DELAYED RELEASE ORAL at 23:28

## 2022-04-04 RX ADMIN — SODIUM CHLORIDE 1000 MILLILITER(S): 9 INJECTION INTRAMUSCULAR; INTRAVENOUS; SUBCUTANEOUS at 21:35

## 2022-04-04 RX ADMIN — ONDANSETRON 4 MILLIGRAM(S): 8 TABLET, FILM COATED ORAL at 23:28

## 2022-04-04 RX ADMIN — Medication 30 MILLILITER(S): at 23:34

## 2022-04-04 RX ADMIN — MORPHINE SULFATE 4 MILLIGRAM(S): 50 CAPSULE, EXTENDED RELEASE ORAL at 23:28

## 2022-04-05 ENCOUNTER — INPATIENT (INPATIENT)
Facility: HOSPITAL | Age: 47
LOS: 0 days | Discharge: HOME | DRG: 446 | End: 2022-04-06
Attending: SURGERY | Admitting: SURGERY
Payer: MEDICAID

## 2022-04-05 DIAGNOSIS — Z98.89 OTHER SPECIFIED POSTPROCEDURAL STATES: Chronic | ICD-10-CM

## 2022-04-05 DIAGNOSIS — Z98.890 OTHER SPECIFIED POSTPROCEDURAL STATES: Chronic | ICD-10-CM

## 2022-04-05 LAB
ALBUMIN SERPL ELPH-MCNC: 3.4 G/DL — SIGNIFICANT CHANGE UP (ref 3.3–5)
ALBUMIN SERPL ELPH-MCNC: 3.6 G/DL — SIGNIFICANT CHANGE UP (ref 3.3–5)
ALP SERPL-CCNC: 108 U/L — SIGNIFICANT CHANGE UP (ref 40–120)
ALP SERPL-CCNC: 149 U/L — HIGH (ref 40–120)
ALT FLD-CCNC: 357 U/L — HIGH (ref 10–45)
ALT FLD-CCNC: 452 U/L — HIGH (ref 10–45)
ANION GAP SERPL CALC-SCNC: 12 MMOL/L — SIGNIFICANT CHANGE UP (ref 5–17)
ANION GAP SERPL CALC-SCNC: 8 MMOL/L — SIGNIFICANT CHANGE UP (ref 5–17)
APPEARANCE UR: CLEAR — SIGNIFICANT CHANGE UP
APTT BLD: 26.3 SEC — LOW (ref 27.5–35.5)
AST SERPL-CCNC: 1578 U/L — HIGH (ref 10–40)
AST SERPL-CCNC: 483 U/L — HIGH (ref 10–40)
BACTERIA # UR AUTO: PRESENT /HPF
BILIRUB SERPL-MCNC: 0.3 MG/DL — SIGNIFICANT CHANGE UP (ref 0.2–1.2)
BILIRUB SERPL-MCNC: 0.4 MG/DL — SIGNIFICANT CHANGE UP (ref 0.2–1.2)
BILIRUB UR-MCNC: NEGATIVE — SIGNIFICANT CHANGE UP
BLD GP AB SCN SERPL QL: NEGATIVE — SIGNIFICANT CHANGE UP
BUN SERPL-MCNC: 10 MG/DL — SIGNIFICANT CHANGE UP (ref 7–23)
BUN SERPL-MCNC: 6 MG/DL — LOW (ref 7–23)
CALCIUM SERPL-MCNC: 8.6 MG/DL — SIGNIFICANT CHANGE UP (ref 8.4–10.5)
CALCIUM SERPL-MCNC: 8.7 MG/DL — SIGNIFICANT CHANGE UP (ref 8.4–10.5)
CHLORIDE SERPL-SCNC: 99 MMOL/L — SIGNIFICANT CHANGE UP (ref 96–108)
CHLORIDE SERPL-SCNC: 99 MMOL/L — SIGNIFICANT CHANGE UP (ref 96–108)
CO2 SERPL-SCNC: 21 MMOL/L — LOW (ref 22–31)
CO2 SERPL-SCNC: 26 MMOL/L — SIGNIFICANT CHANGE UP (ref 22–31)
COLOR SPEC: YELLOW — SIGNIFICANT CHANGE UP
CREAT SERPL-MCNC: 0.82 MG/DL — SIGNIFICANT CHANGE UP (ref 0.5–1.3)
CREAT SERPL-MCNC: 0.88 MG/DL — SIGNIFICANT CHANGE UP (ref 0.5–1.3)
DIFF PNL FLD: NEGATIVE — SIGNIFICANT CHANGE UP
EGFR: 83 ML/MIN/1.73M2 — SIGNIFICANT CHANGE UP
EGFR: 90 ML/MIN/1.73M2 — SIGNIFICANT CHANGE UP
EPI CELLS # UR: SIGNIFICANT CHANGE UP /HPF (ref 0–5)
GLUCOSE BLDC GLUCOMTR-MCNC: 136 MG/DL — HIGH (ref 70–99)
GLUCOSE BLDC GLUCOMTR-MCNC: 148 MG/DL — HIGH (ref 70–99)
GLUCOSE BLDC GLUCOMTR-MCNC: 185 MG/DL — HIGH (ref 70–99)
GLUCOSE SERPL-MCNC: 128 MG/DL — HIGH (ref 70–99)
GLUCOSE SERPL-MCNC: 188 MG/DL — HIGH (ref 70–99)
GLUCOSE UR QL: NEGATIVE — SIGNIFICANT CHANGE UP
HCG SERPL-ACNC: <0 MIU/ML — SIGNIFICANT CHANGE UP
HCT VFR BLD CALC: 30.4 % — LOW (ref 34.5–45)
HGB BLD-MCNC: 9 G/DL — LOW (ref 11.5–15.5)
HYALINE CASTS # UR AUTO: SIGNIFICANT CHANGE UP /LPF (ref 0–2)
INR BLD: 0.93 — SIGNIFICANT CHANGE UP (ref 0.88–1.16)
KETONES UR-MCNC: NEGATIVE — SIGNIFICANT CHANGE UP
LEUKOCYTE ESTERASE UR-ACNC: NEGATIVE — SIGNIFICANT CHANGE UP
MAGNESIUM SERPL-MCNC: 1.9 MG/DL — SIGNIFICANT CHANGE UP (ref 1.6–2.6)
MCHC RBC-ENTMCNC: 22.8 PG — LOW (ref 27–34)
MCHC RBC-ENTMCNC: 29.6 GM/DL — LOW (ref 32–36)
MCV RBC AUTO: 77 FL — LOW (ref 80–100)
NITRITE UR-MCNC: NEGATIVE — SIGNIFICANT CHANGE UP
NRBC # BLD: 0 /100 WBCS — SIGNIFICANT CHANGE UP (ref 0–0)
PH UR: 6 — SIGNIFICANT CHANGE UP (ref 5–8)
PHOSPHATE SERPL-MCNC: 3.6 MG/DL — SIGNIFICANT CHANGE UP (ref 2.5–4.5)
PLATELET # BLD AUTO: 233 K/UL — SIGNIFICANT CHANGE UP (ref 150–400)
POTASSIUM SERPL-MCNC: 4.1 MMOL/L — SIGNIFICANT CHANGE UP (ref 3.5–5.3)
POTASSIUM SERPL-MCNC: 4.3 MMOL/L — SIGNIFICANT CHANGE UP (ref 3.5–5.3)
POTASSIUM SERPL-SCNC: 4.1 MMOL/L — SIGNIFICANT CHANGE UP (ref 3.5–5.3)
POTASSIUM SERPL-SCNC: 4.3 MMOL/L — SIGNIFICANT CHANGE UP (ref 3.5–5.3)
PROT SERPL-MCNC: 6.3 G/DL — SIGNIFICANT CHANGE UP (ref 6–8.3)
PROT SERPL-MCNC: 6.6 G/DL — SIGNIFICANT CHANGE UP (ref 6–8.3)
PROT UR-MCNC: 100 MG/DL
PROTHROM AB SERPL-ACNC: 11 SEC — SIGNIFICANT CHANGE UP (ref 10.5–13.4)
RBC # BLD: 3.95 M/UL — SIGNIFICANT CHANGE UP (ref 3.8–5.2)
RBC # FLD: 20.7 % — HIGH (ref 10.3–14.5)
RBC CASTS # UR COMP ASSIST: < 5 /HPF — SIGNIFICANT CHANGE UP
RH IG SCN BLD-IMP: POSITIVE — SIGNIFICANT CHANGE UP
SARS-COV-2 RNA SPEC QL NAA+PROBE: NEGATIVE — SIGNIFICANT CHANGE UP
SODIUM SERPL-SCNC: 132 MMOL/L — LOW (ref 135–145)
SODIUM SERPL-SCNC: 133 MMOL/L — LOW (ref 135–145)
SP GR SPEC: 1.02 — SIGNIFICANT CHANGE UP (ref 1–1.03)
UROBILINOGEN FLD QL: 1 E.U./DL — SIGNIFICANT CHANGE UP
WBC # BLD: 3.01 K/UL — LOW (ref 3.8–10.5)
WBC # FLD AUTO: 3.01 K/UL — LOW (ref 3.8–10.5)
WBC UR QL: < 5 /HPF — SIGNIFICANT CHANGE UP

## 2022-04-05 PROCEDURE — G1004: CPT

## 2022-04-05 PROCEDURE — 74177 CT ABD & PELVIS W/CONTRAST: CPT | Mod: 26,MG

## 2022-04-05 PROCEDURE — 99222 1ST HOSP IP/OBS MODERATE 55: CPT

## 2022-04-05 PROCEDURE — 78226 HEPATOBILIARY SYSTEM IMAGING: CPT | Mod: 26

## 2022-04-05 RX ORDER — SINCALIDE 5 UG/5ML
1.9 INJECTION, POWDER, LYOPHILIZED, FOR SOLUTION INTRAVENOUS ONCE
Refills: 0 | Status: COMPLETED | OUTPATIENT
Start: 2022-04-05 | End: 2022-04-05

## 2022-04-05 RX ORDER — METFORMIN HYDROCHLORIDE 850 MG/1
1 TABLET ORAL
Qty: 0 | Refills: 0 | DISCHARGE

## 2022-04-05 RX ORDER — DEXTROSE 50 % IN WATER 50 %
15 SYRINGE (ML) INTRAVENOUS ONCE
Refills: 0 | Status: DISCONTINUED | OUTPATIENT
Start: 2022-04-05 | End: 2022-04-06

## 2022-04-05 RX ORDER — METRONIDAZOLE 500 MG
500 TABLET ORAL ONCE
Refills: 0 | Status: COMPLETED | OUTPATIENT
Start: 2022-04-05 | End: 2022-04-05

## 2022-04-05 RX ORDER — MAGNESIUM SULFATE 500 MG/ML
1 VIAL (ML) INJECTION ONCE
Refills: 0 | Status: COMPLETED | OUTPATIENT
Start: 2022-04-05 | End: 2022-04-05

## 2022-04-05 RX ORDER — URSODIOL 250 MG/1
300 TABLET, FILM COATED ORAL EVERY 12 HOURS
Refills: 0 | Status: DISCONTINUED | OUTPATIENT
Start: 2022-04-05 | End: 2022-04-06

## 2022-04-05 RX ORDER — SODIUM CHLORIDE 9 MG/ML
1000 INJECTION, SOLUTION INTRAVENOUS
Refills: 0 | Status: DISCONTINUED | OUTPATIENT
Start: 2022-04-05 | End: 2022-04-05

## 2022-04-05 RX ORDER — ATORVASTATIN CALCIUM 80 MG/1
40 TABLET, FILM COATED ORAL DAILY
Refills: 0 | Status: DISCONTINUED | OUTPATIENT
Start: 2022-04-05 | End: 2022-04-06

## 2022-04-05 RX ORDER — INSULIN LISPRO 100/ML
VIAL (ML) SUBCUTANEOUS
Refills: 0 | Status: DISCONTINUED | OUTPATIENT
Start: 2022-04-05 | End: 2022-04-06

## 2022-04-05 RX ORDER — SODIUM CHLORIDE 9 MG/ML
1000 INJECTION, SOLUTION INTRAVENOUS
Refills: 0 | Status: DISCONTINUED | OUTPATIENT
Start: 2022-04-05 | End: 2022-04-06

## 2022-04-05 RX ORDER — DEXTROSE 50 % IN WATER 50 %
25 SYRINGE (ML) INTRAVENOUS ONCE
Refills: 0 | Status: DISCONTINUED | OUTPATIENT
Start: 2022-04-05 | End: 2022-04-06

## 2022-04-05 RX ORDER — IOHEXOL 300 MG/ML
30 INJECTION, SOLUTION INTRAVENOUS ONCE
Refills: 0 | Status: COMPLETED | OUTPATIENT
Start: 2022-04-05 | End: 2022-04-05

## 2022-04-05 RX ORDER — GLUCAGON INJECTION, SOLUTION 0.5 MG/.1ML
1 INJECTION, SOLUTION SUBCUTANEOUS ONCE
Refills: 0 | Status: DISCONTINUED | OUTPATIENT
Start: 2022-04-05 | End: 2022-04-06

## 2022-04-05 RX ORDER — AMLODIPINE BESYLATE 2.5 MG/1
1 TABLET ORAL
Qty: 0 | Refills: 0 | DISCHARGE

## 2022-04-05 RX ORDER — CEFTRIAXONE 500 MG/1
2000 INJECTION, POWDER, FOR SOLUTION INTRAMUSCULAR; INTRAVENOUS EVERY 24 HOURS
Refills: 0 | Status: DISCONTINUED | OUTPATIENT
Start: 2022-04-06 | End: 2022-04-06

## 2022-04-05 RX ORDER — FERROUS SULFATE 325(65) MG
325 TABLET ORAL
Refills: 0 | Status: DISCONTINUED | OUTPATIENT
Start: 2022-04-05 | End: 2022-04-06

## 2022-04-05 RX ORDER — CEFTRIAXONE 500 MG/1
1000 INJECTION, POWDER, FOR SOLUTION INTRAMUSCULAR; INTRAVENOUS ONCE
Refills: 0 | Status: COMPLETED | OUTPATIENT
Start: 2022-04-05 | End: 2022-04-05

## 2022-04-05 RX ORDER — HEPARIN SODIUM 5000 [USP'U]/ML
5000 INJECTION INTRAVENOUS; SUBCUTANEOUS EVERY 8 HOURS
Refills: 0 | Status: DISCONTINUED | OUTPATIENT
Start: 2022-04-05 | End: 2022-04-06

## 2022-04-05 RX ORDER — DEXTROSE 50 % IN WATER 50 %
12.5 SYRINGE (ML) INTRAVENOUS ONCE
Refills: 0 | Status: DISCONTINUED | OUTPATIENT
Start: 2022-04-05 | End: 2022-04-06

## 2022-04-05 RX ORDER — METOCLOPRAMIDE HCL 10 MG
10 TABLET ORAL ONCE
Refills: 0 | Status: COMPLETED | OUTPATIENT
Start: 2022-04-05 | End: 2022-04-05

## 2022-04-05 RX ORDER — FOLIC ACID 0.8 MG
1 TABLET ORAL DAILY
Refills: 0 | Status: DISCONTINUED | OUTPATIENT
Start: 2022-04-05 | End: 2022-04-06

## 2022-04-05 RX ORDER — METRONIDAZOLE 500 MG
500 TABLET ORAL EVERY 8 HOURS
Refills: 0 | Status: DISCONTINUED | OUTPATIENT
Start: 2022-04-05 | End: 2022-04-06

## 2022-04-05 RX ADMIN — SINCALIDE 51.9 MICROGRAM(S): 5 INJECTION, POWDER, LYOPHILIZED, FOR SOLUTION INTRAVENOUS at 11:23

## 2022-04-05 RX ADMIN — Medication 100 MILLIGRAM(S): at 16:31

## 2022-04-05 RX ADMIN — HEPARIN SODIUM 5000 UNIT(S): 5000 INJECTION INTRAVENOUS; SUBCUTANEOUS at 10:58

## 2022-04-05 RX ADMIN — IOHEXOL 30 MILLILITER(S): 300 INJECTION, SOLUTION INTRAVENOUS at 03:58

## 2022-04-05 RX ADMIN — Medication 10 MILLIGRAM(S): at 04:39

## 2022-04-05 RX ADMIN — Medication 100 MILLIGRAM(S): at 07:37

## 2022-04-05 RX ADMIN — Medication 100 MILLIGRAM(S): at 23:56

## 2022-04-05 RX ADMIN — CEFTRIAXONE 100 MILLIGRAM(S): 500 INJECTION, POWDER, FOR SOLUTION INTRAMUSCULAR; INTRAVENOUS at 09:18

## 2022-04-05 RX ADMIN — Medication 325 MILLIGRAM(S): at 18:58

## 2022-04-05 RX ADMIN — Medication 100 GRAM(S): at 13:38

## 2022-04-05 RX ADMIN — CEFTRIAXONE 100 MILLIGRAM(S): 500 INJECTION, POWDER, FOR SOLUTION INTRAMUSCULAR; INTRAVENOUS at 06:43

## 2022-04-05 RX ADMIN — HEPARIN SODIUM 5000 UNIT(S): 5000 INJECTION INTRAVENOUS; SUBCUTANEOUS at 21:39

## 2022-04-05 RX ADMIN — URSODIOL 300 MILLIGRAM(S): 250 TABLET, FILM COATED ORAL at 18:58

## 2022-04-05 NOTE — ED ADULT NURSE NOTE - CAS DISCH BELONGINGS RETURNED
Ongoing SW/CM Assessment/Plan of Care Note     See SW/CM flowsheets for goals and other objective data.    Patient/Family discharge goal (s):  Goal #1: Communication facilitated  Goal #2: Extended Care Facility discharge arranged  Goal #3: Psychosocial needs assessed    PT Recommendation:  Recommendation for Discharge: PT: Less intensive rehab    OT Recommendation:  Recommendations for Discharge: OT: Less intensive rehab    Progress note:   Received call from Felicia with [facility]; facility doesn't have an appropriate bed.  Received VM from Scotty with [facility]; facility unable to accommodate due to chemo and radiation plan.  Spoke with Dora ([phone]) from [facility]; facility is able to accept pt and will complete an onsite to meet with pt.    Provided pt with update.  Pt reported she's working with Raquel [facility] a  and she's suppose to come met with pt this afternoon.  Pt requested this writer call Raquel ([phone]) to discuss d/c plans.  Contacted [facility] and was directed to speak with Lianne.  Per Lianne she will be meeting with pt today at 1400 hours.  Lianne stated if pt doesn't qualify for [facility], as pt is improving with mobility, she can help pt locate assisted living.  Lianne requested this writer provided some updated clinicals (H&P, PT/OT and progress notes) for her meeting with pt this afternoon.  This writer requested pt sign PARISA form prior to providing documents.  SW will follow.         Not applicable

## 2022-04-05 NOTE — H&P ADULT - HISTORY OF PRESENT ILLNESS
Ms. Dorothy Guan is a 46yF with PMH of anemia on iron (s/p pRBC transfusion last year), HTN on Losartan, DM on Metformin, HLD and a PSH of gastric bypass surgery (20 years ago, ),  in , abdominal myoplasty in 2019, presenting to the ED with a chief complaint of epigastric pain. Patient reports that the pain first started approximately 1-2 days ago, is most severe on her upper abdomen/epigastrum, radiates to the back, and has been associated with intermittent nausea and vomiting. Patient admits to heavy alcohol use ( 4-6 bottles of wine a month). No prior history of pancreatitis and colic pain. Denies CP, SOB nausea, vomiting, f/c.    In the ED, the patient is afebrile, no white count. RUQ US demonstrates cholelithiasis with trace PCCF, without gallbladder wall thickening. Labs notable for elevated AST (1200). CT significant for acute cholecystitis (Gallbladder is distended and there is diffuse inflammatory gallbladder wall)     Ms. Dorothy Guan is a 46yF with PMH of anemia on iron (s/p pRBC transfusion last year), HTN on Losartan, DM on Metformin, HLD and a PSH of gastric bypass surgery (20 years ago, ),  in , abdominal myoplasty in 2019, presenting to the ED with a chief complaint of epigastric pain. Patient reports that the pain first started approximately 1-2 days ago, is most severe on her upper abdomen/epigastrum, radiates to the back, and has been associated with intermittent nausea and vomiting. Patient admits to heavy alcohol use ( 4-6 bottles of wine a month). No prior history of pancreatitis and colic pain. Denies CP, SOB nausea, vomiting, f/c.    In the ED, the patient is afebrile, no white count. RUQ US demonstrates cholelithiasis with trace PCCF, without gallbladder wall thickening. Labs notable for elevated AST (1578), Alk phos (149), ALT(452). T bili wnl. CT significant for acute cholecystitis (Gallbladder is distended and there is diffuse inflammatory gallbladder wall)

## 2022-04-05 NOTE — ED PROVIDER NOTE - CLINICAL SUMMARY MEDICAL DECISION MAKING FREE TEXT BOX
Epigastric pain radiating to the back, n/v, HA, cough. DDx includes but not limited to pancreatitis, biliary colic, cholecystitis, gastritis, less likely COVID19, other RVI, low suspicion ACS. There are no EKG changes to suggest ischemia, infarction, or pericarditis. H&P is not c/w dissection, nor PE. Check labs, CXR, RUQ sono, analgesia / GI cocktail. Dispo pending w/u and clinical status

## 2022-04-05 NOTE — ED PROVIDER NOTE - OBJECTIVE STATEMENT
Pt w/ PMHx anemia on iron (s/p pRBC transfusion last year), HTN on Losartan, DM on Metformin, HLD, PSHx c/s p/w epigastric pain radiating to the back, sharp, and intermittent w/ n/v. pain sometimes radiates into chest. Sx preceded by HA and mild dry cough. Pt admits to daily drinking a bottle of wine. She denies hx pancreatitis, biliary colic, but reports hx gastritis. Pt has never had EGD. no f/c. She is fully vaccinated against COVID19 w/ booster. Denies substance abuse. + smoker. No sig FHx CAD or sudden death

## 2022-04-05 NOTE — ED ADULT NURSE REASSESSMENT NOTE - NS ED NURSE REASSESS COMMENT FT1
VS checked, patient ambulatory to restroom, provided with gowns, toothbrush/toothpaste.  Declined hospital socks.  Labs drawn and sent per order.  IV Flagyl still infusing.  Patient repositioned for comfort, called placed to [facility] to given RN-to-RN report.

## 2022-04-05 NOTE — H&P ADULT - NSHPLABSRESULTS_GEN_ALL_CORE
< from: US Abdomen Limited (04.04.22 @ 23:24) >    IMPRESSION:  Cholelithiasis and trace nonspecific pericholecystic fluid. No wall   thickening or positive Sims sign.    Hepatomegaly and hepatic steatosis.    < end of copied text >    < from: CT Abdomen and Pelvis w/ Oral Cont and w/ IV Cont (04.05.22 @ 05:06) >    IMPRESSION:  1. Enlarged fibroid uterus.  2. Multiple follicle/small cyst evident in the right ovary, measuring up   to 1.5 cm in size.  3. Acute cholecystitis: Gallbladder is distended and there is diffuse   inflammatory gallbladder wall  thickening and pericholecystic fluid.    < end of copied text >

## 2022-04-05 NOTE — H&P ADULT - ASSESSMENT
Ms. Dorothy Guan is a 46yF with PMH of anemia on iron, HTN on Losartan, DM on Metformin, HLD, presenting to the ED with several day Hx of worsening epigastric pain. On presentation, she is afebrile without leukocytosis. RUQ US notable for trace PCCF with cholelithiasis. CT significant for acute cholecystitis (Gallbladder is distended and there is diffuse inflammatory gallbladder wall)    - Admit to Dr. Montejo, brennen, team 2  - NPO/IVF  - MRCP  - Cef/flagyl  - coags  - Med rec  - pain/nausea control prn  - SCDs/HSQ  - ISS   Ms. Dorothy Guan is a 46yF with PMH of anemia on iron, HTN on Losartan, DM on Metformin, HLD, presenting to the ED with several day Hx of worsening epigastric pain. On presentation, she is afebrile without leukocytosis. RUQ US notable for trace PCCF with cholelithiasis. CT significant for acute cholecystitis (Gallbladder is distended and there is diffuse inflammatory gallbladder wall)    - Admit to Dr. Montejo, tele, team 2  - NPO/IVF  - HIDA scan  - Cef/flagyl  - coags  - Med rec  - pain/nausea control prn  - SCDs/HSQ  - ISS

## 2022-04-05 NOTE — H&P ADULT - NSHPPHYSICALEXAM_GEN_ALL_CORE
Constitutional: Middle aged woman, AA, pleasant, no acute distress.  Cardiac: NSR  Respiratory: Equal and bilateral chest rise. No acute respiratory distress.  Abdomen: Soft, NT, ND. No Sims sign. Mild tenderness to palpation of epigastrum. No rebound tenderness or guarding. Well healed laparoscopic port incisions visualized on abdomen.  Extremities: WWP  Vascular: Palpable pedal pulses bilaterally

## 2022-04-05 NOTE — ED PROVIDER NOTE - PHYSICAL EXAMINATION
Constitutional: Well appearing, awake, alert, oriented to person, place, time/situation and in no apparent distress.  ENMT: Airway patent. Normal MM  Eyes: Clear bilaterally  Cardiac: Normal rate, regular rhythm.  Heart sounds S1, S2.  No murmurs, rubs or gallops.  Respiratory: Breaths sounds equal and clear b/l. No increased WOB, tachypnea, hypoxia, or accessory mm use. Pt speaks in full sentences.   Gastrointestinal: Abd soft, ND, NABS. + ttp in the RUQ / epigastric region. No guarding, rebound, or rigidity. No pulsatile abdominal masses. No organomegaly appreciated.   Musculoskeletal: Range of motion is not limited  Neuro: Alert and oriented x 3, face symmetric and speech fluent. Strength 5/5 x 4 ext and symmetric, nml gross motor movement, nml gait. No focal deficits noted.  Skin: Skin normal color for race, warm, dry and intact. No evidence of rash.  Psych: Alert and oriented to person, place, time/situation. normal mood and affect. no apparent risk to self or others.

## 2022-04-06 ENCOUNTER — TRANSCRIPTION ENCOUNTER (OUTPATIENT)
Age: 47
End: 2022-04-06

## 2022-04-06 VITALS — TEMPERATURE: 99 F

## 2022-04-06 LAB
A1AT SERPL-MCNC: 143 MG/DL — SIGNIFICANT CHANGE UP (ref 90–200)
A1C WITH ESTIMATED AVERAGE GLUCOSE RESULT: 7.7 % — HIGH (ref 4–5.6)
ALBUMIN SERPL ELPH-MCNC: 3.3 G/DL — SIGNIFICANT CHANGE UP (ref 3.3–5)
ALP SERPL-CCNC: 102 U/L — SIGNIFICANT CHANGE UP (ref 40–120)
ALT FLD-CCNC: 237 U/L — HIGH (ref 10–45)
AMPHET UR-MCNC: NEGATIVE — SIGNIFICANT CHANGE UP
ANION GAP SERPL CALC-SCNC: 10 MMOL/L — SIGNIFICANT CHANGE UP (ref 5–17)
APTT BLD: 28.2 SEC — SIGNIFICANT CHANGE UP (ref 27.5–35.5)
APTT BLD: 28.3 SEC — SIGNIFICANT CHANGE UP (ref 27.5–35.5)
AST SERPL-CCNC: 177 U/L — HIGH (ref 10–40)
BARBITURATES UR SCN-MCNC: NEGATIVE — SIGNIFICANT CHANGE UP
BENZODIAZ UR-MCNC: NEGATIVE — SIGNIFICANT CHANGE UP
BILIRUB SERPL-MCNC: 0.2 MG/DL — SIGNIFICANT CHANGE UP (ref 0.2–1.2)
BUN SERPL-MCNC: 9 MG/DL — SIGNIFICANT CHANGE UP (ref 7–23)
CALCIUM SERPL-MCNC: 8.6 MG/DL — SIGNIFICANT CHANGE UP (ref 8.4–10.5)
CERULOPLASMIN SERPL-MCNC: 35 MG/DL — SIGNIFICANT CHANGE UP (ref 16–45)
CHLORIDE SERPL-SCNC: 99 MMOL/L — SIGNIFICANT CHANGE UP (ref 96–108)
CO2 SERPL-SCNC: 25 MMOL/L — SIGNIFICANT CHANGE UP (ref 22–31)
COCAINE METAB.OTHER UR-MCNC: NEGATIVE — SIGNIFICANT CHANGE UP
CREAT SERPL-MCNC: 0.88 MG/DL — SIGNIFICANT CHANGE UP (ref 0.5–1.3)
EGFR: 83 ML/MIN/1.73M2 — SIGNIFICANT CHANGE UP
ESTIMATED AVERAGE GLUCOSE: 174 MG/DL — HIGH (ref 68–114)
FERRITIN SERPL-MCNC: 21 NG/ML — SIGNIFICANT CHANGE UP (ref 15–150)
GLUCOSE BLDC GLUCOMTR-MCNC: 120 MG/DL — HIGH (ref 70–99)
GLUCOSE BLDC GLUCOMTR-MCNC: 218 MG/DL — HIGH (ref 70–99)
GLUCOSE BLDC GLUCOMTR-MCNC: 267 MG/DL — HIGH (ref 70–99)
GLUCOSE SERPL-MCNC: 140 MG/DL — HIGH (ref 70–99)
HCT VFR BLD CALC: 31.5 % — LOW (ref 34.5–45)
HGB BLD-MCNC: 9.3 G/DL — LOW (ref 11.5–15.5)
INR BLD: 0.94 — SIGNIFICANT CHANGE UP (ref 0.88–1.16)
INR BLD: 0.98 — SIGNIFICANT CHANGE UP (ref 0.88–1.16)
IRON SATN MFR SERPL: 33 UG/DL — SIGNIFICANT CHANGE UP (ref 30–160)
IRON SATN MFR SERPL: 8 % — LOW (ref 14–50)
MAGNESIUM SERPL-MCNC: 2 MG/DL — SIGNIFICANT CHANGE UP (ref 1.6–2.6)
MCHC RBC-ENTMCNC: 23 PG — LOW (ref 27–34)
MCHC RBC-ENTMCNC: 29.5 GM/DL — LOW (ref 32–36)
MCV RBC AUTO: 77.8 FL — LOW (ref 80–100)
METHADONE UR-MCNC: NEGATIVE — SIGNIFICANT CHANGE UP
NRBC # BLD: 0 /100 WBCS — SIGNIFICANT CHANGE UP (ref 0–0)
OPIATES UR-MCNC: NEGATIVE — SIGNIFICANT CHANGE UP
PCP SPEC-MCNC: SIGNIFICANT CHANGE UP
PCP UR-MCNC: NEGATIVE — SIGNIFICANT CHANGE UP
PHOSPHATE SERPL-MCNC: 3.4 MG/DL — SIGNIFICANT CHANGE UP (ref 2.5–4.5)
PLATELET # BLD AUTO: 253 K/UL — SIGNIFICANT CHANGE UP (ref 150–400)
POTASSIUM SERPL-MCNC: 4 MMOL/L — SIGNIFICANT CHANGE UP (ref 3.5–5.3)
POTASSIUM SERPL-SCNC: 4 MMOL/L — SIGNIFICANT CHANGE UP (ref 3.5–5.3)
PROT SERPL-MCNC: 6.3 G/DL — SIGNIFICANT CHANGE UP (ref 6–8.3)
PROTHROM AB SERPL-ACNC: 11.2 SEC — SIGNIFICANT CHANGE UP (ref 10.5–13.4)
PROTHROM AB SERPL-ACNC: 11.6 SEC — SIGNIFICANT CHANGE UP (ref 10.5–13.4)
RBC # BLD: 4.05 M/UL — SIGNIFICANT CHANGE UP (ref 3.8–5.2)
RBC # FLD: 21 % — HIGH (ref 10.3–14.5)
SODIUM SERPL-SCNC: 134 MMOL/L — LOW (ref 135–145)
THC UR QL: NEGATIVE — SIGNIFICANT CHANGE UP
TIBC SERPL-MCNC: 409 UG/DL — SIGNIFICANT CHANGE UP (ref 220–430)
UIBC SERPL-MCNC: 376 UG/DL — HIGH (ref 110–370)
WBC # BLD: 2.94 K/UL — LOW (ref 3.8–10.5)
WBC # FLD AUTO: 2.94 K/UL — LOW (ref 3.8–10.5)

## 2022-04-06 PROCEDURE — 99253 IP/OBS CNSLTJ NEW/EST LOW 45: CPT

## 2022-04-06 PROCEDURE — 99232 SBSQ HOSP IP/OBS MODERATE 35: CPT

## 2022-04-06 PROCEDURE — 71045 X-RAY EXAM CHEST 1 VIEW: CPT | Mod: 26

## 2022-04-06 RX ORDER — URSODIOL 250 MG/1
1 TABLET, FILM COATED ORAL
Qty: 60 | Refills: 0
Start: 2022-04-06 | End: 2022-05-05

## 2022-04-06 RX ADMIN — HEPARIN SODIUM 5000 UNIT(S): 5000 INJECTION INTRAVENOUS; SUBCUTANEOUS at 05:46

## 2022-04-06 RX ADMIN — HEPARIN SODIUM 5000 UNIT(S): 5000 INJECTION INTRAVENOUS; SUBCUTANEOUS at 14:29

## 2022-04-06 RX ADMIN — URSODIOL 300 MILLIGRAM(S): 250 TABLET, FILM COATED ORAL at 17:02

## 2022-04-06 RX ADMIN — Medication 6: at 07:20

## 2022-04-06 RX ADMIN — Medication 1 MILLIGRAM(S): at 11:38

## 2022-04-06 RX ADMIN — Medication 4: at 16:46

## 2022-04-06 RX ADMIN — Medication 325 MILLIGRAM(S): at 05:46

## 2022-04-06 RX ADMIN — ATORVASTATIN CALCIUM 40 MILLIGRAM(S): 80 TABLET, FILM COATED ORAL at 11:38

## 2022-04-06 RX ADMIN — URSODIOL 300 MILLIGRAM(S): 250 TABLET, FILM COATED ORAL at 05:46

## 2022-04-06 RX ADMIN — Medication 100 MILLIGRAM(S): at 15:15

## 2022-04-06 RX ADMIN — CEFTRIAXONE 100 MILLIGRAM(S): 500 INJECTION, POWDER, FOR SOLUTION INTRAMUSCULAR; INTRAVENOUS at 09:23

## 2022-04-06 RX ADMIN — Medication 100 MILLIGRAM(S): at 07:29

## 2022-04-06 RX ADMIN — Medication 325 MILLIGRAM(S): at 17:02

## 2022-04-06 NOTE — DISCHARGE NOTE PROVIDER - NSDCMRMEDTOKEN_GEN_ALL_CORE_FT
atorvastatin 40 mg oral tablet: 1 tab(s) orally once a day  buPROPion 150 mg/24 hours (XL) oral tablet, extended release: 1 tab(s) orally every 24 hours  chlorthalidone 25 mg oral tablet: 1 tab(s) orally once a day  ergocalciferol 1.25 mg (50,000 intl units) oral capsule: 1 cap(s) orally once a week  folic acid 1 mg oral tablet: 1 tab(s) orally once a day  losartan 25 mg oral tablet: 1 tab(s) orally once a day  metFORMIN 750 mg oral tablet, extended release: 1 tab(s) orally once a day   atorvastatin 40 mg oral tablet: 1 tab(s) orally once a day  buPROPion 150 mg/24 hours (XL) oral tablet, extended release: 1 tab(s) orally every 24 hours  chlorthalidone 25 mg oral tablet: 1 tab(s) orally once a day  ergocalciferol 1.25 mg (50,000 intl units) oral capsule: 1 cap(s) orally once a week  folic acid 1 mg oral tablet: 1 tab(s) orally once a day  losartan 25 mg oral tablet: 1 tab(s) orally once a day  metFORMIN 750 mg oral tablet, extended release: 1 tab(s) orally once a day  ursodiol 300 mg oral capsule: 1 cap(s) orally every 12 hours

## 2022-04-06 NOTE — CONSULT NOTE ADULT - ASSESSMENT
45yo F with PMH of anemia on iron (s/p pRBC transfusion last year), HTN on Losartan, DM on Metformin, HLD and a PSH of gastric bypass surgery (20 years ago, ),  in , abdominal myoplasty in 2019, presenting to the ED with a chief complaint of epigastric pain x 1-2 days, found to have a transaminitis for which GI was consulted on recommendations on management.    #Transaminitis   Patient presenting with   - Please send ROSALIA, AMA, ASMA, alpha 1 antitrypsin, ceruloplasmin, anti LKM-1 Ab, immunoglobulin panel, iron panel (iron, TIBC, ferritin, transferrin)  - Please obtain Abdominal US with duplex to rule out PVT   - Daily CMPs & Coags to calculate MELD-Na  -  47yo F with PMH of anemia on iron (s/p pRBC transfusion last year), HTN on Losartan, DM on Metformin, HLD and a PSH of gastric bypass surgery (20 years ago, ),  in , abdominal myoplasty in 2019, presenting to the ED with a chief complaint of epigastric pain x 1-2 days, found to have a transaminitis for which GI was consulted on recommendations on management.    #Transaminitis   Patient presenting with complaints of 1-2 days of epigastric pain along with nausea/vomiting in the setting of recent intake of 1-2 pinks of vodka (Friday, ). CT A/P on admission revealing findings suggestive of possible cholecystitis, HIDA negative for acute cholecystitis. Liver enzymes noted to be elevated with AST 1578, , c/w hepatocellular pattern.   - Please send ROSALIA, AMA, ASMA, alpha 1 antitrypsin, ceruloplasmin, anti LKM-1 Ab, immunoglobulin panel, iron panel (iron, TIBC, ferritin, transferrin)  - Please obtain Abdominal US with duplex to rule out PVT   - Daily CMPs & Coags to calculate MELD-Na  - Despite liver enzymes downtrending at this time, would benefit from liver biopsy to assess for steatosis & fibrosis     INCOMPLETE NOTE 45yo F with PMH of anemia on iron (s/p pRBC transfusion last year), HTN on Losartan, DM on Metformin, HLD and a PSH of gastric bypass surgery (20 years ago, ),  in , abdominal myoplasty in 2019, presenting to the ED with a chief complaint of epigastric pain x 1-2 days, found to have a transaminitis for which GI was consulted on recommendations on management.    #Transaminitis   Patient presenting with complaints of 1-2 days of epigastric pain along with nausea/vomiting in the setting of recent intake of 1-2 pinks of vodka (Friday, ). CT A/P on admission revealing findings suggestive of possible cholecystitis, HIDA negative for acute cholecystitis. Liver enzymes noted to be elevated with AST 1578, , c/w hepatocellular pattern. Not c/w alcoholic hepatitis picture. With previous normal liver studies in . Utox negative. Likely current acute uptrend multi-factorial in etiology, suspect some degree 2/2 etOH use vs nausea/vomiting/decreased PO intake, though would anticipate to see an associated SABRINA, which she does not have. Cannot rule out other etiologies including autoimmune (risk factors: female, 40s)  - Please send ROSALIA, AMA, ASMA, alpha 1 antitrypsin, ceruloplasmin, anti LKM-1 Ab, immunoglobulin panel, iron panel (iron, TIBC, ferritin, transferrin)  - Please obtain Abdominal US with duplex to rule out PVT   - Daily CMPs & Coags to calculate MELD-Na  - Liver enzymes downtrending at this time however would still benefit from liver biopsy to assess for steatosis & fibrosis, which can be pursued as an outpatient as well   - If discharged today, recommend outpatient follow up with Dr Covington (Hepatology)   - Counseled on EtOH cessation      #Nausea/Vomiting  Patient notes a history of epigastric discomfort predating EtoH consumption, noting a sensation of fullness in the epigastric region with PO intake.   - Currently tolerating PO  - No EGD since bariatric surgery, would warrant endoscopic evaluation as an outpatient, GI team to help coordinate    #Anemia  Notes history of anemia for which she takes Iron supplementation (PO) at home, previously on IV infusions however patient states stopping 2/2 normalization of H/H. 9.3 today, previous hospitalizations in 2019 with Hgb as low as 5s. With history of fibroids resulting in menorrhagia, suspect some degree of contributing to anemia however previous RYGB predisposes to CONSTANCE 2/2 malabsorption.  -Recommend resuming Fe IV infusions as an outpatient  -Obtain Fe studies  -EGD as noted above as well for evaluation of CONSTANCE     #Colorectal Cancer Screening   Patient notes that she was had a screening colonoscopy about 1 year ago however was aborted 2/2 poor prep.  -Will coordinate EGD (as noted above) along with colonoscopy for CRC screening    Case discussed with [facility] attending and primary team.     Donita Russo DO  Gastroenterology Fellow  Pager: [phone]

## 2022-04-06 NOTE — DISCHARGE NOTE PROVIDER - PROVIDER TOKENS
PROVIDER:[TOKEN:[[id number]],FOLLOWUP:[1 week]],PROVIDER:[TOKEN:[[id number]],FOLLOWUP:[1 week]],PROVIDER:[TOKEN:[[id number]],FOLLOWUP:[1 week]]

## 2022-04-06 NOTE — DISCHARGE NOTE PROVIDER - NSDCCPCAREPLAN_GEN_ALL_CORE_FT
PRINCIPAL DISCHARGE DIAGNOSIS  Diagnosis: Cholecystitis, acute  Assessment and Plan of Treatment: Ms. Dorothy Guan is a 46yF with PMH of anemia on iron, HTN, DM II, HLD , psxhx of RYGB  presenting to the ED with several day Hx of worsening epigastric pain. On presentation, she is afebrile without leukocytosis. RUQ US notable for trace PCCF with cholelithiasis. CT significant for acute cholecystitis (Gallbladder is distended and there is diffuse inflammatory gallbladder wall), HIDA (4/5) wnl (no cholecystitis). GI consulted recommended for  further work up outpatient for elevated Transaminitis.  -Continue a low fat diet  -Activity: No restrictions   -Please follow up with gastroenterologist/ Hepatologist Dr. Covington in 2 weeks for further work up of Epigastric pain and Transaminitis.  -Please follow up Dr. Hatfield at [facility] in regards to stones in your gallbladder.    - Please follow up with Hematologist Dr. Cornejo for further wok up for iron deficiency anemia s/p Gastroc bypass.   -Notify physician for fever greater than 101, worsening abdominal pain, nausea and emesis.

## 2022-04-06 NOTE — DISCHARGE NOTE PROVIDER - CARE PROVIDERS DIRECT ADDRESSES
,DirectAddress_Unknown,[email or web address]

## 2022-04-06 NOTE — PROGRESS NOTE ADULT - SUBJECTIVE AND OBJECTIVE BOX
Incomplete note   INTERVAL HPI/OVERNIGHT EVENTS: Nandini reg diet for dinner, -N/-V, +F/-BM  : Started on CTX/Flagyl, HIDA wnl (no cholecystitis), GI consulted (asked for urine tox and trending coags/LFTs), adv to reg diet, started on Actigall, AVSS          SUBJECTIVE:      cefTRIAXone   IVPB 2000 milliGRAM(s) IV Intermittent every 24 hours  heparin   Injectable 5000 Unit(s) SubCutaneous every 8 hours  metroNIDAZOLE  IVPB 500 milliGRAM(s) IV Intermittent every 8 hours      Vital Signs Last 24 Hrs  T(C): 36.9 (2022 04:49), Max: 37.1 (2022 21:11)  T(F): 98.5 (2022 04:49), Max: 98.7 (2022 21:11)  HR: 90 (2022 04:53) (74 - 94)  BP: 127/73 (2022 04:53) (124/86 - 143/78)  BP(mean): 92 (2022 04:53) (88 - 109)  RR: 18 (2022 04:53) (15 - 18)  SpO2: 95% (2022 04:53) (95% - 100%)  I&O's Detail    2022 07:01  -  2022 06:14  --------------------------------------------------------  IN:  Total IN: 0 mL    OUT:    Voided (mL): 800 mL  Total OUT: 800 mL    Total NET: -800 mL          General: NAD, resting comfortably in bed  C/V: NSR  Pulm: Nonlabored breathing, no respiratory distress  Abd: Soft, non-distended, non tender    Extrem: WWP, no edema, SCDs in place      LABS:                        9.0    3.01  )-----------( 233      ( 2022 12:43 )             30.4     04-05    133<L>  |  99  |  6<L>  ----------------------------<  128<H>  4.1   |  26  |  0.88    Ca    8.6      2022 12:43  Phos  3.6     04-05  Mg     1.9     04-05    TPro  6.3  /  Alb  3.4  /  TBili  0.3  /  DBili  x   /  AST  483<H>  /  ALT  357<H>  /  AlkPhos  108  04-05    PT/INR - ( 2022 08:53 )   PT: 11.0 sec;   INR: 0.93          PTT - ( 2022 08:53 )  PTT:26.3 sec  Urinalysis Basic - ( 2022 23:58 )    Color: Yellow / Appearance: Clear / S.025 / pH: x  Gluc: x / Ketone: NEGATIVE  / Bili: Negative / Urobili: 1.0 E.U./dL   Blood: x / Protein: 100 mg/dL / Nitrite: NEGATIVE   Leuk Esterase: NEGATIVE / RBC: < 5 /HPF / WBC < 5 /HPF   Sq Epi: x / Non Sq Epi: 0-5 /HPF / Bacteria: Present /HPF           INTERVAL HPI/OVERNIGHT EVENTS: Nandini reg diet for dinner, -N/-V, +F/-BM  : Started on CTX/Flagyl, HIDA wnl (no cholecystitis), GI consulted (asked for urine tox and trending coags/LFTs), adv to reg diet, started on Actigall, AVSS          SUBJECTIVE:  Patient examined bedside with Dr. Montejo. Patient complains of chest congestion and headache . Patient reports when she coughs or touches her chest it hurts.  Patient reports  that she is tolerating diet. Patient reports she is passing flatus and having bowel movements. Patient reports she is ambulating and using incentive spirometer. Patient denies SOB, chest pain, nausea and emesis. Patient making adequate urine output.       cefTRIAXone   IVPB 2000 milliGRAM(s) IV Intermittent every 24 hours  heparin   Injectable 5000 Unit(s) SubCutaneous every 8 hours  metroNIDAZOLE  IVPB 500 milliGRAM(s) IV Intermittent every 8 hours      Vital Signs Last 24 Hrs  T(C): 36.9 (2022 04:49), Max: 37.1 (2022 21:11)  T(F): 98.5 (2022 04:49), Max: 98.7 (2022 21:11)  HR: 90 (2022 04:53) (74 - 94)  BP: 127/73 (2022 04:53) (124/86 - 143/78)  BP(mean): 92 (2022 04:53) (88 - 109)  RR: 18 (2022 04:53) (15 - 18)  SpO2: 95% (2022 04:53) (95% - 100%)  I&O's Detail    2022 07:01  -  2022 06:14  --------------------------------------------------------  IN:  Total IN: 0 mL    OUT:    Voided (mL): 800 mL  Total OUT: 800 mL    Total NET: -800 mL          General: NAD, resting comfortably in bed  C/V: NSR  Pulm: Nonlabored breathing, no respiratory distress  Abd: Soft, non-distended, non tender    Extrem: WWP, no edema, SCDs in place      LABS:                        9.0    3.01  )-----------( 233      ( 2022 12:43 )             30.4     04-05    133<L>  |  99  |  6<L>  ----------------------------<  128<H>  4.1   |  26  |  0.88    Ca    8.6      2022 12:43  Phos  3.6     04-05  Mg     1.9     04-05    TPro  6.3  /  Alb  3.4  /  TBili  0.3  /  DBili  x   /  AST  483<H>  /  ALT  357<H>  /  AlkPhos  108  04-05    PT/INR - ( 2022 08:53 )   PT: 11.0 sec;   INR: 0.93          PTT - ( 2022 08:53 )  PTT:26.3 sec  Urinalysis Basic - ( 2022 23:58 )    Color: Yellow / Appearance: Clear / S.025 / pH: x  Gluc: x / Ketone: NEGATIVE  / Bili: Negative / Urobili: 1.0 E.U./dL   Blood: x / Protein: 100 mg/dL / Nitrite: NEGATIVE   Leuk Esterase: NEGATIVE / RBC: < 5 /HPF / WBC < 5 /HPF   Sq Epi: x / Non Sq Epi: 0-5 /HPF / Bacteria: Present /HPF

## 2022-04-06 NOTE — DISCHARGE NOTE NURSING/CASE MANAGEMENT/SOCIAL WORK - NSDCPEFALRISK_GEN_ALL_CORE
For information on Fall & Injury Prevention, visit: [email or web address] OR  [email or web address] OR  https://www.cdc.gov/steadi/patient.html

## 2022-04-06 NOTE — DISCHARGE NOTE PROVIDER - CARE PROVIDER_API CALL
Jackson Covington)  Gastroenterology; Internal Medicine  [address], [place] [address]  Phone: [phone]  Fax: [phone]  Follow Up Time: 1 week    Patricia Hatfield)  Surgery  [address], [place] [address]  Phone: [phone]  Fax: [phone]  Follow Up Time: 1 week    Janet Cornejo)  Internal Medicine  [address], [address] [place] [address]  Phone: [phone]  Fax: [phone]  Follow Up Time: 1 week

## 2022-04-06 NOTE — DISCHARGE NOTE PROVIDER - NSDCCAREPROVSEEN_GEN_ALL_CORE_FT
Problem: Pain, Acute (Adult)  Goal: Acceptable Pain Control/Comfort Level  Outcome: Outcome(s) achieved Date Met:  03/10/17       Aaron Montejo

## 2022-04-06 NOTE — CONSULT NOTE ADULT - SUBJECTIVE AND OBJECTIVE BOX
HPI:  Ms. Dorothy Guan is a 46yF with PMH of anemia on iron (s/p pRBC transfusion last year), HTN on Losartan, DM on Metformin, HLD and a PSH of gastric bypass surgery (20 years ago, ),  in , abdominal myoplasty in , presenting to the ED with a chief complaint of epigastric pain. Patient reports that the pain first started approximately 1-2 days ago, is most severe on her upper abdomen/epigastrum, radiates to the back, and has been associated with intermittent nausea and vomiting. Patient admits to heavy alcohol use ( 4-6 bottles of wine a month). No prior history of pancreatitis and colic pain. Denies CP, SOB nausea, vomiting, f/c.    In the ED, the patient is afebrile, no white count. RUQ US demonstrates cholelithiasis with trace PCCF, without gallbladder wall thickening. Labs notable for elevated AST (1578), Alk phos (149), ALT(452). T bili wnl. CT significant for acute cholecystitis (Gallbladder is distended and there is diffuse inflammatory gallbladder wall)     (2022 07:46)    Allergies    No Known Allergies    Intolerances      MEDICATIONS:  MEDICATIONS  (STANDING):  atorvastatin 40 milliGRAM(s) Oral daily  cefTRIAXone   IVPB 2000 milliGRAM(s) IV Intermittent every 24 hours  dextrose 5%. 1000 milliLiter(s) (50 mL/Hr) IV Continuous <Continuous>  dextrose 5%. 1000 milliLiter(s) (100 mL/Hr) IV Continuous <Continuous>  dextrose 50% Injectable 25 Gram(s) IV Push once  dextrose 50% Injectable 12.5 Gram(s) IV Push once  dextrose 50% Injectable 25 Gram(s) IV Push once  ferrous    sulfate 325 milliGRAM(s) Oral two times a day  folic acid 1 milliGRAM(s) Oral daily  glucagon  Injectable 1 milliGRAM(s) IntraMuscular once  heparin   Injectable 5000 Unit(s) SubCutaneous every 8 hours  insulin lispro (ADMELOG) corrective regimen sliding scale   SubCutaneous three times a day before meals  metroNIDAZOLE  IVPB 500 milliGRAM(s) IV Intermittent every 8 hours  ursodiol Capsule 300 milliGRAM(s) Oral every 12 hours    MEDICATIONS  (PRN):  dextrose Oral Gel 15 Gram(s) Oral once PRN Blood Glucose LESS THAN 70 milliGRAM(s)/deciliter    PAST MEDICAL & SURGICAL HISTORY:  Anemia    Fibroids    Thyroid nodule    Diabetes    Anemia    GERD (gastroesophageal reflux disease)    H/O gastric bypass      H/O:   x1    History of D&amp;C    H/O: myomectomy      FAMILY HISTORY:  Family history of diabetes mellitus    Family history of hypertension (Grandparent)      SOCIAL HISTORY:  Tobacoo: [ ] Current, [ ] Former, [ ] Never; Pack Years:  Alcohol:  Illicit Drugs:    REVIEW OF SYSTEMS:  Constitutional: No fever, chills, weight loss, or fatigue  ENMT:  No visual changes; No difficulty hearing, tinnitus, vertigo; No sinus or throat pain  Neck: No pain or stiffness  Respiratory: No cough, wheezing, or hemoptysis; No shortness of breath  Cardiovascular: No chest pain, palpitations, dizziness, orthopnea, PND, or leg swelling  Gastrointestinal: No abdominal or epigastric pain. No  nausea, vomiting, or hematemesis. No diarrhea, constipation, or steatorrhea. No melena or hematochezia  Genitourinary: No dysuria, urinary frequency/hesitancy, or hematuria  Skin: No itching, burning, rashes or lesions   Musculoskeletal: No joint pain or swelling; No muscle, back or extremity pain    Vital Signs Last 24 Hrs  T(C): 36.9 (2022 04:49), Max: 37.1 (2022 21:11)  T(F): 98.5 (2022 04:49), Max: 98.7 (2022 21:11)  HR: 88 (2022 08:30) (82 - 94)  BP: 143/70 (2022 08:30) (127/73 - 143/70)  BP(mean): 100 (2022 08:30) (88 - 109)  RR: 18 (2022 08:30) (18 - 18)  SpO2: 96% (2022 08:30) (95% - 100%)    -05 @ 07:01  -   @ 07:00  --------------------------------------------------------  IN: 0 mL / OUT: 800 mL / NET: -800 mL        PHYSICAL EXAM:    General: Well developed; well nourished; in no acute distress  HEENT: MMM, conjunctiva and sclera clear  Gastrointestinal: Soft, non-tender non-distended; Normal bowel sounds; No rebound or guarding  Extremities: Normal range of motion, No clubbing, cyanosis or edema  Neurological: Alert and oriented x3  Skin: Warm and dry. No obvious rash    LABS:                        9.3    2.94  )-----------( 253      ( 2022 06:06 )             31.5     04-06    134<L>  |  99  |  9   ----------------------------<  140<H>  4.0   |  25  |  0.88    Ca    8.6      2022 06:06  Phos  3.4     04-06  Mg     2.0     04-06    TPro  6.3  /  Alb  3.3  /  TBili  0.2  /  DBili  x   /  AST  177<H>  /  ALT  237<H>  /  AlkPhos  102  04-06        RADIOLOGY & ADDITIONAL STUDIES:    HPI:  Ms. Dorothy Guan is a 46yF with PMH of anemia on iron (s/p pRBC transfusion last year), HTN on Losartan, DM on Metformin, HLD and a PSH of gastric bypass surgery (20 years ago, ),  in , abdominal myoplasty in , presenting to the ED with a chief complaint of epigastric pain. Patient reports that the pain first started approximately 1-2 days ago, is most severe on her upper abdomen/epigastrum, radiates to the back, and has been associated with intermittent nausea and vomiting. Patient admits to heavy alcohol use ( 4-6 bottles of wine a month). No prior history of pancreatitis and colic pain. Denies CP, SOB nausea, vomiting, f/c.    In the ED, the patient is afebrile, no white count. RUQ US demonstrates cholelithiasis with trace PCCF, without gallbladder wall thickening. Labs notable for elevated AST (1578), Alk phos (149), ALT(452). T bili wnl. CT significant for acute cholecystitis (Gallbladder is distended and there is diffuse inflammatory gallbladder wall)    GI consulted for transaminitis noted on admission.    (2022 07:46)    Allergies    No Known Allergies    Intolerances      MEDICATIONS:  MEDICATIONS  (STANDING):  atorvastatin 40 milliGRAM(s) Oral daily  cefTRIAXone   IVPB 2000 milliGRAM(s) IV Intermittent every 24 hours  dextrose 5%. 1000 milliLiter(s) (50 mL/Hr) IV Continuous <Continuous>  dextrose 5%. 1000 milliLiter(s) (100 mL/Hr) IV Continuous <Continuous>  dextrose 50% Injectable 25 Gram(s) IV Push once  dextrose 50% Injectable 12.5 Gram(s) IV Push once  dextrose 50% Injectable 25 Gram(s) IV Push once  ferrous    sulfate 325 milliGRAM(s) Oral two times a day  folic acid 1 milliGRAM(s) Oral daily  glucagon  Injectable 1 milliGRAM(s) IntraMuscular once  heparin   Injectable 5000 Unit(s) SubCutaneous every 8 hours  insulin lispro (ADMELOG) corrective regimen sliding scale   SubCutaneous three times a day before meals  metroNIDAZOLE  IVPB 500 milliGRAM(s) IV Intermittent every 8 hours  ursodiol Capsule 300 milliGRAM(s) Oral every 12 hours    MEDICATIONS  (PRN):  dextrose Oral Gel 15 Gram(s) Oral once PRN Blood Glucose LESS THAN 70 milliGRAM(s)/deciliter    PAST MEDICAL & SURGICAL HISTORY:  Anemia    Fibroids    Thyroid nodule    Diabetes    Anemia    GERD (gastroesophageal reflux disease)    H/O gastric bypass      H/O:   x1    History of D&amp;C    H/O: myomectomy      FAMILY HISTORY:  Family history of diabetes mellitus    Family history of hypertension (Grandparent)      SOCIAL HISTORY:  [place]ac[place]: [ ] Current, [ ] Former, [ ] Never; Pack Years:  Alcohol:  Illicit Drugs:    REVIEW OF SYSTEMS:  Constitutional: No fever, chills, weight loss, or fatigue  ENMT:  No visual changes; No difficulty hearing, tinnitus, vertigo; No sinus or throat pain  Neck: No pain or stiffness  Respiratory: No cough, wheezing, or hemoptysis; No shortness of breath  Cardiovascular: No chest pain, palpitations, dizziness, orthopnea, PND, or leg swelling  Gastrointestinal: No abdominal or epigastric pain. No  nausea, vomiting, or hematemesis. No diarrhea, constipation, or steatorrhea. No melena or hematochezia  Genitourinary: No dysuria, urinary frequency/hesitancy, or hematuria  Skin: No itching, burning, rashes or lesions   Musculoskeletal: No joint pain or swelling; No muscle, back or extremity pain    Vital Signs Last 24 Hrs  T(C): 36.9 (2022 04:49), Max: 37.1 (2022 21:11)  T(F): 98.5 (2022 04:49), Max: 98.7 (2022 21:11)  HR: 88 (2022 08:30) (82 - 94)  BP: 143/70 (2022 08:30) (127/73 - 143/70)  BP(mean): 100 (2022 08:30) (88 - 109)  RR: 18 (2022 08:30) (18 - 18)  SpO2: 96% (2022 08:30) (95% - 100%)     @ 07:01  -   @ 07:00  --------------------------------------------------------  IN: 0 mL / OUT: 800 mL / NET: -800 mL        PHYSICAL EXAM:    General: Obese  female; in no acute distress  HEENT: MMM, conjunctiva and sclera clear  Gastrointestinal: Soft, non-distended; tenderness mainly in LUQ; Normal bowel sounds; No rebound or guarding  Extremities: Normal range of motion, No clubbing, cyanosis or edema  Neurological: Alert and oriented x3  Skin: Warm and dry. No obvious rash    LABS:                        9.3    2.94  )-----------( 253      ( 2022 06:06 )             31.5     04-06    134<L>  |  99  |  9   ----------------------------<  140<H>  4.0   |  25  |  0.88    Ca    8.6      2022 06:06  Phos  3.4     04-06  Mg     2.0     04-06    TPro  6.3  /  Alb  3.3  /  TBili  0.2  /  DBili  x   /  AST  177<H>  /  ALT  237<H>  /  AlkPhos  102  04-06        RADIOLOGY & ADDITIONAL STUDIES:

## 2022-04-06 NOTE — DISCHARGE NOTE PROVIDER - HOSPITAL COURSE
Ms. Dorothy Guan is a 46yF with PMH of anemia on iron, HTN, DM II, HLD , psxhx of RYGB  presenting to the ED with several day Hx of worsening epigastric pain. On presentation, she is afebrile without leukocytosis. RUQ US notable for trace PCCF with cholelithiasis. CT significant for acute cholecystitis (Gallbladder is distended and there is diffuse inflammatory gallbladder wall), HIDA (4/5) wnl (no cholecystitis). GI consulted recommended for  further work up outpatient for elevated Transaminitis.

## 2022-04-06 NOTE — DISCHARGE NOTE NURSING/CASE MANAGEMENT/SOCIAL WORK - PATIENT PORTAL LINK FT
You can access the FollowMyHealth Patient Portal offered by [facility] by registering at the following website: [email or web address]. By joining [employer]’s FollowMyHealth portal, you will also be able to view your health information using other applications (apps) compatible with our system.

## 2022-04-06 NOTE — DISCHARGE NOTE PROVIDER - NSDCCPGOAL_GEN_ALL_CORE_FT
To get better and follow your care plan as instructed.  -Continue a low fat diet  -Activity: No restrictions   -Please follow up with gastroenterologist/ Hepatologist Dr. Covington in 2 weeks for further work up of Epigastric pain and Transaminitis.  -Please follow up Dr. Hatfield at [facility] in regards to stones in your gallbladder.    - Please follow up with Hematologist Dr. Cornejo for further wok up for iron deficiency anemia s/p Gastroc bypass.   -Notify physician for fever greater than 101, worsening abdominal pain, nausea and emesis.

## 2022-04-06 NOTE — PROGRESS NOTE ADULT - ASSESSMENT
Ms. Dorothy Guan is a 46yF with PMH of anemia on iron, HTN on Losartan, DM on Metformin, HLD, presenting to the ED with several day Hx of worsening epigastric pain. On presentation, she is afebrile without leukocytosis. RUQ US notable for trace PCCF with cholelithiasis. CT significant for acute cholecystitis (Gallbladder is distended and there is diffuse inflammatory gallbladder wall), HIDA (4/5) wnl (no cholecystitis)    - Regular (low fat) diet  - Cef/flagyl  - pain/nausea control prn  - Ursodiol (300mg PO BID)  - SCDs/HSQ  - ISS  Patient seen with and case discussed in detail with Dr. Montejo

## 2022-04-07 LAB
IGA FLD-MCNC: 244 MG/DL — SIGNIFICANT CHANGE UP (ref 84–499)
IGG FLD-MCNC: 1001 MG/DL — SIGNIFICANT CHANGE UP (ref 610–1660)
IGM SERPL-MCNC: 60 MG/DL — SIGNIFICANT CHANGE UP (ref 35–242)
KAPPA LC SER QL IFE: 2.82 MG/DL — HIGH (ref 0.33–1.94)
KAPPA/LAMBDA FREE LIGHT CHAIN RATIO, SERUM: 1.25 RATIO — SIGNIFICANT CHANGE UP (ref 0.26–1.65)
LAMBDA LC SER QL IFE: 2.26 MG/DL — SIGNIFICANT CHANGE UP (ref 0.57–2.63)
MITOCHONDRIA AB SER-ACNC: SIGNIFICANT CHANGE UP

## 2022-04-11 DIAGNOSIS — F17.200 NICOTINE DEPENDENCE, UNSPECIFIED, UNCOMPLICATED: ICD-10-CM

## 2022-04-11 DIAGNOSIS — R74.01 ELEVATION OF LEVELS OF LIVER TRANSAMINASE LEVELS: ICD-10-CM

## 2022-04-11 DIAGNOSIS — Z79.52 LONG TERM (CURRENT) USE OF SYSTEMIC STEROIDS: ICD-10-CM

## 2022-04-11 DIAGNOSIS — Z79.84 LONG TERM (CURRENT) USE OF ORAL HYPOGLYCEMIC DRUGS: ICD-10-CM

## 2022-04-11 DIAGNOSIS — D64.9 ANEMIA, UNSPECIFIED: ICD-10-CM

## 2022-04-11 DIAGNOSIS — K80.20 CALCULUS OF GALLBLADDER WITHOUT CHOLECYSTITIS WITHOUT OBSTRUCTION: ICD-10-CM

## 2022-04-11 DIAGNOSIS — Z98.84 BARIATRIC SURGERY STATUS: ICD-10-CM

## 2022-04-11 DIAGNOSIS — Z72.89 OTHER PROBLEMS RELATED TO LIFESTYLE: ICD-10-CM

## 2022-04-11 DIAGNOSIS — I10 ESSENTIAL (PRIMARY) HYPERTENSION: ICD-10-CM

## 2022-04-11 DIAGNOSIS — K76.0 FATTY (CHANGE OF) LIVER, NOT ELSEWHERE CLASSIFIED: ICD-10-CM

## 2022-04-11 DIAGNOSIS — E11.9 TYPE 2 DIABETES MELLITUS WITHOUT COMPLICATIONS: ICD-10-CM

## 2022-04-11 DIAGNOSIS — E78.5 HYPERLIPIDEMIA, UNSPECIFIED: ICD-10-CM

## 2022-04-11 DIAGNOSIS — K80.00 CALCULUS OF GALLBLADDER WITH ACUTE CHOLECYSTITIS WITHOUT OBSTRUCTION: ICD-10-CM

## 2022-04-11 LAB — ANA TITR SER: NEGATIVE — SIGNIFICANT CHANGE UP

## 2022-04-20 ENCOUNTER — APPOINTMENT (OUTPATIENT)
Dept: BARIATRICS | Facility: CLINIC | Age: 47
End: 2022-04-20
Payer: MEDICAID

## 2022-04-20 VITALS
HEART RATE: 93 BPM | OXYGEN SATURATION: 100 % | TEMPERATURE: 97.6 F | DIASTOLIC BLOOD PRESSURE: 80 MMHG | BODY MASS INDEX: 36.96 KG/M2 | HEIGHT: 66 IN | WEIGHT: 230 LBS | SYSTOLIC BLOOD PRESSURE: 144 MMHG

## 2022-04-20 PROCEDURE — 99213 OFFICE O/P EST LOW 20 MIN: CPT

## 2022-04-20 NOTE — ADDENDUM
[FreeTextEntry1] : All medical record entries made by the Scribe were at my, MAUREEN Wright's direction and personally dictated by me on 04/20/2022. I have received the chart and agree that the record accurately reflects my personal performance of the history, physical exam, assessment, and plan. I have also personally directed, reviewed, and agreed with the chart.

## 2022-04-20 NOTE — ASSESSMENT
[FreeTextEntry1] : Patient is 47 y/o F presenting today for f/u s/p recent hospital discharge presenting with cough and chest discomfort. Her workup at the hospital revealed markedly elevated LFTs and had been drinking heavily on weekends, and CT showed acute cholecystitis, though hiatus scan was negative. Patient will f/u with Dr. Covington and repeat LFTs, and will consider elective cholecystectomy after evaluation.

## 2022-04-20 NOTE — END OF VISIT
[FreeTextEntry3] : Documented by Margaret Montero acting as a scribe for MAUREEN Wright on 04/20/2022.

## 2022-04-20 NOTE — HISTORY OF PRESENT ILLNESS
[de-identified] : Patient is 45 y/o F presenting today for f/u s/p recent hospital discharge presenting with cough and chest discomfort. Her workup at the hospital revealed markedly elevated LFTs and had been drinking heavily on weekends, and CT showed acute cholecystitis, though hiatus scan was negative. Patient will f/u with Dr. Covington and repeat LFTs, and will consider elective cholecystectomy.  She states she has stopped drinking alcohol and smoking since hospital, and is feeling better now.

## 2022-05-02 ENCOUNTER — APPOINTMENT (OUTPATIENT)
Dept: HEMATOLOGY ONCOLOGY | Facility: CLINIC | Age: 47
End: 2022-05-02

## 2022-05-30 PROCEDURE — 81001 URINALYSIS AUTO W/SCOPE: CPT

## 2022-05-30 PROCEDURE — 85610 PROTHROMBIN TIME: CPT

## 2022-05-30 PROCEDURE — 87635 SARS-COV-2 COVID-19 AMP PRB: CPT

## 2022-05-30 PROCEDURE — 82784 ASSAY IGA/IGD/IGG/IGM EACH: CPT

## 2022-05-30 PROCEDURE — 80053 COMPREHEN METABOLIC PANEL: CPT

## 2022-05-30 PROCEDURE — 84100 ASSAY OF PHOSPHORUS: CPT

## 2022-05-30 PROCEDURE — 86900 BLOOD TYPING SEROLOGIC ABO: CPT

## 2022-05-30 PROCEDURE — 83690 ASSAY OF LIPASE: CPT

## 2022-05-30 PROCEDURE — 86038 ANTINUCLEAR ANTIBODIES: CPT

## 2022-05-30 PROCEDURE — 96375 TX/PRO/DX INJ NEW DRUG ADDON: CPT

## 2022-05-30 PROCEDURE — 85025 COMPLETE CBC W/AUTO DIFF WBC: CPT

## 2022-05-30 PROCEDURE — 86381 MITOCHONDRIAL ANTIBODY EACH: CPT

## 2022-05-30 PROCEDURE — 86901 BLOOD TYPING SEROLOGIC RH(D): CPT

## 2022-05-30 PROCEDURE — 80307 DRUG TEST PRSMV CHEM ANLYZR: CPT

## 2022-05-30 PROCEDURE — 71045 X-RAY EXAM CHEST 1 VIEW: CPT

## 2022-05-30 PROCEDURE — G1004: CPT

## 2022-05-30 PROCEDURE — 82103 ALPHA-1-ANTITRYPSIN TOTAL: CPT

## 2022-05-30 PROCEDURE — 74177 CT ABD & PELVIS W/CONTRAST: CPT | Mod: MG

## 2022-05-30 PROCEDURE — 85027 COMPLETE CBC AUTOMATED: CPT

## 2022-05-30 PROCEDURE — 83540 ASSAY OF IRON: CPT

## 2022-05-30 PROCEDURE — 82962 GLUCOSE BLOOD TEST: CPT

## 2022-05-30 PROCEDURE — 96374 THER/PROPH/DIAG INJ IV PUSH: CPT

## 2022-05-30 PROCEDURE — 85730 THROMBOPLASTIN TIME PARTIAL: CPT

## 2022-05-30 PROCEDURE — 83036 HEMOGLOBIN GLYCOSYLATED A1C: CPT

## 2022-05-30 PROCEDURE — 86850 RBC ANTIBODY SCREEN: CPT

## 2022-05-30 PROCEDURE — 83550 IRON BINDING TEST: CPT

## 2022-05-30 PROCEDURE — 84484 ASSAY OF TROPONIN QUANT: CPT

## 2022-05-30 PROCEDURE — 84702 CHORIONIC GONADOTROPIN TEST: CPT

## 2022-05-30 PROCEDURE — 80074 ACUTE HEPATITIS PANEL: CPT

## 2022-05-30 PROCEDURE — 82390 ASSAY OF CERULOPLASMIN: CPT

## 2022-05-30 PROCEDURE — A9537: CPT

## 2022-05-30 PROCEDURE — 82728 ASSAY OF FERRITIN: CPT

## 2022-05-30 PROCEDURE — 78226 HEPATOBILIARY SYSTEM IMAGING: CPT

## 2022-05-30 PROCEDURE — 83735 ASSAY OF MAGNESIUM: CPT

## 2022-05-30 PROCEDURE — 36415 COLL VENOUS BLD VENIPUNCTURE: CPT

## 2022-05-30 PROCEDURE — 76705 ECHO EXAM OF ABDOMEN: CPT

## 2022-05-30 PROCEDURE — 99285 EMERGENCY DEPT VISIT HI MDM: CPT

## 2022-06-22 ENCOUNTER — APPOINTMENT (OUTPATIENT)
Dept: BARIATRICS | Facility: CLINIC | Age: 47
End: 2022-06-22

## 2022-07-07 ENCOUNTER — EMERGENCY (EMERGENCY)
Facility: HOSPITAL | Age: 47
LOS: 1 days | Discharge: HOME | End: 2022-07-07
Attending: EMERGENCY MEDICINE | Admitting: EMERGENCY MEDICINE
Payer: MEDICAID

## 2022-07-07 VITALS
HEIGHT: 66 IN | TEMPERATURE: 98 F | SYSTOLIC BLOOD PRESSURE: 135 MMHG | OXYGEN SATURATION: 97 % | RESPIRATION RATE: 18 BRPM | HEART RATE: 89 BPM | WEIGHT: 212.97 LBS | DIASTOLIC BLOOD PRESSURE: 89 MMHG

## 2022-07-07 VITALS
TEMPERATURE: 98 F | RESPIRATION RATE: 18 BRPM | SYSTOLIC BLOOD PRESSURE: 128 MMHG | HEART RATE: 74 BPM | DIASTOLIC BLOOD PRESSURE: 75 MMHG | OXYGEN SATURATION: 99 %

## 2022-07-07 DIAGNOSIS — Z98.89 OTHER SPECIFIED POSTPROCEDURAL STATES: Chronic | ICD-10-CM

## 2022-07-07 DIAGNOSIS — Z98.84 BARIATRIC SURGERY STATUS: ICD-10-CM

## 2022-07-07 DIAGNOSIS — E11.9 TYPE 2 DIABETES MELLITUS WITHOUT COMPLICATIONS: ICD-10-CM

## 2022-07-07 DIAGNOSIS — Z98.890 OTHER SPECIFIED POSTPROCEDURAL STATES: Chronic | ICD-10-CM

## 2022-07-07 DIAGNOSIS — E78.00 PURE HYPERCHOLESTEROLEMIA, UNSPECIFIED: ICD-10-CM

## 2022-07-07 DIAGNOSIS — Z87.19 PERSONAL HISTORY OF OTHER DISEASES OF THE DIGESTIVE SYSTEM: ICD-10-CM

## 2022-07-07 DIAGNOSIS — Z79.84 LONG TERM (CURRENT) USE OF ORAL HYPOGLYCEMIC DRUGS: ICD-10-CM

## 2022-07-07 DIAGNOSIS — I10 ESSENTIAL (PRIMARY) HYPERTENSION: ICD-10-CM

## 2022-07-07 DIAGNOSIS — D64.9 ANEMIA, UNSPECIFIED: ICD-10-CM

## 2022-07-07 DIAGNOSIS — K21.9 GASTRO-ESOPHAGEAL REFLUX DISEASE WITHOUT ESOPHAGITIS: ICD-10-CM

## 2022-07-07 DIAGNOSIS — Z87.59 PERSONAL HISTORY OF OTHER COMPLICATIONS OF PREGNANCY, CHILDBIRTH AND THE PUERPERIUM: ICD-10-CM

## 2022-07-07 DIAGNOSIS — K80.20 CALCULUS OF GALLBLADDER WITHOUT CHOLECYSTITIS WITHOUT OBSTRUCTION: ICD-10-CM

## 2022-07-07 DIAGNOSIS — E78.5 HYPERLIPIDEMIA, UNSPECIFIED: ICD-10-CM

## 2022-07-07 DIAGNOSIS — R51.9 HEADACHE, UNSPECIFIED: ICD-10-CM

## 2022-07-07 DIAGNOSIS — E04.1 NONTOXIC SINGLE THYROID NODULE: ICD-10-CM

## 2022-07-07 DIAGNOSIS — Z87.42 PERSONAL HISTORY OF OTHER DISEASES OF THE FEMALE GENITAL TRACT: ICD-10-CM

## 2022-07-07 DIAGNOSIS — R10.13 EPIGASTRIC PAIN: ICD-10-CM

## 2022-07-07 DIAGNOSIS — Z20.822 CONTACT WITH AND (SUSPECTED) EXPOSURE TO COVID-19: ICD-10-CM

## 2022-07-07 LAB
ALBUMIN SERPL ELPH-MCNC: 3.7 G/DL — SIGNIFICANT CHANGE UP (ref 3.3–5)
ALBUMIN SERPL ELPH-MCNC: 4 G/DL — SIGNIFICANT CHANGE UP (ref 3.3–5)
ALP SERPL-CCNC: 52 U/L — SIGNIFICANT CHANGE UP (ref 40–120)
ALP SERPL-CCNC: 54 U/L — SIGNIFICANT CHANGE UP (ref 40–120)
ALT FLD-CCNC: 12 U/L — SIGNIFICANT CHANGE UP (ref 10–45)
ALT FLD-CCNC: 17 U/L — SIGNIFICANT CHANGE UP (ref 10–45)
ANION GAP SERPL CALC-SCNC: 11 MMOL/L — SIGNIFICANT CHANGE UP (ref 5–17)
ANION GAP SERPL CALC-SCNC: 13 MMOL/L — SIGNIFICANT CHANGE UP (ref 5–17)
APPEARANCE UR: CLEAR — SIGNIFICANT CHANGE UP
APTT BLD: 30.2 SEC — SIGNIFICANT CHANGE UP (ref 27.5–35.5)
AST SERPL-CCNC: 24 U/L — SIGNIFICANT CHANGE UP (ref 10–40)
AST SERPL-CCNC: 59 U/L — HIGH (ref 10–40)
BACTERIA # UR AUTO: PRESENT /HPF
BASOPHILS # BLD AUTO: 0.04 K/UL — SIGNIFICANT CHANGE UP (ref 0–0.2)
BASOPHILS NFR BLD AUTO: 0.9 % — SIGNIFICANT CHANGE UP (ref 0–2)
BILIRUB SERPL-MCNC: 0.3 MG/DL — SIGNIFICANT CHANGE UP (ref 0.2–1.2)
BILIRUB SERPL-MCNC: <0.2 MG/DL — SIGNIFICANT CHANGE UP (ref 0.2–1.2)
BILIRUB UR-MCNC: NEGATIVE — SIGNIFICANT CHANGE UP
BUN SERPL-MCNC: 16 MG/DL — SIGNIFICANT CHANGE UP (ref 7–23)
BUN SERPL-MCNC: 16 MG/DL — SIGNIFICANT CHANGE UP (ref 7–23)
CALCIUM SERPL-MCNC: 9.3 MG/DL — SIGNIFICANT CHANGE UP (ref 8.4–10.5)
CALCIUM SERPL-MCNC: 9.3 MG/DL — SIGNIFICANT CHANGE UP (ref 8.4–10.5)
CHLORIDE SERPL-SCNC: 100 MMOL/L — SIGNIFICANT CHANGE UP (ref 96–108)
CHLORIDE SERPL-SCNC: 99 MMOL/L — SIGNIFICANT CHANGE UP (ref 96–108)
CO2 SERPL-SCNC: 23 MMOL/L — SIGNIFICANT CHANGE UP (ref 22–31)
CO2 SERPL-SCNC: 26 MMOL/L — SIGNIFICANT CHANGE UP (ref 22–31)
COLOR SPEC: YELLOW — SIGNIFICANT CHANGE UP
COMMENT - URINE: SIGNIFICANT CHANGE UP
CREAT SERPL-MCNC: 1.32 MG/DL — HIGH (ref 0.5–1.3)
CREAT SERPL-MCNC: 1.43 MG/DL — HIGH (ref 0.5–1.3)
DIFF PNL FLD: ABNORMAL
EGFR: 46 ML/MIN/1.73M2 — LOW
EGFR: 51 ML/MIN/1.73M2 — LOW
EOSINOPHIL # BLD AUTO: 0.09 K/UL — SIGNIFICANT CHANGE UP (ref 0–0.5)
EOSINOPHIL NFR BLD AUTO: 2 % — SIGNIFICANT CHANGE UP (ref 0–6)
EPI CELLS # UR: SIGNIFICANT CHANGE UP /HPF (ref 0–5)
GLUCOSE SERPL-MCNC: 135 MG/DL — HIGH (ref 70–99)
GLUCOSE SERPL-MCNC: 152 MG/DL — HIGH (ref 70–99)
GLUCOSE UR QL: NEGATIVE — SIGNIFICANT CHANGE UP
HCT VFR BLD CALC: 29.2 % — LOW (ref 34.5–45)
HGB BLD-MCNC: 8.5 G/DL — LOW (ref 11.5–15.5)
IMM GRANULOCYTES NFR BLD AUTO: 0.2 % — SIGNIFICANT CHANGE UP (ref 0–1.5)
INR BLD: 1.02 — SIGNIFICANT CHANGE UP (ref 0.88–1.16)
KETONES UR-MCNC: ABNORMAL MG/DL
LEUKOCYTE ESTERASE UR-ACNC: NEGATIVE — SIGNIFICANT CHANGE UP
LIDOCAIN IGE QN: 37 U/L — SIGNIFICANT CHANGE UP (ref 7–60)
LYMPHOCYTES # BLD AUTO: 1.6 K/UL — SIGNIFICANT CHANGE UP (ref 1–3.3)
LYMPHOCYTES # BLD AUTO: 35.9 % — SIGNIFICANT CHANGE UP (ref 13–44)
MCHC RBC-ENTMCNC: 22.1 PG — LOW (ref 27–34)
MCHC RBC-ENTMCNC: 29.1 GM/DL — LOW (ref 32–36)
MCV RBC AUTO: 76 FL — LOW (ref 80–100)
MONOCYTES # BLD AUTO: 0.58 K/UL — SIGNIFICANT CHANGE UP (ref 0–0.9)
MONOCYTES NFR BLD AUTO: 13 % — SIGNIFICANT CHANGE UP (ref 2–14)
NEUTROPHILS # BLD AUTO: 2.14 K/UL — SIGNIFICANT CHANGE UP (ref 1.8–7.4)
NEUTROPHILS NFR BLD AUTO: 48 % — SIGNIFICANT CHANGE UP (ref 43–77)
NITRITE UR-MCNC: NEGATIVE — SIGNIFICANT CHANGE UP
NRBC # BLD: 0 /100 WBCS — SIGNIFICANT CHANGE UP (ref 0–0)
PH UR: 6 — SIGNIFICANT CHANGE UP (ref 5–8)
PLATELET # BLD AUTO: 732 K/UL — HIGH (ref 150–400)
POTASSIUM SERPL-MCNC: 3.7 MMOL/L — SIGNIFICANT CHANGE UP (ref 3.5–5.3)
POTASSIUM SERPL-MCNC: SIGNIFICANT CHANGE UP MMOL/L (ref 3.5–5.3)
POTASSIUM SERPL-SCNC: 3.7 MMOL/L — SIGNIFICANT CHANGE UP (ref 3.5–5.3)
POTASSIUM SERPL-SCNC: SIGNIFICANT CHANGE UP MMOL/L (ref 3.5–5.3)
PROT SERPL-MCNC: 7 G/DL — SIGNIFICANT CHANGE UP (ref 6–8.3)
PROT SERPL-MCNC: 7.4 G/DL — SIGNIFICANT CHANGE UP (ref 6–8.3)
PROT UR-MCNC: 30 MG/DL
PROTHROM AB SERPL-ACNC: 12.1 SEC — SIGNIFICANT CHANGE UP (ref 10.5–13.4)
RBC # BLD: 3.84 M/UL — SIGNIFICANT CHANGE UP (ref 3.8–5.2)
RBC # FLD: 19.8 % — HIGH (ref 10.3–14.5)
RBC CASTS # UR COMP ASSIST: < 5 /HPF — SIGNIFICANT CHANGE UP
SARS-COV-2 RNA SPEC QL NAA+PROBE: NEGATIVE — SIGNIFICANT CHANGE UP
SODIUM SERPL-SCNC: 135 MMOL/L — SIGNIFICANT CHANGE UP (ref 135–145)
SODIUM SERPL-SCNC: 137 MMOL/L — SIGNIFICANT CHANGE UP (ref 135–145)
SP GR SPEC: >=1.03 — SIGNIFICANT CHANGE UP (ref 1–1.03)
UROBILINOGEN FLD QL: 0.2 E.U./DL — SIGNIFICANT CHANGE UP
WBC # BLD: 4.46 K/UL — SIGNIFICANT CHANGE UP (ref 3.8–10.5)
WBC # FLD AUTO: 4.46 K/UL — SIGNIFICANT CHANGE UP (ref 3.8–10.5)
WBC UR QL: < 5 /HPF — SIGNIFICANT CHANGE UP

## 2022-07-07 PROCEDURE — 99284 EMERGENCY DEPT VISIT MOD MDM: CPT | Mod: 25

## 2022-07-07 PROCEDURE — 76705 ECHO EXAM OF ABDOMEN: CPT | Mod: 26

## 2022-07-07 PROCEDURE — 87635 SARS-COV-2 COVID-19 AMP PRB: CPT

## 2022-07-07 PROCEDURE — 99285 EMERGENCY DEPT VISIT HI MDM: CPT

## 2022-07-07 PROCEDURE — 83690 ASSAY OF LIPASE: CPT

## 2022-07-07 PROCEDURE — 96365 THER/PROPH/DIAG IV INF INIT: CPT

## 2022-07-07 PROCEDURE — 85025 COMPLETE CBC W/AUTO DIFF WBC: CPT

## 2022-07-07 PROCEDURE — 76705 ECHO EXAM OF ABDOMEN: CPT

## 2022-07-07 PROCEDURE — 80053 COMPREHEN METABOLIC PANEL: CPT

## 2022-07-07 PROCEDURE — 96368 THER/DIAG CONCURRENT INF: CPT

## 2022-07-07 PROCEDURE — 36415 COLL VENOUS BLD VENIPUNCTURE: CPT

## 2022-07-07 PROCEDURE — 85730 THROMBOPLASTIN TIME PARTIAL: CPT

## 2022-07-07 PROCEDURE — 96375 TX/PRO/DX INJ NEW DRUG ADDON: CPT

## 2022-07-07 PROCEDURE — 85610 PROTHROMBIN TIME: CPT

## 2022-07-07 PROCEDURE — 81001 URINALYSIS AUTO W/SCOPE: CPT

## 2022-07-07 RX ORDER — PANTOPRAZOLE SODIUM 20 MG/1
1 TABLET, DELAYED RELEASE ORAL
Qty: 30 | Refills: 0
Start: 2022-07-07 | End: 2022-08-05

## 2022-07-07 RX ORDER — SODIUM CHLORIDE 9 MG/ML
1000 INJECTION INTRAMUSCULAR; INTRAVENOUS; SUBCUTANEOUS ONCE
Refills: 0 | Status: COMPLETED | OUTPATIENT
Start: 2022-07-07 | End: 2022-07-07

## 2022-07-07 RX ORDER — ONDANSETRON 8 MG/1
4 TABLET, FILM COATED ORAL ONCE
Refills: 0 | Status: COMPLETED | OUTPATIENT
Start: 2022-07-07 | End: 2022-07-07

## 2022-07-07 RX ORDER — FAMOTIDINE 10 MG/ML
20 INJECTION INTRAVENOUS ONCE
Refills: 0 | Status: COMPLETED | OUTPATIENT
Start: 2022-07-07 | End: 2022-07-07

## 2022-07-07 RX ORDER — METOCLOPRAMIDE HCL 10 MG
1 TABLET ORAL
Qty: 15 | Refills: 0
Start: 2022-07-07 | End: 2022-07-11

## 2022-07-07 RX ORDER — SODIUM CHLORIDE 9 MG/ML
1000 INJECTION INTRAMUSCULAR; INTRAVENOUS; SUBCUTANEOUS ONCE
Refills: 0 | Status: DISCONTINUED | OUTPATIENT
Start: 2022-07-07 | End: 2022-07-10

## 2022-07-07 RX ORDER — ACETAMINOPHEN 500 MG
1000 TABLET ORAL ONCE
Refills: 0 | Status: COMPLETED | OUTPATIENT
Start: 2022-07-07 | End: 2022-07-07

## 2022-07-07 RX ORDER — SUCRALFATE 1 G
1 TABLET ORAL ONCE
Refills: 0 | Status: COMPLETED | OUTPATIENT
Start: 2022-07-07 | End: 2022-07-07

## 2022-07-07 RX ORDER — METOCLOPRAMIDE HCL 10 MG
10 TABLET ORAL ONCE
Refills: 0 | Status: COMPLETED | OUTPATIENT
Start: 2022-07-07 | End: 2022-07-07

## 2022-07-07 RX ADMIN — Medication 1000 MILLIGRAM(S): at 06:07

## 2022-07-07 RX ADMIN — Medication 10 MILLIGRAM(S): at 04:21

## 2022-07-07 RX ADMIN — Medication 1000 MILLIGRAM(S): at 04:39

## 2022-07-07 RX ADMIN — SODIUM CHLORIDE 1000 MILLILITER(S): 9 INJECTION INTRAMUSCULAR; INTRAVENOUS; SUBCUTANEOUS at 04:39

## 2022-07-07 RX ADMIN — FAMOTIDINE 20 MILLIGRAM(S): 10 INJECTION INTRAVENOUS at 02:53

## 2022-07-07 RX ADMIN — SODIUM CHLORIDE 1000 MILLILITER(S): 9 INJECTION INTRAMUSCULAR; INTRAVENOUS; SUBCUTANEOUS at 02:53

## 2022-07-07 RX ADMIN — Medication 104 MILLIGRAM(S): at 03:10

## 2022-07-07 RX ADMIN — ONDANSETRON 4 MILLIGRAM(S): 8 TABLET, FILM COATED ORAL at 02:52

## 2022-07-07 RX ADMIN — Medication 400 MILLIGRAM(S): at 03:19

## 2022-07-07 RX ADMIN — Medication 1 GRAM(S): at 04:39

## 2022-07-07 NOTE — ED PROVIDER NOTE - CARE PROVIDER_API CALL
Patricia Hatfield)  Surgery  [address], [place] [address]  Phone: [phone]  Fax: [phone]  Follow Up Time:     Ken Medina)  Surgery  [address], [address]  [place] [address]  Phone: [phone]  Fax: [phone]  Follow Up Time:

## 2022-07-07 NOTE — ED PROVIDER NOTE - OBJECTIVE STATEMENT
48 yo female with  h/o anemia, HTN, DM, HLD, PSH gastric bypass surgery (),  ( ), abdominal myoplasty)2018),  cholelithiasis, in the ER today with multiple complaints. Pt reports mid epigastric pain, nausea, excessive burping, generalized "not feeling well", HA.  symptoms started few days ago. Pt mentioned that she didn't have a chance to f/u with a surgeon yet  to schedule an elective cholecystectomy. Denies any fever, chills, diarrhea, constipations, dysuria, hematuria for the past few days.

## 2022-07-07 NOTE — ED PROVIDER NOTE - CLINICAL SUMMARY MEDICAL DECISION MAKING FREE TEXT BOX
46 yo female with  h/o anemia, HTN, DM, HLD, PSH gastric bypass surgery (),  ( ), abdominal myoplasty)2018),  cholelithiasis, in the ER today with multiple complaints. Pt reports mid epigastric pain, nausea, excessive burping, generalized "not feeling well", HA.  symptoms started few days ago. Pt mentioned that she didn't have a chance to f/u with a surgeon yet  to schedule an elective cholecystectomy. Denies any fever, chills, diarrhea, constipations, dysuria, hematuria for the past few days.  Pt afebrile, non-toxic appearing, labs and RUQ US done. Pt hydrated and treat with IV antiemetics and feels much better. Pt is able to tolerate PO. LFTs- normal, no evidence of acute cholecystis on US. results of labs and US discussed with pt. F/u with PMD regarding elevated creatinine recommended. F/u with surgeon for further consult recommended. pt  verbalized understanding. d/c home stable.

## 2022-07-07 NOTE — ED PROVIDER NOTE - PATIENT PORTAL LINK FT
You can access the FollowMyHealth Patient Portal offered by [facility] by registering at the following website: [email or web address]. By joining [employer]’s FollowMyHealth portal, you will also be able to view your health information using other applications (apps) compatible with our system.

## 2022-07-07 NOTE — ED ADULT NURSE NOTE - CAS DISCH CONDITION
Improved

## 2022-07-07 NOTE — ED PROVIDER NOTE - ATTENDING APP SHARED VISIT CONTRIBUTION OF CARE
47F hx anemia, htn, dm, high chol, c/o epigastric pain, nausea and burping.  states ongoing for past few days.  no fevers. no diarrhea. states has hx of gallstones.  no chest pain. no SOB.   gen- nad  heent- ncat, clear conj  cv -rrr  lungs -ctab  abd - soft, epigastric/ruq ttp  ext -wwp, no edema  neuro -aox3, steady gait, gorman  no acute st/t wave changes on EKG, no cholecystitis on US.  labs checked, pt found ot have elevated creatinine.  given ivf, reglan, pepcid, carafate.  if discharged will recommend renal and GI f/u

## 2022-07-07 NOTE — ED PROVIDER NOTE - NS ED ATTENDING STATEMENT MOD
This was a shared visit with the ROSSY. I reviewed and verified the documentation and independently performed the documented:

## 2022-07-07 NOTE — ED ADULT TRIAGE NOTE - CHIEF COMPLAINT QUOTE
Pt reports intermittent migraine, epigastric pain and nausea x few days. Pt reports hx of gallstones. Pt denies any V/D, no abd pain, no SOB.

## 2022-07-07 NOTE — ED ADULT TRIAGE NOTE - BP NONINVASIVE SYSTOLIC (MM HG)
135

## 2022-07-07 NOTE — ED ADULT NURSE NOTE - ED CARDIAC RHYTHM
[de-identified] : Germain is a 7 Ethiopian.  
regular

## 2022-07-07 NOTE — ED ADULT NURSE NOTE - OBJECTIVE STATEMENT
Presents with c/o generalized unwell feeling with headache, epigastric pain, and belching with "bile" x few days, has not been self-medicating at home for pain/indigestion. PMH gallstones with anticipated refugio. Denies blood in stool or emesis. Denies change in vision/unilateral weakness/dizziness.     On assessment- AOx4, breathing even and unlabored on RA, no apparent distress, VSS in triage, able to speak in clear coherent sentences, steady gait unassisted, neuro intact with no apparent facial asymmetry, PERRLA.

## 2022-07-07 NOTE — ED PROVIDER NOTE - NSFOLLOWUPINSTRUCTIONS_ED_ALL_ED_FT
Please follow up with your PMD for re-evaluation in a few days    Biliary Colic, Adult       Biliary colic is severe pain caused by a problem with the gallbladder. The gallbladder is a small organ in the upper right part of the abdomen. The gallbladder stores a digestive fluid produced in the liver (bile) that helps the body break down fat. Bile and other digestive enzymes are carried from the liver to the small intestine through tube-like structures called bile ducts. The gallbladder and the bile ducts form the biliary tract.    Sometimes, hard deposits of digestive fluids (gallstones) form in the gallbladder and block the flow of bile from the gallbladder, causing biliary colic. This condition is also called a gallbladder attack. Gallstones can be as small as a grain of sand or as big as a golf ball. There could be just one gallstone in the gallbladder, or there could be many.      What are the causes?    This condition is usually caused by gallstones. Less often, a tumor could block the flow of bile from the gallbladder and trigger biliary colic.      What increases the risk?    The following factors may make you more likely to develop this condition:  •Being female.      •Having a family history of gallstones.      •Being obese.      •Losing weight suddenly or quickly.      •Eating a diet that is high in calories, low in fiber, and rich in refined carbohydrates, such as white bread and white rice.    •Having certain health conditions, such as:  •An intestinal disease that affects nutrient absorption, such as Crohn's disease.      •A metabolic condition, such as diabetes or metabolic syndrome. Metabolic syndrome occurs when someone has high blood pressure, high cholesterol, and diabetes.      •A blood condition, such as hemolytic anemia or sickle cell disease.          What are the signs or symptoms?    The main symptom of this condition is severe pain in the upper right side of the abdomen. You may feel this pain below the chest but above the hip. This pain often occurs at night or after eating a meal that is high in fat. This pain may get worse for up to an hour and last as long as 12 hours. In most cases, the pain fades (subsides) within 2 hours.    Other symptoms of this condition include:  •Nausea and vomiting.      •Pain under the right shoulder.        How is this diagnosed?    This condition is diagnosed based on your medical history, your symptoms, and a physical exam.    You may also have tests, including:  •Blood tests to rule out infection or inflammation of the bile ducts, gallbladder, pancreas, or liver.    •Imaging studies, such as:  •An ultrasound.      •A CT scan.      •An MRI.        In some cases, you may need to have an imaging study done using a small amount of radioactive material (nuclear medicine) to confirm the diagnosis.      How is this treated?    This condition may be treated with medicines to:  •Relieve your pain or nausea.      •Dissolve the gallstones. It may take months or years before the gallstones are completely gone.      If you have gallstones, or if you have a tumor in the gallbladder that is causing biliary colic, you may need surgery to remove the gallbladder (cholecystectomy).      Follow these instructions at home:    Eating and drinking     •Drink enough fluid to keep your urine pale yellow.    •Follow instructions from your health care provider about eating or drinking restrictions. These may include avoiding:  •Fatty, greasy, and fried foods.      •Any foods that make the pain worse.      •Overeating.      •Having a large meal after not eating for a while.        General instructions     •Take over-the-counter and prescription medicines only as told by your health care provider.      •Keep all follow-up visits as told by your health care provider. This is important.        How is this prevented?    Steps to prevent this condition include:  •Maintaining a healthy body weight.      •Getting regular exercise.      •Eating a healthy diet that is high in fiber and low in fat.      •Limiting how much sugar and refined carbohydrates you eat.        Contact a health care provider if:    •Your pain lasts more than 5 hours.      •You vomit.      •You have a fever and chills.      •Your pain gets worse.        Get help right away if:    •Your skin or the whites of your eyes look yellow (jaundice).      •Your have tea-colored urine and light-colored stools (feces).      •You are dizzy or you faint.        Summary    •Biliary colic is severe pain caused by a problem with the gallbladder. The gallbladder is a small organ in the upper right part of your abdomen.      •Treatment for this condition may include medicine to relieve your pain or nausea, or medicine to slowly dissolve the gallstones.      •If you have gallstones, or if you have a tumor in the gallbladder that is causing biliary colic, you may need surgery to remove the gallbladder (cholecystectomy).      This information is not intended to replace advice given to you by your health care provider. Make sure you discuss any questions you have with your health care provider.                                                                                                          Gallbladder Eating Plan      If you have a gallbladder condition, you may have trouble digesting fats. Eating a low-fat diet can help reduce your symptoms, and may be helpful before and after having surgery to remove your gallbladder (cholecystectomy). Your health care provider may recommend that you work with a diet and nutrition specialist (dietitian) to help you reduce the amount of fat in your diet.      What are tips for following this plan?    General guidelines     •Limit your fat intake to less than 30% of your total daily calories. If you eat around 1,800 calories each day, this is less than 60 grams (g) of fat per day.      •Fat is an important part of a healthy diet. Eating a low-fat diet can make it hard to maintain a healthy body weight. Ask your dietitian how much fat, calories, and other nutrients you need each day.      •Eat small, frequent meals throughout the day instead of three large meals.      •Drink at least 8–10 cups of fluid a day. Drink enough fluid to keep your urine clear or pale yellow.      •Limit alcohol intake to no more than 1 drink a day for nonpregnant women and 2 drinks a day for men. One drink equals 12 oz of beer, 5 oz of wine, or 1½ oz of hard liquor.        Reading food labels      •Check Nutrition Facts on food labels for the amount of fat per serving. Choose foods with less than 3 grams of fat per serving.      Shopping     •Choose nonfat and low-fat healthy foods. Look for the words "nonfat," "low fat," or "fat free."      •Avoid buying processed or prepackaged foods.      Cooking     •Cook using low-fat methods, such as baking, broiling, grilling, or boiling.      •Cook with small amounts of healthy fats, such as olive oil, grapeseed oil, canola oil, or sunflower oil.        What foods are recommended?    •All fresh, frozen, or canned fruits and vegetables.      •Whole grains.      •Low-fat or non-fat (skim) milk and yogurt.      •Lean meat, skinless poultry, fish, eggs, and beans.      •Low-fat protein supplement powders or drinks.      •Spices and herbs.        What foods are not recommended?    •High-fat foods. These include baked goods, fast food, fatty cuts of meat, ice cream, french toast, sweet rolls, pizza, cheese bread, foods covered with butter, creamy sauces, or cheese.      •Fried foods. These include french fries, tempura, battered fish, breaded chicken, fried breads, and sweets.      •Foods with strong odors.      •Foods that cause bloating and gas.        Summary    •A low-fat diet can be helpful if you have a gallbladder condition, or before and after gallbladder surgery.      •Limit your fat intake to less than 30% of your total daily calories. This is about 60 g of fat if you eat 1,800 calories each day.      •Eat small, frequent meals throughout the day instead of three large meals.      This information is not intended to replace advice given to you by your health care provider. Make sure you discuss any questions you have with your health care provider.                General Headache Without Cause      A headache is pain or discomfort felt around the head or neck area. The specific cause of a headache may not be found. There are many causes and types of headaches. A few common ones are:  •Tension headaches.      •Migraine headaches.      •Cluster headaches.      •Chronic daily headaches.        Follow these instructions at home:    Watch your condition for any changes. Let your health care provider know about them. Take these steps to help with your condition:      Managing pain                   •Take over-the-counter and prescription medicines only as told by your health care provider.      •Lie down in a dark, quiet room when you have a headache.    •If directed, put ice on your head and neck area:  •Put ice in a plastic bag.      •Place a towel between your skin and the bag.      •Leave the ice on for 20 minutes, 2–3 times per day.      •If directed, apply heat to the affected area. Use the heat source that your health care provider recommends, such as a moist heat pack or a heating pad.  •Place a towel between your skin and the heat source.      •Leave the heat on for 20–30 minutes.      •Remove the heat if your skin turns bright red. This is especially important if you are unable to feel pain, heat, or cold. You may have a greater risk of getting burned.        •Keep lights dim if bright lights bother you or make your headaches worse.      Eating and drinking     •Eat meals on a regular schedule.    •If you drink alcohol:•Limit how much you use to:   •0–1 drink a day for women.       • 0–2 drinks a day for men.         •Be aware of how much alcohol is in your drink. In the [place] one drink equals one 12 oz bottle of beer (355 mL), one 5 oz glass of wine (148 mL), or one 1½ oz glass of hard liquor (44 mL).        •Stop drinking caffeine, or decrease the amount of caffeine you drink.        General instructions    •Keep a headache journal to help find out what may trigger your headaches. For example, write down:  •What you eat and drink.      •How much sleep you get.      •Any change to your diet or medicines.        •Try massage or other relaxation techniques.      •Limit stress.      •Sit up straight, and do not tense your muscles.      • Do not use any products that contain nicotine or tobacco, such as cigarettes, e-cigarettes, and chewing tobacco. If you need help quitting, ask your health care provider.      •Exercise regularly as told by your health care provider.      •Sleep on a regular schedule. Get 7–9 hours of sleep each night, or the amount recommended by your health care provider.      •Keep all follow-up visits as told by your health care provider. This is important.        Contact a health care provider if:    •Your symptoms are not helped by medicine.      •You have a headache that is different from the usual headache.      •You have nausea or you vomit.      •You have a fever.        Get help right away if:    •Your headache becomes severe quickly.      •Your headache gets worse after moderate to intense physical activity.      •You have repeated vomiting.      •You have a stiff neck.      •You have a loss of vision.      •You have problems with speech.      •You have pain in the eye or ear.      •You have muscular weakness or loss of muscle control.      •You lose your balance or have trouble walking.      •You feel faint or pass out.      •You have confusion.      •You have a seizure.        Summary    •A headache is pain or discomfort felt around the head or neck area.      •There are many causes and types of headaches. In some cases, the cause may not be found.      •Keep a headache journal to help find out what may trigger your headaches. Watch your condition for any changes. Let your health care provider know about them.      •Contact a health care provider if you have a headache that is different from the usual headache, or if your symptoms are not helped by medicine.      •Get help right away if your headache becomes severe, you vomit, you have a loss of vision, you lose your balance, or you have a seizure.      This information is not intended to replace advice given to you by your health care provider. Make sure you discuss any questions you have with your health care provider.

## 2022-07-27 ENCOUNTER — APPOINTMENT (OUTPATIENT)
Dept: BARIATRICS | Facility: CLINIC | Age: 47
End: 2022-07-27

## 2022-09-14 NOTE — ED ADULT TRIAGE NOTE - BP NONINVASIVE SYSTOLIC (MM HG)
151 Advancement Flap (Double) Text: The defect edges were debeveled with a #15 scalpel blade.  Given the location of the defect and the proximity to free margins a double advancement flap was deemed most appropriate.  Using a sterile surgical marker, the appropriate advancement flaps were drawn incorporating the defect and placing the expected incisions within the relaxed skin tension lines where possible.    The area thus outlined was incised deep to adipose tissue with a #15 scalpel blade.  The skin margins were undermined to an appropriate distance in all directions utilizing iris scissors.

## 2023-01-04 ENCOUNTER — EMERGENCY (EMERGENCY)
Facility: HOSPITAL | Age: 48
LOS: 1 days | Discharge: HOME | End: 2023-01-04
Attending: EMERGENCY MEDICINE | Admitting: EMERGENCY MEDICINE
Payer: MEDICAID

## 2023-01-04 VITALS
SYSTOLIC BLOOD PRESSURE: 119 MMHG | HEART RATE: 79 BPM | TEMPERATURE: 98 F | DIASTOLIC BLOOD PRESSURE: 68 MMHG | RESPIRATION RATE: 18 BRPM | OXYGEN SATURATION: 98 %

## 2023-01-04 VITALS
DIASTOLIC BLOOD PRESSURE: 90 MMHG | TEMPERATURE: 98 F | RESPIRATION RATE: 16 BRPM | OXYGEN SATURATION: 100 % | WEIGHT: 210.1 LBS | SYSTOLIC BLOOD PRESSURE: 142 MMHG | HEART RATE: 86 BPM

## 2023-01-04 DIAGNOSIS — Z87.19 PERSONAL HISTORY OF OTHER DISEASES OF THE DIGESTIVE SYSTEM: ICD-10-CM

## 2023-01-04 DIAGNOSIS — M54.9 DORSALGIA, UNSPECIFIED: ICD-10-CM

## 2023-01-04 DIAGNOSIS — Z20.822 CONTACT WITH AND (SUSPECTED) EXPOSURE TO COVID-19: ICD-10-CM

## 2023-01-04 DIAGNOSIS — Z98.84 BARIATRIC SURGERY STATUS: ICD-10-CM

## 2023-01-04 DIAGNOSIS — Z98.89 OTHER SPECIFIED POSTPROCEDURAL STATES: Chronic | ICD-10-CM

## 2023-01-04 DIAGNOSIS — F17.200 NICOTINE DEPENDENCE, UNSPECIFIED, UNCOMPLICATED: ICD-10-CM

## 2023-01-04 DIAGNOSIS — R51.9 HEADACHE, UNSPECIFIED: ICD-10-CM

## 2023-01-04 DIAGNOSIS — I10 ESSENTIAL (PRIMARY) HYPERTENSION: ICD-10-CM

## 2023-01-04 DIAGNOSIS — Z79.84 LONG TERM (CURRENT) USE OF ORAL HYPOGLYCEMIC DRUGS: ICD-10-CM

## 2023-01-04 DIAGNOSIS — D64.9 ANEMIA, UNSPECIFIED: ICD-10-CM

## 2023-01-04 DIAGNOSIS — Z98.890 OTHER SPECIFIED POSTPROCEDURAL STATES: Chronic | ICD-10-CM

## 2023-01-04 DIAGNOSIS — E78.5 HYPERLIPIDEMIA, UNSPECIFIED: ICD-10-CM

## 2023-01-04 DIAGNOSIS — R07.89 OTHER CHEST PAIN: ICD-10-CM

## 2023-01-04 DIAGNOSIS — E11.9 TYPE 2 DIABETES MELLITUS WITHOUT COMPLICATIONS: ICD-10-CM

## 2023-01-04 LAB
ALBUMIN SERPL ELPH-MCNC: 4.1 G/DL — SIGNIFICANT CHANGE UP (ref 3.3–5)
ALP SERPL-CCNC: 62 U/L — SIGNIFICANT CHANGE UP (ref 40–120)
ALT FLD-CCNC: 19 U/L — SIGNIFICANT CHANGE UP (ref 10–45)
ANION GAP SERPL CALC-SCNC: 10 MMOL/L — SIGNIFICANT CHANGE UP (ref 5–17)
ANISOCYTOSIS BLD QL: SIGNIFICANT CHANGE UP
AST SERPL-CCNC: 25 U/L — SIGNIFICANT CHANGE UP (ref 10–40)
BASOPHILS # BLD AUTO: 0 K/UL — SIGNIFICANT CHANGE UP (ref 0–0.2)
BASOPHILS NFR BLD AUTO: 0 % — SIGNIFICANT CHANGE UP (ref 0–2)
BILIRUB SERPL-MCNC: 0.2 MG/DL — SIGNIFICANT CHANGE UP (ref 0.2–1.2)
BUN SERPL-MCNC: 8 MG/DL — SIGNIFICANT CHANGE UP (ref 7–23)
BURR CELLS BLD QL SMEAR: SLIGHT — SIGNIFICANT CHANGE UP
CALCIUM SERPL-MCNC: 8.9 MG/DL — SIGNIFICANT CHANGE UP (ref 8.4–10.5)
CHLORIDE SERPL-SCNC: 104 MMOL/L — SIGNIFICANT CHANGE UP (ref 96–108)
CO2 SERPL-SCNC: 25 MMOL/L — SIGNIFICANT CHANGE UP (ref 22–31)
CREAT SERPL-MCNC: 0.97 MG/DL — SIGNIFICANT CHANGE UP (ref 0.5–1.3)
DACRYOCYTES BLD QL SMEAR: SLIGHT — SIGNIFICANT CHANGE UP
EGFR: 73 ML/MIN/1.73M2 — SIGNIFICANT CHANGE UP
ELLIPTOCYTES BLD QL SMEAR: SLIGHT — SIGNIFICANT CHANGE UP
EOSINOPHIL # BLD AUTO: 0 K/UL — SIGNIFICANT CHANGE UP (ref 0–0.5)
EOSINOPHIL NFR BLD AUTO: 0 % — SIGNIFICANT CHANGE UP (ref 0–6)
FLUAV AG NPH QL: SIGNIFICANT CHANGE UP
FLUBV AG NPH QL: SIGNIFICANT CHANGE UP
GIANT PLATELETS BLD QL SMEAR: PRESENT — SIGNIFICANT CHANGE UP
GLUCOSE SERPL-MCNC: 157 MG/DL — HIGH (ref 70–99)
HCT VFR BLD CALC: 27.4 % — LOW (ref 34.5–45)
HGB BLD-MCNC: 7.8 G/DL — LOW (ref 11.5–15.5)
HYPOCHROMIA BLD QL: SLIGHT — SIGNIFICANT CHANGE UP
LYMPHOCYTES # BLD AUTO: 1.4 K/UL — SIGNIFICANT CHANGE UP (ref 1–3.3)
LYMPHOCYTES # BLD AUTO: 32.2 % — SIGNIFICANT CHANGE UP (ref 13–44)
MANUAL SMEAR VERIFICATION: SIGNIFICANT CHANGE UP
MCHC RBC-ENTMCNC: 21.5 PG — LOW (ref 27–34)
MCHC RBC-ENTMCNC: 28.5 GM/DL — LOW (ref 32–36)
MCV RBC AUTO: 75.5 FL — LOW (ref 80–100)
MICROCYTES BLD QL: SLIGHT — SIGNIFICANT CHANGE UP
MONOCYTES # BLD AUTO: 0.53 K/UL — SIGNIFICANT CHANGE UP (ref 0–0.9)
MONOCYTES NFR BLD AUTO: 12.2 % — SIGNIFICANT CHANGE UP (ref 2–14)
NEUTROPHILS # BLD AUTO: 2.38 K/UL — SIGNIFICANT CHANGE UP (ref 1.8–7.4)
NEUTROPHILS NFR BLD AUTO: 54.8 % — SIGNIFICANT CHANGE UP (ref 43–77)
NRBC # BLD: 4 /100 — HIGH (ref 0–0)
NRBC # BLD: SIGNIFICANT CHANGE UP /100 WBCS (ref 0–0)
OVALOCYTES BLD QL SMEAR: SLIGHT — SIGNIFICANT CHANGE UP
PLAT MORPH BLD: ABNORMAL
PLATELET # BLD AUTO: 83 K/UL — LOW (ref 150–400)
POIKILOCYTOSIS BLD QL AUTO: SLIGHT — SIGNIFICANT CHANGE UP
POLYCHROMASIA BLD QL SMEAR: SLIGHT — SIGNIFICANT CHANGE UP
POTASSIUM SERPL-MCNC: 3.6 MMOL/L — SIGNIFICANT CHANGE UP (ref 3.5–5.3)
POTASSIUM SERPL-SCNC: 3.6 MMOL/L — SIGNIFICANT CHANGE UP (ref 3.5–5.3)
PROT SERPL-MCNC: 7.2 G/DL — SIGNIFICANT CHANGE UP (ref 6–8.3)
RBC # BLD: 3.63 M/UL — LOW (ref 3.8–5.2)
RBC # FLD: 25.8 % — HIGH (ref 10.3–14.5)
RBC BLD AUTO: ABNORMAL
RSV RNA NPH QL NAA+NON-PROBE: SIGNIFICANT CHANGE UP
SARS-COV-2 RNA SPEC QL NAA+PROBE: SIGNIFICANT CHANGE UP
SODIUM SERPL-SCNC: 139 MMOL/L — SIGNIFICANT CHANGE UP (ref 135–145)
TROPONIN T SERPL-MCNC: 0.01 NG/ML — SIGNIFICANT CHANGE UP (ref 0–0.01)
VARIANT LYMPHS # BLD: 0.8 % — SIGNIFICANT CHANGE UP (ref 0–6)
WBC # BLD: 4.34 K/UL — SIGNIFICANT CHANGE UP (ref 3.8–10.5)
WBC # FLD AUTO: 4.34 K/UL — SIGNIFICANT CHANGE UP (ref 3.8–10.5)

## 2023-01-04 PROCEDURE — 84484 ASSAY OF TROPONIN QUANT: CPT

## 2023-01-04 PROCEDURE — 36415 COLL VENOUS BLD VENIPUNCTURE: CPT

## 2023-01-04 PROCEDURE — 82962 GLUCOSE BLOOD TEST: CPT

## 2023-01-04 PROCEDURE — 70450 CT HEAD/BRAIN W/O DYE: CPT | Mod: 26,MA

## 2023-01-04 PROCEDURE — 93005 ELECTROCARDIOGRAM TRACING: CPT

## 2023-01-04 PROCEDURE — 70450 CT HEAD/BRAIN W/O DYE: CPT | Mod: MA

## 2023-01-04 PROCEDURE — 80053 COMPREHEN METABOLIC PANEL: CPT

## 2023-01-04 PROCEDURE — 99285 EMERGENCY DEPT VISIT HI MDM: CPT

## 2023-01-04 PROCEDURE — 96375 TX/PRO/DX INJ NEW DRUG ADDON: CPT

## 2023-01-04 PROCEDURE — 71046 X-RAY EXAM CHEST 2 VIEWS: CPT

## 2023-01-04 PROCEDURE — 85025 COMPLETE CBC W/AUTO DIFF WBC: CPT

## 2023-01-04 PROCEDURE — 83690 ASSAY OF LIPASE: CPT

## 2023-01-04 PROCEDURE — 96374 THER/PROPH/DIAG INJ IV PUSH: CPT

## 2023-01-04 PROCEDURE — 71046 X-RAY EXAM CHEST 2 VIEWS: CPT | Mod: 26

## 2023-01-04 PROCEDURE — 87637 SARSCOV2&INF A&B&RSV AMP PRB: CPT

## 2023-01-04 PROCEDURE — 99285 EMERGENCY DEPT VISIT HI MDM: CPT | Mod: 25

## 2023-01-04 RX ORDER — METOCLOPRAMIDE HCL 10 MG
10 TABLET ORAL ONCE
Refills: 0 | Status: COMPLETED | OUTPATIENT
Start: 2023-01-04 | End: 2023-01-04

## 2023-01-04 RX ORDER — SODIUM CHLORIDE 9 MG/ML
1000 INJECTION INTRAMUSCULAR; INTRAVENOUS; SUBCUTANEOUS ONCE
Refills: 0 | Status: COMPLETED | OUTPATIENT
Start: 2023-01-04 | End: 2023-01-04

## 2023-01-04 RX ORDER — DIPHENHYDRAMINE HCL 50 MG
50 CAPSULE ORAL ONCE
Refills: 0 | Status: COMPLETED | OUTPATIENT
Start: 2023-01-04 | End: 2023-01-04

## 2023-01-04 RX ORDER — ACETAMINOPHEN 500 MG
650 TABLET ORAL ONCE
Refills: 0 | Status: COMPLETED | OUTPATIENT
Start: 2023-01-04 | End: 2023-01-04

## 2023-01-04 RX ADMIN — Medication 650 MILLIGRAM(S): at 16:53

## 2023-01-04 RX ADMIN — Medication 104 MILLIGRAM(S): at 16:54

## 2023-01-04 RX ADMIN — SODIUM CHLORIDE 1000 MILLILITER(S): 9 INJECTION INTRAMUSCULAR; INTRAVENOUS; SUBCUTANEOUS at 16:54

## 2023-01-04 RX ADMIN — Medication 50 MILLIGRAM(S): at 17:28

## 2023-01-04 NOTE — ED PROVIDER NOTE - OBJECTIVE STATEMENT
48 yo female with  h/o anemia, HTN, DM, HLD, PSH gastric bypass surgery (),  ( 2005), abdominal myoplasty)2018),  cholelithiasis, c/o HA and chest pain. Reports intermittent frontal HA x 2-3 weeks. Describes HA as pounding/pressure that comes and goes. Typical does not get frequent HAs. Associated with nausea. Also c/o midsternal sharp CP for the past few days (unable to quantify). Associated with lightheadedness and occasional R sided back pain. Denies fever, chills ,cough, sob, palpitations, sweats, abd pain, vomiting, focal weakness, dysuria, hematuria. Reports drinking 1pint of vodka 2-3 times per week, last drink was 1 week ago, pt is trying to cut  down. +smoker.

## 2023-01-04 NOTE — ED PROVIDER NOTE - PROGRESS NOTE DETAILS
CXr/head CT neg. Symptoms likely secondary to anemia. hgb 7.8/plt 83 (has been low in the past). No indication for emergent transfusion. Pt used to see Dr. Cornejo is due to see her. Pt states she takes iron but not regularly. Advised to continue iron supplements and f/u with Dr. Cornejo. Symptoms

## 2023-01-04 NOTE — ED ADULT NURSE NOTE - OBJECTIVE STATEMENT
Pt received aaox3 c/o  HA and CP for 2-3wks. Pt's HA is a pounding pressure that comes and goes. Pt also c/o nausea with no vomiting. Pt endorses drinking 1pint of vodka 2-3 times per week, last drink was 1 week ago. PIV placed, labs drawn and sent.

## 2023-01-04 NOTE — ED PROVIDER NOTE - PATIENT PORTAL LINK FT
You can access the FollowMyHealth Patient Portal offered by [facility] by registering at the following website: [email or web address]. By joining [facility]’s FollowMyHealth portal, you will also be able to view your health information using other applications (apps) compatible with our system.

## 2023-01-04 NOTE — ED PROVIDER NOTE - CLINICAL SUMMARY MEDICAL DECISION MAKING FREE TEXT BOX
46 yo female with  h/o anemia, HTN, DM, HLD, PSH gastric bypass surgery (),  ( ), abdominal myoplasty)2018),  cholelithiasis, c/o HA and chest pain. Reports intermittent frontal HA x 2-3 weeks.  Denies fever, chills ,cough, sob, palpitations, sweats, abd pain, vomiting, focal weakness, dysuria, hematuria. VSS. Well appearing. Neurologically intact. PE unremarkable. EKG NSR no ischemia. Labs, cxr, head ct, treat pain

## 2023-01-04 NOTE — ED PROVIDER NOTE - ATTENDING APP SHARED VISIT CONTRIBUTION OF CARE
48 yo female with  h/o anemia, HTN, DM, HLD, PSH gastric bypass surgery (),  ( 2005), abdominal myoplasty)2018),  cholelithiasis, c/o HA and chest pain. Reports intermittent frontal HA x 2-3 weeks. Describes HA as pounding/pressure that comes and goes. Typical does not get frequent HAs. Associated with nausea. Also c/o midsternal sharp CP for the past few days (unable to quantify). Associated with lightheadedness and occasional R sided back pain. Denies fever, chills ,cough, sob, palpitations, sweats, abd pain, vomiting, focal weakness, dysuria, hematuria. Reports drinking 1pint of vodka 2-3 times per week, last drink was 1 week ago, pt is trying to cut  down. +smoker.    VSS. Well appearing. Neurologically intact. PE unremarkable. EKG NSR no ischemia. Labs, cxr, head ct, treat pain    CXr/head CT neg. Symptoms likely secondary to anemia. hgb 7.8/plt 83 (has been low in the past). No indication for emergent transfusion. Pt used to see Dr. Cornejo is due to see her. Pt states she takes iron but not regularly. Advised to continue iron supplements and f/u with Dr. Cornejo. Symptoms    Agree with above note as documented by PA.  I was available as the supervising attending during patient's ER evaluation.

## 2023-01-04 NOTE — ED PROVIDER NOTE - PHYSICAL EXAMINATION
CONSTITUTIONAL: Well-appearing;  in no apparent distress.   HEAD: Normocephalic; atraumatic.   EYES: PERRL; EOM intact; conjunctiva and sclera clear  ENT: normal nose; no rhinorrhea; normal pharynx with no erythema or lesions.   NECK: Supple; non-tender; no LAD  CARDIOVASCULAR: Normal S1, S2; No audible murmurs. Regular rate and rhythm.   RESPIRATORY: Breathing easily; breath sounds clear and equal bilaterally; no wheezes, rhonchi, or rales.  GI: Soft; non-distended; non-tender; no palpable organomegaly.   MSK: FROM at all extremities, normal tone   EXT: No cyanosis or edema; N/V intact  SKIN: Normal for age and race; warm; dry; good turgor; no apparent lesions or rash.   NEURO: AAO x 3, CN II-XII intact, normal speech, strength 5/5 bilateral upper and lower extremities, sensation intact, cerebellum intact, no ataxia, follows commands appropriately  PSYCHOLOGICAL: The patient’s mood and manner are appropriate.

## 2023-02-08 ENCOUNTER — EMERGENCY (EMERGENCY)
Facility: HOSPITAL | Age: 48
LOS: 1 days | Discharge: HOME | End: 2023-02-08
Attending: EMERGENCY MEDICINE | Admitting: EMERGENCY MEDICINE
Payer: MEDICAID

## 2023-02-08 VITALS
DIASTOLIC BLOOD PRESSURE: 76 MMHG | OXYGEN SATURATION: 99 % | SYSTOLIC BLOOD PRESSURE: 147 MMHG | HEART RATE: 104 BPM | TEMPERATURE: 98 F | RESPIRATION RATE: 16 BRPM

## 2023-02-08 VITALS
HEIGHT: 66 IN | DIASTOLIC BLOOD PRESSURE: 81 MMHG | WEIGHT: 210.1 LBS | SYSTOLIC BLOOD PRESSURE: 158 MMHG | TEMPERATURE: 98 F | HEART RATE: 110 BPM | RESPIRATION RATE: 16 BRPM | OXYGEN SATURATION: 97 %

## 2023-02-08 DIAGNOSIS — R42 DIZZINESS AND GIDDINESS: ICD-10-CM

## 2023-02-08 DIAGNOSIS — Z98.84 BARIATRIC SURGERY STATUS: ICD-10-CM

## 2023-02-08 DIAGNOSIS — R00.0 TACHYCARDIA, UNSPECIFIED: ICD-10-CM

## 2023-02-08 DIAGNOSIS — Z87.442 PERSONAL HISTORY OF URINARY CALCULI: ICD-10-CM

## 2023-02-08 DIAGNOSIS — Z98.89 OTHER SPECIFIED POSTPROCEDURAL STATES: Chronic | ICD-10-CM

## 2023-02-08 DIAGNOSIS — Z86.39 PERSONAL HISTORY OF OTHER ENDOCRINE, NUTRITIONAL AND METABOLIC DISEASE: ICD-10-CM

## 2023-02-08 DIAGNOSIS — Z20.822 CONTACT WITH AND (SUSPECTED) EXPOSURE TO COVID-19: ICD-10-CM

## 2023-02-08 DIAGNOSIS — S09.90XA UNSPECIFIED INJURY OF HEAD, INITIAL ENCOUNTER: ICD-10-CM
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
## 2023-02-08 DIAGNOSIS — E11.9 TYPE 2 DIABETES MELLITUS WITHOUT COMPLICATIONS: ICD-10-CM
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
## 2023-02-08 DIAGNOSIS — Z98.890 OTHER SPECIFIED POSTPROCEDURAL STATES: Chronic | ICD-10-CM

## 2023-02-08 DIAGNOSIS — E78.5 HYPERLIPIDEMIA, UNSPECIFIED: ICD-10-CM

## 2023-02-08 DIAGNOSIS — F17.200 NICOTINE DEPENDENCE, UNSPECIFIED, UNCOMPLICATED: ICD-10-CM

## 2023-02-08 DIAGNOSIS — Z79.84 LONG TERM (CURRENT) USE OF ORAL HYPOGLYCEMIC DRUGS: ICD-10-CM

## 2023-02-08 DIAGNOSIS — D64.9 ANEMIA, UNSPECIFIED: ICD-10-CM

## 2023-02-08 DIAGNOSIS — I10 ESSENTIAL (PRIMARY) HYPERTENSION: ICD-10-CM

## 2023-02-08 DIAGNOSIS — Z87.19 PERSONAL HISTORY OF OTHER DISEASES OF THE DIGESTIVE SYSTEM: ICD-10-CM

## 2023-02-08 DIAGNOSIS — Z87.42 PERSONAL HISTORY OF OTHER DISEASES OF THE FEMALE GENITAL TRACT: ICD-10-CM

## 2023-02-08 DIAGNOSIS — F10.129 ALCOHOL ABUSE WITH INTOXICATION, UNSPECIFIED: ICD-10-CM

## 2023-02-08 LAB
ALBUMIN SERPL ELPH-MCNC: 3.9 G/DL — SIGNIFICANT CHANGE UP (ref 3.3–5)
ALP SERPL-CCNC: 61 U/L — SIGNIFICANT CHANGE UP (ref 40–120)
ALT FLD-CCNC: 20 U/L — SIGNIFICANT CHANGE UP (ref 10–45)
ANION GAP SERPL CALC-SCNC: 18 MMOL/L — HIGH (ref 5–17)
ANISOCYTOSIS BLD QL: SIGNIFICANT CHANGE UP
APTT BLD: 23.3 SEC — LOW (ref 27.5–35.5)
AST SERPL-CCNC: 25 U/L — SIGNIFICANT CHANGE UP (ref 10–40)
BASOPHILS # BLD AUTO: 0.08 K/UL — SIGNIFICANT CHANGE UP (ref 0–0.2)
BASOPHILS NFR BLD AUTO: 1.7 % — SIGNIFICANT CHANGE UP (ref 0–2)
BILIRUB SERPL-MCNC: 0.2 MG/DL — SIGNIFICANT CHANGE UP (ref 0.2–1.2)
BLD GP AB SCN SERPL QL: NEGATIVE — SIGNIFICANT CHANGE UP
BUN SERPL-MCNC: 16 MG/DL — SIGNIFICANT CHANGE UP (ref 7–23)
CALCIUM SERPL-MCNC: 8.5 MG/DL — SIGNIFICANT CHANGE UP (ref 8.4–10.5)
CHLORIDE SERPL-SCNC: 101 MMOL/L — SIGNIFICANT CHANGE UP (ref 96–108)
CO2 SERPL-SCNC: 19 MMOL/L — LOW (ref 22–31)
CREAT SERPL-MCNC: 1.11 MG/DL — SIGNIFICANT CHANGE UP (ref 0.5–1.3)
DACRYOCYTES BLD QL SMEAR: SLIGHT — SIGNIFICANT CHANGE UP
EGFR: 62 ML/MIN/1.73M2 — SIGNIFICANT CHANGE UP
ELLIPTOCYTES BLD QL SMEAR: SLIGHT — SIGNIFICANT CHANGE UP
EOSINOPHIL # BLD AUTO: 0 K/UL — SIGNIFICANT CHANGE UP (ref 0–0.5)
EOSINOPHIL NFR BLD AUTO: 0 % — SIGNIFICANT CHANGE UP (ref 0–6)
GIANT PLATELETS BLD QL SMEAR: PRESENT — SIGNIFICANT CHANGE UP
GLUCOSE SERPL-MCNC: 226 MG/DL — HIGH (ref 70–99)
HCT VFR BLD CALC: 28.3 % — LOW (ref 34.5–45)
HGB BLD-MCNC: 8 G/DL — LOW (ref 11.5–15.5)
HYPOCHROMIA BLD QL: SIGNIFICANT CHANGE UP
INR BLD: 1.04 — SIGNIFICANT CHANGE UP (ref 0.88–1.16)
LYMPHOCYTES # BLD AUTO: 1.47 K/UL — SIGNIFICANT CHANGE UP (ref 1–3.3)
LYMPHOCYTES # BLD AUTO: 32.2 % — SIGNIFICANT CHANGE UP (ref 13–44)
MACROCYTES BLD QL: SLIGHT — SIGNIFICANT CHANGE UP
MANUAL SMEAR VERIFICATION: SIGNIFICANT CHANGE UP
MCHC RBC-ENTMCNC: 21.2 PG — LOW (ref 27–34)
MCHC RBC-ENTMCNC: 28.3 GM/DL — LOW (ref 32–36)
MCV RBC AUTO: 74.9 FL — LOW (ref 80–100)
MICROCYTES BLD QL: SIGNIFICANT CHANGE UP
MONOCYTES # BLD AUTO: 0.35 K/UL — SIGNIFICANT CHANGE UP (ref 0–0.9)
MONOCYTES NFR BLD AUTO: 7.8 % — SIGNIFICANT CHANGE UP (ref 2–14)
NEUTROPHILS # BLD AUTO: 2.65 K/UL — SIGNIFICANT CHANGE UP (ref 1.8–7.4)
NEUTROPHILS NFR BLD AUTO: 58.3 % — SIGNIFICANT CHANGE UP (ref 43–77)
PLAT MORPH BLD: ABNORMAL
PLATELET # BLD AUTO: 445 K/UL — HIGH (ref 150–400)
POIKILOCYTOSIS BLD QL AUTO: SLIGHT — SIGNIFICANT CHANGE UP
POLYCHROMASIA BLD QL SMEAR: SLIGHT — SIGNIFICANT CHANGE UP
POTASSIUM SERPL-MCNC: 4.2 MMOL/L — SIGNIFICANT CHANGE UP (ref 3.5–5.3)
POTASSIUM SERPL-SCNC: 4.2 MMOL/L — SIGNIFICANT CHANGE UP (ref 3.5–5.3)
PROT SERPL-MCNC: 7.5 G/DL — SIGNIFICANT CHANGE UP (ref 6–8.3)
PROTHROM AB SERPL-ACNC: 12.4 SEC — SIGNIFICANT CHANGE UP (ref 10.5–13.4)
RBC # BLD: 3.78 M/UL — LOW (ref 3.8–5.2)
RBC # FLD: 29.2 % — HIGH (ref 10.3–14.5)
RBC BLD AUTO: ABNORMAL
RH IG SCN BLD-IMP: POSITIVE — SIGNIFICANT CHANGE UP
SARS-COV-2 RNA SPEC QL NAA+PROBE: NEGATIVE — SIGNIFICANT CHANGE UP
SCHISTOCYTES BLD QL AUTO: SLIGHT — SIGNIFICANT CHANGE UP
SODIUM SERPL-SCNC: 138 MMOL/L — SIGNIFICANT CHANGE UP (ref 135–145)
WBC # BLD: 4.55 K/UL — SIGNIFICANT CHANGE UP (ref 3.8–10.5)
WBC # FLD AUTO: 4.55 K/UL — SIGNIFICANT CHANGE UP (ref 3.8–10.5)

## 2023-02-08 PROCEDURE — 86850 RBC ANTIBODY SCREEN: CPT

## 2023-02-08 PROCEDURE — 93005 ELECTROCARDIOGRAM TRACING: CPT

## 2023-02-08 PROCEDURE — 85025 COMPLETE CBC W/AUTO DIFF WBC: CPT

## 2023-02-08 PROCEDURE — 86901 BLOOD TYPING SEROLOGIC RH(D): CPT

## 2023-02-08 PROCEDURE — 86900 BLOOD TYPING SEROLOGIC ABO: CPT

## 2023-02-08 PROCEDURE — 99285 EMERGENCY DEPT VISIT HI MDM: CPT

## 2023-02-08 PROCEDURE — 80053 COMPREHEN METABOLIC PANEL: CPT

## 2023-02-08 PROCEDURE — 85610 PROTHROMBIN TIME: CPT

## 2023-02-08 PROCEDURE — 36415 COLL VENOUS BLD VENIPUNCTURE: CPT

## 2023-02-08 PROCEDURE — 85730 THROMBOPLASTIN TIME PARTIAL: CPT

## 2023-02-08 PROCEDURE — 87635 SARS-COV-2 COVID-19 AMP PRB: CPT

## 2023-02-08 PROCEDURE — 82962 GLUCOSE BLOOD TEST: CPT

## 2023-02-08 RX ORDER — SODIUM CHLORIDE 9 MG/ML
1000 INJECTION INTRAMUSCULAR; INTRAVENOUS; SUBCUTANEOUS ONCE
Refills: 0 | Status: COMPLETED | OUTPATIENT
Start: 2023-02-08 | End: 2023-02-08

## 2023-02-08 RX ADMIN — SODIUM CHLORIDE 1000 MILLILITER(S): 9 INJECTION INTRAMUSCULAR; INTRAVENOUS; SUBCUTANEOUS at 12:13

## 2023-02-08 NOTE — ED PROVIDER NOTE - NSFOLLOWUPINSTRUCTIONS_ED_ALL_ED_FT
Stay well hydrated.    Follow up with primary doctor within 1-2 days.     Return to ER sooner for fevers, persistent vomit, uncontrolled pain, worsening breathing, worsening lightheaded, worsening shaking.    Alcohol is harmful to your health!     Follow up with hematologist/oncologist for your anemia.  Located at [address], [place] [address]  Can call [phone] to schedule appointment.    Can also call Dr. Jose at [phone]  Located at [address], [address]    Can also call Dr. Cornejo at [phone]  Located at [address], 4[address] floor     Anemia    Anemia is a condition in which the concentration of red blood cells or hemoglobin in the blood is below normal. Hemoglobin is a substance in red blood cells that carries oxygen to the tissues of the body. Anemia results in not enough oxygen reaching these tissues which can cause symptoms such as weakness, dizziness/lightheadedness, shortness of breath, chest pain, paleness, or nausea. The cause of your anemia may or may not be determined immediately. If your hemoglobin was dangerously low, you may have received a blood transfusion. Usually reactions to transfusions occur immediately but monitor yourself for any fevers, rash, or shortness of breath.    SEEK IMMEDIATE MEDICAL CARE IF YOU HAVE ANY OF THE FOLLOWING SYMPTOMS: extreme weakness/chest pain/shortness of breath, black or bloody stools, vomiting blood, fainting, fever, or any signs of dehydration.     Alcohol Abuse    Alcohol intoxication occurs when the amount of alcohol that a person has consumed impairs his or her ability to mentally and physically function. Chronic alcohol consumption can also lead to a variety of health issues including neurological disease, stomach disease, heart disease, liver disease, etc. Do not drive after drinking alcohol. Drinking enough alcohol to end up in an Emergency Room suggests you may have an alcohol abuse problem. Seek help at a drug addiction center.    SEEK IMMEDIATE MEDICAL CARE IF YOU HAVE ANY OF THE FOLLOWING SYMPTOMS: seizures, vomiting blood, blood in your stool, lightheadedness/dizziness, or becoming shaky to tremulous when you stop drinking.
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
## 2023-02-08 NOTE — ED PROVIDER NOTE - PHYSICAL EXAMINATION
No spinal ttp, neck FROM. Strength 5/5. No bony ttp, FROM all extremities. Normal equal distal pulses. Steady unassisted gait.   no LE edema, normal equal distal pulses, steady unassisted gait.   No clonus, rigidity, tremors, fasciculations. PERRL, EOMI, no nystagmus. Strength 5/5. Steady unassisted gait. Normal bowel sounds, skin temp/color.  AOB.

## 2023-02-08 NOTE — ED ADULT NURSE NOTE - CHPI ED NUR SYMPTOMS NEG
no blurred vision/no confusion/no dizziness/no loss of consciousness/no nausea/no seizure/no syncope/no vomiting
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
## 2023-02-08 NOTE — ED PROVIDER NOTE - PATIENT PORTAL LINK FT
You can access the FollowMyHealth Patient Portal offered by [facility] by registering at the following website: [email or web address]. By joining [employer]’s FollowMyHealth portal, you will also be able to view your health information using other applications (apps) compatible with our system.

## 2023-02-08 NOTE — ED PROVIDER NOTE - PROGRESS NOTE DETAILS
[place]: Hgb increased to 8, plt 445, AG 18 w/ bicarb 19 (likely related to etoh), glucose 226, other labs grossly wnl. COVID neg. Still mild tachycardia - IVF still going (only got ~300 ccs). Remains well appearing. Will reassess after IVF. [place]: HR improving, now ~104, Pt awake and alert. Asymptomatic. Denies SI/HI. Steady unassisted gait. Tolerating PO. Clinically sober and not withdrawing, given copy of results, comfortable for dc, outpt PMD f/u.

## 2023-05-04 ENCOUNTER — EMERGENCY (EMERGENCY)
Facility: HOSPITAL | Age: 48
LOS: 1 days | Discharge: HOME | End: 2023-05-04
Attending: STUDENT IN AN ORGANIZED HEALTH CARE EDUCATION/TRAINING PROGRAM | Admitting: STUDENT IN AN ORGANIZED HEALTH CARE EDUCATION/TRAINING PROGRAM
Payer: MEDICAID

## 2023-05-04 VITALS
WEIGHT: 210.1 LBS | SYSTOLIC BLOOD PRESSURE: 138 MMHG | RESPIRATION RATE: 18 BRPM | OXYGEN SATURATION: 100 % | HEART RATE: 122 BPM | HEIGHT: 66 IN | DIASTOLIC BLOOD PRESSURE: 73 MMHG | TEMPERATURE: 99 F

## 2023-05-04 DIAGNOSIS — Z98.891 HISTORY OF UTERINE SCAR FROM PREVIOUS SURGERY: ICD-10-CM

## 2023-05-04 DIAGNOSIS — Z98.890 OTHER SPECIFIED POSTPROCEDURAL STATES: Chronic | ICD-10-CM

## 2023-05-04 DIAGNOSIS — Z98.84 BARIATRIC SURGERY STATUS: ICD-10-CM

## 2023-05-04 DIAGNOSIS — Z87.42 PERSONAL HISTORY OF OTHER DISEASES OF THE FEMALE GENITAL TRACT: ICD-10-CM

## 2023-05-04 DIAGNOSIS — Z98.89 OTHER SPECIFIED POSTPROCEDURAL STATES: Chronic | ICD-10-CM

## 2023-05-04 DIAGNOSIS — F17.200 NICOTINE DEPENDENCE, UNSPECIFIED, UNCOMPLICATED: ICD-10-CM

## 2023-05-04 DIAGNOSIS — R50.9 FEVER, UNSPECIFIED: ICD-10-CM

## 2023-05-04 DIAGNOSIS — D64.9 ANEMIA, UNSPECIFIED: ICD-10-CM

## 2023-05-04 DIAGNOSIS — I10 ESSENTIAL (PRIMARY) HYPERTENSION: ICD-10-CM

## 2023-05-04 DIAGNOSIS — R00.0 TACHYCARDIA, UNSPECIFIED: ICD-10-CM

## 2023-05-04 DIAGNOSIS — E78.5 HYPERLIPIDEMIA, UNSPECIFIED: ICD-10-CM

## 2023-05-04 DIAGNOSIS — Z87.19 PERSONAL HISTORY OF OTHER DISEASES OF THE DIGESTIVE SYSTEM: ICD-10-CM

## 2023-05-04 DIAGNOSIS — Z98.890 OTHER SPECIFIED POSTPROCEDURAL STATES: ICD-10-CM

## 2023-05-04 DIAGNOSIS — Z87.09 PERSONAL HISTORY OF OTHER DISEASES OF THE RESPIRATORY SYSTEM: ICD-10-CM

## 2023-05-04 DIAGNOSIS — Z20.822 CONTACT WITH AND (SUSPECTED) EXPOSURE TO COVID-19: ICD-10-CM

## 2023-05-04 DIAGNOSIS — Z79.84 LONG TERM (CURRENT) USE OF ORAL HYPOGLYCEMIC DRUGS: ICD-10-CM

## 2023-05-04 DIAGNOSIS — Z86.59 PERSONAL HISTORY OF OTHER MENTAL AND BEHAVIORAL DISORDERS: ICD-10-CM

## 2023-05-04 DIAGNOSIS — E11.9 TYPE 2 DIABETES MELLITUS WITHOUT COMPLICATIONS: ICD-10-CM

## 2023-05-04 LAB
ALBUMIN SERPL ELPH-MCNC: 3.6 G/DL — SIGNIFICANT CHANGE UP (ref 3.3–5)
ALP SERPL-CCNC: 64 U/L — SIGNIFICANT CHANGE UP (ref 40–120)
ALT FLD-CCNC: 20 U/L — SIGNIFICANT CHANGE UP (ref 10–45)
ANION GAP SERPL CALC-SCNC: 13 MMOL/L — SIGNIFICANT CHANGE UP (ref 5–17)
APTT BLD: 27.3 SEC — LOW (ref 27.5–35.5)
AST SERPL-CCNC: 31 U/L — SIGNIFICANT CHANGE UP (ref 10–40)
BASE EXCESS BLDV CALC-SCNC: 0.7 MMOL/L — SIGNIFICANT CHANGE UP (ref -2–3)
BASOPHILS # BLD AUTO: 0.05 K/UL — SIGNIFICANT CHANGE UP (ref 0–0.2)
BASOPHILS NFR BLD AUTO: 0.8 % — SIGNIFICANT CHANGE UP (ref 0–2)
BILIRUB SERPL-MCNC: 0.2 MG/DL — SIGNIFICANT CHANGE UP (ref 0.2–1.2)
BUN SERPL-MCNC: 13 MG/DL — SIGNIFICANT CHANGE UP (ref 7–23)
CA-I SERPL-SCNC: 1.12 MMOL/L — LOW (ref 1.15–1.33)
CALCIUM SERPL-MCNC: 8.6 MG/DL — SIGNIFICANT CHANGE UP (ref 8.4–10.5)
CHLORIDE SERPL-SCNC: 101 MMOL/L — SIGNIFICANT CHANGE UP (ref 96–108)
CO2 BLDV-SCNC: 25.4 MMOL/L — SIGNIFICANT CHANGE UP (ref 22–26)
CO2 SERPL-SCNC: 22 MMOL/L — SIGNIFICANT CHANGE UP (ref 22–31)
CREAT SERPL-MCNC: 1.02 MG/DL — SIGNIFICANT CHANGE UP (ref 0.5–1.3)
EGFR: 69 ML/MIN/1.73M2 — SIGNIFICANT CHANGE UP
EOSINOPHIL # BLD AUTO: 0.03 K/UL — SIGNIFICANT CHANGE UP (ref 0–0.5)
EOSINOPHIL NFR BLD AUTO: 0.5 % — SIGNIFICANT CHANGE UP (ref 0–6)
GAS PNL BLDV: 137 MMOL/L — SIGNIFICANT CHANGE UP (ref 136–145)
GAS PNL BLDV: SIGNIFICANT CHANGE UP
GAS PNL BLDV: SIGNIFICANT CHANGE UP
GLUCOSE SERPL-MCNC: 180 MG/DL — HIGH (ref 70–99)
HCO3 BLDV-SCNC: 24 MMOL/L — SIGNIFICANT CHANGE UP (ref 22–29)
HCT VFR BLD CALC: 29.3 % — LOW (ref 34.5–45)
HGB BLD-MCNC: 8.3 G/DL — LOW (ref 11.5–15.5)
IMM GRANULOCYTES NFR BLD AUTO: 0.5 % — SIGNIFICANT CHANGE UP (ref 0–0.9)
INR BLD: 1.08 — SIGNIFICANT CHANGE UP (ref 0.88–1.16)
LYMPHOCYTES # BLD AUTO: 1.84 K/UL — SIGNIFICANT CHANGE UP (ref 1–3.3)
LYMPHOCYTES # BLD AUTO: 29.1 % — SIGNIFICANT CHANGE UP (ref 13–44)
MCHC RBC-ENTMCNC: 21.7 PG — LOW (ref 27–34)
MCHC RBC-ENTMCNC: 28.3 GM/DL — LOW (ref 32–36)
MCV RBC AUTO: 76.7 FL — LOW (ref 80–100)
MONOCYTES # BLD AUTO: 0.46 K/UL — SIGNIFICANT CHANGE UP (ref 0–0.9)
MONOCYTES NFR BLD AUTO: 7.3 % — SIGNIFICANT CHANGE UP (ref 2–14)
NEUTROPHILS # BLD AUTO: 3.91 K/UL — SIGNIFICANT CHANGE UP (ref 1.8–7.4)
NEUTROPHILS NFR BLD AUTO: 61.8 % — SIGNIFICANT CHANGE UP (ref 43–77)
NRBC # BLD: 0 /100 WBCS — SIGNIFICANT CHANGE UP (ref 0–0)
PCO2 BLDV: 35 MMHG — LOW (ref 39–42)
PH BLDV: 7.45 — HIGH (ref 7.32–7.43)
PLATELET # BLD AUTO: 145 K/UL — LOW (ref 150–400)
PO2 BLDV: 70 MMHG — HIGH (ref 25–45)
POTASSIUM BLDV-SCNC: 4 MMOL/L — SIGNIFICANT CHANGE UP (ref 3.5–5.1)
POTASSIUM SERPL-MCNC: 3.8 MMOL/L — SIGNIFICANT CHANGE UP (ref 3.5–5.3)
POTASSIUM SERPL-SCNC: 3.8 MMOL/L — SIGNIFICANT CHANGE UP (ref 3.5–5.3)
PROT SERPL-MCNC: 6.5 G/DL — SIGNIFICANT CHANGE UP (ref 6–8.3)
PROTHROM AB SERPL-ACNC: 12.9 SEC — SIGNIFICANT CHANGE UP (ref 10.5–13.4)
RAPID RVP RESULT: SIGNIFICANT CHANGE UP
RBC # BLD: 3.82 M/UL — SIGNIFICANT CHANGE UP (ref 3.8–5.2)
RBC # FLD: 27.1 % — HIGH (ref 10.3–14.5)
SAO2 % BLDV: SIGNIFICANT CHANGE UP % (ref 67–88)
SARS-COV-2 RNA SPEC QL NAA+PROBE: SIGNIFICANT CHANGE UP
SODIUM SERPL-SCNC: 136 MMOL/L — SIGNIFICANT CHANGE UP (ref 135–145)
WBC # BLD: 6.32 K/UL — SIGNIFICANT CHANGE UP (ref 3.8–10.5)
WBC # FLD AUTO: 6.32 K/UL — SIGNIFICANT CHANGE UP (ref 3.8–10.5)

## 2023-05-04 PROCEDURE — 36415 COLL VENOUS BLD VENIPUNCTURE: CPT

## 2023-05-04 PROCEDURE — 85610 PROTHROMBIN TIME: CPT

## 2023-05-04 PROCEDURE — 93005 ELECTROCARDIOGRAM TRACING: CPT

## 2023-05-04 PROCEDURE — 85025 COMPLETE CBC W/AUTO DIFF WBC: CPT

## 2023-05-04 PROCEDURE — 0225U NFCT DS DNA&RNA 21 SARSCOV2: CPT

## 2023-05-04 PROCEDURE — 85730 THROMBOPLASTIN TIME PARTIAL: CPT

## 2023-05-04 PROCEDURE — 82962 GLUCOSE BLOOD TEST: CPT

## 2023-05-04 PROCEDURE — 71046 X-RAY EXAM CHEST 2 VIEWS: CPT

## 2023-05-04 PROCEDURE — 99285 EMERGENCY DEPT VISIT HI MDM: CPT | Mod: 25

## 2023-05-04 PROCEDURE — 96374 THER/PROPH/DIAG INJ IV PUSH: CPT

## 2023-05-04 PROCEDURE — 80053 COMPREHEN METABOLIC PANEL: CPT

## 2023-05-04 PROCEDURE — 84132 ASSAY OF SERUM POTASSIUM: CPT

## 2023-05-04 PROCEDURE — 71046 X-RAY EXAM CHEST 2 VIEWS: CPT | Mod: 26

## 2023-05-04 PROCEDURE — 82803 BLOOD GASES ANY COMBINATION: CPT

## 2023-05-04 PROCEDURE — 82330 ASSAY OF CALCIUM: CPT

## 2023-05-04 PROCEDURE — 99284 EMERGENCY DEPT VISIT MOD MDM: CPT

## 2023-05-04 PROCEDURE — 87040 BLOOD CULTURE FOR BACTERIA: CPT

## 2023-05-04 PROCEDURE — 84295 ASSAY OF SERUM SODIUM: CPT

## 2023-05-04 RX ORDER — ACETAMINOPHEN 500 MG
1000 TABLET ORAL ONCE
Refills: 0 | Status: COMPLETED | OUTPATIENT
Start: 2023-05-04 | End: 2023-05-04

## 2023-05-04 RX ORDER — SODIUM CHLORIDE 9 MG/ML
1000 INJECTION INTRAMUSCULAR; INTRAVENOUS; SUBCUTANEOUS ONCE
Refills: 0 | Status: COMPLETED | OUTPATIENT
Start: 2023-05-04 | End: 2023-05-04

## 2023-05-04 RX ADMIN — SODIUM CHLORIDE 1000 MILLILITER(S): 9 INJECTION INTRAMUSCULAR; INTRAVENOUS; SUBCUTANEOUS at 22:49

## 2023-05-04 RX ADMIN — Medication 400 MILLIGRAM(S): at 21:23

## 2023-05-04 RX ADMIN — SODIUM CHLORIDE 1000 MILLILITER(S): 9 INJECTION INTRAMUSCULAR; INTRAVENOUS; SUBCUTANEOUS at 21:20

## 2023-05-04 NOTE — ED PROVIDER NOTE - NS_ACPWITHSCRIBE_ED_ALL_ED
Patient progressing towards discharge    Problem: Discharge Planning  Goal: Discharge to home or other facility with appropriate resources  12/19/2022 0911 by Davide Galvan RN  Outcome: Progressing  12/18/2022 2238 by Osiel Brady RN  Outcome: Progressing     Problem: Safety - Adult  Goal: Free from fall injury  12/19/2022 0911 by Davide Galvan RN  Outcome: Progressing  12/18/2022 2238 by Osiel Brady RN  Outcome: Progressing     Problem: Chronic Conditions and Co-morbidities  Goal: Patient's chronic conditions and co-morbidity symptoms are monitored and maintained or improved  12/19/2022 0911 by Davide Galvan RN  Outcome: Progressing  12/18/2022 2238 by Osiel Brady RN  Outcome: Progressing I personally performed the service described in the documentation  recorded by the scribe in my presence, and it accurately and completely records my words and action.

## 2023-05-04 NOTE — ED PROVIDER NOTE - CLINICAL SUMMARY MEDICAL DECISION MAKING FREE TEXT BOX
48 y/o female with PMHx of anemia, HTN, HLD, DM, EtOH abuse and is a former smoker reports to the ED c/o general fatigue, malaise, chills, weakness, and body aches. Pt has a differential diagnosis of viral URI, pneumonia, dehydration, worsening from anemia, PE. Plan to check labs and CXR as well as provide medication and hydrate and perform a septic workup. Will reevaluate after that.

## 2023-05-04 NOTE — ED ADULT TRIAGE NOTE - CHIEF COMPLAINT QUOTE
Pt c/o generalized discomfort, fevers, "I just am not feeling well". Pt speaking very softly during assessment. PMHx anemia, HTN, DM. Denies cp, sob.

## 2023-05-04 NOTE — ED PROVIDER NOTE - ATTENDING APP SHARED VISIT CONTRIBUTION OF CARE
46 y/o female with PMHx of anemia, HTN, HLD, DM, EtOH abuse and is a former smoker reports to the ED c/o general fatigue, malaise, chills, weakness, and body aches. Pt has a differential diagnosis of viral URI, pneumonia, dehydration, worsening from anemia, PE. Plan to check labs and CXR as well as provide medication and hydrate and perform a septic workup. Will reevaluate after that.

## 2023-05-04 NOTE — ED PROVIDER NOTE - NSICDXPASTMEDICALHX_GEN_ALL_CORE_FT
PAST MEDICAL HISTORY:  Anemia     Diabetes     Fibroids     GERD (gastroesophageal reflux disease)     Thyroid nodule       ETOH abuse     HLD (hyperlipidemia)     HTN (hypertension)

## 2023-05-04 NOTE — ED PROVIDER NOTE - NSFOLLOWUPINSTRUCTIONS_ED_ALL_ED_FT
Please take all your medications as prescribed and follow up with your doctor for re-evaluation in a few days    Anemia    Anemia is a condition in which there is not enough red blood cells or hemoglobin in the blood. Hemoglobin is a substance in red blood cells that carries oxygen.    When you do not have enough red blood cells or hemoglobin (are anemic), your body cannot get enough oxygen and your organs may not work properly. As a result, you may feel very tired or have other problems.    What are the causes?  Common causes of anemia include:  Excessive bleeding. Anemia can be caused by excessive bleeding inside or outside the body, including bleeding from the intestines or from heavy menstrual periods in females.  Poor nutrition.  Long-lasting (chronic) kidney, thyroid, and liver disease.  Bone marrow disorders, spleen problems, and blood disorders.  Cancer and treatments for cancer.  HIV (human immunodeficiency virus) and AIDS (acquired immunodeficiency syndrome).  Infections, medicines, and autoimmune disorders that destroy red blood cells.  What are the signs or symptoms?  Symptoms of this condition include:  Minor weakness.  Dizziness.  Headache, or difficulties concentrating and sleeping.  Heartbeats that feel irregular or faster than normal (palpitations).  Shortness of breath, especially with exercise.  Pale skin, lips, and nails, or cold hands and feet.  Indigestion and nausea.  Symptoms may occur suddenly or develop slowly. If your anemia is mild, you may not have symptoms.    How is this diagnosed?  This condition is diagnosed based on blood tests, your medical history, and a physical exam. In some cases, a test may be needed in which cells are removed from the soft tissue inside of a bone and looked at under a microscope (bone marrow biopsy). Your health care provider may also check your stool (feces) for blood and may do additional testing to look for the cause of your bleeding.    Other tests may include:  Imaging tests, such as a CT scan or MRI.  A procedure to see inside your esophagus and stomach (endoscopy).  A procedure to see inside your colon and rectum (colonoscopy).  How is this treated?  Treatment for this condition depends on the cause. If you continue to lose a lot of blood, you may need to be treated at a hospital. Treatment may include:  Taking supplements of iron, vitamin B12, or folic acid.  Taking a hormone medicine (erythropoietin) that can help to stimulate red blood cell growth.  Having a blood transfusion. This may be needed if you lose a lot of blood.  Making changes to your diet.  Having surgery to remove your spleen.  Follow these instructions at home:  Take over-the-counter and prescription medicines only as told by your health care provider.  Take supplements only as told by your health care provider.  Follow any diet instructions that you were given by your health care provider.  Keep all follow-up visits as told by your health care provider. This is important.  Contact a health care provider if:  You develop new bleeding anywhere in the body.  Get help right away if:  You are very weak.  You are short of breath.  You have pain in your abdomen or chest.  You are dizzy or feel faint.  You have trouble concentrating.  You have bloody stools, black stools, or tarry stools.  You vomit repeatedly or you vomit up blood.  These symptoms may represent a serious problem that is an emergency. Do not wait to see if the symptoms will go away. Get medical help right away. Call your local emergency services (911 in the [place] Do not drive yourself to the hospital.    Summary  Anemia is a condition in which you do not have enough red blood cells or enough of a substance in your red blood cells that carries oxygen (hemoglobin).  Symptoms may occur suddenly or develop slowly.  If your anemia is mild, you may not have symptoms.  This condition is diagnosed with blood tests, a medical history, and a physical exam. Other tests may be needed.  Treatment for this condition depends on the cause of the anemia.  This information is not intended to replace advice given to you by your health care provider. Make sure you discuss any questions you have with your health care provider.

## 2023-05-04 NOTE — ED PROVIDER NOTE - OBJECTIVE STATEMENT
48 y/o female with PMHx of anemia, HTN, HLD, DM, EtOH abuse and is a former smoker reports to the ED c/o general fatigue, malise, weakness, and bodyaches that started a few days ago. Pt reports that she feels worse today and is very tired and sleepy and can't explain her symptoms well. Pt came to the ED tachycardic with a low grade fever. Pt also reports she is not compliant with her currently prescribed medications. Pt denies fever, SOB, chest pain, abdominal pain, naseua, vomiting, dysuria, hematuria, diarrheic, constipation, headache, dizziness, neck pain, illicit drug use, recent travel, or a sick contact.

## 2023-05-04 NOTE — ED PROVIDER NOTE - PATIENT PORTAL LINK FT
You can access the FollowMyHealth Patient Portal offered by [facility] by registering at the following website: [email or web address]. By joining [employer]’s FollowMyHealth portal, you will also be able to view your health information using other applications (apps) compatible with our system.

## 2023-05-05 VITALS
SYSTOLIC BLOOD PRESSURE: 180 MMHG | HEART RATE: 99 BPM | OXYGEN SATURATION: 100 % | DIASTOLIC BLOOD PRESSURE: 89 MMHG | RESPIRATION RATE: 18 BRPM | TEMPERATURE: 99 F

## 2023-05-05 NOTE — ED ADULT NURSE REASSESSMENT NOTE - NS ED NURSE REASSESS COMMENT FT1
Received patient for continuous care.  Pending blood work, plan.  As per previous nurse, patient is hard stick and unable to get IV, send blood specimens. DECLAN Correa aware it.
Pt is sleeping on stretcher comfortably.   Denies any discomfort at this time.   Patient is alert and oriented, A&O4, steady gait noted. Family member is on bedside.

## 2023-05-09 LAB
CULTURE RESULTS: SIGNIFICANT CHANGE UP
CULTURE RESULTS: SIGNIFICANT CHANGE UP
SPECIMEN SOURCE: SIGNIFICANT CHANGE UP
SPECIMEN SOURCE: SIGNIFICANT CHANGE UP

## 2023-05-16 NOTE — ED ADULT NURSE NOTE - ED CARDIAC HEART SOUNDS
normal S1, S2 heard Burow's Graft Text: The defect edges were debeveled with a #15 scalpel blade.  Given the location of the defect, shape of the defect, the proximity to free margins and the presence of a standing cone deformity a Burow's skin graft was deemed most appropriate. The standing cone was removed and this tissue was then trimmed to the shape of the primary defect. The adipose tissue was also removed until only dermis and epidermis were left.  The skin margins of the secondary defect were undermined to an appropriate distance in all directions utilizing iris scissors.  The secondary defect was closed with interrupted buried subcutaneous sutures.  The skin edges were then re-apposed with running  sutures.  The skin graft was then placed in the primary defect and oriented appropriately.

## 2023-05-19 NOTE — H&P ADULT - NSICDXFAMILYHX_GEN_ALL_CORE_FT
FAMILY HISTORY:  Family history of diabetes mellitus    Grandparent  Still living? Unknown  Family history of hypertension, Age at diagnosis: Age Unknown n/a

## 2023-06-14 NOTE — H&P ADULT - HISTORY OF PRESENT ILLNESS
"Chief Complaint  URI    Subjective          Juan Wheatley presents to [facility]  History of Present Illness  Patient was out of the state was in [place] last week flown in airplane came back this weekend states that since starting about since Sunday he started getting sick he said yesterday he started running a fever cough and congestion body aches feeling like he has the flu he says he had his all of his COVID shots he says otherwise no other difficulties  URI   Associated symptoms include coughing and rhinorrhea. Pertinent negatives include no congestion, ear pain, nausea, rash or sore throat.     Objective   Vital Signs:   /85   Pulse 98   Temp 98 °F (36.7 °C)   Ht 182.9 cm (72\")   Wt (!) 143 kg (315 lb)   SpO2 99%   BMI 42.72 kg/m²     Body mass index is 42.72 kg/m².    Review of Systems   Constitutional:  Positive for fatigue and fever.   HENT:  Positive for rhinorrhea and sinus pressure. Negative for congestion, dental problem, ear discharge, ear pain and sore throat.    Respiratory:  Positive for cough and shortness of breath. Negative for apnea and chest tightness.    Gastrointestinal:  Negative for constipation and nausea.   Endocrine: Negative for polyuria.   Genitourinary:  Negative for difficulty urinating.   Musculoskeletal:  Negative for arthralgias and gait problem.   Skin:  Negative for rash.   Hematological:  Negative for adenopathy.     Past History:  Medical History: has no past medical history on file.   Surgical History: has a past surgical history that includes Knee surgery (2017) and Hip surgery (2017).         Current Outpatient Medications:     losartan (COZAAR) 100 MG tablet, Take 1 tablet by mouth Daily., Disp: 90 tablet, Rfl: 1    omeprazole (priLOSEC) 20 MG capsule, Take 1 capsule by mouth Daily., Disp: 90 capsule, Rfl: 3    traMADol (ULTRAM) 50 MG tablet, Take 1 tablet by mouth 2 (Two) Times a Day As Needed for Moderate Pain., Disp: 60 tablet, " Please advise.    42yo  @ 11w2d by LMP presenting with incomplete .  Last week was spotting and today started having vaginal bleeding with pelvic cramping.  Was scheduled for D+C at [facility], however drank fluids just prior to procedure time so was not able to proceed with procedure at that time, and was subsequently sent here for add-on D+C.  She reports having soaked through one pad in approximately 1 hour of time since arriving at the ED.  Denies dizziness or SOB, palpitations.  Denies nausea, vomiting.  Reports strong pelvic cramping.      OBHx:   G1: ; 8w, first trimester mAB w/ D&C  G2: ; second trimester PTD at 22w  G3: ; second trimester PTD at unknown gestational age  G4: ; second trimester PTD at 18w, had cerclage placed at [facility]  G5: ; first timester mAB with D&C  G6: ; C/S at 28w for PTL, had cerclage in place. Breech presentation. C/b gestational diabetes - diet controlled  G7: ; first timester mAB with D&C  G8: ; first timester mAB with D&C  G9: 2017; second trimester PTD at 18w - was planning for cerclage but delivered before     FH: Diabetes in mother and maternal aunt  Aurelia (mini) in mother  HTN in maternal grandmother     GYNHx: fibroid uterus (anterior intramural fibroid measures 2 x 1.1 x 2.5 cm, "a dominant posterior intramural fibroid measures 4.8 x 3.4 x 4.2 cm, a lower uterine segment fibroid measures 3.3 x 1.9 x 2.4 cm and a right pedunculated fibroid measures 8.8 x 7.4 x 6.2 cm). Hx of Menometrorrhagia requiring 6 blood transfusions.   PSH: Cerclage x2, C/S, no anesth complic  PMH: pre-diabetes - last A1c 6.1 in 2017, thryoid cyst (benign), episodes of hypertension in the past (not currently being treated)  NKDA  Meds: previously on daily metformin (unknown dose) but stopped when found out she was pregnant Rfl: 0    Nirmatrelvir&Ritonavir 300/100 (PAXLOVID) 20 x 150 MG & 10 x 100MG tablet therapy pack tablet, Take 3 tablets by mouth 2 (Two) Times a Day., Disp: 30 tablet, Rfl: 0    promethazine-dextromethorphan (PROMETHAZINE-DM) 6.25-15 MG/5ML solution, Take 5 mL by mouth 4 (Four) Times a Day As Needed for Cough., Disp: 150 mL, Rfl: 2    Allergies: Morphine and Topiramate    Physical Exam  Vitals reviewed.   Constitutional:       Appearance: Normal appearance.   HENT:      Head: Normocephalic.      Right Ear: Tympanic membrane, ear canal and external ear normal.      Left Ear: Tympanic membrane, ear canal and external ear normal.      Nose: Nose normal.      Mouth/Throat:      Pharynx: Oropharynx is clear.      Comments: Cobblestoning erythema  Eyes:      Pupils: Pupils are equal, round, and reactive to light.   Cardiovascular:      Rate and Rhythm: Normal rate and regular rhythm.      Pulses: Normal pulses.   Pulmonary:      Effort: Pulmonary effort is normal.      Breath sounds: Normal breath sounds.      Comments: Coughing  Abdominal:      General: Abdomen is flat. Bowel sounds are normal.      Palpations: Abdomen is soft.   Musculoskeletal:         General: Normal range of motion.   Skin:     General: Skin is warm and dry.   Neurological:      General: No focal deficit present.      Mental Status: He is alert and oriented to person, place, and time.        Result Review :                   Assessment and Plan    There are no diagnoses linked to this encounter.          Follow Up   No follow-ups on file.  Patient was given instructions and counseling regarding his condition or for health maintenance advice. Please see specific information pulled into the AVS if appropriate.     Ryan Morales MD

## 2023-08-01 ENCOUNTER — INPATIENT (INPATIENT)
Facility: HOSPITAL | Age: 48
LOS: 2 days | Discharge: HOME | DRG: 287 | End: 2023-08-04
Attending: INTERNAL MEDICINE | Admitting: INTERNAL MEDICINE
Payer: MEDICAID

## 2023-08-01 VITALS
OXYGEN SATURATION: 100 % | SYSTOLIC BLOOD PRESSURE: 178 MMHG | DIASTOLIC BLOOD PRESSURE: 88 MMHG | RESPIRATION RATE: 18 BRPM | HEART RATE: 85 BPM | WEIGHT: 220.02 LBS | TEMPERATURE: 99 F

## 2023-08-01 DIAGNOSIS — Z98.89 OTHER SPECIFIED POSTPROCEDURAL STATES: Chronic | ICD-10-CM

## 2023-08-01 DIAGNOSIS — Z98.890 OTHER SPECIFIED POSTPROCEDURAL STATES: Chronic | ICD-10-CM

## 2023-08-01 LAB
ALBUMIN SERPL ELPH-MCNC: 3.4 G/DL — SIGNIFICANT CHANGE UP (ref 3.3–5)
ALP SERPL-CCNC: 68 U/L — SIGNIFICANT CHANGE UP (ref 40–120)
ALT FLD-CCNC: 15 U/L — SIGNIFICANT CHANGE UP (ref 10–45)
ANION GAP SERPL CALC-SCNC: 11 MMOL/L — SIGNIFICANT CHANGE UP (ref 5–17)
APPEARANCE UR: CLEAR — SIGNIFICANT CHANGE UP
AST SERPL-CCNC: 26 U/L — SIGNIFICANT CHANGE UP (ref 10–40)
BACTERIA # UR AUTO: PRESENT /HPF
BASOPHILS # BLD AUTO: 0.05 K/UL — SIGNIFICANT CHANGE UP (ref 0–0.2)
BASOPHILS NFR BLD AUTO: 1 % — SIGNIFICANT CHANGE UP (ref 0–2)
BILIRUB SERPL-MCNC: 0.2 MG/DL — SIGNIFICANT CHANGE UP (ref 0.2–1.2)
BILIRUB UR-MCNC: NEGATIVE — SIGNIFICANT CHANGE UP
BUN SERPL-MCNC: 12 MG/DL — SIGNIFICANT CHANGE UP (ref 7–23)
CALCIUM SERPL-MCNC: 8.7 MG/DL — SIGNIFICANT CHANGE UP (ref 8.4–10.5)
CHLORIDE SERPL-SCNC: 104 MMOL/L — SIGNIFICANT CHANGE UP (ref 96–108)
CO2 SERPL-SCNC: 23 MMOL/L — SIGNIFICANT CHANGE UP (ref 22–31)
COLOR SPEC: YELLOW — SIGNIFICANT CHANGE UP
COMMENT - URINE: SIGNIFICANT CHANGE UP
CREAT SERPL-MCNC: 0.86 MG/DL — SIGNIFICANT CHANGE UP (ref 0.5–1.3)
D DIMER BLD IA.RAPID-MCNC: 936 NG/ML DDU — HIGH
DIFF PNL FLD: NEGATIVE — SIGNIFICANT CHANGE UP
EGFR: 84 ML/MIN/1.73M2 — SIGNIFICANT CHANGE UP
EOSINOPHIL # BLD AUTO: 0.11 K/UL — SIGNIFICANT CHANGE UP (ref 0–0.5)
EOSINOPHIL NFR BLD AUTO: 2.1 % — SIGNIFICANT CHANGE UP (ref 0–6)
EPI CELLS # UR: SIGNIFICANT CHANGE UP /HPF (ref 0–5)
GLUCOSE SERPL-MCNC: 131 MG/DL — HIGH (ref 70–99)
GLUCOSE UR QL: NEGATIVE — SIGNIFICANT CHANGE UP
HCG SERPL-ACNC: <0 MIU/ML — SIGNIFICANT CHANGE UP
HCT VFR BLD CALC: 27.8 % — LOW (ref 34.5–45)
HGB BLD-MCNC: 7.8 G/DL — LOW (ref 11.5–15.5)
IMM GRANULOCYTES NFR BLD AUTO: 1.4 % — HIGH (ref 0–0.9)
KETONES UR-MCNC: NEGATIVE — SIGNIFICANT CHANGE UP
LEUKOCYTE ESTERASE UR-ACNC: NEGATIVE — SIGNIFICANT CHANGE UP
LIDOCAIN IGE QN: 51 U/L — SIGNIFICANT CHANGE UP (ref 7–60)
LYMPHOCYTES # BLD AUTO: 1.59 K/UL — SIGNIFICANT CHANGE UP (ref 1–3.3)
LYMPHOCYTES # BLD AUTO: 30.9 % — SIGNIFICANT CHANGE UP (ref 13–44)
MCHC RBC-ENTMCNC: 23.4 PG — LOW (ref 27–34)
MCHC RBC-ENTMCNC: 28.1 GM/DL — LOW (ref 32–36)
MCV RBC AUTO: 83.2 FL — SIGNIFICANT CHANGE UP (ref 80–100)
MONOCYTES # BLD AUTO: 0.62 K/UL — SIGNIFICANT CHANGE UP (ref 0–0.9)
MONOCYTES NFR BLD AUTO: 12 % — SIGNIFICANT CHANGE UP (ref 2–14)
NEUTROPHILS # BLD AUTO: 2.71 K/UL — SIGNIFICANT CHANGE UP (ref 1.8–7.4)
NEUTROPHILS NFR BLD AUTO: 52.6 % — SIGNIFICANT CHANGE UP (ref 43–77)
NITRITE UR-MCNC: NEGATIVE — SIGNIFICANT CHANGE UP
NRBC # BLD: 2 /100 WBCS — HIGH (ref 0–0)
NT-PROBNP SERPL-SCNC: 130 PG/ML — SIGNIFICANT CHANGE UP (ref 0–300)
PH UR: 7 — SIGNIFICANT CHANGE UP (ref 5–8)
PLATELET # BLD AUTO: 510 K/UL — HIGH (ref 150–400)
POTASSIUM SERPL-MCNC: 3.9 MMOL/L — SIGNIFICANT CHANGE UP (ref 3.5–5.3)
POTASSIUM SERPL-SCNC: 3.9 MMOL/L — SIGNIFICANT CHANGE UP (ref 3.5–5.3)
PROT SERPL-MCNC: 6.3 G/DL — SIGNIFICANT CHANGE UP (ref 6–8.3)
PROT UR-MCNC: 100 MG/DL
RBC # BLD: 3.34 M/UL — LOW (ref 3.8–5.2)
RBC # FLD: 28.4 % — HIGH (ref 10.3–14.5)
RBC CASTS # UR COMP ASSIST: < 5 /HPF — SIGNIFICANT CHANGE UP
SODIUM SERPL-SCNC: 138 MMOL/L — SIGNIFICANT CHANGE UP (ref 135–145)
SP GR SPEC: 1.02 — SIGNIFICANT CHANGE UP (ref 1–1.03)
TROPONIN T, HIGH SENSITIVITY RESULT: <6 NG/L — SIGNIFICANT CHANGE UP (ref 0–51)
UROBILINOGEN FLD QL: 4 E.U./DL
WBC # BLD: 5.15 K/UL — SIGNIFICANT CHANGE UP (ref 3.8–10.5)
WBC # FLD AUTO: 5.15 K/UL — SIGNIFICANT CHANGE UP (ref 3.8–10.5)
WBC UR QL: ABNORMAL /HPF

## 2023-08-01 PROCEDURE — 71046 X-RAY EXAM CHEST 2 VIEWS: CPT | Mod: 26

## 2023-08-01 PROCEDURE — 99285 EMERGENCY DEPT VISIT HI MDM: CPT

## 2023-08-01 PROCEDURE — G1004: CPT

## 2023-08-01 PROCEDURE — 71275 CT ANGIOGRAPHY CHEST: CPT | Mod: 26,ME

## 2023-08-01 RX ORDER — GLUCAGON INJECTION, SOLUTION 0.5 MG/.1ML
1 INJECTION, SOLUTION SUBCUTANEOUS ONCE
Refills: 0 | Status: DISCONTINUED | OUTPATIENT
Start: 2023-08-01 | End: 2023-08-04

## 2023-08-01 RX ORDER — METOCLOPRAMIDE HCL 10 MG
5 TABLET ORAL THREE TIMES A DAY
Refills: 0 | Status: DISCONTINUED | OUTPATIENT
Start: 2023-08-01 | End: 2023-08-04

## 2023-08-01 RX ORDER — SODIUM CHLORIDE 9 MG/ML
1000 INJECTION, SOLUTION INTRAVENOUS
Refills: 0 | Status: DISCONTINUED | OUTPATIENT
Start: 2023-08-01 | End: 2023-08-04

## 2023-08-01 RX ORDER — ACETAMINOPHEN 500 MG
650 TABLET ORAL EVERY 6 HOURS
Refills: 0 | Status: DISCONTINUED | OUTPATIENT
Start: 2023-08-01 | End: 2023-08-04

## 2023-08-01 RX ORDER — PANTOPRAZOLE SODIUM 20 MG/1
40 TABLET, DELAYED RELEASE ORAL
Refills: 0 | Status: DISCONTINUED | OUTPATIENT
Start: 2023-08-01 | End: 2023-08-04

## 2023-08-01 RX ORDER — DEXTROSE 50 % IN WATER 50 %
25 SYRINGE (ML) INTRAVENOUS ONCE
Refills: 0 | Status: DISCONTINUED | OUTPATIENT
Start: 2023-08-01 | End: 2023-08-04

## 2023-08-01 RX ORDER — LOSARTAN POTASSIUM 100 MG/1
25 TABLET, FILM COATED ORAL DAILY
Refills: 0 | Status: DISCONTINUED | OUTPATIENT
Start: 2023-08-01 | End: 2023-08-02

## 2023-08-01 RX ORDER — NIFEDIPINE 30 MG
10 TABLET, EXTENDED RELEASE 24 HR ORAL ONCE
Refills: 0 | Status: COMPLETED | OUTPATIENT
Start: 2023-08-01 | End: 2023-08-01

## 2023-08-01 RX ORDER — FAMOTIDINE 10 MG/ML
20 INJECTION INTRAVENOUS ONCE
Refills: 0 | Status: COMPLETED | OUTPATIENT
Start: 2023-08-01 | End: 2023-08-01

## 2023-08-01 RX ORDER — URSODIOL 250 MG/1
300 TABLET, FILM COATED ORAL EVERY 12 HOURS
Refills: 0 | Status: DISCONTINUED | OUTPATIENT
Start: 2023-08-01 | End: 2023-08-02

## 2023-08-01 RX ORDER — DEXTROSE 50 % IN WATER 50 %
12.5 SYRINGE (ML) INTRAVENOUS ONCE
Refills: 0 | Status: DISCONTINUED | OUTPATIENT
Start: 2023-08-01 | End: 2023-08-04

## 2023-08-01 RX ORDER — FOLIC ACID 0.8 MG
1 TABLET ORAL DAILY
Refills: 0 | Status: DISCONTINUED | OUTPATIENT
Start: 2023-08-01 | End: 2023-08-04

## 2023-08-01 RX ORDER — BUPROPION HYDROCHLORIDE 150 MG/1
150 TABLET, EXTENDED RELEASE ORAL DAILY
Refills: 0 | Status: DISCONTINUED | OUTPATIENT
Start: 2023-08-01 | End: 2023-08-04

## 2023-08-01 RX ORDER — INSULIN LISPRO 100/ML
VIAL (ML) SUBCUTANEOUS
Refills: 0 | Status: DISCONTINUED | OUTPATIENT
Start: 2023-08-01 | End: 2023-08-04

## 2023-08-01 RX ORDER — ASPIRIN/CALCIUM CARB/MAGNESIUM 324 MG
324 TABLET ORAL ONCE
Refills: 0 | Status: COMPLETED | OUTPATIENT
Start: 2023-08-01 | End: 2023-08-01

## 2023-08-01 RX ORDER — CHLORTHALIDONE 50 MG
25 TABLET ORAL DAILY
Refills: 0 | Status: DISCONTINUED | OUTPATIENT
Start: 2023-08-01 | End: 2023-08-02

## 2023-08-01 RX ORDER — ATORVASTATIN CALCIUM 80 MG/1
40 TABLET, FILM COATED ORAL AT BEDTIME
Refills: 0 | Status: DISCONTINUED | OUTPATIENT
Start: 2023-08-01 | End: 2023-08-04

## 2023-08-01 RX ORDER — DEXTROSE 50 % IN WATER 50 %
15 SYRINGE (ML) INTRAVENOUS ONCE
Refills: 0 | Status: DISCONTINUED | OUTPATIENT
Start: 2023-08-01 | End: 2023-08-04

## 2023-08-01 RX ADMIN — ATORVASTATIN CALCIUM 40 MILLIGRAM(S): 80 TABLET, FILM COATED ORAL at 23:49

## 2023-08-01 RX ADMIN — FAMOTIDINE 20 MILLIGRAM(S): 10 INJECTION INTRAVENOUS at 19:38

## 2023-08-01 RX ADMIN — FAMOTIDINE 20 MILLIGRAM(S): 10 INJECTION INTRAVENOUS at 23:49

## 2023-08-01 RX ADMIN — Medication 30 MILLILITER(S): at 19:38

## 2023-08-01 RX ADMIN — Medication 324 MILLIGRAM(S): at 23:49

## 2023-08-01 NOTE — H&P ADULT - ASSESSMENT
47 y/o Obese female, current  smoker with  PMHx non compliance with medication Etoh abuse, anemia, HTN, DM, HLD, cholelithiasis, gastric bypass surgery (1998), , abdominal myoplasty (2018) presents to [facility] ER this evening, 8/1/23, complaining of substernal chest pressure, epigastric pressure associated with belching and mild BURNETT when ambulating  two  blocks.     47 y/o Obese female, current  smoker (1ppd X>10yrs ) with  PMHx non compliance with medication Etoh abuse, anemia, HTN, DM, HLD, cholelithiasis, gastric bypass surgery (1998), , abdominal myoplasty (2018) presents to [facility] ER this evening, 8/1/23, complaining of substernal chest pressure, epigastric pressure associated with belching and mild BURNETT when ambulating  two  blocks.

## 2023-08-01 NOTE — ED ADULT TRIAGE NOTE - CHIEF COMPLAINT QUOTE
Pt presents to ED c/o biliary vomitus started yesterday and today with epigastric pain. Pt has hx of htn, hdl. Denies fever, chills, sob, bowel or bladder loss. Neuro intact. Denies bloody emesis. EKG in prog. Pt reports not taking her anti htn medications today.

## 2023-08-01 NOTE — ED ADULT TRIAGE NOTE - PRO INTERPRETER NEED 2
Pt presents to ED with c/o left side neck pain that started 3 days ago. Pt denies any recent trauma/injury. Pt AAOx4,  NAD noted.   
English

## 2023-08-01 NOTE — ED PROVIDER NOTE - CONSIDERATION OF ADMISSION OBSERVATION
pt with epig/lower CP that she attribute to gallstones; hx of DM, HTN, + smoker, alcohol abuse. never had cardiac workup. Consideration of Admission/Observation

## 2023-08-01 NOTE — H&P ADULT - NSHPLABSRESULTS_GEN_ALL_CORE
7.8    5.15  )-----------( 510      ( 01 Aug 2023 20:12 )             27.8       08-01    138  |  104  |  12  ----------------------------<  131<H>  3.9   |  23  |  0.86    Ca    8.7      01 Aug 2023 20:12    TPro  6.3  /  Alb  3.4  /  TBili  0.2  /  DBili  x   /  AST  26  /  ALT  15  /  AlkPhos  68  08-01                Urinalysis Basic - ( 01 Aug 2023 20:12 )    Color: x / Appearance: x / SG: x / pH: x  Gluc: 131 mg/dL / Ketone: x  / Bili: x / Urobili: x   Blood: x / Protein: x / Nitrite: x   Leuk Esterase: x / RBC: x / WBC x   Sq Epi: x / Non Sq Epi: x / Bacteria: x        EKG: NSR@80bpm with non-specific ST-T wave changes

## 2023-08-01 NOTE — ED PROVIDER NOTE - ATTENDING APP SHARED VISIT CONTRIBUTION OF CARE
47 yo fem smoker PMHx DM, HTN, fe def anemia, biliary colic, EtOH abuse  c/o discomfort in lower chest-epigastric region for the past couple of days. reports cp is worse on exertion and associated with sob. denies f/c/cough/n/v/d/dysuria/hematuria/rash. On exam, bp 170/106, VS otherwise wnl,   GENERAL:  Awake, alert and in NAD, non-toxic appearing  ENMT: Airway patent  EYES: conjunctiva clear  CARDIAC: Regular rate, regular rhythm.  Heart sounds S1, S2, no S3, S4. No murmurs, rubs or gallops.  RESPIRATORY: Breath sounds clear and equal in bilateral anterior lung fields, no wheezes/ronchi/crackles/stridor; pt breathing and speaking comfortably with no increased WOB, no accessory mm. use, no intercostal retractions, no nasal flaring  GI: abdomen soft, non-distended, non-tender, no rebound or guarding in ruq/luq/epigastric region/rlq/llq   SKIN: warm and dry, no rashes  PSYCH: awake, alert, calm and cooperative  EXTREMITIES: no ble edema  NEURO: gcs 15    ddx: acs vs pancreatitis vs pe. given elevated bp concern for htn urgency    Plan:  - ekg nsr no ryan/std, single twi in III  - dimer elevated, ctpe negative for pe  - troponin negative  - cxr clear  - will give asa, pepcid.   - will give nifedipine for elevated bp  - given cp and elevated bp, on reassessment pt continues to report cp, heart score 4, concern for acs however given hx of gallstones us ordered for ruq  - d/w cardiology fellow will admit to cardiology

## 2023-08-01 NOTE — H&P ADULT - PROBLEM SELECTOR PLAN 1
Telemetry, ECG Troponin neg X1    -CTA of chest w/ contrast no PE no acute pathology seen  -Troponin in AM  -NPO for further cardiac work up to rule out ACS (CCTA vs Stress)  -TTE in AM

## 2023-08-01 NOTE — H&P ADULT - NSHPPHYSICALEXAM_GEN_ALL_CORE
T(C): 36.9 (08-01-23 @ 21:41), Max: 37 (08-01-23 @ 17:15)  HR: 83 (08-01-23 @ 21:41) (83 - 85)  BP: 170/106 (08-01-23 @ 21:41) (170/106 - 178/88)  RR: 18 (08-01-23 @ 21:41) (18 - 18)  SpO2: 100% (08-01-23 @ 21:41) (100% - 100%)  Wt(kg): --    Appearance: NAD	  HEENT:   Normal oral mucosa,  EOMI	  Neck: Supple,- JVD; No Carotid Bruit and 2+ pulses B/L  Cardiovascular: Normal S1 S2, No JVD, No murmurs  Respiratory: Lungs clear to auscultation/Decreased Breath Sounds/No Rales, Rhonchi, Wheezing	  Gastrointestinal:  Soft, Non-tender, + BS	  Skin: No rashes, No ecchymoses, No cyanosis  Extremities: Normal range of motion, No clubbing, cyanosis or edema  Vascular: Femoral pulses 2+ b/l without bruit, DP 1+ b/l, PT 1+ b/l  Neurologic: Non-focal  Psychiatry: A & O x 4, Mood & affect appropriate T(C): 36.9 (08-01-23 @ 21:41), Max: 37 (08-01-23 @ 17:15)  HR: 83 (08-01-23 @ 21:41) (83 - 85)  BP: 170/106 (08-01-23 @ 21:41) (170/106 - 178/88)  RR: 18 (08-01-23 @ 21:41) (18 - 18)  SpO2: 100% (08-01-23 @ 21:41) (100% - 100%)  Wt(kg): --    Appearance: NAD	  HEENT:   Normal oral mucosa,  EOMI	  Neck: Supple,- JVD; No Carotid Bruit and 2+ pulses B/L  Cardiovascular: Normal S1 S2, No JVD, No murmurs  Respiratory: Lungs clear to auscultation/Decreased Breath Sounds/No Rales, Rhonchi, Wheezing	  Gastrointestinal:  Soft, Non-tender, + BS	  Skin: No rashes, No ecchymoses, No cyanosis  Extremities: Normal range of motion, No clubbing, cyanosis or edema  Vascular: Femoral pulses 2+ b/l without bruit, DP 2+ b/l, PT 1+ b/l  Neurologic: Non-focal  Psychiatry: A & O x 4, Mood & affect appropriate

## 2023-08-01 NOTE — H&P ADULT - NSHPSOCIALHISTORY_GEN_ALL_CORE
lives with her ,  current smoker, EtOH use and denies illicit drug use  lives with her ,  current smoker 1ppd X>10yrs , EtOH abuse one pint a week of vodka and denies illicit drug use

## 2023-08-01 NOTE — H&P ADULT - REASON FOR ADMISSION
Intermittent, non exertional, non radiating substernal chest pressure along with epigastric pressure associated with mild BURNETT.

## 2023-08-01 NOTE — ED ADULT NURSE NOTE - OBJECTIVE STATEMENT
"Pt presents to ED c/o biliary vomitus started yesterday and today with epigastric pain. Pt has hx of htn, hdl. Denies fever, chills, sob, bowel or bladder loss. Neuro intact. Denies bloody emesis. EKG in prog. Pt reports not taking her anti htn medications today."    triage note above.  pt confirms c/o abd pain, n/v.  denies fever, chills, cp, sob, diarrhea, urinary sx, bloody stool or vomitus.

## 2023-08-01 NOTE — ED ADULT NURSE NOTE - NSICDXPASTMEDICALHX_GEN_ALL_CORE_FT
PAST MEDICAL HISTORY:  Anemia     Diabetes     ETOH abuse     Fibroids     GERD (gastroesophageal reflux disease)     HLD (hyperlipidemia)     HTN (hypertension)     Thyroid nodule

## 2023-08-01 NOTE — ED PROVIDER NOTE - CLINICAL SUMMARY MEDICAL DECISION MAKING FREE TEXT BOX
CP/epigastric pain, belching, nausea.  Also with ankle/pedal edema.  Cardiac RF: HTN, DM, smoker; poor historian.  Consider ACS, CHF, pancreatitis, biliary colic, gastritis/PUD (no melena to suggest UGIB).  EKG left axis, nonsischemic. CXR cardiomegaly.  Will get labs including cardiac markers, belly labs. CP/epigastric pain, belching, nausea.  Also with BURNETT and ankle/pedal edema.  Cardiac RF: HTN, DM, smoker; poor historian.  Consider ACS, CHF, PE, pancreatitis, biliary colic, gastritis/PUD (no melena to suggest UGIB).  EKG left axis, nonsischemic. CXR cardiomegaly.  Will get labs including cardiac markers, belly labs.

## 2023-08-01 NOTE — H&P ADULT - NSHPREVIEWOFSYSTEMS_GEN_ALL_CORE
GENERAL, CONSTITUTIONAL : denies recent weight loss, fever, chills  EYES, VISION: denies changes in vision   EARS, NOSE, THROAT: denies hearing loss  HEART, CARDIOVASCULAR: admits to substernal  chest pressure, denies  arrhythmia, palpitations  RESPIRATORY: admits to mild BURNETT when ambulating   Denies cough, SOB, wheezing, PND, orthopnea;   GASTROINTESTINAL: admtis to epigastric pressure, heartburn, denies bloody stool, dark tarry stool  GENITOURINARY: Denies frequent urination, urgency  MUSCULOSKELETAL denies joint pain or swelling, restricted motion, musculoskeletal pain.   SKIN & INTEGUMENTARY Denies rashes, sores, blisters, blisters, growths.  NEUROLOGICAL: Denies numbness or tingling sensations, sensation loss, burning.   PSYCHIATRIC: Denies nervousness, anxiety, depression  ENDOCRINE Denies heat or cold intolerance, excessive thirst  HEMATOLOGIC/LYMPHATIC: Denies abnormal bleeding, bleeding of any kind, hx of Anemia (Iron Deficiency)

## 2023-08-01 NOTE — H&P ADULT - HISTORY OF PRESENT ILLNESS
A&O X4 Full Code    47 y/o female, current  smoker with  PMH Etoh abuse, anemia, HTN, DM, HLD, cholelithiasis, gastric bypass surgery (1998), , abdominal myoplasty (2018) presents to [facility] ER this evening, 8/1/23, complaining of substernal chest pressure, epigastric pressure associated with belching and mild BURNETT when ambulating       blocks.    In speaking with patient, she reports intermittent, non exertional substernal chest pressure with epigastric pressure, described as "heavy" 8/10, associated with belching and mild BURNETT when ambulating    blocks.  Pt. also endorses b/l ankle swelling  for several days.   She states epigastric pressure feels similar to prior gallbladder pain and believes her chest pressure and mild BURNETT is caused by her anemia. According to patient, she has not had a cardiac workup in years.  Pt. denies fever, chills, HA, SOB palpitations, diaphoresis, dizziness, back pain, melena, and n/v/d.     In ER ECG reveals NSR@80bpm with non specific ST-T wave changes, Troponin T Sensitivity neg X1, , D-dimer 936, H/H 7.8/27.8 Plts 510, WBC 5.15 BUN/Cr 12/0.86.  CXR PA/LAT no acute pathology seen F/U official report, CTA PE protocol w/ IV contrast reveals no PE.  VSS Temp 98.6, HR85 /88, RR 18 O2 %.    In light of patient's risk factors and symptoms, pt. is admitted to [facility], Cardiology, for cardiac workup to rule out ACS; TTE in AM.      A&O X4 Full Code    47 y/o Obese female, current  smoker with  PMHx of non compliance with medication Etoh abuse, anemia, HTN, DM, HLD, cholelithiasis, gastric bypass surgery (1998), , abdominal myoplasty (2018) presents to [facility] ER this evening, 8/1/23, complaining of substernal chest pressure, epigastric pressure associated with belching and mild BURNETT when ambulating  two  blocks.    In speaking with patient, she reports intermittent, non exertional substernal chest pressure with epigastric pressure, described as "heavy" 8/10, associated with belching and mild BURNETT when ambulating  two  blocks.  Pt. also endorses b/l ankle swelling  for several days.   She states epigastric pressure feels similar to prior gallbladder pain and believes her chest pressure and mild BURNETT is caused by her anemia. According to patient, she has not had a cardiac workup in years.  Pt. denies fever, chills, HA, SOB palpitations, diaphoresis, dizziness, back pain, melena, and n/v/d.     In ER ECG reveals NSR@80bpm with non specific ST-T wave changes, Troponin T Sensitivity neg X1, , D-dimer 936, H/H 7.8/27.8 Plts 510, WBC 5.15 BUN/Cr 12/0.86.  CXR PA/LAT no acute pathology seen F/U official report, CTA PE protocol w/ IV contrast reveals no PE.  VSS Temp 98.6, HR85 /88, RR 18 O2 %.    In light of patient's risk factors and symptoms, pt. is admitted to [facility], Cardiology, for cardiac workup to rule out ACS; TTE in AM.      A&O X4 Full Code    49 y/o Obese female, current  smoker (1ppd X>10yrs ) with  PMHx of non compliance with medication Etoh abuse, anemia, HTN, DM, HLD, cholelithiasis, gastric bypass surgery (1998), , abdominal myoplasty (2018) presents to [facility] ER this evening, 8/1/23, complaining of substernal chest pressure, epigastric pressure associated with belching and mild BURNETT when ambulating  two  blocks.    In speaking with patient, she reports intermittent, non exertional substernal chest pressure with epigastric pressure, described as "heavy" 8/10, associated with belching and mild BURNETT when ambulating  two  blocks.  Pt. also endorses b/l ankle swelling  for several days.   She states epigastric pressure feels similar to prior gallbladder pain and believes her chest pressure and mild BURNETT is caused by her anemia. According to patient, she has not had a cardiac workup in years.  Pt. denies fever, chills, HA, SOB palpitations, diaphoresis, dizziness, back pain, melena, and n/v/d.     In ER ECG reveals NSR@80bpm with non specific ST-T wave changes, Troponin T Sensitivity neg X1, , D-dimer 936, H/H 7.8/27.8 Plts 510, WBC 5.15 BUN/Cr 12/0.86.  CXR PA/LAT no acute pathology seen F/U official report, CTA PE protocol w/ IV contrast reveals no PE.  VSS Temp 98.6, HR85 /88, RR 18 O2 %.    In light of patient's risk factors and symptoms, pt. is admitted to [facility], Cardiology, for cardiac workup to rule out ACS; TTE in AM.

## 2023-08-01 NOTE — H&P ADULT - PROBLEM SELECTOR PLAN 7
Monitor CBC  H/H 7.8/27.8  (baseline 9-10)  -continue Ferrous Sulfate 325mg PO daily  -pt. takes Ergocalciferol 1.25mg PO weekly

## 2023-08-01 NOTE — ED PROVIDER NOTE - PROGRESS NOTE DETAILS
Pt feeling better, no longer belching or nauseated.  Asking to eat, will feed.  Labs reassuring. CTA PE study pending. Pt feeling better, no longer has pain, belching or nausea.  Asking to eat, will feed.  Labs reassuring. CTA PE study pending. MD Meche: d/w cardiology pa

## 2023-08-01 NOTE — H&P ADULT - PROBLEM SELECTOR PLAN 2
Patient DM2 with hx of gastroparesis, stopped taking Reglan 5mg PO tid with meals. Hx of non compliance  consider Abdominal US?  -Protonix 40mg IV X1  -Protonix 40mg PO daily

## 2023-08-01 NOTE — H&P ADULT - PROBLEM SELECTOR PLAN 6
Monitor FS; f/u Hgb A1c  -hold patient's Metformin 1000mg PO bid; continue Insulin Lispro Med dose sliding scale  -depending on Hgb A1c possible Endocrine consult

## 2023-08-01 NOTE — ED PROVIDER NOTE - OBJECTIVE STATEMENT
49 yo fem PMHx DM, HTN, fe def anemia, biliary colic,   c/o discomfort in lower chest-epigastric region for the past couple of days.  poorly described discomfort, states "feels like something there" and "heavy".  + assoc belching and mild BURNETT.  Also states ankles swollen for a few days.  She says this feels similar to prior gallbladder pain, and she says she thinks her anemia is contributing to symptoms.      No hx of heart problems, DVT/PE, cancer, exogenous estrogen use, hemoptysis, recent immobilization.  Never had cardiac stress test.  Social: + tobacco smoker, no cocaine/other drug use  Family: no hx of CAD or sudden death in first deg relatives. 47 yo fem PMHx DM, HTN, fe def anemia, biliary colic,   c/o discomfort in lower chest-epigastric region for the past couple of days.  poorly described discomfort, states "feels like something there" and "heavy".  + assoc belching and mild BURNETT.  Also states ankles swollen for a few days.  She says this feels similar to prior gallbladder pain, and she says she thinks her anemia is contributing to symptoms.      PSHx: D&C, myomectomy, gastric bypass  No hx of heart problems, DVT/PE, cancer, exogenous estrogen use, hemoptysis, recent surgery or immobilization.  Never had cardiac stress test.  Social: + tobacco smoker, no cocaine/other drug use  Family: no hx of CAD or sudden death in first deg relatives. 47 yo fem PMHx DM, HTN, fe def anemia, biliary colic, EtOH abuse  c/o discomfort in lower chest-epigastric region for the past couple of days.  poorly described discomfort, states "feels like something there" and "heavy".  + assoc belching and mild BURNETT.  Also states ankles swollen for a few days.  She says this feels similar to prior gallbladder pain, and she says she thinks her anemia is contributing to symptoms.      PSHx: D&C, myomectomy, gastric bypass  No hx of heart problems, DVT/PE, cancer, exogenous estrogen use, hemoptysis, recent surgery or immobilization.  Never had cardiac stress test.  Social: + tobacco smoker, no cocaine/other drug use  Family: no hx of CAD or sudden death in first deg relatives.

## 2023-08-02 DIAGNOSIS — R79.89 OTHER SPECIFIED ABNORMAL FINDINGS OF BLOOD CHEMISTRY: ICD-10-CM

## 2023-08-02 DIAGNOSIS — Z86.2 PERSONAL HISTORY OF DISEASES OF THE BLOOD AND BLOOD-FORMING ORGANS AND CERTAIN DISORDERS INVOLVING THE IMMUNE MECHANISM: ICD-10-CM

## 2023-08-02 DIAGNOSIS — I10 ESSENTIAL (PRIMARY) HYPERTENSION: ICD-10-CM

## 2023-08-02 DIAGNOSIS — E11.9 TYPE 2 DIABETES MELLITUS WITHOUT COMPLICATIONS: ICD-10-CM

## 2023-08-02 DIAGNOSIS — Z78.9 OTHER SPECIFIED HEALTH STATUS: ICD-10-CM

## 2023-08-02 DIAGNOSIS — R10.13 EPIGASTRIC PAIN: ICD-10-CM

## 2023-08-02 DIAGNOSIS — E78.5 HYPERLIPIDEMIA, UNSPECIFIED: ICD-10-CM

## 2023-08-02 DIAGNOSIS — F10.11 ALCOHOL ABUSE, IN REMISSION: ICD-10-CM

## 2023-08-02 DIAGNOSIS — R07.9 CHEST PAIN, UNSPECIFIED: ICD-10-CM

## 2023-08-02 DIAGNOSIS — F17.200 NICOTINE DEPENDENCE, UNSPECIFIED, UNCOMPLICATED: ICD-10-CM

## 2023-08-02 PROBLEM — F10.10 ALCOHOL ABUSE, UNCOMPLICATED: Chronic | Status: ACTIVE | Noted: 2023-05-11

## 2023-08-02 LAB
A1C WITH ESTIMATED AVERAGE GLUCOSE RESULT: 7.1 % — HIGH (ref 4–5.6)
ALBUMIN SERPL ELPH-MCNC: 3.2 G/DL — LOW (ref 3.3–5)
ALP SERPL-CCNC: 56 U/L — SIGNIFICANT CHANGE UP (ref 40–120)
ALT FLD-CCNC: 12 U/L — SIGNIFICANT CHANGE UP (ref 10–45)
AMPHET UR-MCNC: NEGATIVE — SIGNIFICANT CHANGE UP
ANION GAP SERPL CALC-SCNC: 10 MMOL/L — SIGNIFICANT CHANGE UP (ref 5–17)
ANISOCYTOSIS BLD QL: SIGNIFICANT CHANGE UP
APTT BLD: 30.7 SEC — SIGNIFICANT CHANGE UP (ref 24.5–35.6)
AST SERPL-CCNC: 15 U/L — SIGNIFICANT CHANGE UP (ref 10–40)
BARBITURATES UR SCN-MCNC: NEGATIVE — SIGNIFICANT CHANGE UP
BASOPHILS # BLD AUTO: 0.08 K/UL — SIGNIFICANT CHANGE UP (ref 0–0.2)
BASOPHILS NFR BLD AUTO: 1.8 % — SIGNIFICANT CHANGE UP (ref 0–2)
BENZODIAZ UR-MCNC: NEGATIVE — SIGNIFICANT CHANGE UP
BILIRUB DIRECT SERPL-MCNC: <0.2 MG/DL — SIGNIFICANT CHANGE UP (ref 0–0.3)
BILIRUB INDIRECT FLD-MCNC: SIGNIFICANT CHANGE UP MG/DL (ref 0.2–1)
BILIRUB SERPL-MCNC: <0.2 MG/DL — SIGNIFICANT CHANGE UP (ref 0.2–1.2)
BLD GP AB SCN SERPL QL: NEGATIVE — SIGNIFICANT CHANGE UP
BUN SERPL-MCNC: 11 MG/DL — SIGNIFICANT CHANGE UP (ref 7–23)
BURR CELLS BLD QL SMEAR: PRESENT — SIGNIFICANT CHANGE UP
CALCIUM SERPL-MCNC: 8.8 MG/DL — SIGNIFICANT CHANGE UP (ref 8.4–10.5)
CHLORIDE SERPL-SCNC: 105 MMOL/L — SIGNIFICANT CHANGE UP (ref 96–108)
CHOLEST SERPL-MCNC: 135 MG/DL — SIGNIFICANT CHANGE UP
CO2 SERPL-SCNC: 22 MMOL/L — SIGNIFICANT CHANGE UP (ref 22–31)
COCAINE METAB.OTHER UR-MCNC: NEGATIVE — SIGNIFICANT CHANGE UP
CREAT SERPL-MCNC: 1.03 MG/DL — SIGNIFICANT CHANGE UP (ref 0.5–1.3)
DACRYOCYTES BLD QL SMEAR: SLIGHT — SIGNIFICANT CHANGE UP
EGFR: 68 ML/MIN/1.73M2 — SIGNIFICANT CHANGE UP
EOSINOPHIL # BLD AUTO: 0.16 K/UL — SIGNIFICANT CHANGE UP (ref 0–0.5)
EOSINOPHIL NFR BLD AUTO: 3.5 % — SIGNIFICANT CHANGE UP (ref 0–6)
ESTIMATED AVERAGE GLUCOSE: 157 MG/DL — HIGH (ref 68–114)
GIANT PLATELETS BLD QL SMEAR: PRESENT — SIGNIFICANT CHANGE UP
GLUCOSE BLDC GLUCOMTR-MCNC: 141 MG/DL — HIGH (ref 70–99)
GLUCOSE BLDC GLUCOMTR-MCNC: 171 MG/DL — HIGH (ref 70–99)
GLUCOSE BLDC GLUCOMTR-MCNC: 178 MG/DL — HIGH (ref 70–99)
GLUCOSE BLDC GLUCOMTR-MCNC: 179 MG/DL — HIGH (ref 70–99)
GLUCOSE SERPL-MCNC: 190 MG/DL — HIGH (ref 70–99)
HCT VFR BLD CALC: 26.2 % — LOW (ref 34.5–45)
HDLC SERPL-MCNC: 37 MG/DL — LOW
HGB BLD-MCNC: 7.2 G/DL — LOW (ref 11.5–15.5)
HYPOCHROMIA BLD QL: SLIGHT — SIGNIFICANT CHANGE UP
INR BLD: 0.93 — SIGNIFICANT CHANGE UP (ref 0.85–1.18)
LIPID PNL WITH DIRECT LDL SERPL: 72 MG/DL — SIGNIFICANT CHANGE UP
LYMPHOCYTES # BLD AUTO: 1.35 K/UL — SIGNIFICANT CHANGE UP (ref 1–3.3)
LYMPHOCYTES # BLD AUTO: 30.1 % — SIGNIFICANT CHANGE UP (ref 13–44)
MACROCYTES BLD QL: SLIGHT — SIGNIFICANT CHANGE UP
MAGNESIUM SERPL-MCNC: 1.7 MG/DL — SIGNIFICANT CHANGE UP (ref 1.6–2.6)
MANUAL SMEAR VERIFICATION: SIGNIFICANT CHANGE UP
MCHC RBC-ENTMCNC: 22.9 PG — LOW (ref 27–34)
MCHC RBC-ENTMCNC: 27.5 GM/DL — LOW (ref 32–36)
MCV RBC AUTO: 83.4 FL — SIGNIFICANT CHANGE UP (ref 80–100)
METHADONE UR-MCNC: NEGATIVE — SIGNIFICANT CHANGE UP
MICROCYTES BLD QL: SIGNIFICANT CHANGE UP
MONOCYTES # BLD AUTO: 0.32 K/UL — SIGNIFICANT CHANGE UP (ref 0–0.9)
MONOCYTES NFR BLD AUTO: 7.1 % — SIGNIFICANT CHANGE UP (ref 2–14)
NEUTROPHILS # BLD AUTO: 2.59 K/UL — SIGNIFICANT CHANGE UP (ref 1.8–7.4)
NEUTROPHILS NFR BLD AUTO: 57.5 % — SIGNIFICANT CHANGE UP (ref 43–77)
NON HDL CHOLESTEROL: 98 MG/DL — SIGNIFICANT CHANGE UP
NRBC # BLD: 4 /100 — HIGH (ref 0–0)
NRBC # BLD: SIGNIFICANT CHANGE UP /100 WBCS (ref 0–0)
OPIATES UR-MCNC: NEGATIVE — SIGNIFICANT CHANGE UP
OVALOCYTES BLD QL SMEAR: SLIGHT — SIGNIFICANT CHANGE UP
PCP SPEC-MCNC: SIGNIFICANT CHANGE UP
PCP UR-MCNC: NEGATIVE — SIGNIFICANT CHANGE UP
PLAT MORPH BLD: ABNORMAL
PLATELET # BLD AUTO: 470 K/UL — HIGH (ref 150–400)
POIKILOCYTOSIS BLD QL AUTO: SLIGHT — SIGNIFICANT CHANGE UP
POLYCHROMASIA BLD QL SMEAR: SLIGHT — SIGNIFICANT CHANGE UP
POTASSIUM SERPL-MCNC: 3.7 MMOL/L — SIGNIFICANT CHANGE UP (ref 3.5–5.3)
POTASSIUM SERPL-SCNC: 3.7 MMOL/L — SIGNIFICANT CHANGE UP (ref 3.5–5.3)
PROT SERPL-MCNC: 5.8 G/DL — LOW (ref 6–8.3)
PROTHROM AB SERPL-ACNC: 10.6 SEC — SIGNIFICANT CHANGE UP (ref 9.5–13)
RBC # BLD: 3.14 M/UL — LOW (ref 3.8–5.2)
RBC # FLD: 28.9 % — HIGH (ref 10.3–14.5)
RBC BLD AUTO: ABNORMAL
RH IG SCN BLD-IMP: POSITIVE — SIGNIFICANT CHANGE UP
SODIUM SERPL-SCNC: 137 MMOL/L — SIGNIFICANT CHANGE UP (ref 135–145)
THC UR QL: NEGATIVE — SIGNIFICANT CHANGE UP
TRIGL SERPL-MCNC: 131 MG/DL — SIGNIFICANT CHANGE UP
TSH SERPL-MCNC: 1.56 UIU/ML — SIGNIFICANT CHANGE UP (ref 0.27–4.2)
WBC # BLD: 4.5 K/UL — SIGNIFICANT CHANGE UP (ref 3.8–10.5)
WBC # FLD AUTO: 4.5 K/UL — SIGNIFICANT CHANGE UP (ref 3.8–10.5)

## 2023-08-02 PROCEDURE — 75574 CT ANGIO HRT W/3D IMAGE: CPT | Mod: 26

## 2023-08-02 PROCEDURE — 99222 1ST HOSP IP/OBS MODERATE 55: CPT

## 2023-08-02 PROCEDURE — 93306 TTE W/DOPPLER COMPLETE: CPT | Mod: 26

## 2023-08-02 PROCEDURE — 76705 ECHO EXAM OF ABDOMEN: CPT | Mod: 26

## 2023-08-02 PROCEDURE — 93970 EXTREMITY STUDY: CPT | Mod: 26

## 2023-08-02 RX ORDER — FERROUS SULFATE 325(65) MG
325 TABLET ORAL DAILY
Refills: 0 | Status: DISCONTINUED | OUTPATIENT
Start: 2023-08-02 | End: 2023-08-04

## 2023-08-02 RX ORDER — METFORMIN HYDROCHLORIDE 850 MG/1
1 TABLET ORAL
Refills: 0 | DISCHARGE

## 2023-08-02 RX ORDER — FOLIC ACID 0.8 MG
1 TABLET ORAL
Qty: 0 | Refills: 0 | DISCHARGE

## 2023-08-02 RX ORDER — CHLORTHALIDONE 50 MG
1 TABLET ORAL
Qty: 0 | Refills: 0 | DISCHARGE

## 2023-08-02 RX ORDER — LOSARTAN POTASSIUM 100 MG/1
100 TABLET, FILM COATED ORAL DAILY
Refills: 0 | Status: DISCONTINUED | OUTPATIENT
Start: 2023-08-02 | End: 2023-08-04

## 2023-08-02 RX ORDER — NALTREXONE HYDROCHLORIDE 50 MG/1
1 TABLET, FILM COATED ORAL
Refills: 0 | DISCHARGE

## 2023-08-02 RX ORDER — POTASSIUM CHLORIDE 20 MEQ
40 PACKET (EA) ORAL ONCE
Refills: 0 | Status: COMPLETED | OUTPATIENT
Start: 2023-08-02 | End: 2023-08-02

## 2023-08-02 RX ORDER — AMLODIPINE BESYLATE 2.5 MG/1
5 TABLET ORAL ONCE
Refills: 0 | Status: COMPLETED | OUTPATIENT
Start: 2023-08-02 | End: 2023-08-02

## 2023-08-02 RX ORDER — METFORMIN HYDROCHLORIDE 850 MG/1
1 TABLET ORAL
Qty: 0 | Refills: 0 | DISCHARGE

## 2023-08-02 RX ORDER — BUPROPION HYDROCHLORIDE 150 MG/1
1 TABLET, EXTENDED RELEASE ORAL
Qty: 0 | Refills: 0 | DISCHARGE

## 2023-08-02 RX ORDER — LOSARTAN POTASSIUM 100 MG/1
1 TABLET, FILM COATED ORAL
Qty: 0 | Refills: 0 | DISCHARGE

## 2023-08-02 RX ORDER — METOPROLOL TARTRATE 50 MG
50 TABLET ORAL ONCE
Refills: 0 | Status: DISCONTINUED | OUTPATIENT
Start: 2023-08-02 | End: 2023-08-02

## 2023-08-02 RX ORDER — MAGNESIUM SULFATE 500 MG/ML
1 VIAL (ML) INJECTION ONCE
Refills: 0 | Status: COMPLETED | OUTPATIENT
Start: 2023-08-02 | End: 2023-08-02

## 2023-08-02 RX ADMIN — LOSARTAN POTASSIUM 100 MILLIGRAM(S): 100 TABLET, FILM COATED ORAL at 11:36

## 2023-08-02 RX ADMIN — Medication 2: at 08:53

## 2023-08-02 RX ADMIN — Medication 1 MILLIGRAM(S): at 11:36

## 2023-08-02 RX ADMIN — URSODIOL 300 MILLIGRAM(S): 250 TABLET, FILM COATED ORAL at 11:35

## 2023-08-02 RX ADMIN — Medication 650 MILLIGRAM(S): at 21:53

## 2023-08-02 RX ADMIN — Medication 2: at 12:36

## 2023-08-02 RX ADMIN — Medication 10 MILLIGRAM(S): at 00:13

## 2023-08-02 RX ADMIN — Medication 650 MILLIGRAM(S): at 22:00

## 2023-08-02 RX ADMIN — Medication 2: at 22:03

## 2023-08-02 RX ADMIN — Medication 100 GRAM(S): at 13:56

## 2023-08-02 RX ADMIN — ATORVASTATIN CALCIUM 40 MILLIGRAM(S): 80 TABLET, FILM COATED ORAL at 21:54

## 2023-08-02 RX ADMIN — Medication 40 MILLIEQUIVALENT(S): at 13:55

## 2023-08-02 RX ADMIN — Medication 5 MILLIGRAM(S): at 11:35

## 2023-08-02 RX ADMIN — AMLODIPINE BESYLATE 5 MILLIGRAM(S): 2.5 TABLET ORAL at 22:03

## 2023-08-02 NOTE — DIETITIAN INITIAL EVALUATION ADULT - PERTINENT MEDS FT
MEDICATIONS  (STANDING):  atorvastatin 40 milliGRAM(s) Oral at bedtime  buPROPion XL (24-Hour) 150 milliGRAM(s) Oral daily  dextrose 5%. 1000 milliLiter(s) (100 mL/Hr) IV Continuous <Continuous>  dextrose 5%. 1000 milliLiter(s) (50 mL/Hr) IV Continuous <Continuous>  dextrose 50% Injectable 25 Gram(s) IV Push once  dextrose 50% Injectable 12.5 Gram(s) IV Push once  dextrose 50% Injectable 25 Gram(s) IV Push once  ferrous    sulfate 325 milliGRAM(s) Oral daily  folic acid 1 milliGRAM(s) Oral daily  glucagon  Injectable 1 milliGRAM(s) IntraMuscular once  insulin lispro (ADMELOG) corrective regimen sliding scale   SubCutaneous Before meals and at bedtime  losartan 100 milliGRAM(s) Oral daily  magnesium sulfate  IVPB 1 Gram(s) IV Intermittent once  metoclopramide 5 milliGRAM(s) Oral three times a day  pantoprazole    Tablet 40 milliGRAM(s) Oral before breakfast  potassium chloride    Tablet ER 40 milliEquivalent(s) Oral once  ursodiol Capsule 300 milliGRAM(s) Oral every 12 hours    MEDICATIONS  (PRN):  acetaminophen     Tablet .. 650 milliGRAM(s) Oral every 6 hours PRN Moderate Pain (4 - 6)  dextrose Oral Gel 15 Gram(s) Oral once PRN Blood Glucose LESS THAN 70 milliGRAM(s)/deciliter

## 2023-08-02 NOTE — PROGRESS NOTE ADULT - PROBLEM SELECTOR PLAN 1
Pt complaining of chest pressure, epigastric pressure with belching and mild BURNETT when ambulating 2 blocks.  -EKG: NSR at 80bpm, QTc 470ms   -Trop T <6 x 1   -TTE 8/2/23: LV=60-65%. Mild symmetric LVH. Normal RV size and systolic function. Normal atria. No significant valvular disease. No signs of effusion.   -f/u CCTA results

## 2023-08-02 NOTE — DIETITIAN INITIAL EVALUATION ADULT - OTHER CALCULATIONS
IBW used to calculate energy needs due to pt's current body weight exceeding 120% of IBW  Adjust for age, Current conditions, Hx of ARCENIO KAISER (1998)

## 2023-08-02 NOTE — DIETITIAN INITIAL EVALUATION ADULT - PERSON TAUGHT/METHOD
Spoke to pt about various aspects of DM diet related education./verbal instruction/teach back - (Patient repeats in own words)/patient instructed

## 2023-08-02 NOTE — PROGRESS NOTE ADULT - SUBJECTIVE AND OBJECTIVE BOX
Interventional Cardiology PA Adult Progress Note    C.C.: Chest pressure    Subjective Assessment:  Pt examined at bedside. Pt endorses R sided headache and heaviness in chest. Pt also endorses worsening R sided leg edema. Pt denies SOB, dizziness, abdominal pain, or nausea. Pt reports to be belching frequently.        ROS Negative except as per Subjective and HPI  	  MEDICATIONS:  losartan 100 milliGRAM(s) Oral daily  metoprolol tartrate 50 milliGRAM(s) Oral once        acetaminophen     Tablet .. 650 milliGRAM(s) Oral every 6 hours PRN  buPROPion XL (24-Hour) 150 milliGRAM(s) Oral daily    metoclopramide 5 milliGRAM(s) Oral three times a day  pantoprazole    Tablet 40 milliGRAM(s) Oral before breakfast  ursodiol Capsule 300 milliGRAM(s) Oral every 12 hours    atorvastatin 40 milliGRAM(s) Oral at bedtime  dextrose 50% Injectable 25 Gram(s) IV Push once  dextrose 50% Injectable 25 Gram(s) IV Push once  dextrose 50% Injectable 12.5 Gram(s) IV Push once  dextrose Oral Gel 15 Gram(s) Oral once PRN  glucagon  Injectable 1 milliGRAM(s) IntraMuscular once  insulin lispro (ADMELOG) corrective regimen sliding scale   SubCutaneous Before meals and at bedtime    dextrose 5%. 1000 milliLiter(s) IV Continuous <Continuous>  dextrose 5%. 1000 milliLiter(s) IV Continuous <Continuous>  ferrous    sulfate 325 milliGRAM(s) Oral daily  folic acid 1 milliGRAM(s) Oral daily      	    [PHYSICAL EXAM:  TELEMETRY:  T(C): 36.7 (08-02-23 @ 11:35), Max: 37 (08-01-23 @ 17:15)  HR: 72 (08-02-23 @ 11:35) (72 - 88)  BP: 157/94 (08-02-23 @ 11:35) (143/75 - 178/88)  RR: 17 (08-02-23 @ 11:35) (17 - 18)  SpO2: 99% (08-02-23 @ 11:35) (99% - 100%)  Wt(kg): --  I&O's Summary    02 Aug 2023 07:01  -  02 Aug 2023 17:11  --------------------------------------------------------  IN: 0 mL / OUT: 0 mL / NET: 0 mL        Weight (kg): 99.8 (08-01 @ 17:15)    Appearance: Normal	  HEENT:   Normal oral mucosa, PERRL, EOMI	  Neck: Supple, - JVD; Carotid Bruit   Cardiovascular: Normal S1 S2, No JVD, No murmurs  Respiratory: Lungs clear to auscultation  Gastrointestinal:  Soft, mildly tender, + BS, frequent belching during exam	  Skin: No rashes, No ecchymoses, No cyanosis  Extremities: Normal range of motion, B/L lower extremity non pitting edema  Vascular: Peripheral pulses palpable 2+ bilaterally  Neurologic: Non-focal  Psychiatry: A & O x 3, Mood & affect appropriate      	    	    LABS:	 	  CARDIAC MARKERS:                                  7.2    4.50  )-----------( 470      ( 02 Aug 2023 05:30 )             26.2     08-02    137  |  105  |  11  ----------------------------<  190<H>  3.7   |  22  |  1.03    Ca    8.8      02 Aug 2023 05:30  Mg     1.7     08-02    TPro  5.8<L>  /  Alb  3.2<L>  /  TBili  <0.2  /  DBili  <0.2  /  AST  15  /  ALT  12  /  AlkPhos  56  08-02    proBNP:   Lipid Profile:   HgA1c:   TSH: Thyroid Stimulating Hormone, Serum: 1.560 uIU/mL (08-02 @ 05:30)    PT/INR - ( 02 Aug 2023 05:30 )   PT: 10.6 sec;   INR: 0.93          PTT - ( 02 Aug 2023 05:30 )  PTT:30.7 sec    ASSESSMENT/PLAN: 	        DVT ppx:  Dispo:

## 2023-08-02 NOTE — DIETITIAN INITIAL EVALUATION ADULT - OTHER INFO
49 y/o Obese female, current smoker (1ppd X>10yrs ) with PMHx of non compliance with medication, Etoh abuse, anemia, HTN, DM, HLD, cholelithiasis, gastric bypass surgery (1998), abdominal myoplasty (2018) presents to [facility] ER 8/1/23, complaining of substernal chest pressure, epigastric pressure associated with belching and mild BURNETT when ambulating two blocks. In light of risk factors and symptoms, pt is admitted to [facility] Cardiology, for cardiac workup to rule out ACS; TTE in AM.    Pt seen this AM on 5UR. Pt NPO. PTA varying PO intake: sometimes is good / sometimes will skip meals. Pt reports she will skip meal in setting of ETOH use  as ETOH use results in N/V (of note hx of gastroparesis per H&P of which pt does not take meds for). Denies N/V this AM. BM+ 7/31. Reglan and protonix ordered. Pt reports often feeling headache PTA when she does not eat - ? if d/t low BG. Pt reports not testing BG PTA. Reports not having a kit, does not seem to have prior education on testing. Of note ETOH use is risk for hypoglycemia. Pt noted A1c 7.1% with low HH. Pt unaware of what an A1c reading is. Furthermore when asked about DM dx pt reports she is only on DM meds "for control." ? Pt full understanding on DM dx. Pt on METFORMIN PTA. Current orders for corrective sliding scale at meals/bedtime. POCT 178 201,  131. HDL 37.  Pt denies wt change, admit wt 220 pounds and reports  pounds. Assume wt to vary in setting of Edema. Noted pt remains with: 3+R Arm/foot Edema. No issues chewing/swallowing at this time. NKFA. No pain. Umang 21.  Please see below for nutritions recommendations- Discussed with team.

## 2023-08-02 NOTE — PROGRESS NOTE ADULT - PROBLEM SELECTOR PLAN 3
AbX3E=9.0.  -continue MISS  -consult endo in AM for proper patient education on DM [id number]=4.0.  -continue MISS  -home meds: metformin 1000mg BID  -consult endo in AM for proper patient education on DM

## 2023-08-02 NOTE — PROGRESS NOTE ADULT - PROBLEM SELECTOR PLAN 2
Patient DM2 with hx of gastroparesis, cholelithiasis, stopped taking Reglan 5mg PO tid with meals. Hx of non compliance.  -Continue Protonix 40mg PO daily  -continue metoclopramide 5mg TID  -continue ursodiol 300mg BID Patient DM2 with hx of gastroparesis, cholelithiasis, stopped taking Reglan 5mg PO tid with meals. Hx of non compliance.  -Continue Protonix 40mg PO daily  -continue metoclopramide 5mg TID  -d/c ursodiol 300mg BID

## 2023-08-02 NOTE — DIETITIAN INITIAL EVALUATION ADULT - PERTINENT LABORATORY DATA
08-02    137  |  105  |  11  ----------------------------<  190<H>  3.7   |  22  |  1.03    Ca    8.8      02 Aug 2023 05:30  Mg     1.7     08-02    TPro  5.8<L>  /  Alb  3.2<L>  /  TBili  <0.2  /  DBili  <0.2  /  AST  15  /  ALT  12  /  AlkPhos  56  08-02  POCT Blood Glucose.: 171 mg/dL (08-02-23 @ 12:11)  A1C with Estimated Average Glucose Result: 7.1 % (08-02-23 @ 05:30)

## 2023-08-02 NOTE — PROGRESS NOTE ADULT - PROBLEM SELECTOR PLAN 8
Hx of ETOH abuse. Hx of ETOH abuse.  -Continue folic acid 1mg qd.  -Home med (noncompliant): naltrexone 50mg QD, consider restarting

## 2023-08-02 NOTE — DIETITIAN INITIAL EVALUATION ADULT - ADD RECOMMEND
1. Monitor PO intake/appetite, GI distress, diet tolerance, labs, weights.  2. Honor pt food preferences as able.  3. Endo vs CDE consult.  4. RD to remain available for additional nutrition interventions as needed.   ** High Nutrition Risk.

## 2023-08-02 NOTE — PROGRESS NOTE ADULT - PROBLEM SELECTOR PLAN 9
Current smoker.  -Continue Bupropion XL 150mg QD for smoking cessation    F: None  E: Replete if K<4 or Mag<2  N: DASH Diet  GIppx: PPI   VTEppx: SCDs  Dispo: pending clinical course  PT: none Current smoker.  -Continue Bupropion XL 150mg QD for smoking cessation    F: None  E: Replete if K<4 or Mag<2  N: DASH Diet, consistent carb  GIppx: PPI   VTEppx: SCDs  Dispo: pending clinical course  PT: none

## 2023-08-02 NOTE — PROGRESS NOTE ADULT - PROBLEM SELECTOR PLAN 7
Pt has hx of iron deficiency anemia, hx of prior blood transfusions.   -H/H=   -pt reports two episodes 2 weeks prior of bright red blood in stool (hard stools)  -denies any melena, hematemesis or recent episodes of hematochezia  -T+S ordered, pt is amenable to transfusion if needed Pt has hx of iron deficiency anemia, hx of prior blood transfusions.   -H/H= 7.2/26.2  -pt reports two episodes 2 weeks prior of bright red blood in stool (hard stools)  -denies any melena, hematemesis or recent episodes of hematochezia  -continue ferrous sulfate 325mg QD  -maintain active T+S   -f/u CBC in AM, no transfusion given today pending CCTA if patient goes for [facility] transfuse prior

## 2023-08-02 NOTE — PROGRESS NOTE ADULT - SUBJECTIVE AND OBJECTIVE BOX
Interventional Cardiology PA Adult Progress Note    C.C.:     Subjective Assessment:      ROS Negative except as per Subjective and HPI  	  MEDICATIONS:  losartan 100 milliGRAM(s) Oral daily        acetaminophen     Tablet .. 650 milliGRAM(s) Oral every 6 hours PRN  buPROPion XL (24-Hour) 150 milliGRAM(s) Oral daily    metoclopramide 5 milliGRAM(s) Oral three times a day  pantoprazole    Tablet 40 milliGRAM(s) Oral before breakfast  ursodiol Capsule 300 milliGRAM(s) Oral every 12 hours    atorvastatin 40 milliGRAM(s) Oral at bedtime  dextrose 50% Injectable 25 Gram(s) IV Push once  dextrose 50% Injectable 25 Gram(s) IV Push once  dextrose 50% Injectable 12.5 Gram(s) IV Push once  dextrose Oral Gel 15 Gram(s) Oral once PRN  glucagon  Injectable 1 milliGRAM(s) IntraMuscular once  insulin lispro (ADMELOG) corrective regimen sliding scale   SubCutaneous Before meals and at bedtime    dextrose 5%. 1000 milliLiter(s) IV Continuous <Continuous>  dextrose 5%. 1000 milliLiter(s) IV Continuous <Continuous>  ferrous    sulfate 325 milliGRAM(s) Oral daily  folic acid 1 milliGRAM(s) Oral daily      	    [PHYSICAL EXAM:  TELEMETRY:  T(C): 36.6 (08-02-23 @ 06:05), Max: 37 (08-01-23 @ 17:15)  HR: 75 (08-02-23 @ 08:46) (75 - 88)  BP: 154/76 (08-02-23 @ 08:46) (143/75 - 178/88)  RR: 18 (08-02-23 @ 08:46) (18 - 18)  SpO2: 99% (08-02-23 @ 08:46) (99% - 100%)  Wt(kg): --  I&O's Summary    02 Aug 2023 07:01  -  02 Aug 2023 11:02  --------------------------------------------------------  IN: 0 mL / OUT: 0 mL / NET: 0 mL        Weight (kg): 99.8 (08-01 @ 17:15)  Ribeiro:  Central/PICC/Mid Line:                                         Appearance: Normal	  HEENT:   Normal oral mucosa, PERRL, EOMI	  Neck: Supple, + JVD/ - JVD; Carotid Bruit   Cardiovascular: Normal S1 S2, No JVD, No murmurs,   Respiratory: Lungs clear to auscultation/Decreased Breath Sounds/No Rales, Rhonchi, Wheezing	  Gastrointestinal:  Soft, Non-tender, + BS	  Skin: No rashes, No ecchymoses, No cyanosis  Extremities: Normal range of motion, No clubbing, cyanosis or edema  Vascular: Peripheral pulses palpable 2+ bilaterally  Neurologic: Non-focal  Psychiatry: A & O x 3, Mood & affect appropriate      	    ECG:  	  RADIOLOGY:   DIAGNOSTIC TESTING:  [ ] Echocardiogram:  [ ]  Catheterization:  [ ] Stress Test:    [ ] NILESH  OTHER: 	    LABS:	 	  CARDIAC MARKERS:                                  7.2    4.50  )-----------( 470      ( 02 Aug 2023 05:30 )             26.2     08-02    137  |  105  |  11  ----------------------------<  190<H>  3.7   |  22  |  1.03    Ca    8.8      02 Aug 2023 05:30  Mg     1.7     08-02    TPro  5.8<L>  /  Alb  3.2<L>  /  TBili  <0.2  /  DBili  <0.2  /  AST  15  /  ALT  12  /  AlkPhos  56  08-02    proBNP:   Lipid Profile:   HgA1c:   TSH: Thyroid Stimulating Hormone, Serum: 1.560 uIU/mL (08-02 @ 05:30)    PT/INR - ( 02 Aug 2023 05:30 )   PT: 10.6 sec;   INR: 0.93          PTT - ( 02 Aug 2023 05:30 )  PTT:30.7 sec    ASSESSMENT/PLAN: 	        DVT ppx:  Dispo:

## 2023-08-03 LAB
ANION GAP SERPL CALC-SCNC: 10 MMOL/L — SIGNIFICANT CHANGE UP (ref 5–17)
BUN SERPL-MCNC: 10 MG/DL — SIGNIFICANT CHANGE UP (ref 7–23)
CALCIUM SERPL-MCNC: 8.8 MG/DL — SIGNIFICANT CHANGE UP (ref 8.4–10.5)
CHLORIDE SERPL-SCNC: 104 MMOL/L — SIGNIFICANT CHANGE UP (ref 96–108)
CO2 SERPL-SCNC: 22 MMOL/L — SIGNIFICANT CHANGE UP (ref 22–31)
CREAT SERPL-MCNC: 0.96 MG/DL — SIGNIFICANT CHANGE UP (ref 0.5–1.3)
EGFR: 74 ML/MIN/1.73M2 — SIGNIFICANT CHANGE UP
GLUCOSE BLDC GLUCOMTR-MCNC: 132 MG/DL — HIGH (ref 70–99)
GLUCOSE BLDC GLUCOMTR-MCNC: 151 MG/DL — HIGH (ref 70–99)
GLUCOSE BLDC GLUCOMTR-MCNC: 152 MG/DL — HIGH (ref 70–99)
GLUCOSE BLDC GLUCOMTR-MCNC: 165 MG/DL — HIGH (ref 70–99)
GLUCOSE SERPL-MCNC: 143 MG/DL — HIGH (ref 70–99)
HCG UR QL: NEGATIVE — SIGNIFICANT CHANGE UP
HCT VFR BLD CALC: 29.2 % — LOW (ref 34.5–45)
HGB BLD-MCNC: 8.1 G/DL — LOW (ref 11.5–15.5)
MAGNESIUM SERPL-MCNC: 1.7 MG/DL — SIGNIFICANT CHANGE UP (ref 1.6–2.6)
MCHC RBC-ENTMCNC: 23.3 PG — LOW (ref 27–34)
MCHC RBC-ENTMCNC: 27.7 GM/DL — LOW (ref 32–36)
MCV RBC AUTO: 83.9 FL — SIGNIFICANT CHANGE UP (ref 80–100)
NRBC # BLD: 3 /100 WBCS — HIGH (ref 0–0)
PLATELET # BLD AUTO: 471 K/UL — HIGH (ref 150–400)
POTASSIUM SERPL-MCNC: 4.1 MMOL/L — SIGNIFICANT CHANGE UP (ref 3.5–5.3)
POTASSIUM SERPL-SCNC: 4.1 MMOL/L — SIGNIFICANT CHANGE UP (ref 3.5–5.3)
RBC # BLD: 3.48 M/UL — LOW (ref 3.8–5.2)
RBC # FLD: 29.7 % — HIGH (ref 10.3–14.5)
SODIUM SERPL-SCNC: 136 MMOL/L — SIGNIFICANT CHANGE UP (ref 135–145)
WBC # BLD: 4.19 K/UL — SIGNIFICANT CHANGE UP (ref 3.8–10.5)
WBC # FLD AUTO: 4.19 K/UL — SIGNIFICANT CHANGE UP (ref 3.8–10.5)

## 2023-08-03 PROCEDURE — 99152 MOD SED SAME PHYS/QHP 5/>YRS: CPT

## 2023-08-03 PROCEDURE — 99233 SBSQ HOSP IP/OBS HIGH 50: CPT | Mod: 25

## 2023-08-03 PROCEDURE — 93454 CORONARY ARTERY ANGIO S&I: CPT | Mod: 26

## 2023-08-03 RX ORDER — SODIUM CHLORIDE 9 MG/ML
250 INJECTION INTRAMUSCULAR; INTRAVENOUS; SUBCUTANEOUS ONCE
Refills: 0 | Status: COMPLETED | OUTPATIENT
Start: 2023-08-03 | End: 2023-08-03

## 2023-08-03 RX ORDER — MAGNESIUM SULFATE 500 MG/ML
2 VIAL (ML) INJECTION ONCE
Refills: 0 | Status: COMPLETED | OUTPATIENT
Start: 2023-08-03 | End: 2023-08-03

## 2023-08-03 RX ORDER — SODIUM CHLORIDE 9 MG/ML
500 INJECTION INTRAMUSCULAR; INTRAVENOUS; SUBCUTANEOUS
Refills: 0 | Status: DISCONTINUED | OUTPATIENT
Start: 2023-08-03 | End: 2023-08-04

## 2023-08-03 RX ORDER — THIAMINE MONONITRATE (VIT B1) 100 MG
100 TABLET ORAL DAILY
Refills: 0 | Status: DISCONTINUED | OUTPATIENT
Start: 2023-08-03 | End: 2023-08-04

## 2023-08-03 RX ORDER — CLOPIDOGREL BISULFATE 75 MG/1
300 TABLET, FILM COATED ORAL ONCE
Refills: 0 | Status: COMPLETED | OUTPATIENT
Start: 2023-08-03 | End: 2023-08-03

## 2023-08-03 RX ORDER — ASPIRIN/CALCIUM CARB/MAGNESIUM 324 MG
81 TABLET ORAL DAILY
Refills: 0 | Status: DISCONTINUED | OUTPATIENT
Start: 2023-08-03 | End: 2023-08-04

## 2023-08-03 RX ORDER — SENNA PLUS 8.6 MG/1
2 TABLET ORAL AT BEDTIME
Refills: 0 | Status: DISCONTINUED | OUTPATIENT
Start: 2023-08-03 | End: 2023-08-04

## 2023-08-03 RX ADMIN — BUPROPION HYDROCHLORIDE 150 MILLIGRAM(S): 150 TABLET, EXTENDED RELEASE ORAL at 12:22

## 2023-08-03 RX ADMIN — ATORVASTATIN CALCIUM 40 MILLIGRAM(S): 80 TABLET, FILM COATED ORAL at 23:30

## 2023-08-03 RX ADMIN — LOSARTAN POTASSIUM 100 MILLIGRAM(S): 100 TABLET, FILM COATED ORAL at 05:03

## 2023-08-03 RX ADMIN — Medication 5 MILLIGRAM(S): at 05:03

## 2023-08-03 RX ADMIN — Medication 2: at 08:31

## 2023-08-03 RX ADMIN — Medication 1 MILLIGRAM(S): at 12:23

## 2023-08-03 RX ADMIN — Medication 5 MILLIGRAM(S): at 23:30

## 2023-08-03 RX ADMIN — Medication 25 GRAM(S): at 11:04

## 2023-08-03 RX ADMIN — Medication 2: at 23:30

## 2023-08-03 RX ADMIN — Medication 100 MILLIGRAM(S): at 12:23

## 2023-08-03 RX ADMIN — Medication 81 MILLIGRAM(S): at 14:29

## 2023-08-03 RX ADMIN — Medication 2: at 12:22

## 2023-08-03 RX ADMIN — SODIUM CHLORIDE 100 MILLILITER(S): 9 INJECTION INTRAMUSCULAR; INTRAVENOUS; SUBCUTANEOUS at 18:47

## 2023-08-03 RX ADMIN — Medication 1 TABLET(S): at 12:23

## 2023-08-03 RX ADMIN — SODIUM CHLORIDE 75 MILLILITER(S): 9 INJECTION INTRAMUSCULAR; INTRAVENOUS; SUBCUTANEOUS at 13:49

## 2023-08-03 RX ADMIN — SODIUM CHLORIDE 500 MILLILITER(S): 9 INJECTION INTRAMUSCULAR; INTRAVENOUS; SUBCUTANEOUS at 13:48

## 2023-08-03 RX ADMIN — CLOPIDOGREL BISULFATE 300 MILLIGRAM(S): 75 TABLET, FILM COATED ORAL at 14:29

## 2023-08-03 RX ADMIN — Medication 5 MILLIGRAM(S): at 13:48

## 2023-08-03 NOTE — PROGRESS NOTE ADULT - PROBLEM SELECTOR PLAN 3
[id number]=8.0.  -continue MISS  -home meds: metformin 1000mg BID  -consult endo in AM for proper patient education on DM [id number]=2.0.  -continue MISS  -home meds: metformin 1000mg BID  -pending endo consult for proper patient education on DM PMH hypertension. /94.   -continue losartan 100mg PO daily.  -noted to be hypertensive today while intermittently upset with her hospitalization- will continue to assess when pt more calm.

## 2023-08-03 NOTE — PROGRESS NOTE ADULT - PROBLEM SELECTOR PLAN 5
LDL 72.   -Continue atorvastatin 40mg daily at bedtime. Patient DM2 with hx of gastroparesis, cholelithiasis, stopped taking Reglan 5mg PO tid with meals. Hx of non compliance.  -Continue Protonix 40mg PO daily  -continue metoclopramide 5mg TID  -d/c ursodiol 300mg BID

## 2023-08-03 NOTE — PROVIDER CONTACT NOTE (OTHER) - RECOMMENDATIONS
Security called to escort patient back into her room. Afterwards, PA and manager comes in room to speak to patient.

## 2023-08-03 NOTE — PROGRESS NOTE ADULT - PROBLEM SELECTOR PLAN 1
Pt complaining of chest pressure, epigastric pressure with belching and mild BURNETT when ambulating 2 blocks.  -EKG: NSR at 80bpm, QTc 470ms   -Trop T <6 x 1   -TTE 8/2/23: LV=60-65%. Mild symmetric LVH. Normal RV size and systolic function. Normal atria. No significant valvular disease. No signs of effusion.   -f/u CCTA results Pt complaining of chest pressure, epigastric pressure with belching and mild BURNETT when ambulating 2 blocks.  -EKG: NSR at 80bpm, QTc 470ms   -Trop T <6 x 1   -TTE 8/2/23: LV=60-65%. Mild symmetric LVH. Normal RV size and systolic function. Normal atria. No significant valvular disease. No signs of effusion.   - CCTA 8/2/23: Cardiac: The calcium score is moderate at 147 Agatston units -- 99 percentile. Motion noted in the mid RCA, however cannot rule out moderate stenosis 2/2 noncalcific plaque. Nonobstructive disease in the remaining segments. CT-FFR is recommended. Report to be addended once heartflow results are received. Non-cardiac: No visualized PE. Pt complaining of chest pressure, epigastric pressure with belching and mild BURNETT when ambulating 2 blocks.  -EKG: NSR at 80bpm, QTc 470ms   -Trop T <6 x 1   -TTE 8/2/23: LV=60-65%. Mild symmetric LVH. Normal RV. Normal atria. No significant valvular dz. No signs of effusion.   - CCTA 8/2/23: Cardiac: The calcium score is moderate at 147 Agatston units -- 99 percentile. Motion noted in the mid RCA, however cannot rule out moderate stenosis 2/2 noncalcific plaque. Nonobstructive dz in the remaining segments. CT-FFR is recommended.  Non-cardiac: No visualized PE.  **CT FFR unable to be assessed due to motion artifact.  - D/w Dr. Ireland, pt consented and added on to [facility] schedule today. Pt complaining of chest pressure, epigastric pressure with belching and mild BURNETT when ambulating 2 blocks.  -EKG: NSR at 80bpm, QTc 470ms   -Trop T <6 x 1   -TTE 8/2/23: LV=60-65%. Mild symmetric LVH. Normal RV. Normal atria. No significant valvular dz. No signs of effusion.   - CCTA 8/2/23: Cardiac: The calcium score is moderate at 147 Agatston units -- 99 percentile. Motion noted in the mid RCA, however cannot rule out moderate stenosis 2/2 noncalcific plaque. Nonobstructive dz in the remaining segments. CT-FFR is recommended.  Non-cardiac: No visualized PE.  **CT FFR unable to be assessed due to motion artifact.  - D/w Dr. Ireland, pt consented and added on to [facility] schedule today; see Chart note for full PreCath checklist.

## 2023-08-03 NOTE — PROVIDER CONTACT NOTE (OTHER) - BACKGROUND
Patient blood pressure has risen  overnight, stabilized this morning but then started to rise up again today.

## 2023-08-03 NOTE — PROVIDER CONTACT NOTE (OTHER) - ACTION/TREATMENT ORDERED:
Monitor blood pressure. Provider doesn't want to automatically give medication to drop the blood pressure due to patient going to cardiac cath procedure on the same day.
Try to convince patient to stay.

## 2023-08-03 NOTE — PROGRESS NOTE ADULT - PROBLEM SELECTOR PLAN 2
Patient DM2 with hx of gastroparesis, cholelithiasis, stopped taking Reglan 5mg PO tid with meals. Hx of non compliance.  -Continue Protonix 40mg PO daily  -continue metoclopramide 5mg TID  -d/c ursodiol 300mg BID Pt has hx of iron deficiency anemia, hx of prior blood transfusions.   -H/H= 7.2/26.2  --> Hgb 8.1 today- no transfusion needed prior to cath.   -pt reports two episodes 2 weeks prior of bright red blood in stool (hard stools)  -denies any melena, hematemesis or recent episodes of hematochezia  -continue ferrous sulfate 325mg QD  -maintain active T+S   -Pt reports PMH Iron Deficiency Anemia 2/2 Uterine Fibroids. LMP approximately one month prior. Reports taking Iron tablets intermittently but is not compliant. H/H trend reviewed with Dr. Rivers and Dr. Soto. Patient declined guaiac at this time. Pt loaded with ASA 81 mg PO x one dose and Plavix 300 mg x one dose as d/w MD.

## 2023-08-03 NOTE — CHART NOTE - NSCHARTNOTEFT_GEN_A_CORE
Precath Checklist      Allergies: NONE  No Known Allergies      Shellfish/Contrast Allergies? No                 8.1    4.19  )-----------( 471      ( 03 Aug 2023 05:30 )             29.2     08-03    136  |  104  |  10  ----------------------------<  143<H>  4.1   |  22  |  0.96    Ca    8.8      03 Aug 2023 05:30  Mg     1.7     08-03    TPro  5.8<L>  /  Alb  3.2<L>  /  TBili  <0.2  /  DBili  <0.2  /  AST  15  /  ALT  12  /  AlkPhos  56  08-02      Prothrombin Time, Plasma: 10.6 sec [9.5 - 13.0] (08-02-23 @ 05:30)  INR: 0.93 [0.85 - 1.18] (08-02-23 @ 05:30)  Activated Partial Thromboplastin Time: 30.7 sec [24.5 - 35.6] (08-02-23 @ 05:30)      HCG Quantitative, Serum: <0 mIU/mL (08-01-23 @ 20:12)    EKG: PENDING     ASA: III				Mallampati class: II            Anginal Class: IV      Sedation Plan:   [  ] None   [ x ] Moderate   [  ]  Deep    [  ]  General Anesthesia   Patient Is Suitable Candidate For Sedation?     [ x ] Yes   [  ] No   [  ] Not Applicable   Cath Order Placed: [ x ] Yes    CARDIAC CATH: Risks & benefits of procedure and sedation and risks and benefits for the alternative therapy have been explained to the patient and/or HCP in layman’s terms including but not limited to: allergic reaction, bleeding, infection, arrhythmia, respiratory compromise, renal and vascular compromise, limb damage, MI, CVA, emergent CABG/Vascular Surgery and death. Informed consent obtained and in chart.    FLUIDS: EF preserved with Grade I Diastolic dysfunction: Euvolemic on exam.  bolus x one ordered, followed by NS @ 75 cc/hour x two hours  LABS: Patient noted to be anemic and reports she has a known Iron Deficiency Anemia secondary to history of Uterine Fibroids. LMP approximately one month prior. Reports taking Iron tablets intermittently but is not compliant. H/H trend reviewed with Dr. Rivers and Dr. Soto. Patient declined guaiac at this time and denies any melena with exception with scant blood when constipated (last episode >1 month ago). To be loaded with ASA 81 mg PO x one dose and Plavix 300 mg x one dose.   Discussed compliance need with DAPT if patient receives stents. Teach back method applied. In addition, patient reports she is going to abstain from ETOH use and work on smoking cessation.

## 2023-08-03 NOTE — PROGRESS NOTE ADULT - ASSESSMENT
47 y/o obese female, current smoker (1ppd X>10yrs ) with PMHx of non compliance with medication, ETOH abuse, iron deficiency anemia (hx of prior blood transfusions), HTN, DM, HLD, cholelithiasis, gastric bypass surgery (1998), abdominal myoplasty (2018) presents to complaining of substernal chest pressure, epigastric pressure associated with belching and mild BURNETT when ambulating  two blocks. Pt endorses b/l leg and ankle swelling, worse on R side (CT PE protocol negative, B/L LUE duplex negative for DVT). EKG 8/2/23 showed NSR at 80 with QTC of 470ms, Trop T neg x1. Pt is admitted to cardiac telemetry unit for further cardiac ischemic workup with TTE and CCTA.     47 y/o obese female, current smoker (1ppd X>10yrs ) with PMHx of non compliance with medication, ETOH abuse, iron deficiency anemia (hx of prior blood transfusions), HTN, DM, HLD, cholelithiasis, gastric bypass surgery (1998), abdominal myoplasty (2018) presents to complaining of substernal chest pressure, epigastric pressure associated with belching and mild BURNETT when ambulating  two blocks. Pt endorses b/l leg and ankle swelling, worse on R side (CT PE protocol negative, B/L LUE duplex negative for DVT). EKG 8/2/23 showed NSR at 80 with QTC of 470ms, Trop T neg x1. Pt is admitted to cardiac telemetry unit for further cardiac ischemic workup. TTE done 8/2/23 and showed LV=60-65%, mild symmetric LVH (see report) and CCTA done 8/2/23 and calcium score was moderate and in the 99 percentile, showed motion noted in the mid RCA, however cannot rule out moderate stenosis 2/2 noncalcific plaque and no PE found (see report).   47 y/o obese female, current smoker (1ppd X>10yrs ) with PMHx of non compliance with medication, ETOH abuse, iron deficiency anemia (hx of prior blood transfusions), HTN, DM, HLD, cholelithiasis, gastric bypass surgery (1998), abdominal myoplasty (2018) presents to complaining of substernal chest pressure, epigastric pressure associated with belching and mild BURNETT when ambulating  two blocks. Pt endorses b/l leg and ankle swelling, worse on R side (CT PE protocol negative, B/L LUE duplex negative for DVT). EKG 8/2/23 showed NSR at 80 with QTC of 470ms, Trop T neg x1. Pt is admitted to cardiac telemetry unit for further cardiac ischemic workup. TTE done 8/2/23 and showed LV=60-65%, mild symmetric LVH (see report) and CCTA done 8/2/23 and calcium score was moderate and in the 99 percentile, showed motion noted in the mid RCA, however cannot rule out moderate stenosis 2/2 noncalcific plaque and no PE found (see report). Added to [facility] schedule today.

## 2023-08-03 NOTE — PROGRESS NOTE ADULT - PROBLEM SELECTOR PLAN 7
Pt has hx of iron deficiency anemia, hx of prior blood transfusions.   -H/H= 7.2/26.2  -pt reports two episodes 2 weeks prior of bright red blood in stool (hard stools)  -denies any melena, hematemesis or recent episodes of hematochezia  -continue ferrous sulfate 325mg QD  -maintain active T+S   -f/u CBC in AM, no transfusion given today pending CCTA if patient goes for [facility] transfuse prior Pt has hx of iron deficiency anemia, hx of prior blood transfusions.   -H/H= 7.2/26.2  -pt reports two episodes 2 weeks prior of bright red blood in stool (hard stools)  -denies any melena, hematemesis or recent episodes of hematochezia  -continue ferrous sulfate 325mg QD  -maintain active T+S   -f/u CBC in AM, no transfusion given today (see CCTA above) if patient goes for [facility] transfuse prior KjI2M=6.0.  -continue MISS  -home meds: metformin 1000mg BID- pt reports noncompliance;  pt educated on compliance. Consider DM specialist for education

## 2023-08-03 NOTE — PROVIDER CONTACT NOTE (OTHER) - SITUATION
Patient refuse to eat the breakfast that was given to her. She wanted to go out to buy her own food. She wanted to talk to a doctor or PA regarding her food. Patient was not willing to wait.
Patient blood pressure has been steadily rising.

## 2023-08-03 NOTE — PROGRESS NOTE ADULT - PROBLEM SELECTOR PLAN 8
Hx of ETOH abuse.  -Continue folic acid 1mg qd.  -Home med (noncompliant): naltrexone 50mg QD, consider restarting LDL 72.   -Continue atorvastatin 40mg daily at bedtime.

## 2023-08-03 NOTE — PROGRESS NOTE ADULT - PROBLEM SELECTOR PLAN 6
D-dimer 936.  -CTA protocol w/ contrast PE 8/1/23: negative for PE.  -B/L LUE Duplex 8/2/23: No evidence of DVT in either lower extremity
D-dimer 936.  -CTA protocol w/ contrast PE 8/1/23: negative for PE.  -B/L LUE Duplex 8/2/23: No evidence of DVT in either lower extremity

## 2023-08-03 NOTE — PROVIDER CONTACT NOTE (OTHER) - BACKGROUND
Patient has been NPO yesterday. She didn't get to eat until after 5 pm after her CTA. Now patient feels like we are trying to starve her again today.

## 2023-08-03 NOTE — PROGRESS NOTE ADULT - PROBLEM SELECTOR PLAN 4
Pt has hx of hypertension. /94.   -continue losartan 100mg PO daily Hx of ETOH abuse.  - CIWA monitoring ordered  -Continue folic acid 1mg qd. Thiamine, MVI.   -Home med (noncompliant): naltrexone 50mg QD, consider restarting

## 2023-08-03 NOTE — PROVIDER CONTACT NOTE (OTHER) - ASSESSMENT
Patient agitated, stomping her feet in her room and placing her clothes on in anticipation of leaving the unit.
Patient has no chest pain, palpitation or any acute distress.

## 2023-08-03 NOTE — PROGRESS NOTE ADULT - PROBLEM SELECTOR PLAN 9
Current smoker.  -Continue Bupropion XL 150mg QD for smoking cessation    F: None  E: Replete if K<4 or Mag<2  N: DASH Diet, consistent carb  GIppx: PPI   VTEppx: SCDs  Dispo: pending clinical course  PT: none Current smoker.  -Continue Bupropion XL 150mg QD for smoking cessation    F: PreCath IVF protocol ordered  E: Replete if K<4 or Mag<2  N: DASH Diet, consistent carb  GIppx: PPI   VTEppx: SCDs  Dispo: pending clinical course  PT: none

## 2023-08-04 ENCOUNTER — TRANSCRIPTION ENCOUNTER (OUTPATIENT)
Age: 48
End: 2023-08-04

## 2023-08-04 VITALS
RESPIRATION RATE: 20 BRPM | HEART RATE: 80 BPM | OXYGEN SATURATION: 97 % | TEMPERATURE: 98 F | SYSTOLIC BLOOD PRESSURE: 154 MMHG | DIASTOLIC BLOOD PRESSURE: 75 MMHG

## 2023-08-04 LAB
ANION GAP SERPL CALC-SCNC: 10 MMOL/L — SIGNIFICANT CHANGE UP (ref 5–17)
BUN SERPL-MCNC: 13 MG/DL — SIGNIFICANT CHANGE UP (ref 7–23)
CALCIUM SERPL-MCNC: 8.6 MG/DL — SIGNIFICANT CHANGE UP (ref 8.4–10.5)
CHLORIDE SERPL-SCNC: 103 MMOL/L — SIGNIFICANT CHANGE UP (ref 96–108)
CO2 SERPL-SCNC: 19 MMOL/L — LOW (ref 22–31)
CREAT SERPL-MCNC: 1 MG/DL — SIGNIFICANT CHANGE UP (ref 0.5–1.3)
EGFR: 70 ML/MIN/1.73M2 — SIGNIFICANT CHANGE UP
FERRITIN SERPL-MCNC: 198 NG/ML — HIGH (ref 15–150)
GLUCOSE BLDC GLUCOMTR-MCNC: 135 MG/DL — HIGH (ref 70–99)
GLUCOSE BLDC GLUCOMTR-MCNC: 181 MG/DL — HIGH (ref 70–99)
GLUCOSE BLDC GLUCOMTR-MCNC: 196 MG/DL — HIGH (ref 70–99)
GLUCOSE BLDC GLUCOMTR-MCNC: 214 MG/DL — HIGH (ref 70–99)
GLUCOSE SERPL-MCNC: 177 MG/DL — HIGH (ref 70–99)
HCT VFR BLD CALC: 29.1 % — LOW (ref 34.5–45)
HGB BLD-MCNC: 8.1 G/DL — LOW (ref 11.5–15.5)
IRON SATN MFR SERPL: 11 % — LOW (ref 14–50)
IRON SATN MFR SERPL: 35 UG/DL — SIGNIFICANT CHANGE UP (ref 30–160)
MAGNESIUM SERPL-MCNC: 1.7 MG/DL — SIGNIFICANT CHANGE UP (ref 1.6–2.6)
MCHC RBC-ENTMCNC: 23.6 PG — LOW (ref 27–34)
MCHC RBC-ENTMCNC: 27.8 GM/DL — LOW (ref 32–36)
MCV RBC AUTO: 84.8 FL — SIGNIFICANT CHANGE UP (ref 80–100)
NRBC # BLD: 2 /100 WBCS — HIGH (ref 0–0)
PLATELET # BLD AUTO: 453 K/UL — HIGH (ref 150–400)
POTASSIUM SERPL-MCNC: SIGNIFICANT CHANGE UP MMOL/L (ref 3.5–5.3)
POTASSIUM SERPL-SCNC: SIGNIFICANT CHANGE UP MMOL/L (ref 3.5–5.3)
RBC # BLD: 3.43 M/UL — LOW (ref 3.8–5.2)
RBC # FLD: 29.9 % — HIGH (ref 10.3–14.5)
SODIUM SERPL-SCNC: 132 MMOL/L — LOW (ref 135–145)
TIBC SERPL-MCNC: 329 UG/DL — SIGNIFICANT CHANGE UP (ref 220–430)
TRANSFERRIN SERPL-MCNC: 266 MG/DL — SIGNIFICANT CHANGE UP (ref 200–360)
UIBC SERPL-MCNC: 294 UG/DL — SIGNIFICANT CHANGE UP (ref 110–370)
WBC # BLD: 3.86 K/UL — SIGNIFICANT CHANGE UP (ref 3.8–10.5)
WBC # FLD AUTO: 3.86 K/UL — SIGNIFICANT CHANGE UP (ref 3.8–10.5)

## 2023-08-04 PROCEDURE — 71046 X-RAY EXAM CHEST 2 VIEWS: CPT

## 2023-08-04 PROCEDURE — 85379 FIBRIN DEGRADATION QUANT: CPT

## 2023-08-04 PROCEDURE — 36415 COLL VENOUS BLD VENIPUNCTURE: CPT

## 2023-08-04 PROCEDURE — 84484 ASSAY OF TROPONIN QUANT: CPT

## 2023-08-04 PROCEDURE — C1887: CPT

## 2023-08-04 PROCEDURE — 85025 COMPLETE CBC W/AUTO DIFF WBC: CPT

## 2023-08-04 PROCEDURE — 82962 GLUCOSE BLOOD TEST: CPT

## 2023-08-04 PROCEDURE — 83540 ASSAY OF IRON: CPT

## 2023-08-04 PROCEDURE — 75574 CT ANGIO HRT W/3D IMAGE: CPT

## 2023-08-04 PROCEDURE — G1004: CPT

## 2023-08-04 PROCEDURE — 86901 BLOOD TYPING SEROLOGIC RH(D): CPT

## 2023-08-04 PROCEDURE — 83036 HEMOGLOBIN GLYCOSYLATED A1C: CPT

## 2023-08-04 PROCEDURE — 83735 ASSAY OF MAGNESIUM: CPT

## 2023-08-04 PROCEDURE — 76705 ECHO EXAM OF ABDOMEN: CPT

## 2023-08-04 PROCEDURE — 85730 THROMBOPLASTIN TIME PARTIAL: CPT

## 2023-08-04 PROCEDURE — 81025 URINE PREGNANCY TEST: CPT

## 2023-08-04 PROCEDURE — 86900 BLOOD TYPING SEROLOGIC ABO: CPT

## 2023-08-04 PROCEDURE — 85027 COMPLETE CBC AUTOMATED: CPT

## 2023-08-04 PROCEDURE — 93306 TTE W/DOPPLER COMPLETE: CPT

## 2023-08-04 PROCEDURE — 81001 URINALYSIS AUTO W/SCOPE: CPT

## 2023-08-04 PROCEDURE — 80307 DRUG TEST PRSMV CHEM ANLYZR: CPT

## 2023-08-04 PROCEDURE — 80053 COMPREHEN METABOLIC PANEL: CPT

## 2023-08-04 PROCEDURE — 84466 ASSAY OF TRANSFERRIN: CPT

## 2023-08-04 PROCEDURE — 99285 EMERGENCY DEPT VISIT HI MDM: CPT

## 2023-08-04 PROCEDURE — 82248 BILIRUBIN DIRECT: CPT

## 2023-08-04 PROCEDURE — C1894: CPT

## 2023-08-04 PROCEDURE — 86850 RBC ANTIBODY SCREEN: CPT

## 2023-08-04 PROCEDURE — 80048 BASIC METABOLIC PNL TOTAL CA: CPT

## 2023-08-04 PROCEDURE — 71275 CT ANGIOGRAPHY CHEST: CPT | Mod: ME

## 2023-08-04 PROCEDURE — 84702 CHORIONIC GONADOTROPIN TEST: CPT

## 2023-08-04 PROCEDURE — 93970 EXTREMITY STUDY: CPT

## 2023-08-04 PROCEDURE — 82728 ASSAY OF FERRITIN: CPT

## 2023-08-04 PROCEDURE — 83550 IRON BINDING TEST: CPT

## 2023-08-04 PROCEDURE — 99238 HOSP IP/OBS DSCHRG MGMT 30/<: CPT | Mod: GC

## 2023-08-04 PROCEDURE — 80061 LIPID PANEL: CPT

## 2023-08-04 PROCEDURE — C1769: CPT

## 2023-08-04 PROCEDURE — 83880 ASSAY OF NATRIURETIC PEPTIDE: CPT

## 2023-08-04 PROCEDURE — 83690 ASSAY OF LIPASE: CPT

## 2023-08-04 PROCEDURE — 85610 PROTHROMBIN TIME: CPT

## 2023-08-04 PROCEDURE — 84443 ASSAY THYROID STIM HORMONE: CPT

## 2023-08-04 RX ORDER — ATORVASTATIN CALCIUM 80 MG/1
1 TABLET, FILM COATED ORAL
Qty: 30 | Refills: 11
Start: 2023-08-04 | End: 2024-07-28

## 2023-08-04 RX ORDER — ATORVASTATIN CALCIUM 80 MG/1
1 TABLET, FILM COATED ORAL
Qty: 0 | Refills: 0 | DISCHARGE

## 2023-08-04 RX ORDER — AMLODIPINE BESYLATE 2.5 MG/1
1 TABLET ORAL
Qty: 30 | Refills: 5
Start: 2023-08-04 | End: 2024-01-30

## 2023-08-04 RX ORDER — ASPIRIN/CALCIUM CARB/MAGNESIUM 324 MG
1 TABLET ORAL
Qty: 30 | Refills: 10
Start: 2023-08-04 | End: 2024-06-28

## 2023-08-04 RX ORDER — FERROUS SULFATE 325(65) MG
1 TABLET ORAL
Qty: 30 | Refills: 3
Start: 2023-08-04 | End: 2023-12-01

## 2023-08-04 RX ORDER — ASPIRIN/CALCIUM CARB/MAGNESIUM 324 MG
1 TABLET ORAL
Qty: 0 | Refills: 0 | DISCHARGE
Start: 2023-08-04

## 2023-08-04 RX ORDER — BUPROPION HYDROCHLORIDE 150 MG/1
1 TABLET, EXTENDED RELEASE ORAL
Qty: 0 | Refills: 0 | DISCHARGE
Start: 2023-08-04

## 2023-08-04 RX ADMIN — LOSARTAN POTASSIUM 100 MILLIGRAM(S): 100 TABLET, FILM COATED ORAL at 06:23

## 2023-08-04 RX ADMIN — Medication 81 MILLIGRAM(S): at 12:58

## 2023-08-04 RX ADMIN — Medication 100 MILLIGRAM(S): at 14:12

## 2023-08-04 RX ADMIN — Medication 4: at 12:39

## 2023-08-04 RX ADMIN — Medication 1 MILLIGRAM(S): at 12:58

## 2023-08-04 RX ADMIN — Medication 5 MILLIGRAM(S): at 14:12

## 2023-08-04 RX ADMIN — Medication 2: at 10:29

## 2023-08-04 RX ADMIN — Medication 1 TABLET(S): at 12:59

## 2023-08-04 RX ADMIN — BUPROPION HYDROCHLORIDE 150 MILLIGRAM(S): 150 TABLET, EXTENDED RELEASE ORAL at 12:58

## 2023-08-04 RX ADMIN — PANTOPRAZOLE SODIUM 40 MILLIGRAM(S): 20 TABLET, DELAYED RELEASE ORAL at 06:22

## 2023-08-04 RX ADMIN — Medication 5 MILLIGRAM(S): at 06:23

## 2023-08-04 RX ADMIN — Medication 325 MILLIGRAM(S): at 12:59

## 2023-08-04 RX ADMIN — Medication 650 MILLIGRAM(S): at 14:14

## 2023-08-04 NOTE — SBIRT NOTE ADULT - NSSBIRTALCPASSREFTXDET_GEN_A_CORE
Score reviewed w/ patient. Patient has trauma history but declined to provide further detail. Patient denied safety concerns. SW provided psychoeducation on alcohol abuse to self-medicate pain. Patient denied h/o tx. SW explained benefits of treatment and patient verbalized interest in seeking inpatient treatment. Patient accepted inpatient substance abuse resources.

## 2023-08-04 NOTE — DISCHARGE NOTE NURSING/CASE MANAGEMENT/SOCIAL WORK - PATIENT PORTAL LINK FT
You can access the FollowMyHealth Patient Portal offered by [facility] by registering at the following website: [email or web address]. By joining [facility]’s FollowMyHealth portal, you will also be able to view your health information using other applications (apps) compatible with our system.

## 2023-08-04 NOTE — DISCHARGE NOTE PROVIDER - NSDCMRMEDTOKEN_GEN_ALL_CORE_FT
atorvastatin 40 mg oral tablet: 1 tab(s) orally once a day  ergocalciferol 1.25 mg (50,000 intl units) oral capsule: 1 cap(s) orally once a week  folic acid 1 mg oral tablet: 1 tab(s) orally once a day  losartan 100 mg oral tablet: 1 orally once a day  metFORMIN 1000 mg oral tablet, extended release: 1 orally 2 times a day  Protonix 40 mg oral delayed release tablet: 1 tab(s) orally once a day   ursodiol 300 mg oral capsule: 1 cap(s) orally every 12 hours    aspirin 81 mg oral delayed release tablet: 1 tab(s) orally once a day  atorvastatin 40 mg oral tablet: 1 tab(s) orally once a day  buPROPion 150 mg/24 hours (XL) oral tablet, extended release: 1 tab(s) orally once a day  ergocalciferol 1.25 mg (50,000 intl units) oral capsule: 1 cap(s) orally once a week  folic acid 1 mg oral tablet: 1 tab(s) orally once a day  losartan 100 mg oral tablet: 1 orally once a day  metFORMIN 1000 mg oral tablet, extended release: 1 orally 2 times a day  Protonix 40 mg oral delayed release tablet: 1 tab(s) orally once a day   ursodiol 300 mg oral capsule: 1 cap(s) orally every 12 hours    aspirin 81 mg oral delayed release tablet: 1 tab(s) orally once a day  atorvastatin 40 mg oral tablet: 1 tab(s) orally once a day  buPROPion 150 mg/24 hours (XL) oral tablet, extended release: 1 tab(s) orally once a day  ergocalciferol 1.25 mg (50,000 intl units) oral capsule: 1 cap(s) orally once a week  ferrous sulfate 325 mg (65 mg elemental iron) oral tablet: 1 tab(s) orally once a day  folic acid 1 mg oral tablet: 1 tab(s) orally once a day  losartan 100 mg oral tablet: 1 orally once a day  metFORMIN 1000 mg oral tablet, extended release: 1 orally 2 times a day  Norvasc 10 mg oral tablet: 1 tab(s) orally once a day  Protonix 40 mg oral delayed release tablet: 1 tab(s) orally once a day   ursodiol 300 mg oral capsule: 1 cap(s) orally every 12 hours

## 2023-08-04 NOTE — DISCHARGE NOTE PROVIDER - PROVIDER TOKENS
PROVIDER:[TOKEN:[[id number]],FOLLOWUP:[2 weeks]] PROVIDER:[TOKEN:[[id number]],FOLLOWUP:[2 weeks]],PROVIDER:[TOKEN:[[id number]],FOLLOWUP:[1 month]]

## 2023-08-04 NOTE — DISCHARGE NOTE PROVIDER - CARE PROVIDER_API CALL
Flaquito Ireland  Cardiology  [address] [address]  Phone: [phone]  Fax: [phone]  Follow Up Time: 2 weeks   Flaquito Ireland  Cardiology  [address] [address]  Phone: [phone]  Fax: [phone]  Follow Up Time: 2 weeks    Patricia Hatfield  Surgery  [address]  [place] [address]  Phone: [phone]  Fax: [phone]  Follow Up Time: 1 month

## 2023-08-04 NOTE — DISCHARGE NOTE PROVIDER - NSDCCPCAREPLAN_GEN_ALL_CORE_FT
PRINCIPAL DISCHARGE DIAGNOSIS  Diagnosis: Chest pain  Assessment and Plan of Treatment: You came to the hospital for evaluation of your chest pain, which has since then resolved. You had cardiac enzymes which were negative, revealing no damage to the heart muscle, or a heart attack. You had an Echocardiogram (ultrasound of the heart) which was normal. You also had a CT of your heart which revealed there was some blockages in the arteries of your heart. Therefore you underwent a cardiac cath on 8/3/2023 and showed some mild blockages but nothing that required a stent  PLEASE CONTINUE TO FOLLOW UP WITH ASPIRIN 81MG DAILY.  Please follow up with Dr. Ireland in 2-3 weeks. If you experience any worsening chest pain, palpitations, dizziness, or shortness of breath, please go to the nearest emergency room.      SECONDARY DISCHARGE DIAGNOSES  Diagnosis: Hypertension  Assessment and Plan of Treatment: You have a history of elevated blood pressure and you should continue your blood pressure medications as prescribed. We added an addtional regimen to your BP meds and started you Norvasc 10mg to help control your blood pressure as it was elevated this admission. Please follow up with Dr. Ireland    Diagnosis: DM2 (diabetes mellitus, type 2)  Assessment and Plan of Treatment: If you are a diabetic and you take Metformin: DO NOT TAKE your Metformin for two days. This medication can interact with the contrast used during your procedure therefore we want to ensure the contrast has left your body prior to you restarting your Metformin.   Make sure you monitor your finger sticks and diet while you are not taking your Metformin!  PLEASE HOLD AND RESTART METFORMIN ON 8/5/2023 and follow up with your PCP    Diagnosis: H/O iron deficiency anemia  Assessment and Plan of Treatment: You have a history of Iron Defiency Anemia and your blood counts remained stable this admission you should continue your iron supplements and follow up, please follow up with Hematologist Dr. Cornejo for further wok up and management for iron deficiency anemia since you had a Gastric bypass      Diagnosis: Hyperlipidemia  Assessment and Plan of Treatment: Your cholesterol panel is abnormal. Your LDL is (72) mg/dL and your goal LDL is less than 70 mg/dL. LDL is also known as "bad cholesterol" because it takes cholesterol to your arteries, where it may collect in artery walls. Too much cholesterol in your arteries may lead to a buildup of plaque known as atherosclerosis and can cause heart disease. Your HDL is (37) mg/dL and your goal HDL is greater than 50 mg/dL. The higher the HDL the better; HDL is known as “good cholesterol” because it transports cholesterol to your liver to be expelled from your body.    Diagnosis: Epigastric pressure  Assessment and Plan of Treatment: You have a history of cholethiasis and should follow up Dr. Hatfield at [facility] in regards to stones in your gallbladder. You were afebrile this admission and no signs infection on your blood work. Please follow up with Dr. Hatfield for continued management of your Cholethiasis        PRINCIPAL DISCHARGE DIAGNOSIS  Diagnosis: CAD (coronary artery disease)  Assessment and Plan of Treatment: You came to the hospital for evaluation of your chest pain, which has since then resolved. You had cardiac enzymes which were negative, revealing no damage to the heart muscle, or a heart attack. You had an Echocardiogram (ultrasound of the heart) which was normal. You also had a CT of your heart which revealed there was some blockages in the arteries of your heart. Therefore you underwent a cardiac cath on 8/3/2023 and showed some mild blockages but nothing that required a stent  PLEASE CONTINUE TO FOLLOW UP WITH ASPIRIN 81MG DAILY.  Please follow up with Dr. Ireland in 2-3 weeks. If you experience any worsening chest pain, palpitations, dizziness, or shortness of breath, please go to the nearest emergency room.      SECONDARY DISCHARGE DIAGNOSES  Diagnosis: Hypertension  Assessment and Plan of Treatment: You have a history of elevated blood pressure and you should continue your blood pressure medications as prescribed. We added an addtional regimen to your BP meds and started you Norvasc 10mg to help control your blood pressure as it was elevated this admission. Please follow up with Dr. Ireland    Diagnosis: DM2 (diabetes mellitus, type 2)  Assessment and Plan of Treatment: If you are a diabetic and you take Metformin: DO NOT TAKE your Metformin for two days. This medication can interact with the contrast used during your procedure therefore we want to ensure the contrast has left your body prior to you restarting your Metformin.   Make sure you monitor your finger sticks and diet while you are not taking your Metformin!  PLEASE HOLD AND RESTART METFORMIN ON 8/5/2023 and follow up with your PCP    Diagnosis: H/O iron deficiency anemia  Assessment and Plan of Treatment: You have a history of Iron Defiency Anemia and your blood counts remained stable this admission you should continue your iron supplements and follow up, please follow up with Hematologist Dr. Cornejo for further wok up and management for iron deficiency anemia since you had a Gastric bypass      Diagnosis: Hyperlipidemia  Assessment and Plan of Treatment: Your cholesterol panel is abnormal. Your LDL is (72) mg/dL and your goal LDL is less than 70 mg/dL. LDL is also known as "bad cholesterol" because it takes cholesterol to your arteries, where it may collect in artery walls. Too much cholesterol in your arteries may lead to a buildup of plaque known as atherosclerosis and can cause heart disease. Your HDL is (37) mg/dL and your goal HDL is greater than 50 mg/dL. The higher the HDL the better; HDL is known as “good cholesterol” because it transports cholesterol to your liver to be expelled from your body.    Diagnosis: Epigastric pressure  Assessment and Plan of Treatment: You have a history of cholethiasis and should follow up Dr. Hatfield at [facility] in regards to stones in your gallbladder. You were afebrile this admission and no signs infection on your blood work. Please follow up with Dr. Hatfield for continued management of your Cholethiasis as you were reccommended to see her as outpatient as well as Dr. Covington you were reccommended to see

## 2023-08-04 NOTE — DISCHARGE NOTE PROVIDER - CARE PROVIDERS DIRECT ADDRESSES
[email or web address]

## 2023-08-04 NOTE — DISCHARGE NOTE PROVIDER - HOSPITAL COURSE
49 y/o Obese female, current  smoker (1ppd X>10yrs ) with  PMHx of non compliance with medication Etoh abuse, anemia, HTN, DM, HLD, cholelithiasis, gastric bypass surgery (1998), , abdominal myoplasty (2018) presents to [facility] ER this evening, 8/1/23, complaining of substernal chest pressure, epigastric pressure associated with belching and mild BURNETT when ambulating  two  blocks.    In speaking with patient, she reports intermittent, non exertional substernal chest pressure with epigastric pressure, described as "heavy" 8/10, associated with belching and mild BURNETT when ambulating  two  blocks.  Pt. also endorses b/l ankle swelling  for several days.   She states epigastric pressure feels similar to prior gallbladder pain and believes her chest pressure and mild BURNETT is caused by her anemia. According to patient, she has not had a cardiac workup in years.  Pt. denies fever, chills, HA, SOB palpitations, diaphoresis, dizziness, back pain, melena, and n/v/d.     In ER ECG reveals NSR@80bpm with non specific ST-T wave changes, Troponin T Sensitivity neg X1, , D-dimer 936, H/H 7.8/27.8 Plts 510, WBC 5.15 BUN/Cr 12/0.86.  CXR PA/LAT no acute pathology seen F/U official report, CTA PE protocol w/ IV contrast reveals no PE.  VSS Temp 98.6, HR85 /88, RR 18 O2 %.    Patient had ECHO 8/2 Normal left ventricular size and systolic function. Mild symmetric left ventricular hypertrophy. Normal right ventricular size and systolic function. Normal atria No significant valvular disease. Trivial pericardial effusion. No prior echo is available for comparison.     Had CCTA 1.  The calcium score is moderate at 147 Agatston units, which is at the   99 percentile, adjusted for age, gender and race.  2.  Motion noted in the mid RCA, however cannot rule out moderate   stenosis secondary to noncalcific plaque.  3.  Nonobstructive disease in the remaining segments.  4.  Based on the findings, CT-FFR is recommended. Report to be addended   once heartflow results are received.     49 y/o Obese female, current  smoker (1ppd X>10yrs ) with  PMHx of non compliance with medication Etoh abuse, anemia, HTN, DM, HLD, cholelithiasis, gastric bypass surgery (1998), , abdominal myoplasty (2018) presents to [facility] ER this evening, 8/1/23, complaining of substernal chest pressure, epigastric pressure associated with belching and mild BURNETT when ambulating  two  blocks.    In speaking with patient, she reports intermittent, non exertional substernal chest pressure with epigastric pressure, described as "heavy" 8/10, associated with belching and mild BURNETT when ambulating  two  blocks.  Pt. also endorses b/l ankle swelling  for several days.   She states epigastric pressure feels similar to prior gallbladder pain and believes her chest pressure and mild BURNETT is caused by her anemia. According to patient, she has not had a cardiac workup in years.  Pt. denies fever, chills, HA, SOB palpitations, diaphoresis, dizziness, back pain, melena, and n/v/d.     In ER ECG reveals NSR@80bpm with non specific ST-T wave changes, Troponin T Sensitivity neg X1, , D-dimer 936, H/H 7.8/27.8 Plts 510, WBC 5.15 BUN/Cr 12/0.86.  CXR PA/LAT no acute pathology seen F/U official report, CTA PE protocol w/ IV contrast reveals no PE.  VSS Temp 98.6, HR85 /88, RR 18 O2 %.    Patient had ECHO 8/2 Normal left ventricular size and systolic function. Mild symmetric left ventricular hypertrophy. Normal right ventricular size and systolic function. Normal atria No significant valvular disease. Trivial pericardial effusion. No prior echo is available for comparison.     Had CCTA 8/2/2023 The calcium score is moderate at 147 Agatston units, which is at the   99 percentile, adjusted for age, gender and race.  2.  Motion noted in the mid RCA, however cannot rule out moderate   stenosis secondary to noncalcific plaque.  3.  Nonobstructive disease in the remaining segments.  4.  Based on the findings, CT-FFR is recommended. Report to be addended   once heartflow results are received.    Patient went for cardiac cath on 8/3 with Non Obstructive CAD, Right Radial Access site looks well clean dry and intact patient still with some chronic lower abdominal pain, Patient with normal LFTS, never followed up with cholelithiasis. Recommended patient follows up with Dr. Hatfield at [facility] in regards to stones in your gallbladder.     As well as Hematologist Dr. Cornejo for further wok up for iron deficiency anemia s/p Gastroc bypass and can follow up with Dr. Ireland in 2-3 weeks after DC to establish care.     Patient discharged on ASA Atorvastatin, Norvasc 10mg, Losartan 100mg, And Iron Supplement      49 y/o Obese female, current  smoker (1ppd X>10yrs ) with  PMHx of non compliance with medication Etoh abuse, anemia, HTN, DM, HLD, cholelithiasis, gastric bypass surgery (1998), , abdominal myoplasty (2018) presents to [facility] ER this evening, 8/1/23, complaining of substernal chest pressure, epigastric pressure associated with belching and mild BURNETT when ambulating  two  blocks.    In speaking with patient, she reports intermittent, non exertional substernal chest pressure with epigastric pressure, described as "heavy" 8/10, associated with belching and mild BURNETT when ambulating  two  blocks.  Pt. also endorses b/l ankle swelling  for several days.   She states epigastric pressure feels similar to prior gallbladder pain and believes her chest pressure and mild BURNETT is caused by her anemia. According to patient, she has not had a cardiac workup in years.  Pt. denies fever, chills, HA, SOB palpitations, diaphoresis, dizziness, back pain, melena, and n/v/d.     In ER ECG reveals NSR@80bpm with non specific ST-T wave changes, Troponin T Sensitivity neg X1, , D-dimer 936, H/H 7.8/27.8 Plts 510, WBC 5.15 BUN/Cr 12/0.86.  CXR PA/LAT no acute pathology seen F/U official report, CTA PE protocol w/ IV contrast reveals no PE.  VSS Temp 98.6, HR85 /88, RR 18 O2 %.    Patient had ECHO 8/2 Normal left ventricular size and systolic function. Mild symmetric left ventricular hypertrophy. Normal right ventricular size and systolic function. Normal atria No significant valvular disease. Trivial pericardial effusion. No prior echo is available for comparison.     Had CCTA 8/2/2023 The calcium score is moderate at 147 Agatston units, which is at the   99 percentile, adjusted for age, gender and race.  2.  Motion noted in the mid RCA, however cannot rule out moderate   stenosis secondary to noncalcific plaque.  3.  Nonobstructive disease in the remaining segments.  4.  Based on the findings, CT-FFR is recommended. Report to be addended   once heartflow results are received.    Patient went for cardiac cath on 8/3 with Non Obstructive CAD, Right Radial Access site looks well clean dry and intact patient still with some chronic lower abdominal pain, Patient with normal LFTS, never followed up with cholelithiasis. Recommended patient follows up with Dr. Hatfield at [facility] in regards to stones in your gallbladder.     As well as Hematologist Dr. Cornejo for further wok up for iron deficiency anemia s/p Gastroc bypass and can follow up with Dr. Ireland in 2-3 weeks after DC to establish care.     Patient discharged on ASA Atorvastatin, Norvasc 10mg, Losartan 100mg, And Daily Iron

## 2023-08-09 DIAGNOSIS — R10.13 EPIGASTRIC PAIN: ICD-10-CM

## 2023-08-09 DIAGNOSIS — K31.84 GASTROPARESIS: ICD-10-CM

## 2023-08-09 DIAGNOSIS — Z91.148 PATIENT'S OTHER NONCOMPLIANCE WITH MEDICATION REGIMEN FOR OTHER REASON: ICD-10-CM

## 2023-08-09 DIAGNOSIS — R07.9 CHEST PAIN, UNSPECIFIED: ICD-10-CM

## 2023-08-09 DIAGNOSIS — E11.43 TYPE 2 DIABETES MELLITUS WITH DIABETIC AUTONOMIC (POLY)NEUROPATHY: ICD-10-CM

## 2023-08-09 DIAGNOSIS — E78.5 HYPERLIPIDEMIA, UNSPECIFIED: ICD-10-CM

## 2023-08-09 DIAGNOSIS — K80.20 CALCULUS OF GALLBLADDER WITHOUT CHOLECYSTITIS WITHOUT OBSTRUCTION: ICD-10-CM

## 2023-08-09 DIAGNOSIS — D25.9 LEIOMYOMA OF UTERUS, UNSPECIFIED: ICD-10-CM

## 2023-08-09 DIAGNOSIS — I10 ESSENTIAL (PRIMARY) HYPERTENSION: ICD-10-CM

## 2023-08-09 DIAGNOSIS — F17.210 NICOTINE DEPENDENCE, CIGARETTES, UNCOMPLICATED: ICD-10-CM

## 2023-08-09 DIAGNOSIS — Z79.84 LONG TERM (CURRENT) USE OF ORAL HYPOGLYCEMIC DRUGS: ICD-10-CM

## 2023-08-09 DIAGNOSIS — F10.10 ALCOHOL ABUSE, UNCOMPLICATED: ICD-10-CM

## 2023-08-09 DIAGNOSIS — Z98.84 BARIATRIC SURGERY STATUS: ICD-10-CM

## 2023-08-09 DIAGNOSIS — I77.1 STRICTURE OF ARTERY: ICD-10-CM

## 2023-08-09 DIAGNOSIS — D50.9 IRON DEFICIENCY ANEMIA, UNSPECIFIED: ICD-10-CM

## 2023-08-23 ENCOUNTER — APPOINTMENT (OUTPATIENT)
Dept: BARIATRICS | Facility: CLINIC | Age: 48
End: 2023-08-23

## 2023-08-25 NOTE — PATIENT PROFILE ADULT - FALL HARM RISK TYPE OF ASSESSMENT
Patient : Wilian Martinez Age: 72 year old Sex: male   MRN: [id number] Encounter Date: 8/25/2023    History     Chief Complaint   Patient presents with   • Abnormal Lab Results         Abnormal Lab Results         Wilian Martinez is a 72 year old presenting to the emergency department ***    {Chart Review (Optional):032110814}    No Known Allergies    No current facility-administered medications for this encounter.     Current Outpatient Medications   Medication Sig   • albuterol 108 (90 Base) MCG/ACT inhaler Inhale 1-2 puffs into the lungs.   • albuterol (ACCUNEB) 1.25 MG/3ML nebulizer solution    • omeprazole (PriLOSEC) 40 MG capsule Take 40 mg by mouth.   • tiotropium (Spiriva HandiHaler) 18 MCG capsule for inhaler        Past Medical History:   Diagnosis Date   • COPD (chronic obstructive pulmonary disease) (CMD)    • Essential (primary) hypertension        Past Surgical History:   Procedure Laterality Date   • Splenectomy, total         History reviewed. No pertinent family history.    Social History     Tobacco Use   • Smoking status: Never     Passive exposure: Never   • Smokeless tobacco: Never   Vaping Use   • Vaping Use: never used   Substance Use Topics   • Alcohol use: Not Currently   • Drug use: Never       Review of Systems     Review of Systems   All other systems reviewed and are negative.      Physical Exam     ED Triage Vitals   ED Triage Vitals Group      Temp 08/25/23 1119 99 °F (37.2 °C)      Heart Rate 08/25/23 1119 70      Resp 08/25/23 1119 20      BP 08/25/23 1119 (!) 141/69      SpO2 08/25/23 1119 100 %      EtCO2 mmHg --       Height 08/25/23 1113 5' 6\" (1.676 m)      Weight 08/25/23 1113 179 lb (81.2 kg)      Weight Scale Used --       BMI (Calculated) 08/25/23 1113 28.89      IBW/kg (Calculated) 08/25/23 1113 63.8       Physical Exam  Vitals and nursing note reviewed.   Constitutional:       Appearance: Normal appearance.   HENT:      Head: Normocephalic and atraumatic.      Right Ear: External ear  normal.      Left Ear: External ear normal.      Nose: Nose normal.      Mouth/Throat:      Mouth: Mucous membranes are moist.      Pharynx: Oropharynx is clear.      Neck: Normal range of motion and neck supple. No muscular tenderness.   Eyes:      Extraocular Movements: Extraocular movements intact.   Cardiovascular:      Rate and Rhythm: Normal rate and regular rhythm.      Heart sounds: No murmur heard.     No gallop.   Pulmonary:      Effort: Pulmonary effort is normal.      Breath sounds: Normal breath sounds. No wheezing, rhonchi or rales.   Chest:      Chest wall: No tenderness.   Abdominal:      General: Bowel sounds are normal. There is no distension.      Palpations: Abdomen is soft.      Tenderness: There is no abdominal tenderness. There is no guarding.   Musculoskeletal:         General: No swelling or tenderness. Normal range of motion.      Right lower leg: No edema.      Left lower leg: No edema.   Skin:     General: Skin is warm and dry.      Findings: No lesion.   Neurological:      General: No focal deficit present.      Mental Status: He is alert and oriented to person, place, and time.   Psychiatric:         Mood and Affect: Mood normal.         Behavior: Behavior normal.           Procedures     Procedures    Lab Results     Results for orders placed or performed during the hospital encounter of 08/25/23   GLUCOSE, BEDSIDE - POINT OF CARE   Result Value Ref Range    GLUCOSE, BEDSIDE - POINT OF CARE 106 (H) 70 - 99 mg/dL       EKG     EKG Interpretation  Rate: 67  Rhythm: normal sinus rhythm   Abnormality: no    Per my review of the EKG tracing, my findings are listed above, awaiting confirmation from cardiology    Radiology Results     Imaging Results    None         ED Medications     ED Medication Orders (From admission, onward)    None          ED Course     Vitals:    08/25/23 1113 08/25/23 1119   BP:  (!) 141/69   BP Location:  LUE - Left upper extremity   Patient Position:  Sitting    Pulse:  70   Resp:  20   Temp:  99 °F (37.2 °C)   TempSrc:  Oral   SpO2:  100%   Weight: 81.2 kg (179 lb)    Height: 5' 6\" (1.676 m)             {Data Independently Reviewed:19525}    {ED Scoring Tool (Optional):572501578}    Consults                {Consult Documentation (Optional):978927370}        MDM                 {(TIP) Please include a comprehensive MDM explaining the patients risks and why or why not they needed to be admitted.  This message will delete upon signing.  (Optional):3}               Patient is a 72 year old with complaint of ***.  My differential diagnosis includes but is not limited to ***. The patient will {dispo:660786237} ***      {Does the Patient have sepsis:803246431}     Critical Care       {Critical Care:980839614}        Disposition     {ED Disposition:271068669}          There is no disposition no dispo time  There is no comment    {(TIP) 2023 Billing Criteria (this will vanish upon signing)    Level 4   Must meet requirements of at least   1 of the 3 Categories    Level 5  Must meet requirements of at least   2 of the 3 categories      Category 1  Test Documents or independent historians  (need 3) -Review of prior external notes  -Review of results of a unique test  -Ordering of Each unique test  -Assessment Requiring an independent historian     Category 2  Independent interpretation of a test preformed by another physician/other qualified health care profesional -Cardiac Monitor Review  -Radiology interpretation  -EKG (unless billed separately)   Category 3  Discussion of Management or test interpretation with external physician/other qualified health care professional/appropriate source       -Consultation that specifies who you discussed the patient management with and what the plan is.   (Optional):3}        On 8/25/2023, Josh DING scribed the services personally performed by Dr. Milian     admission

## 2023-12-01 ENCOUNTER — INPATIENT (INPATIENT)
Facility: HOSPITAL | Age: 48
LOS: 1 days | Discharge: HOME | DRG: 871 | End: 2023-12-03
Attending: STUDENT IN AN ORGANIZED HEALTH CARE EDUCATION/TRAINING PROGRAM | Admitting: STUDENT IN AN ORGANIZED HEALTH CARE EDUCATION/TRAINING PROGRAM
Payer: MEDICAID

## 2023-12-01 VITALS
WEIGHT: 210.1 LBS | OXYGEN SATURATION: 100 % | HEART RATE: 97 BPM | HEIGHT: 66 IN | DIASTOLIC BLOOD PRESSURE: 84 MMHG | TEMPERATURE: 99 F | SYSTOLIC BLOOD PRESSURE: 131 MMHG | RESPIRATION RATE: 18 BRPM

## 2023-12-01 DIAGNOSIS — Z98.890 OTHER SPECIFIED POSTPROCEDURAL STATES: Chronic | ICD-10-CM

## 2023-12-01 DIAGNOSIS — D50.9 IRON DEFICIENCY ANEMIA, UNSPECIFIED: ICD-10-CM

## 2023-12-01 DIAGNOSIS — Z98.89 OTHER SPECIFIED POSTPROCEDURAL STATES: Chronic | ICD-10-CM

## 2023-12-01 DIAGNOSIS — E11.9 TYPE 2 DIABETES MELLITUS WITHOUT COMPLICATIONS: ICD-10-CM

## 2023-12-01 DIAGNOSIS — E78.5 HYPERLIPIDEMIA, UNSPECIFIED: ICD-10-CM

## 2023-12-01 DIAGNOSIS — R79.89 OTHER SPECIFIED ABNORMAL FINDINGS OF BLOOD CHEMISTRY: ICD-10-CM

## 2023-12-01 DIAGNOSIS — R53.1 WEAKNESS: ICD-10-CM

## 2023-12-01 DIAGNOSIS — E87.20 ACIDOSIS, UNSPECIFIED: ICD-10-CM

## 2023-12-01 DIAGNOSIS — D72.819 DECREASED WHITE BLOOD CELL COUNT, UNSPECIFIED: ICD-10-CM

## 2023-12-01 DIAGNOSIS — Z78.9 OTHER SPECIFIED HEALTH STATUS: ICD-10-CM

## 2023-12-01 DIAGNOSIS — U07.1 COVID-19: ICD-10-CM

## 2023-12-01 DIAGNOSIS — Z29.9 ENCOUNTER FOR PROPHYLACTIC MEASURES, UNSPECIFIED: ICD-10-CM

## 2023-12-01 LAB
ALBUMIN SERPL ELPH-MCNC: 4 G/DL — SIGNIFICANT CHANGE UP (ref 3.3–5)
ALBUMIN SERPL ELPH-MCNC: 4 G/DL — SIGNIFICANT CHANGE UP (ref 3.3–5)
ALBUMIN SERPL ELPH-MCNC: 4.1 G/DL — SIGNIFICANT CHANGE UP (ref 3.3–5)
ALBUMIN SERPL ELPH-MCNC: 4.1 G/DL — SIGNIFICANT CHANGE UP (ref 3.3–5)
ALP SERPL-CCNC: 68 U/L — SIGNIFICANT CHANGE UP (ref 40–120)
ALT FLD-CCNC: 20 U/L — SIGNIFICANT CHANGE UP (ref 10–45)
ALT FLD-CCNC: 20 U/L — SIGNIFICANT CHANGE UP (ref 10–45)
ALT FLD-CCNC: SIGNIFICANT CHANGE UP U/L (ref 10–45)
ALT FLD-CCNC: SIGNIFICANT CHANGE UP U/L (ref 10–45)
AMPHET UR-MCNC: NEGATIVE — SIGNIFICANT CHANGE UP
AMPHET UR-MCNC: NEGATIVE — SIGNIFICANT CHANGE UP
ANION GAP SERPL CALC-SCNC: 15 MMOL/L — SIGNIFICANT CHANGE UP (ref 5–17)
ANION GAP SERPL CALC-SCNC: 15 MMOL/L — SIGNIFICANT CHANGE UP (ref 5–17)
ANION GAP SERPL CALC-SCNC: 16 MMOL/L — SIGNIFICANT CHANGE UP (ref 5–17)
ANION GAP SERPL CALC-SCNC: 16 MMOL/L — SIGNIFICANT CHANGE UP (ref 5–17)
APPEARANCE UR: CLEAR — SIGNIFICANT CHANGE UP
APPEARANCE UR: CLEAR — SIGNIFICANT CHANGE UP
APTT BLD: 29.4 SEC — SIGNIFICANT CHANGE UP (ref 24.5–35.6)
APTT BLD: 29.4 SEC — SIGNIFICANT CHANGE UP (ref 24.5–35.6)
AST SERPL-CCNC: 25 U/L — SIGNIFICANT CHANGE UP (ref 10–40)
AST SERPL-CCNC: 25 U/L — SIGNIFICANT CHANGE UP (ref 10–40)
AST SERPL-CCNC: SIGNIFICANT CHANGE UP U/L (ref 10–40)
AST SERPL-CCNC: SIGNIFICANT CHANGE UP U/L (ref 10–40)
B-OH-BUTYR SERPL-SCNC: 0.2 MMOL/L — SIGNIFICANT CHANGE UP
B-OH-BUTYR SERPL-SCNC: 0.2 MMOL/L — SIGNIFICANT CHANGE UP
BACTERIA # UR AUTO: NEGATIVE /HPF — SIGNIFICANT CHANGE UP
BACTERIA # UR AUTO: NEGATIVE /HPF — SIGNIFICANT CHANGE UP
BARBITURATES UR SCN-MCNC: NEGATIVE — SIGNIFICANT CHANGE UP
BARBITURATES UR SCN-MCNC: NEGATIVE — SIGNIFICANT CHANGE UP
BASE EXCESS BLDV CALC-SCNC: -2 MMOL/L — SIGNIFICANT CHANGE UP (ref -2–3)
BASE EXCESS BLDV CALC-SCNC: -2 MMOL/L — SIGNIFICANT CHANGE UP (ref -2–3)
BASOPHILS # BLD AUTO: 0.02 K/UL — SIGNIFICANT CHANGE UP (ref 0–0.2)
BASOPHILS NFR BLD AUTO: 0.5 % — SIGNIFICANT CHANGE UP (ref 0–2)
BASOPHILS NFR BLD AUTO: 0.5 % — SIGNIFICANT CHANGE UP (ref 0–2)
BASOPHILS NFR BLD AUTO: 0.6 % — SIGNIFICANT CHANGE UP (ref 0–2)
BASOPHILS NFR BLD AUTO: 0.6 % — SIGNIFICANT CHANGE UP (ref 0–2)
BENZODIAZ UR-MCNC: NEGATIVE — SIGNIFICANT CHANGE UP
BENZODIAZ UR-MCNC: NEGATIVE — SIGNIFICANT CHANGE UP
BILIRUB SERPL-MCNC: 0.2 MG/DL — SIGNIFICANT CHANGE UP (ref 0.2–1.2)
BILIRUB SERPL-MCNC: 0.2 MG/DL — SIGNIFICANT CHANGE UP (ref 0.2–1.2)
BILIRUB SERPL-MCNC: 0.3 MG/DL — SIGNIFICANT CHANGE UP (ref 0.2–1.2)
BILIRUB SERPL-MCNC: 0.3 MG/DL — SIGNIFICANT CHANGE UP (ref 0.2–1.2)
BILIRUB UR-MCNC: NEGATIVE — SIGNIFICANT CHANGE UP
BILIRUB UR-MCNC: NEGATIVE — SIGNIFICANT CHANGE UP
BLD GP AB SCN SERPL QL: NEGATIVE — SIGNIFICANT CHANGE UP
BLD GP AB SCN SERPL QL: NEGATIVE — SIGNIFICANT CHANGE UP
BUN SERPL-MCNC: 14 MG/DL — SIGNIFICANT CHANGE UP (ref 7–23)
BUN SERPL-MCNC: 14 MG/DL — SIGNIFICANT CHANGE UP (ref 7–23)
BUN SERPL-MCNC: 15 MG/DL — SIGNIFICANT CHANGE UP (ref 7–23)
BUN SERPL-MCNC: 15 MG/DL — SIGNIFICANT CHANGE UP (ref 7–23)
CA-I SERPL-SCNC: 1.23 MMOL/L — SIGNIFICANT CHANGE UP (ref 1.15–1.33)
CA-I SERPL-SCNC: 1.23 MMOL/L — SIGNIFICANT CHANGE UP (ref 1.15–1.33)
CALCIUM SERPL-MCNC: 9.5 MG/DL — SIGNIFICANT CHANGE UP (ref 8.4–10.5)
CAST: 1 /LPF — SIGNIFICANT CHANGE UP (ref 0–4)
CAST: 1 /LPF — SIGNIFICANT CHANGE UP (ref 0–4)
CHLORIDE SERPL-SCNC: 105 MMOL/L — SIGNIFICANT CHANGE UP (ref 96–108)
CK MB CFR SERPL CALC: 1.2 NG/ML — SIGNIFICANT CHANGE UP (ref 0–6.7)
CK MB CFR SERPL CALC: 1.2 NG/ML — SIGNIFICANT CHANGE UP (ref 0–6.7)
CK SERPL-CCNC: 130 U/L — SIGNIFICANT CHANGE UP (ref 25–170)
CK SERPL-CCNC: 130 U/L — SIGNIFICANT CHANGE UP (ref 25–170)
CO2 BLDV-SCNC: 24.3 MMOL/L — SIGNIFICANT CHANGE UP (ref 22–26)
CO2 BLDV-SCNC: 24.3 MMOL/L — SIGNIFICANT CHANGE UP (ref 22–26)
CO2 SERPL-SCNC: 18 MMOL/L — LOW (ref 22–31)
CO2 SERPL-SCNC: 18 MMOL/L — LOW (ref 22–31)
CO2 SERPL-SCNC: 19 MMOL/L — LOW (ref 22–31)
CO2 SERPL-SCNC: 19 MMOL/L — LOW (ref 22–31)
COCAINE METAB.OTHER UR-MCNC: NEGATIVE — SIGNIFICANT CHANGE UP
COCAINE METAB.OTHER UR-MCNC: NEGATIVE — SIGNIFICANT CHANGE UP
COLOR SPEC: YELLOW — SIGNIFICANT CHANGE UP
COLOR SPEC: YELLOW — SIGNIFICANT CHANGE UP
CREAT SERPL-MCNC: 0.93 MG/DL — SIGNIFICANT CHANGE UP (ref 0.5–1.3)
CREAT SERPL-MCNC: 0.93 MG/DL — SIGNIFICANT CHANGE UP (ref 0.5–1.3)
CREAT SERPL-MCNC: 0.98 MG/DL — SIGNIFICANT CHANGE UP (ref 0.5–1.3)
CREAT SERPL-MCNC: 0.98 MG/DL — SIGNIFICANT CHANGE UP (ref 0.5–1.3)
D DIMER BLD IA.RAPID-MCNC: 325 NG/ML DDU — HIGH
D DIMER BLD IA.RAPID-MCNC: 325 NG/ML DDU — HIGH
DIFF PNL FLD: NEGATIVE — SIGNIFICANT CHANGE UP
DIFF PNL FLD: NEGATIVE — SIGNIFICANT CHANGE UP
EGFR: 72 ML/MIN/1.73M2 — SIGNIFICANT CHANGE UP
EGFR: 72 ML/MIN/1.73M2 — SIGNIFICANT CHANGE UP
EGFR: 77 ML/MIN/1.73M2 — SIGNIFICANT CHANGE UP
EGFR: 77 ML/MIN/1.73M2 — SIGNIFICANT CHANGE UP
EOSINOPHIL # BLD AUTO: 0.04 K/UL — SIGNIFICANT CHANGE UP (ref 0–0.5)
EOSINOPHIL # BLD AUTO: 0.04 K/UL — SIGNIFICANT CHANGE UP (ref 0–0.5)
EOSINOPHIL # BLD AUTO: 0.05 K/UL — SIGNIFICANT CHANGE UP (ref 0–0.5)
EOSINOPHIL # BLD AUTO: 0.05 K/UL — SIGNIFICANT CHANGE UP (ref 0–0.5)
EOSINOPHIL NFR BLD AUTO: 1.1 % — SIGNIFICANT CHANGE UP (ref 0–6)
EOSINOPHIL NFR BLD AUTO: 1.1 % — SIGNIFICANT CHANGE UP (ref 0–6)
EOSINOPHIL NFR BLD AUTO: 1.4 % — SIGNIFICANT CHANGE UP (ref 0–6)
EOSINOPHIL NFR BLD AUTO: 1.4 % — SIGNIFICANT CHANGE UP (ref 0–6)
ETHANOL SERPL-MCNC: <10 MG/DL — SIGNIFICANT CHANGE UP (ref 0–10)
ETHANOL SERPL-MCNC: <10 MG/DL — SIGNIFICANT CHANGE UP (ref 0–10)
GAS PNL BLDV: 139 MMOL/L — SIGNIFICANT CHANGE UP (ref 136–145)
GAS PNL BLDV: 139 MMOL/L — SIGNIFICANT CHANGE UP (ref 136–145)
GAS PNL BLDV: SIGNIFICANT CHANGE UP
GAS PNL BLDV: SIGNIFICANT CHANGE UP
GLUCOSE BLDC GLUCOMTR-MCNC: 236 MG/DL — HIGH (ref 70–99)
GLUCOSE BLDC GLUCOMTR-MCNC: 236 MG/DL — HIGH (ref 70–99)
GLUCOSE SERPL-MCNC: 171 MG/DL — HIGH (ref 70–99)
GLUCOSE SERPL-MCNC: 171 MG/DL — HIGH (ref 70–99)
GLUCOSE SERPL-MCNC: 202 MG/DL — HIGH (ref 70–99)
GLUCOSE SERPL-MCNC: 202 MG/DL — HIGH (ref 70–99)
GLUCOSE UR QL: NEGATIVE MG/DL — SIGNIFICANT CHANGE UP
GLUCOSE UR QL: NEGATIVE MG/DL — SIGNIFICANT CHANGE UP
HAPTOGLOB SERPL-MCNC: 232 MG/DL — HIGH (ref 34–200)
HAPTOGLOB SERPL-MCNC: 232 MG/DL — HIGH (ref 34–200)
HCG SERPL-ACNC: <0 MIU/ML — SIGNIFICANT CHANGE UP
HCG SERPL-ACNC: <0 MIU/ML — SIGNIFICANT CHANGE UP
HCO3 BLDV-SCNC: 23 MMOL/L — SIGNIFICANT CHANGE UP (ref 22–29)
HCO3 BLDV-SCNC: 23 MMOL/L — SIGNIFICANT CHANGE UP (ref 22–29)
HCT VFR BLD CALC: 26 % — LOW (ref 34.5–45)
HCT VFR BLD CALC: 26 % — LOW (ref 34.5–45)
HCT VFR BLD CALC: 26.7 % — LOW (ref 34.5–45)
HCT VFR BLD CALC: 26.7 % — LOW (ref 34.5–45)
HGB BLD-MCNC: 7.8 G/DL — LOW (ref 11.5–15.5)
HGB BLD-MCNC: 7.8 G/DL — LOW (ref 11.5–15.5)
HGB BLD-MCNC: 8 G/DL — LOW (ref 11.5–15.5)
HGB BLD-MCNC: 8 G/DL — LOW (ref 11.5–15.5)
IMM GRANULOCYTES NFR BLD AUTO: 0 % — SIGNIFICANT CHANGE UP (ref 0–0.9)
IMM GRANULOCYTES NFR BLD AUTO: 0 % — SIGNIFICANT CHANGE UP (ref 0–0.9)
IMM GRANULOCYTES NFR BLD AUTO: 0.3 % — SIGNIFICANT CHANGE UP (ref 0–0.9)
IMM GRANULOCYTES NFR BLD AUTO: 0.3 % — SIGNIFICANT CHANGE UP (ref 0–0.9)
INR BLD: 0.93 — SIGNIFICANT CHANGE UP (ref 0.85–1.18)
INR BLD: 0.93 — SIGNIFICANT CHANGE UP (ref 0.85–1.18)
IRON SATN MFR SERPL: 24 UG/DL — LOW (ref 30–160)
IRON SATN MFR SERPL: 24 UG/DL — LOW (ref 30–160)
KETONES UR-MCNC: ABNORMAL MG/DL
KETONES UR-MCNC: ABNORMAL MG/DL
LACTATE SERPL-SCNC: 2.2 MMOL/L — HIGH (ref 0.5–2)
LACTATE SERPL-SCNC: 2.2 MMOL/L — HIGH (ref 0.5–2)
LACTATE SERPL-SCNC: 2.8 MMOL/L — HIGH (ref 0.5–2)
LACTATE SERPL-SCNC: 2.8 MMOL/L — HIGH (ref 0.5–2)
LDH SERPL L TO P-CCNC: 161 U/L — SIGNIFICANT CHANGE UP (ref 50–242)
LDH SERPL L TO P-CCNC: 161 U/L — SIGNIFICANT CHANGE UP (ref 50–242)
LEUKOCYTE ESTERASE UR-ACNC: NEGATIVE — SIGNIFICANT CHANGE UP
LEUKOCYTE ESTERASE UR-ACNC: NEGATIVE — SIGNIFICANT CHANGE UP
LIDOCAIN IGE QN: 60 U/L — SIGNIFICANT CHANGE UP (ref 7–60)
LIDOCAIN IGE QN: 60 U/L — SIGNIFICANT CHANGE UP (ref 7–60)
LYMPHOCYTES # BLD AUTO: 1.21 K/UL — SIGNIFICANT CHANGE UP (ref 1–3.3)
LYMPHOCYTES # BLD AUTO: 1.21 K/UL — SIGNIFICANT CHANGE UP (ref 1–3.3)
LYMPHOCYTES # BLD AUTO: 1.31 K/UL — SIGNIFICANT CHANGE UP (ref 1–3.3)
LYMPHOCYTES # BLD AUTO: 1.31 K/UL — SIGNIFICANT CHANGE UP (ref 1–3.3)
LYMPHOCYTES # BLD AUTO: 32 % — SIGNIFICANT CHANGE UP (ref 13–44)
LYMPHOCYTES # BLD AUTO: 32 % — SIGNIFICANT CHANGE UP (ref 13–44)
LYMPHOCYTES # BLD AUTO: 36.8 % — SIGNIFICANT CHANGE UP (ref 13–44)
LYMPHOCYTES # BLD AUTO: 36.8 % — SIGNIFICANT CHANGE UP (ref 13–44)
MAGNESIUM SERPL-MCNC: 1.6 MG/DL — SIGNIFICANT CHANGE UP (ref 1.6–2.6)
MAGNESIUM SERPL-MCNC: 1.6 MG/DL — SIGNIFICANT CHANGE UP (ref 1.6–2.6)
MAGNESIUM SERPL-MCNC: 1.7 MG/DL — SIGNIFICANT CHANGE UP (ref 1.6–2.6)
MAGNESIUM SERPL-MCNC: 1.7 MG/DL — SIGNIFICANT CHANGE UP (ref 1.6–2.6)
MCHC RBC-ENTMCNC: 22.7 PG — LOW (ref 27–34)
MCHC RBC-ENTMCNC: 22.7 PG — LOW (ref 27–34)
MCHC RBC-ENTMCNC: 22.9 PG — LOW (ref 27–34)
MCHC RBC-ENTMCNC: 22.9 PG — LOW (ref 27–34)
MCHC RBC-ENTMCNC: 30 GM/DL — LOW (ref 32–36)
MCV RBC AUTO: 75.8 FL — LOW (ref 80–100)
MCV RBC AUTO: 75.8 FL — LOW (ref 80–100)
MCV RBC AUTO: 76.3 FL — LOW (ref 80–100)
MCV RBC AUTO: 76.3 FL — LOW (ref 80–100)
METHADONE UR-MCNC: NEGATIVE — SIGNIFICANT CHANGE UP
METHADONE UR-MCNC: NEGATIVE — SIGNIFICANT CHANGE UP
MONOCYTES # BLD AUTO: 0.41 K/UL — SIGNIFICANT CHANGE UP (ref 0–0.9)
MONOCYTES # BLD AUTO: 0.41 K/UL — SIGNIFICANT CHANGE UP (ref 0–0.9)
MONOCYTES # BLD AUTO: 0.55 K/UL — SIGNIFICANT CHANGE UP (ref 0–0.9)
MONOCYTES # BLD AUTO: 0.55 K/UL — SIGNIFICANT CHANGE UP (ref 0–0.9)
MONOCYTES NFR BLD AUTO: 10.8 % — SIGNIFICANT CHANGE UP (ref 2–14)
MONOCYTES NFR BLD AUTO: 10.8 % — SIGNIFICANT CHANGE UP (ref 2–14)
MONOCYTES NFR BLD AUTO: 15.4 % — HIGH (ref 2–14)
MONOCYTES NFR BLD AUTO: 15.4 % — HIGH (ref 2–14)
NEUTROPHILS # BLD AUTO: 1.62 K/UL — LOW (ref 1.8–7.4)
NEUTROPHILS # BLD AUTO: 1.62 K/UL — LOW (ref 1.8–7.4)
NEUTROPHILS # BLD AUTO: 2.1 K/UL — SIGNIFICANT CHANGE UP (ref 1.8–7.4)
NEUTROPHILS # BLD AUTO: 2.1 K/UL — SIGNIFICANT CHANGE UP (ref 1.8–7.4)
NEUTROPHILS NFR BLD AUTO: 45.5 % — SIGNIFICANT CHANGE UP (ref 43–77)
NEUTROPHILS NFR BLD AUTO: 45.5 % — SIGNIFICANT CHANGE UP (ref 43–77)
NEUTROPHILS NFR BLD AUTO: 55.6 % — SIGNIFICANT CHANGE UP (ref 43–77)
NEUTROPHILS NFR BLD AUTO: 55.6 % — SIGNIFICANT CHANGE UP (ref 43–77)
NITRITE UR-MCNC: NEGATIVE — SIGNIFICANT CHANGE UP
NITRITE UR-MCNC: NEGATIVE — SIGNIFICANT CHANGE UP
NRBC # BLD: 0 /100 WBCS — SIGNIFICANT CHANGE UP (ref 0–0)
NT-PROBNP SERPL-SCNC: 46 PG/ML — SIGNIFICANT CHANGE UP (ref 0–300)
NT-PROBNP SERPL-SCNC: 46 PG/ML — SIGNIFICANT CHANGE UP (ref 0–300)
OPIATES UR-MCNC: NEGATIVE — SIGNIFICANT CHANGE UP
OPIATES UR-MCNC: NEGATIVE — SIGNIFICANT CHANGE UP
PCO2 BLDV: 40 MMHG — SIGNIFICANT CHANGE UP (ref 39–42)
PCO2 BLDV: 40 MMHG — SIGNIFICANT CHANGE UP (ref 39–42)
PCP SPEC-MCNC: SIGNIFICANT CHANGE UP
PCP SPEC-MCNC: SIGNIFICANT CHANGE UP
PCP UR-MCNC: NEGATIVE — SIGNIFICANT CHANGE UP
PCP UR-MCNC: NEGATIVE — SIGNIFICANT CHANGE UP
PH BLDV: 7.37 — SIGNIFICANT CHANGE UP (ref 7.32–7.43)
PH BLDV: 7.37 — SIGNIFICANT CHANGE UP (ref 7.32–7.43)
PH UR: 5.5 — SIGNIFICANT CHANGE UP (ref 5–8)
PH UR: 5.5 — SIGNIFICANT CHANGE UP (ref 5–8)
PHOSPHATE SERPL-MCNC: 3.7 MG/DL — SIGNIFICANT CHANGE UP (ref 2.5–4.5)
PHOSPHATE SERPL-MCNC: 3.7 MG/DL — SIGNIFICANT CHANGE UP (ref 2.5–4.5)
PLATELET # BLD AUTO: 250 K/UL — SIGNIFICANT CHANGE UP (ref 150–400)
PLATELET # BLD AUTO: 250 K/UL — SIGNIFICANT CHANGE UP (ref 150–400)
PLATELET # BLD AUTO: 275 K/UL — SIGNIFICANT CHANGE UP (ref 150–400)
PLATELET # BLD AUTO: 275 K/UL — SIGNIFICANT CHANGE UP (ref 150–400)
PO2 BLDV: <33 MMHG — SIGNIFICANT CHANGE UP (ref 25–45)
PO2 BLDV: <33 MMHG — SIGNIFICANT CHANGE UP (ref 25–45)
POTASSIUM BLDV-SCNC: 4.1 MMOL/L — SIGNIFICANT CHANGE UP (ref 3.5–5.1)
POTASSIUM BLDV-SCNC: 4.1 MMOL/L — SIGNIFICANT CHANGE UP (ref 3.5–5.1)
POTASSIUM SERPL-MCNC: 4.2 MMOL/L — SIGNIFICANT CHANGE UP (ref 3.5–5.3)
POTASSIUM SERPL-MCNC: 4.2 MMOL/L — SIGNIFICANT CHANGE UP (ref 3.5–5.3)
POTASSIUM SERPL-MCNC: SIGNIFICANT CHANGE UP MMOL/L (ref 3.5–5.3)
POTASSIUM SERPL-MCNC: SIGNIFICANT CHANGE UP MMOL/L (ref 3.5–5.3)
POTASSIUM SERPL-SCNC: 4.2 MMOL/L — SIGNIFICANT CHANGE UP (ref 3.5–5.3)
POTASSIUM SERPL-SCNC: 4.2 MMOL/L — SIGNIFICANT CHANGE UP (ref 3.5–5.3)
POTASSIUM SERPL-SCNC: SIGNIFICANT CHANGE UP MMOL/L (ref 3.5–5.3)
POTASSIUM SERPL-SCNC: SIGNIFICANT CHANGE UP MMOL/L (ref 3.5–5.3)
PROT SERPL-MCNC: 7.2 G/DL — SIGNIFICANT CHANGE UP (ref 6–8.3)
PROT SERPL-MCNC: 7.2 G/DL — SIGNIFICANT CHANGE UP (ref 6–8.3)
PROT SERPL-MCNC: 7.5 G/DL — SIGNIFICANT CHANGE UP (ref 6–8.3)
PROT SERPL-MCNC: 7.5 G/DL — SIGNIFICANT CHANGE UP (ref 6–8.3)
PROT UR-MCNC: 30 MG/DL
PROT UR-MCNC: 30 MG/DL
PROTHROM AB SERPL-ACNC: 10.6 SEC — SIGNIFICANT CHANGE UP (ref 9.5–13)
PROTHROM AB SERPL-ACNC: 10.6 SEC — SIGNIFICANT CHANGE UP (ref 9.5–13)
RAPID RVP RESULT: DETECTED
RAPID RVP RESULT: DETECTED
RBC # BLD: 3.22 M/UL — LOW (ref 3.8–5.2)
RBC # BLD: 3.22 M/UL — LOW (ref 3.8–5.2)
RBC # BLD: 3.43 M/UL — LOW (ref 3.8–5.2)
RBC # BLD: 3.43 M/UL — LOW (ref 3.8–5.2)
RBC # BLD: 3.5 M/UL — LOW (ref 3.8–5.2)
RBC # BLD: 3.5 M/UL — LOW (ref 3.8–5.2)
RBC # FLD: 18.3 % — HIGH (ref 10.3–14.5)
RBC # FLD: 18.3 % — HIGH (ref 10.3–14.5)
RBC # FLD: 18.6 % — HIGH (ref 10.3–14.5)
RBC # FLD: 18.6 % — HIGH (ref 10.3–14.5)
RBC CASTS # UR COMP ASSIST: 0 /HPF — SIGNIFICANT CHANGE UP (ref 0–4)
RBC CASTS # UR COMP ASSIST: 0 /HPF — SIGNIFICANT CHANGE UP (ref 0–4)
RETICS #: 40.6 K/UL — SIGNIFICANT CHANGE UP (ref 25–125)
RETICS #: 40.6 K/UL — SIGNIFICANT CHANGE UP (ref 25–125)
RETICS/RBC NFR: 1.3 % — SIGNIFICANT CHANGE UP (ref 0.5–2.5)
RETICS/RBC NFR: 1.3 % — SIGNIFICANT CHANGE UP (ref 0.5–2.5)
RH IG SCN BLD-IMP: POSITIVE — SIGNIFICANT CHANGE UP
RH IG SCN BLD-IMP: POSITIVE — SIGNIFICANT CHANGE UP
SAO2 % BLDV: 39.1 % — LOW (ref 67–88)
SAO2 % BLDV: 39.1 % — LOW (ref 67–88)
SARS-COV-2 RNA SPEC QL NAA+PROBE: DETECTED
SARS-COV-2 RNA SPEC QL NAA+PROBE: DETECTED
SODIUM SERPL-SCNC: 139 MMOL/L — SIGNIFICANT CHANGE UP (ref 135–145)
SP GR SPEC: 1.03 — SIGNIFICANT CHANGE UP (ref 1–1.03)
SP GR SPEC: 1.03 — SIGNIFICANT CHANGE UP (ref 1–1.03)
SQUAMOUS # UR AUTO: 1 /HPF — SIGNIFICANT CHANGE UP (ref 0–5)
SQUAMOUS # UR AUTO: 1 /HPF — SIGNIFICANT CHANGE UP (ref 0–5)
THC UR QL: NEGATIVE — SIGNIFICANT CHANGE UP
THC UR QL: NEGATIVE — SIGNIFICANT CHANGE UP
TROPONIN T, HIGH SENSITIVITY RESULT: <6 NG/L — SIGNIFICANT CHANGE UP (ref 0–51)
TSH SERPL-MCNC: 1.27 UIU/ML — SIGNIFICANT CHANGE UP (ref 0.27–4.2)
TSH SERPL-MCNC: 1.27 UIU/ML — SIGNIFICANT CHANGE UP (ref 0.27–4.2)
UROBILINOGEN FLD QL: 1 MG/DL — SIGNIFICANT CHANGE UP (ref 0.2–1)
UROBILINOGEN FLD QL: 1 MG/DL — SIGNIFICANT CHANGE UP (ref 0.2–1)
WBC # BLD: 3.56 K/UL — LOW (ref 3.8–10.5)
WBC # BLD: 3.56 K/UL — LOW (ref 3.8–10.5)
WBC # BLD: 3.78 K/UL — LOW (ref 3.8–10.5)
WBC # BLD: 3.78 K/UL — LOW (ref 3.8–10.5)
WBC # FLD AUTO: 3.56 K/UL — LOW (ref 3.8–10.5)
WBC # FLD AUTO: 3.56 K/UL — LOW (ref 3.8–10.5)
WBC # FLD AUTO: 3.78 K/UL — LOW (ref 3.8–10.5)
WBC # FLD AUTO: 3.78 K/UL — LOW (ref 3.8–10.5)
WBC UR QL: 0 /HPF — SIGNIFICANT CHANGE UP (ref 0–5)
WBC UR QL: 0 /HPF — SIGNIFICANT CHANGE UP (ref 0–5)

## 2023-12-01 PROCEDURE — 93010 ELECTROCARDIOGRAM REPORT: CPT

## 2023-12-01 PROCEDURE — 99285 EMERGENCY DEPT VISIT HI MDM: CPT

## 2023-12-01 PROCEDURE — 99223 1ST HOSP IP/OBS HIGH 75: CPT

## 2023-12-01 PROCEDURE — 71045 X-RAY EXAM CHEST 1 VIEW: CPT | Mod: 26

## 2023-12-01 PROCEDURE — 71275 CT ANGIOGRAPHY CHEST: CPT | Mod: 26,MA

## 2023-12-01 RX ORDER — DEXTROSE 50 % IN WATER 50 %
12.5 SYRINGE (ML) INTRAVENOUS ONCE
Refills: 0 | Status: DISCONTINUED | OUTPATIENT
Start: 2023-12-01 | End: 2023-12-03

## 2023-12-01 RX ORDER — ACETAMINOPHEN 500 MG
650 TABLET ORAL EVERY 6 HOURS
Refills: 0 | Status: DISCONTINUED | OUTPATIENT
Start: 2023-12-01 | End: 2023-12-03

## 2023-12-01 RX ORDER — SODIUM CHLORIDE 9 MG/ML
1000 INJECTION, SOLUTION INTRAVENOUS
Refills: 0 | Status: DISCONTINUED | OUTPATIENT
Start: 2023-12-01 | End: 2023-12-03

## 2023-12-01 RX ORDER — FOLIC ACID 0.8 MG
1 TABLET ORAL
Refills: 0 | Status: DISCONTINUED | OUTPATIENT
Start: 2023-12-02 | End: 2023-12-03

## 2023-12-01 RX ORDER — GLUCAGON INJECTION, SOLUTION 0.5 MG/.1ML
1 INJECTION, SOLUTION SUBCUTANEOUS ONCE
Refills: 0 | Status: DISCONTINUED | OUTPATIENT
Start: 2023-12-01 | End: 2023-12-03

## 2023-12-01 RX ORDER — DEXTROSE 50 % IN WATER 50 %
25 SYRINGE (ML) INTRAVENOUS ONCE
Refills: 0 | Status: DISCONTINUED | OUTPATIENT
Start: 2023-12-01 | End: 2023-12-03

## 2023-12-01 RX ORDER — SODIUM CHLORIDE 9 MG/ML
1000 INJECTION INTRAMUSCULAR; INTRAVENOUS; SUBCUTANEOUS ONCE
Refills: 0 | Status: COMPLETED | OUTPATIENT
Start: 2023-12-01 | End: 2023-12-01

## 2023-12-01 RX ORDER — FERROUS SULFATE 325(65) MG
325 TABLET ORAL AT BEDTIME
Refills: 0 | Status: DISCONTINUED | OUTPATIENT
Start: 2023-12-02 | End: 2023-12-02

## 2023-12-01 RX ORDER — DEXTROSE 50 % IN WATER 50 %
15 SYRINGE (ML) INTRAVENOUS ONCE
Refills: 0 | Status: DISCONTINUED | OUTPATIENT
Start: 2023-12-01 | End: 2023-12-03

## 2023-12-01 RX ORDER — ATORVASTATIN CALCIUM 80 MG/1
40 TABLET, FILM COATED ORAL AT BEDTIME
Refills: 0 | Status: DISCONTINUED | OUTPATIENT
Start: 2023-12-01 | End: 2023-12-03

## 2023-12-01 RX ORDER — SIMETHICONE 80 MG/1
80 TABLET, CHEWABLE ORAL EVERY 24 HOURS
Refills: 0 | Status: COMPLETED | OUTPATIENT
Start: 2023-12-01 | End: 2023-12-02

## 2023-12-01 RX ORDER — PANTOPRAZOLE SODIUM 20 MG/1
40 TABLET, DELAYED RELEASE ORAL
Refills: 0 | Status: DISCONTINUED | OUTPATIENT
Start: 2023-12-01 | End: 2023-12-03

## 2023-12-01 RX ORDER — ERGOCALCIFEROL 1.25 MG/1
1 CAPSULE ORAL
Qty: 0 | Refills: 0 | DISCHARGE

## 2023-12-01 RX ORDER — AMLODIPINE BESYLATE 2.5 MG/1
10 TABLET ORAL EVERY 24 HOURS
Refills: 0 | Status: DISCONTINUED | OUTPATIENT
Start: 2023-12-02 | End: 2023-12-03

## 2023-12-01 RX ORDER — LOSARTAN POTASSIUM 100 MG/1
100 TABLET, FILM COATED ORAL EVERY 24 HOURS
Refills: 0 | Status: DISCONTINUED | OUTPATIENT
Start: 2023-12-02 | End: 2023-12-03

## 2023-12-01 RX ORDER — INSULIN LISPRO 100/ML
VIAL (ML) SUBCUTANEOUS
Refills: 0 | Status: DISCONTINUED | OUTPATIENT
Start: 2023-12-01 | End: 2023-12-03

## 2023-12-01 RX ORDER — ASPIRIN/CALCIUM CARB/MAGNESIUM 324 MG
81 TABLET ORAL EVERY 24 HOURS
Refills: 0 | Status: DISCONTINUED | OUTPATIENT
Start: 2023-12-02 | End: 2023-12-02

## 2023-12-01 RX ADMIN — SIMETHICONE 80 MILLIGRAM(S): 80 TABLET, CHEWABLE ORAL at 22:26

## 2023-12-01 RX ADMIN — Medication 2: at 22:26

## 2023-12-01 RX ADMIN — SODIUM CHLORIDE 1000 MILLILITER(S): 9 INJECTION INTRAMUSCULAR; INTRAVENOUS; SUBCUTANEOUS at 17:35

## 2023-12-01 RX ADMIN — ATORVASTATIN CALCIUM 40 MILLIGRAM(S): 80 TABLET, FILM COATED ORAL at 22:26

## 2023-12-01 NOTE — H&P ADULT - PROBLEM SELECTOR PLAN 3
wbc 3.78. ANC 1620 so not neutropenic. Normal lymphocyte count.  Past does not always practice safe sex practices.  May be related to covid illness vs undiagnosed HIV   - HIV screen Lactate 2.8-->2.2 after fluids. bicarb mildly decreased, normal pH on VBG.  No signs of hypoperfusion.  May be type B lactic acidosis.  - has hx of DM and uses metformin  - also has new leukopenia, which could be related to an undiagnosed HIV  - pt has past hx of alcohol use but minimal of late  - no need to further trend at this time

## 2023-12-01 NOTE — ED PROVIDER NOTE - CLINICAL SUMMARY MEDICAL DECISION MAKING FREE TEXT BOX
48-year-old poor historian , with profound generalized weakness, patient is sleepy appearing, has atypical chest pain, duratio  for 2 days and lower extremity edema of unclear  duration,  consider possible alcohol or drug intoxication, consider recurrent anemia, possible recurrent iron deficiency, also consider B12 deficiency as prior gastric bypass, consider metabolic abnormalities and hypothyroid, consider infection, consider uncontrolled diabetes, plan Labs, x-ray, urine analysis,  Dispo pending workup and reevaluation. 48-year-old poor historian , with profound generalized weakness, patient is sleepy appearing, has atypical chest pain, duratio  for 2 days and lower extremity edema of unclear  duration,  consider possible alcohol or drug intoxication, consider recurrent anemia, possible recurrent iron deficiency, also consider B12 deficiency as prior gastric bypass, consider metabolic abnormalities and hypothyroid, consider infection, consider uncontrolled diabetes, plan Labs, x-ray, urine analysis,  Dispo pending workup and reevaluation.    + anemia, suspect + covid with dehydration and anemia cause of weakness, suspect hbg will drop after hydration, admit to medicine, lactic elevated , doubt bacterial infection / sepsis , suspect lactic caused by both anemia and dehydration.

## 2023-12-01 NOTE — ED ADULT NURSE REASSESSMENT NOTE - NS ED NURSE REASSESS COMMENT FT1
Pt A&Ox4 and able to speak in complete sentences. Pt breathing even and unlabored with equal chest rise and fall. Pt orthostatics done. VSS. Pt getting IV infusion. pt able to eat w/o difficulty. Pt ambulatory with steady gait. pt denies cp, sob, fevers, chills, n, v..Lightheadedness present at this time.

## 2023-12-01 NOTE — H&P ADULT - PROBLEM SELECTOR PLAN 9
F: s/p 2L NS  E: replenish as appropriate  N: consistent carb    VTE Prophylaxis: Lovenox 40mg qd  GI: home protonix  C: Full Code  D: MANNY

## 2023-12-01 NOTE — H&P ADULT - PROBLEM SELECTOR PLAN 5
- c/w home lipitor 40 qhs Well controlled, A1c 7.1 in August 2023.  Home med: metformin 1000 BID  Glucose 170-200 on BMPs.  - low insulin sliding scale in insulin naive pt  - FSG TID  - FSG goal 140-180  - consistent carb diet

## 2023-12-01 NOTE — H&P ADULT - PROBLEM SELECTOR PLAN 2
Lactate 2.8-->2.2 after fluids. bicarb mildly decreased, normal pH on VBG.  No signs of hypoperfusion.  May be type B lactic acidosis.  - has hx of DM and uses metformin  - also has new leukopenia, which could be related to an undiagnosed HIV  - pt has past hx of alcohol use but minimal of late  - no need to further trend at this time Tachycardic, leukopenic w/ pos covid and lactate  2/2 covid infection    - plan as above

## 2023-12-01 NOTE — ED PROVIDER NOTE - OBJECTIVE STATEMENT
48-year-old, history of alcohol abuse, iron deficiency anemia, hypertension, diabetes, hyperlipidemia, cholelithiasis, gastric bypass surgery 1998, abdominal myoplasty 2018, smoker, patient with cardiac catheterization last August with nonobstructive CAD,  Patient presents today with generalized weakness which she reports is for a long time she cannot say exactly how long, cough also for a long time nonproductive, bilateral lower extremity edema unclear how long, denies fevers, denies congestion, no nausea or vomiting, constipated for a few days, no abdominal pain, reports chest pain for 2 days which has been there constantly and is worse with coughing, and nonexertional, patient reports chest pain is worse with breathing although it is not sharp or with inspiration, no leg pain, no history of DVT or PE, patient reports compliance with his med her medications although does not check her sugars, excessive thirst all of the time, no increase in urination, no dysuria urgency or frequency.

## 2023-12-01 NOTE — H&P ADULT - NSICDXFAMILYHX_GEN_ALL_CORE_FT
no FAMILY HISTORY:  Family history of diabetes mellitus    Grandparent  Still living? Unknown  Family history of hypertension, Age at diagnosis: Age Unknown

## 2023-12-01 NOTE — ED PROVIDER NOTE - MUSCULOSKELETAL, MLM
Spine appears normal, range of motion is not limited, no muscle or joint tenderness + 1 pitting edema bilateral lower extremities, no calf tenderness

## 2023-12-01 NOTE — H&P ADULT - ATTENDING COMMENTS
49 yo F with PMHx etOH use, iron-deficiency anemia, HTN, HLD, DM, s/p gastric bypass (1998), s/p abd myoplasty (2018), non-obstructive CAD, current active smoker px from home with generalized fatigue/weakness found to have severe sepsis 2/2 COVID and symptomatic anemia 2/2 known CONSTANCE admitted for further management.      #Severe sepsis – Meeting 2/4 SIRS (HR, WBC) with lactate 2.8, repeat 2.2 s/p 2L NS IVF in ED. Satting well on RA at rest. COVID PCR positive. CXR clear without consolidations/effusions. CTA PE negative for PE or other acute pulmonary pathology. No indication for steroids. Continue Remdesivir.      #Symptomatic anemia - Hb 7.8 with MCV 75.8. At baseline Hb per patient. No signs/sx to suggest acute/active bleeding. VSS. F/U iron studies. Maintain active T/S. Will need age-appropriate cancer screening on discharge.      Agree with remainder of resident plan as above.

## 2023-12-01 NOTE — H&P ADULT - PROBLEM SELECTOR PLAN 4
Well controlled, A1c 7.1 in August 2023.  Home med: metformin 1000 BID  Glucose 170-200 on BMPs.  - low insulin sliding scale in insulin naive pt  - FSG TID  - FSG goal 140-180  - consistent carb diet wbc 3.78. ANC 1620 so not neutropenic. Normal lymphocyte count.  Past does not always practice safe sex practices.  May be related to covid illness vs undiagnosed HIV   - HIV screen

## 2023-12-01 NOTE — H&P ADULT - NSHPPHYSICALEXAM_GEN_ALL_CORE
.  VITAL SIGNS:  T(F): 98.7 (12-01-23 @ 18:50), Max: 99.4 (12-01-23 @ 12:24)  HR: 100 (12-01-23 @ 18:50) (90 - 100)  BP: 126/77 (12-01-23 @ 18:50) (126/77 - 133/84)  BP(mean): --  RR: 18 (12-01-23 @ 18:50) (18 - 18)  SpO2: 100% (12-01-23 @ 18:50) (100% - 100%)    PHYSICAL EXAM:    Constitutional: WDWN resting comfortably in bed; NAD  HEENT: NC/AT, PERRL, EOMI, anicteric sclera, no nasal discharge; uvula midline, no oropharyngeal erythema or exudates; MMM  Neck: supple, no thyromegaly  Respiratory: CTA B/L; no W/R/R, no retractions  Cardiac: +S1/S2; RRR; no M/R/G; PMI non-displaced, no JVD  Gastrointestinal: soft, NT/ND; no rebound or guarding; +BSx4  Genitourinary: normal external genitalia  Back: spine midline, no bony tenderness or step-offs; no CVAT B/L  Extremities: WWP, no clubbing or cyanosis; no peripheral edema  Musculoskeletal: NROM x4; no joint swelling, tenderness or erythema  Vascular: 2+ radial, femoral, DP/PT pulses B/L  Dermatologic: skin warm, dry and intact; no rashes, wounds, or scars  Lymphatic: no submandibular or cervical LAD  Neurologic: AAOx3; CNII-XII grossly intact; no focal deficits  Psychiatric: affect and characteristics of appearance, verbalizations, behaviors are appropriate, denies SI/HI/AH/VH .  VITAL SIGNS:  T(F): 98.7 (12-01-23 @ 18:50), Max: 99.4 (12-01-23 @ 12:24)  HR: 100 (12-01-23 @ 18:50) (90 - 100)  BP: 126/77 (12-01-23 @ 18:50) (126/77 - 133/84)  BP(mean): --  RR: 18 (12-01-23 @ 18:50) (18 - 18)  SpO2: 100% (12-01-23 @ 18:50) (100% - 100%)    PHYSICAL EXAM:    Constitutional: sitting up in stretcher, NAD  HEENT: NC/AT, EOMI, anicteric sclera, no nasal discharge; uvula midline, no oropharyngeal erythema or exudates; MMM  Neck: supple  Respiratory: CTA B/L; no W/R/R, no retractions, speaking in full sentences  Cardiac: +S1/S2; RRR; no M/R/G; no JVD  Gastrointestinal: distended, soft, NT; no rebound or guarding; +BS  Extremities: warm and dry, no clubbing or cyanosis; trace peripheral edema at ankles  Musculoskeletal: NROM x4; no joint swelling, tenderness or erythema  Vascular: 2+ radial, 1+ DP pulses B/L  Dermatologic: skin warm, dry and intact; no rashes, wounds, or scars  Lymphatic: no submandibular or cervical LAD  Neurologic: AAOx3; CNII-XII intact; hand strength 4/5 b/l, UE flexion 4+/5 b/l, hip flexion 4/5 b/l, lower leg extension 3+/5 b/l  Psychiatric: calm, appropriate

## 2023-12-01 NOTE — H&P ADULT - PROBLEM SELECTOR PLAN 6
Has microcytic anemia, at baseline.  Takes iron supplements.    - c/w home iron supplementation - c/w home lipitor 40 qhs

## 2023-12-01 NOTE — H&P ADULT - PROBLEM SELECTOR PLAN 7
Past hx of alcohol use. Has cut down on alcohol, only has a drink occasionally.  EtOh wnl upon arrival.  CIWA 0  - no need for CIWA protocol at this time given patient with minimal alcohol use now      #tobacco use  Current smoker, 1PPD  Used to take buproprion but does not take currently  - will provide nicotine patch Has microcytic anemia, at baseline.  Takes iron supplements.    - c/w home iron supplementation  - recheck full iron panel, may benefit from IV supplementation

## 2023-12-01 NOTE — H&P ADULT - PROBLEM SELECTOR PLAN 8
F: s/p 2L NS  E: replenish as appropriate  N: consistent carb    VTE Prophylaxis: Lovenox 40mg BID for covid  GI: home protonix  C: Full Code  D: AC Past hx of alcohol use. Has cut down on alcohol, only has a drink occasionally.  EtOh wnl upon arrival.  CIWA 0  - no need for CIWA protocol at this time given patient with minimal alcohol use now      #tobacco use  Current smoker, 1PPD  Used to take buproprion but does not take currently  - will provide nicotine patch

## 2023-12-01 NOTE — H&P ADULT - NSHPLABSRESULTS_GEN_ALL_CORE
.  LABS:                         7.8    3.56  )-----------( 250      ( 01 Dec 2023 13:47 )             26.0         139  |  105  |  15  ----------------------------<  171<H>  4.2   |  19<L>  |  0.98    Ca    9.5      01 Dec 2023 13:47  Phos  3.7       Mg     1.7         TPro  7.2  /  Alb  4.0  /  TBili  0.2  /  DBili  x   /  AST  25  /  ALT  20  /  AlkPhos  68      PT/INR - ( 01 Dec 2023 13:47 )   PT: 10.6 sec;   INR: 0.93          PTT - ( 01 Dec 2023 13:47 )  PTT:29.4 sec  Urinalysis Basic - ( 01 Dec 2023 15:18 )    Color: Yellow / Appearance: Clear / S.030 / pH: x  Gluc: x / Ketone: Trace mg/dL  / Bili: Negative / Urobili: 1.0 mg/dL   Blood: x / Protein: 30 mg/dL / Nitrite: Negative   Leuk Esterase: Negative / RBC: 0 /HPF / WBC 0 /HPF   Sq Epi: x / Non Sq Epi: 1 /HPF / Bacteria: Negative /HPF      CARDIAC MARKERS ( 01 Dec 2023 13:47 )  x     / x     / 130 U/L / x     / 1.2 ng/mL        Lactate, Blood: 2.2 mmol/L ( @ 19:36)  Lactate, Blood: 2.8 mmol/L ( @ 13:47)      RADIOLOGY, EKG & ADDITIONAL TESTS: Reviewed.

## 2023-12-01 NOTE — ED ADULT NURSE NOTE - OBJECTIVE STATEMENT
Pt A&Ox4 and able to speak in complete sentences. Pt breathing even and unlabored with equal chest rise and fall. Pt endorsed weakness since yesterday morning, over 24 hrs. Pt endorsed she has been feeling dizzy, room spinning sensation, ambulates with steady gait. Pt endorsed hx of anemia and taking iron supplements. Pt previously received blood transfusion in end of 2022. Pt denies sob, n, v, fevers, chills, numbness, tingling. Pt denies facial numbness.

## 2023-12-01 NOTE — H&P ADULT - HISTORY OF PRESENT ILLNESS
48-year-old, history of alcohol abuse, iron deficiency anemia, HTN, DM (A1c 7.1), hld, cholelithiasis, gastric bypass 1998, smoker, cardiac catheterization August with nonobstructive CAD who presents to ED with weakness.      ED course:   vitals: afebrile, HR 97, /94, RR 18, o2 100 on room air, orthostatics negative  labs: wbc 3.78, lactate 2.8-->2.2 after fluid, VBG pH 7.37; utox, Etoh negative; Sars-Cov-2 +  CT angio chest: no PE  CXR: no consolidations, effusions  Interventions: 2L NS 48-year-old, history of alcohol abuse, iron deficiency anemia, HTN, DM (A1c 7.1), hld, cholelithiasis, gastric bypass 1998, smoker, cardiac catheterization August with nonobstructive CAD who presents to ED with generalized weakness and fatigue. Pt reports has been feeling this way at least 3 days but maybe longer. She feels fatigued and her body feels weak. She also has a cough with minimal production, myalgias and slight rhinorheaa. She also reports central chest discomfort, sometimes worse with cough, not associated with exertion and is continuous. She feels like she can point to to the area of discomfort. 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 Also feels bloated. Feels like she has some leg swelling which she's had for a long time. Denies fever, chills, n/v, abd pain.      ED course:   vitals: afebrile, HR 97, /94, RR 18, o2 100 on room air, orthostatics negative  labs: wbc 3.78, lactate 2.8-->2.2 after fluid but may not have received all of it, VBG pH 7.37; utox, Etoh negative; Sars-Cov-2 +  CT angio chest: no PE  CXR: no consolidations, effusions  ECG: sinus, no signs of cardiac ischemia  Interventions: 2L NS

## 2023-12-01 NOTE — H&P ADULT - NSHPSOCIALHISTORY_GEN_ALL_CORE
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  Independent ADLs.  Not currently sexually active but last time did not use protection.

## 2023-12-01 NOTE — ED ADULT TRIAGE NOTE - WEIGHT IN KG
137,000 is fine this is barely out of normal range-it can cause a problem for surgery if it is below 50,000   95.3

## 2023-12-01 NOTE — ED ADULT NURSE NOTE - NSFALLRISKINTERV_ED_ALL_ED

## 2023-12-01 NOTE — H&P ADULT - PROBLEM SELECTOR PLAN 1
+Sars-Cov-2. Not hypoxic. 3+ days of symptoms.   Likely cause for patient's symptoms.    - monitor respiratory status  - pt has multiple rx factors including DM, can consider Paxlovid +Sars-Cov-2. Not hypoxic. 3+ days of symptoms.   Likely cause for patient's symptoms.    - monitor respiratory status  - pt has rx factors for disease progression including DM  - after med review to make sure no cyp3 inducers, can consider paxlovid  - dvt ppx as per covid protocols: lovenox 40 BID +Sars-Cov-2. Not hypoxic. 3+ days of symptoms.   Likely cause for patient's symptoms.    - monitor respiratory status  - pt has rx factors for disease progression including DM  - start 5 day course of remdesivir

## 2023-12-01 NOTE — ED ADULT NURSE NOTE - CAS TRG GEN SKIN CONDITION
BIBA c'o B/L knee pain was seen in ED two weeks prior had x-ray done with negative never followed up with ortho
Warm

## 2023-12-02 DIAGNOSIS — A41.9 SEPSIS, UNSPECIFIED ORGANISM: ICD-10-CM

## 2023-12-02 DIAGNOSIS — D64.9 ANEMIA, UNSPECIFIED: ICD-10-CM

## 2023-12-02 DIAGNOSIS — Z87.891 PERSONAL HISTORY OF NICOTINE DEPENDENCE: ICD-10-CM

## 2023-12-02 LAB
A1C WITH ESTIMATED AVERAGE GLUCOSE RESULT: 8.9 % — HIGH (ref 4–5.6)
A1C WITH ESTIMATED AVERAGE GLUCOSE RESULT: 8.9 % — HIGH (ref 4–5.6)
ANION GAP SERPL CALC-SCNC: 12 MMOL/L — SIGNIFICANT CHANGE UP (ref 5–17)
ANION GAP SERPL CALC-SCNC: 12 MMOL/L — SIGNIFICANT CHANGE UP (ref 5–17)
ANISOCYTOSIS BLD QL: SLIGHT — SIGNIFICANT CHANGE UP
ANISOCYTOSIS BLD QL: SLIGHT — SIGNIFICANT CHANGE UP
BASOPHILS # BLD AUTO: 0.02 K/UL — SIGNIFICANT CHANGE UP (ref 0–0.2)
BASOPHILS NFR BLD AUTO: 0.8 % — SIGNIFICANT CHANGE UP (ref 0–2)
BASOPHILS NFR BLD AUTO: 0.8 % — SIGNIFICANT CHANGE UP (ref 0–2)
BASOPHILS NFR BLD AUTO: 0.9 % — SIGNIFICANT CHANGE UP (ref 0–2)
BASOPHILS NFR BLD AUTO: 0.9 % — SIGNIFICANT CHANGE UP (ref 0–2)
BLD GP AB SCN SERPL QL: NEGATIVE — SIGNIFICANT CHANGE UP
BLD GP AB SCN SERPL QL: NEGATIVE — SIGNIFICANT CHANGE UP
BUN SERPL-MCNC: 12 MG/DL — SIGNIFICANT CHANGE UP (ref 7–23)
BUN SERPL-MCNC: 12 MG/DL — SIGNIFICANT CHANGE UP (ref 7–23)
CALCIUM SERPL-MCNC: 8.2 MG/DL — LOW (ref 8.4–10.5)
CALCIUM SERPL-MCNC: 8.2 MG/DL — LOW (ref 8.4–10.5)
CHLORIDE SERPL-SCNC: 106 MMOL/L — SIGNIFICANT CHANGE UP (ref 96–108)
CHLORIDE SERPL-SCNC: 106 MMOL/L — SIGNIFICANT CHANGE UP (ref 96–108)
CO2 SERPL-SCNC: 19 MMOL/L — LOW (ref 22–31)
CO2 SERPL-SCNC: 19 MMOL/L — LOW (ref 22–31)
CREAT SERPL-MCNC: 1.06 MG/DL — SIGNIFICANT CHANGE UP (ref 0.5–1.3)
CREAT SERPL-MCNC: 1.06 MG/DL — SIGNIFICANT CHANGE UP (ref 0.5–1.3)
DACRYOCYTES BLD QL SMEAR: SLIGHT — SIGNIFICANT CHANGE UP
DACRYOCYTES BLD QL SMEAR: SLIGHT — SIGNIFICANT CHANGE UP
EGFR: 66 ML/MIN/1.73M2 — SIGNIFICANT CHANGE UP
EGFR: 66 ML/MIN/1.73M2 — SIGNIFICANT CHANGE UP
EOSINOPHIL # BLD AUTO: 0.03 K/UL — SIGNIFICANT CHANGE UP (ref 0–0.5)
EOSINOPHIL # BLD AUTO: 0.03 K/UL — SIGNIFICANT CHANGE UP (ref 0–0.5)
EOSINOPHIL # BLD AUTO: 0.04 K/UL — SIGNIFICANT CHANGE UP (ref 0–0.5)
EOSINOPHIL # BLD AUTO: 0.04 K/UL — SIGNIFICANT CHANGE UP (ref 0–0.5)
EOSINOPHIL NFR BLD AUTO: 1.3 % — SIGNIFICANT CHANGE UP (ref 0–6)
EOSINOPHIL NFR BLD AUTO: 1.3 % — SIGNIFICANT CHANGE UP (ref 0–6)
EOSINOPHIL NFR BLD AUTO: 1.8 % — SIGNIFICANT CHANGE UP (ref 0–6)
EOSINOPHIL NFR BLD AUTO: 1.8 % — SIGNIFICANT CHANGE UP (ref 0–6)
ESTIMATED AVERAGE GLUCOSE: 209 MG/DL — HIGH (ref 68–114)
ESTIMATED AVERAGE GLUCOSE: 209 MG/DL — HIGH (ref 68–114)
FERRITIN SERPL-MCNC: 9 NG/ML — LOW (ref 15–150)
FERRITIN SERPL-MCNC: 9 NG/ML — LOW (ref 15–150)
GLUCOSE BLDC GLUCOMTR-MCNC: 213 MG/DL — HIGH (ref 70–99)
GLUCOSE BLDC GLUCOMTR-MCNC: 213 MG/DL — HIGH (ref 70–99)
GLUCOSE BLDC GLUCOMTR-MCNC: 224 MG/DL — HIGH (ref 70–99)
GLUCOSE BLDC GLUCOMTR-MCNC: 224 MG/DL — HIGH (ref 70–99)
GLUCOSE BLDC GLUCOMTR-MCNC: 226 MG/DL — HIGH (ref 70–99)
GLUCOSE BLDC GLUCOMTR-MCNC: 226 MG/DL — HIGH (ref 70–99)
GLUCOSE BLDC GLUCOMTR-MCNC: 251 MG/DL — HIGH (ref 70–99)
GLUCOSE BLDC GLUCOMTR-MCNC: 251 MG/DL — HIGH (ref 70–99)
GLUCOSE SERPL-MCNC: 238 MG/DL — HIGH (ref 70–99)
GLUCOSE SERPL-MCNC: 238 MG/DL — HIGH (ref 70–99)
HCG SERPL-ACNC: <0 MIU/ML — SIGNIFICANT CHANGE UP
HCG SERPL-ACNC: <0 MIU/ML — SIGNIFICANT CHANGE UP
HCT VFR BLD CALC: 22.6 % — LOW (ref 34.5–45)
HCT VFR BLD CALC: 22.6 % — LOW (ref 34.5–45)
HCT VFR BLD CALC: 26.6 % — LOW (ref 34.5–45)
HCT VFR BLD CALC: 26.6 % — LOW (ref 34.5–45)
HGB BLD-MCNC: 6.4 G/DL — CRITICAL LOW (ref 11.5–15.5)
HGB BLD-MCNC: 6.4 G/DL — CRITICAL LOW (ref 11.5–15.5)
HGB BLD-MCNC: 8.1 G/DL — LOW (ref 11.5–15.5)
HGB BLD-MCNC: 8.1 G/DL — LOW (ref 11.5–15.5)
HIV 1+2 AB+HIV1 P24 AG SERPL QL IA: SIGNIFICANT CHANGE UP
HIV 1+2 AB+HIV1 P24 AG SERPL QL IA: SIGNIFICANT CHANGE UP
HYPOCHROMIA BLD QL: SLIGHT — SIGNIFICANT CHANGE UP
HYPOCHROMIA BLD QL: SLIGHT — SIGNIFICANT CHANGE UP
IMM GRANULOCYTES NFR BLD AUTO: 0.4 % — SIGNIFICANT CHANGE UP (ref 0–0.9)
IMM GRANULOCYTES NFR BLD AUTO: 0.4 % — SIGNIFICANT CHANGE UP (ref 0–0.9)
IRON SATN MFR SERPL: 22 UG/DL — LOW (ref 30–160)
IRON SATN MFR SERPL: 22 UG/DL — LOW (ref 30–160)
IRON SATN MFR SERPL: 6 % — LOW (ref 14–50)
IRON SATN MFR SERPL: 6 % — LOW (ref 14–50)
LYMPHOCYTES # BLD AUTO: 1.15 K/UL — SIGNIFICANT CHANGE UP (ref 1–3.3)
LYMPHOCYTES # BLD AUTO: 1.15 K/UL — SIGNIFICANT CHANGE UP (ref 1–3.3)
LYMPHOCYTES # BLD AUTO: 1.28 K/UL — SIGNIFICANT CHANGE UP (ref 1–3.3)
LYMPHOCYTES # BLD AUTO: 1.28 K/UL — SIGNIFICANT CHANGE UP (ref 1–3.3)
LYMPHOCYTES # BLD AUTO: 53.3 % — HIGH (ref 13–44)
LYMPHOCYTES # BLD AUTO: 53.3 % — HIGH (ref 13–44)
LYMPHOCYTES # BLD AUTO: 54.4 % — HIGH (ref 13–44)
LYMPHOCYTES # BLD AUTO: 54.4 % — HIGH (ref 13–44)
MANUAL SMEAR VERIFICATION: SIGNIFICANT CHANGE UP
MANUAL SMEAR VERIFICATION: SIGNIFICANT CHANGE UP
MCHC RBC-ENTMCNC: 21.9 PG — LOW (ref 27–34)
MCHC RBC-ENTMCNC: 21.9 PG — LOW (ref 27–34)
MCHC RBC-ENTMCNC: 24 PG — LOW (ref 27–34)
MCHC RBC-ENTMCNC: 24 PG — LOW (ref 27–34)
MCHC RBC-ENTMCNC: 28.3 GM/DL — LOW (ref 32–36)
MCHC RBC-ENTMCNC: 28.3 GM/DL — LOW (ref 32–36)
MCHC RBC-ENTMCNC: 30.5 GM/DL — LOW (ref 32–36)
MCHC RBC-ENTMCNC: 30.5 GM/DL — LOW (ref 32–36)
MCV RBC AUTO: 77.4 FL — LOW (ref 80–100)
MCV RBC AUTO: 77.4 FL — LOW (ref 80–100)
MCV RBC AUTO: 78.9 FL — LOW (ref 80–100)
MCV RBC AUTO: 78.9 FL — LOW (ref 80–100)
METAMYELOCYTES # FLD: 3.6 % — HIGH (ref 0–0)
METAMYELOCYTES # FLD: 3.6 % — HIGH (ref 0–0)
MONOCYTES # BLD AUTO: 0.19 K/UL — SIGNIFICANT CHANGE UP (ref 0–0.9)
MONOCYTES # BLD AUTO: 0.19 K/UL — SIGNIFICANT CHANGE UP (ref 0–0.9)
MONOCYTES # BLD AUTO: 0.35 K/UL — SIGNIFICANT CHANGE UP (ref 0–0.9)
MONOCYTES # BLD AUTO: 0.35 K/UL — SIGNIFICANT CHANGE UP (ref 0–0.9)
MONOCYTES NFR BLD AUTO: 14.6 % — HIGH (ref 2–14)
MONOCYTES NFR BLD AUTO: 14.6 % — HIGH (ref 2–14)
MONOCYTES NFR BLD AUTO: 8.9 % — SIGNIFICANT CHANGE UP (ref 2–14)
MONOCYTES NFR BLD AUTO: 8.9 % — SIGNIFICANT CHANGE UP (ref 2–14)
NEUTROPHILS # BLD AUTO: 0.62 K/UL — LOW (ref 1.8–7.4)
NEUTROPHILS # BLD AUTO: 0.62 K/UL — LOW (ref 1.8–7.4)
NEUTROPHILS # BLD AUTO: 0.71 K/UL — LOW (ref 1.8–7.4)
NEUTROPHILS # BLD AUTO: 0.71 K/UL — LOW (ref 1.8–7.4)
NEUTROPHILS NFR BLD AUTO: 29.5 % — LOW (ref 43–77)
NEUTROPHILS NFR BLD AUTO: 29.5 % — LOW (ref 43–77)
NEUTROPHILS NFR BLD AUTO: 29.6 % — LOW (ref 43–77)
NEUTROPHILS NFR BLD AUTO: 29.6 % — LOW (ref 43–77)
NRBC # BLD: 0 /100 WBCS — SIGNIFICANT CHANGE UP (ref 0–0)
NRBC # BLD: 0 /100 WBCS — SIGNIFICANT CHANGE UP (ref 0–0)
NRBC # BLD: 1 /100 — HIGH (ref 0–0)
NRBC # BLD: 1 /100 — HIGH (ref 0–0)
NRBC # BLD: SIGNIFICANT CHANGE UP /100 WBCS (ref 0–0)
NRBC # BLD: SIGNIFICANT CHANGE UP /100 WBCS (ref 0–0)
OVALOCYTES BLD QL SMEAR: SLIGHT — SIGNIFICANT CHANGE UP
OVALOCYTES BLD QL SMEAR: SLIGHT — SIGNIFICANT CHANGE UP
PLAT MORPH BLD: NORMAL — SIGNIFICANT CHANGE UP
PLAT MORPH BLD: NORMAL — SIGNIFICANT CHANGE UP
PLATELET # BLD AUTO: 187 K/UL — SIGNIFICANT CHANGE UP (ref 150–400)
PLATELET # BLD AUTO: 187 K/UL — SIGNIFICANT CHANGE UP (ref 150–400)
PLATELET # BLD AUTO: 188 K/UL — SIGNIFICANT CHANGE UP (ref 150–400)
PLATELET # BLD AUTO: 188 K/UL — SIGNIFICANT CHANGE UP (ref 150–400)
POIKILOCYTOSIS BLD QL AUTO: SLIGHT — SIGNIFICANT CHANGE UP
POIKILOCYTOSIS BLD QL AUTO: SLIGHT — SIGNIFICANT CHANGE UP
POTASSIUM SERPL-MCNC: 3.7 MMOL/L — SIGNIFICANT CHANGE UP (ref 3.5–5.3)
POTASSIUM SERPL-MCNC: 3.7 MMOL/L — SIGNIFICANT CHANGE UP (ref 3.5–5.3)
POTASSIUM SERPL-SCNC: 3.7 MMOL/L — SIGNIFICANT CHANGE UP (ref 3.5–5.3)
POTASSIUM SERPL-SCNC: 3.7 MMOL/L — SIGNIFICANT CHANGE UP (ref 3.5–5.3)
RBC # BLD: 2.92 M/UL — LOW (ref 3.8–5.2)
RBC # BLD: 2.92 M/UL — LOW (ref 3.8–5.2)
RBC # BLD: 3.37 M/UL — LOW (ref 3.8–5.2)
RBC # BLD: 3.37 M/UL — LOW (ref 3.8–5.2)
RBC # FLD: 18.3 % — HIGH (ref 10.3–14.5)
RBC # FLD: 18.3 % — HIGH (ref 10.3–14.5)
RBC # FLD: 18.8 % — HIGH (ref 10.3–14.5)
RBC # FLD: 18.8 % — HIGH (ref 10.3–14.5)
RBC BLD AUTO: ABNORMAL
RBC BLD AUTO: ABNORMAL
RH IG SCN BLD-IMP: POSITIVE — SIGNIFICANT CHANGE UP
RH IG SCN BLD-IMP: POSITIVE — SIGNIFICANT CHANGE UP
SMUDGE CELLS # BLD: PRESENT — SIGNIFICANT CHANGE UP
SMUDGE CELLS # BLD: PRESENT — SIGNIFICANT CHANGE UP
SODIUM SERPL-SCNC: 137 MMOL/L — SIGNIFICANT CHANGE UP (ref 135–145)
SODIUM SERPL-SCNC: 137 MMOL/L — SIGNIFICANT CHANGE UP (ref 135–145)
TIBC SERPL-MCNC: 375 UG/DL — SIGNIFICANT CHANGE UP (ref 220–430)
TIBC SERPL-MCNC: 375 UG/DL — SIGNIFICANT CHANGE UP (ref 220–430)
TSH SERPL-MCNC: 1.83 UIU/ML — SIGNIFICANT CHANGE UP (ref 0.27–4.2)
TSH SERPL-MCNC: 1.83 UIU/ML — SIGNIFICANT CHANGE UP (ref 0.27–4.2)
UIBC SERPL-MCNC: 353 UG/DL — SIGNIFICANT CHANGE UP (ref 110–370)
UIBC SERPL-MCNC: 353 UG/DL — SIGNIFICANT CHANGE UP (ref 110–370)
VARIANT LYMPHS # BLD: 0.9 % — SIGNIFICANT CHANGE UP (ref 0–6)
VARIANT LYMPHS # BLD: 0.9 % — SIGNIFICANT CHANGE UP (ref 0–6)
VIT B12 SERPL-MCNC: 423 PG/ML — SIGNIFICANT CHANGE UP (ref 232–1245)
VIT B12 SERPL-MCNC: 423 PG/ML — SIGNIFICANT CHANGE UP (ref 232–1245)
WBC # BLD: 2.11 K/UL — LOW (ref 3.8–10.5)
WBC # BLD: 2.11 K/UL — LOW (ref 3.8–10.5)
WBC # BLD: 2.4 K/UL — LOW (ref 3.8–10.5)
WBC # BLD: 2.4 K/UL — LOW (ref 3.8–10.5)
WBC # FLD AUTO: 2.11 K/UL — LOW (ref 3.8–10.5)
WBC # FLD AUTO: 2.11 K/UL — LOW (ref 3.8–10.5)
WBC # FLD AUTO: 2.4 K/UL — LOW (ref 3.8–10.5)
WBC # FLD AUTO: 2.4 K/UL — LOW (ref 3.8–10.5)

## 2023-12-02 PROCEDURE — 99232 SBSQ HOSP IP/OBS MODERATE 35: CPT

## 2023-12-02 RX ORDER — THIAMINE MONONITRATE (VIT B1) 100 MG
200 TABLET ORAL EVERY 24 HOURS
Refills: 0 | Status: DISCONTINUED | OUTPATIENT
Start: 2023-12-02 | End: 2023-12-03

## 2023-12-02 RX ORDER — REMDESIVIR 5 MG/ML
200 INJECTION INTRAVENOUS EVERY 24 HOURS
Refills: 0 | Status: COMPLETED | OUTPATIENT
Start: 2023-12-02 | End: 2023-12-02

## 2023-12-02 RX ORDER — REMDESIVIR 5 MG/ML
100 INJECTION INTRAVENOUS EVERY 24 HOURS
Refills: 0 | Status: DISCONTINUED | OUTPATIENT
Start: 2023-12-03 | End: 2023-12-03

## 2023-12-02 RX ORDER — ACETAMINOPHEN 500 MG
500 TABLET ORAL ONCE
Refills: 0 | Status: DISCONTINUED | OUTPATIENT
Start: 2023-12-02 | End: 2023-12-02

## 2023-12-02 RX ORDER — ENOXAPARIN SODIUM 100 MG/ML
40 INJECTION SUBCUTANEOUS EVERY 24 HOURS
Refills: 0 | Status: DISCONTINUED | OUTPATIENT
Start: 2023-12-02 | End: 2023-12-02

## 2023-12-02 RX ORDER — THIAMINE MONONITRATE (VIT B1) 100 MG
200 TABLET ORAL EVERY 24 HOURS
Refills: 0 | Status: DISCONTINUED | OUTPATIENT
Start: 2023-12-02 | End: 2023-12-02

## 2023-12-02 RX ORDER — INFLUENZA VIRUS VACCINE 15; 15; 15; 15 UG/.5ML; UG/.5ML; UG/.5ML; UG/.5ML
0.5 SUSPENSION INTRAMUSCULAR ONCE
Refills: 0 | Status: DISCONTINUED | OUTPATIENT
Start: 2023-12-02 | End: 2023-12-03

## 2023-12-02 RX ORDER — REMDESIVIR 5 MG/ML
INJECTION INTRAVENOUS
Refills: 0 | Status: DISCONTINUED | OUTPATIENT
Start: 2023-12-02 | End: 2023-12-03

## 2023-12-02 RX ORDER — SENNA PLUS 8.6 MG/1
2 TABLET ORAL AT BEDTIME
Refills: 0 | Status: DISCONTINUED | OUTPATIENT
Start: 2023-12-02 | End: 2023-12-03

## 2023-12-02 RX ORDER — IRON SUCROSE 20 MG/ML
300 INJECTION, SOLUTION INTRAVENOUS EVERY 24 HOURS
Refills: 0 | Status: DISCONTINUED | OUTPATIENT
Start: 2023-12-02 | End: 2023-12-03

## 2023-12-02 RX ORDER — THIAMINE MONONITRATE (VIT B1) 100 MG
100 TABLET ORAL DAILY
Refills: 0 | Status: DISCONTINUED | OUTPATIENT
Start: 2023-12-02 | End: 2023-12-02

## 2023-12-02 RX ORDER — POLYETHYLENE GLYCOL 3350 17 G/17G
17 POWDER, FOR SOLUTION ORAL DAILY
Refills: 0 | Status: DISCONTINUED | OUTPATIENT
Start: 2023-12-02 | End: 2023-12-03

## 2023-12-02 RX ADMIN — PANTOPRAZOLE SODIUM 40 MILLIGRAM(S): 20 TABLET, DELAYED RELEASE ORAL at 06:24

## 2023-12-02 RX ADMIN — REMDESIVIR 200 MILLIGRAM(S): 5 INJECTION INTRAVENOUS at 06:21

## 2023-12-02 RX ADMIN — Medication 650 MILLIGRAM(S): at 07:31

## 2023-12-02 RX ADMIN — POLYETHYLENE GLYCOL 3350 17 GRAM(S): 17 POWDER, FOR SOLUTION ORAL at 13:44

## 2023-12-02 RX ADMIN — Medication 200 MILLIGRAM(S): at 15:54

## 2023-12-02 RX ADMIN — Medication 650 MILLIGRAM(S): at 07:01

## 2023-12-02 RX ADMIN — IRON SUCROSE 176.67 MILLIGRAM(S): 20 INJECTION, SOLUTION INTRAVENOUS at 14:34

## 2023-12-02 RX ADMIN — Medication 1 MILLIGRAM(S): at 09:21

## 2023-12-02 RX ADMIN — ATORVASTATIN CALCIUM 40 MILLIGRAM(S): 80 TABLET, FILM COATED ORAL at 22:04

## 2023-12-02 RX ADMIN — ENOXAPARIN SODIUM 40 MILLIGRAM(S): 100 INJECTION SUBCUTANEOUS at 06:24

## 2023-12-02 RX ADMIN — LOSARTAN POTASSIUM 100 MILLIGRAM(S): 100 TABLET, FILM COATED ORAL at 09:20

## 2023-12-02 RX ADMIN — Medication 2: at 22:07

## 2023-12-02 RX ADMIN — AMLODIPINE BESYLATE 10 MILLIGRAM(S): 2.5 TABLET ORAL at 09:20

## 2023-12-02 RX ADMIN — Medication 3: at 13:45

## 2023-12-02 RX ADMIN — Medication 2: at 18:18

## 2023-12-02 RX ADMIN — Medication 2: at 08:36

## 2023-12-02 RX ADMIN — SIMETHICONE 80 MILLIGRAM(S): 80 TABLET, CHEWABLE ORAL at 22:04

## 2023-12-02 NOTE — PROGRESS NOTE ADULT - PROBLEM SELECTOR PLAN 2
Tachycardic, leukopenic w/ pos covid and lactate  2/2 covid infection    - plan as above Patient found to be severe sepsis w/ 2/4 SIRS (Tachycardia, and leukopenia) and elevatd lactate on admission of 2.8. Severe sepsis likely 2/2 covid 19 infection. Lactate down trended to 2.2 s/p fluids in the ED.     PLAN:  - c/w covid19 treatment as above.   - f/u Bcx x2

## 2023-12-02 NOTE — PROGRESS NOTE ADULT - PROBLEM SELECTOR PLAN 4
wbc 3.78. ANC 1620 so not neutropenic. Normal lymphocyte count.  Past does not always practice safe sex practices.  May be related to covid illness vs undiagnosed HIV   - HIV screen Past hx of alcohol use. States she drinks 1-2 pints a day, with last drink being a few days ago. EtOh wnl upon arrival.  CIWA 0    PLAN:  - Start CIWA protocol  - Ativan 2mg prn for CIWA >8. Past hx of alcohol use. States she drinks 1-2 pints a day, with last drink being a few days ago. EtOh wnl upon arrival.  CIWA 0    PLAN:  - Start CIWA protocol  - Ativan 2mg prn for CIWA >8.  - Start on thiamine 200mg x5days

## 2023-12-02 NOTE — PROGRESS NOTE ADULT - ATTENDING COMMENTS
Symptomatic iron deficient anemia, chronic - reports recent heavy menstrual bleeding but iron stores severely low. 1uPRBC and IV venofer. Add on B12/folate given h/o gastric bypass.  Monitor s/s of bleeding, denies melana. Outpatient age appropriate cancer screening  COVID-19- remains on RA. likley contributing to fatigue. IV remdisivir while inpatient  DM2: Sliding scale insulin Symptomatic iron deficient anemia, chronic - reports recent heavy menstrual bleeding but iron stores severely low. 1uPRBC and IV venofer. Add on B12/folate given h/o gastric bypass.  Monitor s/s of bleeding, denies melena. Outpatient age appropriate cancer screening  COVID-19- remains on RA. likley contributing to fatigue. IV remdisivir while inpatient  DM2: Sliding scale insulin

## 2023-12-02 NOTE — PROGRESS NOTE ADULT - PROBLEM SELECTOR PLAN 5
Well controlled, A1c 7.1 in August 2023.  Home med: metformin 1000 BID  Glucose 170-200 on BMPs.  - low insulin sliding scale in insulin naive pt  - FSG TID  - FSG goal 140-180  - consistent carb diet Well controlled, A1c 7.1 in August 2023.  Home med: metformin 1000 BID  Glucose 170-200 on BMPs.    PLAN:  - low insulin sliding scale in insulin naive pt  - FSG TID  - FSG goal 140-180  - consistent carb diet

## 2023-12-02 NOTE — PROGRESS NOTE ADULT - PROBLEM SELECTOR PLAN 6
- c/w home lipitor 40 qhs History of HLD, takes home lipitor 40mg qhs.     PLAN:  - c/w home lipitor 40 qhs

## 2023-12-02 NOTE — PROGRESS NOTE ADULT - PROBLEM SELECTOR PLAN 7
Has microcytic anemia, at baseline.  Takes iron supplements.    - c/w home iron supplementation  - recheck full iron panel, may benefit from IV supplementation His tobacco use  Current smoker, 1PPD  Used to take buproprion but does not take currently  - will provide nicotine patch

## 2023-12-02 NOTE — PROGRESS NOTE ADULT - PROBLEM SELECTOR PLAN 8
Past hx of alcohol use. Has cut down on alcohol, only has a drink occasionally.  EtOh wnl upon arrival.  CIWA 0  - no need for CIWA protocol at this time given patient with minimal alcohol use now      #tobacco use  Current smoker, 1PPD  Used to take buproprion but does not take currently  - will provide nicotine patch F: s/p 2L NS  E: replenish as appropriate  N: consistent carb  VTE Prophylaxis: Hold iso low Hgb   GI: home protonix  C: Full Code  D: [facility] F: s/p 2L NS  E: replenish as appropriate  N: consistent carb  VTE Prophylaxis: Hold iso low Hgb   GI: home protonix  C: Full Code  D: [facility] F: s/p 2L NS  E: replenish as appropriate  N: consistent carb  VTE Prophylaxis: Hold iso low Hgb   GI: home protonix  C: Full Code  D: [facility]

## 2023-12-02 NOTE — PROGRESS NOTE ADULT - ASSESSMENT
48-year-old, history of alcohol abuse, iron deficiency anemia, HTN, DM (A1c 7.1), hld, cholelithiasis, gastric bypass 1998, smoker, cardiac catheterization August with nonobstructive CAD who presents to ED with generalized weakness and fatigue. Found to be positive for SARS-Cov-2. Admit to F for further management. 48-year-old, history of alcohol abuse, iron deficiency anemia, HTN, DM (A1c 7.1), hld, cholelithiasis, gastric bypass 1998, smoker, cardiac catheterization August with nonobstructive CAD who presents to ED with generalized weakness and fatigue. Found to be positive for SARS-Cov-2. Course c/b anemia of 6.4 s/p 1u pRBC. Admit to [facility] for further management Severe sepsis 2/2 covid and anemia.  48-year-old, history of alcohol abuse, iron deficiency anemia, HTN, DM (A1c 7.1), hld, cholelithiasis, gastric bypass 1998, smoker, cardiac catheterization August with nonobstructive CAD who presents to ED with generalized weakness and fatigue. Found to be positive for SARS-Cov-2. Course c/b anemia of 6.4 s/p 1u pRBC. Admit to [facility] for further management Severe sepsis 2/2 covid and anemia.  48-year-old, history of alcohol abuse, iron deficiency anemia, HTN, DM (A1c 7.1), hld, cholelithiasis, gastric bypass 1998, smoker, cardiac catheterization August with nonobstructive CAD who presents to ED with generalized weakness and fatigue. Found to be positive for SARS-Cov-2. Course c/b anemia of 6.4 s/p 1u pRBC. Admit to [facility] for further management Severe sepsis 2/2 covid and anemia.

## 2023-12-02 NOTE — PROGRESS NOTE ADULT - SUBJECTIVE AND OBJECTIVE BOX
*****INCOMPLETE NOTE*****    INTERVAL HPI/OVERNIGHT EVENTS:    SUBJECTIVE: Pt seen and examined at bedside. RUSLAN.   Denies f/c/n/v/d, abd pain, SOB, CP,  sxs,     ANTIBIOTICS/RELEVANT:    MEDICATIONS  (STANDING):  amLODIPine   Tablet 10 milliGRAM(s) Oral every 24 hours  atorvastatin 40 milliGRAM(s) Oral at bedtime  dextrose 5%. 1000 milliLiter(s) (50 mL/Hr) IV Continuous <Continuous>  dextrose 5%. 1000 milliLiter(s) (100 mL/Hr) IV Continuous <Continuous>  dextrose 50% Injectable 25 Gram(s) IV Push once  dextrose 50% Injectable 12.5 Gram(s) IV Push once  dextrose 50% Injectable 25 Gram(s) IV Push once  folic acid 1 milliGRAM(s) Oral with breakfast  glucagon  Injectable 1 milliGRAM(s) IntraMuscular once  influenza   Vaccine 0.5 milliLiter(s) IntraMuscular once  insulin lispro (ADMELOG) corrective regimen sliding scale   SubCutaneous Before meals and at bedtime  iron sucrose IVPB 300 milliGRAM(s) IV Intermittent every 24 hours  losartan 100 milliGRAM(s) Oral every 24 hours  pantoprazole    Tablet 40 milliGRAM(s) Oral before breakfast  polyethylene glycol 3350 17 Gram(s) Oral daily  remdesivir  IVPB   IV Intermittent   senna 2 Tablet(s) Oral at bedtime  simethicone 80 milliGRAM(s) Chew every 24 hours  thiamine 100 milliGRAM(s) Oral daily  thiamine IVPB 200 milliGRAM(s) IV Intermittent every 24 hours    MEDICATIONS  (PRN):  acetaminophen     Tablet .. 650 milliGRAM(s) Oral every 6 hours PRN Temp greater or equal to 38C (100.4F), Mild Pain (1 - 3)  dextrose Oral Gel 15 Gram(s) Oral once PRN Blood Glucose LESS THAN 70 milliGRAM(s)/deciliter  LORazepam   Injectable 2 milliGRAM(s) IV Push every 1 hour PRN CIWA-Ar score 8 or greater      Vital Signs Last 24 Hrs  T(C): 37.1 (02 Dec 2023 09:17), Max: 37.4 (01 Dec 2023 12:24)  T(F): 98.7 (02 Dec 2023 09:17), Max: 99.4 (01 Dec 2023 12:24)  HR: 78 (02 Dec 2023 09:17) (78 - 100)  BP: 133/88 (02 Dec 2023 09:17) (126/77 - 152/90)  BP(mean): --  RR: 18 (02 Dec 2023 09:17) (18 - 18)  SpO2: 99% (02 Dec 2023 09:17) (98% - 100%)    Parameters below as of 02 Dec 2023 09:17  Patient On (Oxygen Delivery Method): room air        PHYSICAL EXAM:  General: in no acute distress, non toxic appearing, speaking in full sentences  Eyes: EOMI intact bilaterally. Anicteric sclerae, moist conjunctivae  HENT: Moist mucous membranes  Neck: Trachea midline, supple  Lungs: CTA B/L. No wheezes, rales, or rhonchi  Cardiovascular: RRR. No murmurs, rubs, or gallops  Abdomen: +BS Soft, non-tender non-distended; No rebound or guarding  Vasc: Rad and DP intact and equal b/l   Extremities: WWP, No clubbing, cyanosis or edema  Neurological: Alert and oriented  Skin: Warm and dry. No obvious rash     LABS:                        6.4    2.11  )-----------( 187      ( 02 Dec 2023 07:14 )             22.6     12-02    137  |  106  |  12  ----------------------------<  238<H>  3.7   |  19<L>  |  1.06    Ca    8.2<L>      02 Dec 2023 07:14  Phos  3.7     12-01  Mg     1.7     12-01    TPro  7.2  /  Alb  4.0  /  TBili  0.2  /  DBili  x   /  AST  25  /  ALT  20  /  AlkPhos  68  12-01    PT/INR - ( 01 Dec 2023 13:47 )   PT: 10.6 sec;   INR: 0.93          PTT - ( 01 Dec 2023 13:47 )  PTT:29.4 sec  Urinalysis Basic - ( 02 Dec 2023 07:14 )    Color: x / Appearance: x / SG: x / pH: x  Gluc: 238 mg/dL / Ketone: x  / Bili: x / Urobili: x   Blood: x / Protein: x / Nitrite: x   Leuk Esterase: x / RBC: x / WBC x   Sq Epi: x / Non Sq Epi: x / Bacteria: x        MICROBIOLOGY:    RADIOLOGY & ADDITIONAL STUDIES: INTERVAL HPI/OVERNIGHT EVENTS: AM Hgb 6.4 ordered 1u pRBC and IV iron. Patient has a Hx of CONSTANCE from uterine fibroids, states she started her menstrual cycle today. Also reports drinking 1-2 pints a day, last drink was a couples days ago.     SUBJECTIVE: Pt seen and examined at bedside. C/o of CP, describes it as someone pulling a string on her chest. Also  Denies f/c/n/v/d, abd pain,  sxs, denies any visual/auditory or tactile hallucinations, no tremors.     ANTIBIOTICS/RELEVANT: Started on remdesivir.     MEDICATIONS  (STANDING):  amLODIPine   Tablet 10 milliGRAM(s) Oral every 24 hours  atorvastatin 40 milliGRAM(s) Oral at bedtime  dextrose 5%. 1000 milliLiter(s) (50 mL/Hr) IV Continuous <Continuous>  dextrose 5%. 1000 milliLiter(s) (100 mL/Hr) IV Continuous <Continuous>  dextrose 50% Injectable 25 Gram(s) IV Push once  dextrose 50% Injectable 12.5 Gram(s) IV Push once  dextrose 50% Injectable 25 Gram(s) IV Push once  folic acid 1 milliGRAM(s) Oral with breakfast  glucagon  Injectable 1 milliGRAM(s) IntraMuscular once  influenza   Vaccine 0.5 milliLiter(s) IntraMuscular once  insulin lispro (ADMELOG) corrective regimen sliding scale   SubCutaneous Before meals and at bedtime  iron sucrose IVPB 300 milliGRAM(s) IV Intermittent every 24 hours  losartan 100 milliGRAM(s) Oral every 24 hours  pantoprazole    Tablet 40 milliGRAM(s) Oral before breakfast  polyethylene glycol 3350 17 Gram(s) Oral daily  remdesivir  IVPB   IV Intermittent   senna 2 Tablet(s) Oral at bedtime  simethicone 80 milliGRAM(s) Chew every 24 hours  thiamine 100 milliGRAM(s) Oral daily  thiamine IVPB 200 milliGRAM(s) IV Intermittent every 24 hours    MEDICATIONS  (PRN):  acetaminophen     Tablet .. 650 milliGRAM(s) Oral every 6 hours PRN Temp greater or equal to 38C (100.4F), Mild Pain (1 - 3)  dextrose Oral Gel 15 Gram(s) Oral once PRN Blood Glucose LESS THAN 70 milliGRAM(s)/deciliter  LORazepam   Injectable 2 milliGRAM(s) IV Push every 1 hour PRN CIWA-Ar score 8 or greater      Vital Signs Last 24 Hrs  T(C): 37.1 (02 Dec 2023 09:17), Max: 37.4 (01 Dec 2023 12:24)  T(F): 98.7 (02 Dec 2023 09:17), Max: 99.4 (01 Dec 2023 12:24)  HR: 78 (02 Dec 2023 09:17) (78 - 100)  BP: 133/88 (02 Dec 2023 09:17) (126/77 - 152/90)  BP(mean): --  RR: 18 (02 Dec 2023 09:17) (18 - 18)  SpO2: 99% (02 Dec 2023 09:17) (98% - 100%)    Parameters below as of 02 Dec 2023 09:17  Patient On (Oxygen Delivery Method): room air        PHYSICAL EXAM:  General: in no acute distress, non toxic appearing, speaking in full sentences  Eyes: EOMI intact bilaterally. Anicteric sclerae, moist conjunctivae  HENT: Moist mucous membranes  Neck: Trachea midline, supple  Lungs: CTA B/L. No wheezes, rales, or rhonchi  Cardiovascular: RRR. No murmurs, rubs, or gallops  Abdomen: +BS Soft, non-tender non-distended; No rebound or guarding  Vasc: Rad and DP intact and equal b/l   Extremities: WWP, No clubbing, cyanosis or edema  Neurological: Alert and oriented x3   Skin: Warm and dry. No obvious rash     LABS:                        6.4    2.11  )-----------( 187      ( 02 Dec 2023 07:14 )             22.6     12-02    137  |  106  |  12  ----------------------------<  238<H>  3.7   |  19<L>  |  1.06    Ca    8.2<L>      02 Dec 2023 07:14  Phos  3.7     12-01  Mg     1.7     12-01    TPro  7.2  /  Alb  4.0  /  TBili  0.2  /  DBili  x   /  AST  25  /  ALT  20  /  AlkPhos  68  12-01    PT/INR - ( 01 Dec 2023 13:47 )   PT: 10.6 sec;   INR: 0.93          PTT - ( 01 Dec 2023 13:47 )  PTT:29.4 sec  Urinalysis Basic - ( 02 Dec 2023 07:14 )    Color: x / Appearance: x / SG: x / pH: x  Gluc: 238 mg/dL / Ketone: x  / Bili: x / Urobili: x   Blood: x / Protein: x / Nitrite: x   Leuk Esterase: x / RBC: x / WBC x   Sq Epi: x / Non Sq Epi: x / Bacteria: x        MICROBIOLOGY:    RADIOLOGY & ADDITIONAL STUDIES:

## 2023-12-02 NOTE — PROGRESS NOTE ADULT - PROBLEM SELECTOR PLAN 3
Lactate 2.8-->2.2 after fluids. bicarb mildly decreased, normal pH on VBG.  No signs of hypoperfusion.  May be type B lactic acidosis.  - has hx of DM and uses metformin  - also has new leukopenia, which could be related to an undiagnosed HIV  - pt has past hx of alcohol use but minimal of late  - no need to further trend at this time Patient has a history of CONSTANCE 2/2 uterine fibroids. Hgb on admission 8.0 and downtrended to 6.4 on 12/2, and asymptomatic. Patient is s/p 1u pRBC. Report she started her menstrual cycle on 12/2  - Reports taking iron supplements at home.   - Iron total 22, TIBC 475, % iron sat 6, ferritin 9    PLAN:  - f/u AM cbc   - start IV iron 300mg q24 [id number].  - f/u B12 and folate given history of gastric bypass. Patient has a history of CONSTANCE 2/2 uterine fibroids. Hgb on admission 8.0 and downtrended to 6.4 on 12/2, and asymptomatic. Patient is s/p 1u pRBC. Report she started her menstrual cycle on 12/2  - Reports taking iron supplements at home.   - Iron total 22, TIBC 475, % iron sat 6, ferritin 9    PLAN:  - f/u AM cbc   - start IV iron 300mg q24 [id number].  - f/u B12 and folate given history of gastric bypass. Patient has a history of CONSTANCE 2/2 uterine fibroids. Hgb on admission 8.0 and downtrended to 6.4 on 12/2, and asymptomatic. Patient is s/p 1u pRBC. Report she started her menstrual cycle on 12/2  - Reports taking iron supplements at home.   - Iron total 22, TIBC 475, % iron sat 6, ferritin 9    PLAN:  - f/u AM cbc   - start IV iron 300mg q24 [id number].  - f/u B12 and folate given history of gastric bypass.

## 2023-12-02 NOTE — PROGRESS NOTE ADULT - PROBLEM SELECTOR PLAN 1
Patient found to be covid positive    +Sars-Cov-2. Not hypoxic. 3+ days of symptoms.   Likely cause for patient's symptoms.    - monitor respiratory status  - pt has rx factors for disease progression including DM  - start 5 day course of remdesivir Presenting w/ chest discomfort, cough, myalgia, and rhinorrhea, found to be covid 19 positive. Patient on RA and not on any O2. Able to hold conversation. Lung exams clear   - No need for steroids as patient is non-hypoxic.    PLAN:  - monitor respiratory status  - Started on remdesivir x5days as patient has hx of risk factors including DM.   - Monitor off steroids.

## 2023-12-02 NOTE — PATIENT PROFILE ADULT - FUNCTIONAL ASSESSMENT - BASIC MOBILITY 6.
4-calculated by average/Not able to assess (calculate score using [facility] averaging method)

## 2023-12-03 ENCOUNTER — TRANSCRIPTION ENCOUNTER (OUTPATIENT)
Age: 48
End: 2023-12-03

## 2023-12-03 VITALS
HEART RATE: 82 BPM | OXYGEN SATURATION: 97 % | RESPIRATION RATE: 17 BRPM | DIASTOLIC BLOOD PRESSURE: 72 MMHG | TEMPERATURE: 99 F | SYSTOLIC BLOOD PRESSURE: 120 MMHG

## 2023-12-03 LAB
ALBUMIN SERPL ELPH-MCNC: 3.3 G/DL — SIGNIFICANT CHANGE UP (ref 3.3–5)
ALBUMIN SERPL ELPH-MCNC: 3.3 G/DL — SIGNIFICANT CHANGE UP (ref 3.3–5)
ALP SERPL-CCNC: 63 U/L — SIGNIFICANT CHANGE UP (ref 40–120)
ALP SERPL-CCNC: 63 U/L — SIGNIFICANT CHANGE UP (ref 40–120)
ALT FLD-CCNC: 13 U/L — SIGNIFICANT CHANGE UP (ref 10–45)
ALT FLD-CCNC: 13 U/L — SIGNIFICANT CHANGE UP (ref 10–45)
ANION GAP SERPL CALC-SCNC: 12 MMOL/L — SIGNIFICANT CHANGE UP (ref 5–17)
ANION GAP SERPL CALC-SCNC: 12 MMOL/L — SIGNIFICANT CHANGE UP (ref 5–17)
AST SERPL-CCNC: 15 U/L — SIGNIFICANT CHANGE UP (ref 10–40)
AST SERPL-CCNC: 15 U/L — SIGNIFICANT CHANGE UP (ref 10–40)
BASOPHILS # BLD AUTO: 0.02 K/UL — SIGNIFICANT CHANGE UP (ref 0–0.2)
BASOPHILS # BLD AUTO: 0.02 K/UL — SIGNIFICANT CHANGE UP (ref 0–0.2)
BASOPHILS NFR BLD AUTO: 0.7 % — SIGNIFICANT CHANGE UP (ref 0–2)
BASOPHILS NFR BLD AUTO: 0.7 % — SIGNIFICANT CHANGE UP (ref 0–2)
BILIRUB SERPL-MCNC: 0.3 MG/DL — SIGNIFICANT CHANGE UP (ref 0.2–1.2)
BILIRUB SERPL-MCNC: 0.3 MG/DL — SIGNIFICANT CHANGE UP (ref 0.2–1.2)
BUN SERPL-MCNC: 13 MG/DL — SIGNIFICANT CHANGE UP (ref 7–23)
BUN SERPL-MCNC: 13 MG/DL — SIGNIFICANT CHANGE UP (ref 7–23)
CALCIUM SERPL-MCNC: 8.4 MG/DL — SIGNIFICANT CHANGE UP (ref 8.4–10.5)
CALCIUM SERPL-MCNC: 8.4 MG/DL — SIGNIFICANT CHANGE UP (ref 8.4–10.5)
CHLORIDE SERPL-SCNC: 103 MMOL/L — SIGNIFICANT CHANGE UP (ref 96–108)
CHLORIDE SERPL-SCNC: 103 MMOL/L — SIGNIFICANT CHANGE UP (ref 96–108)
CO2 SERPL-SCNC: 22 MMOL/L — SIGNIFICANT CHANGE UP (ref 22–31)
CO2 SERPL-SCNC: 22 MMOL/L — SIGNIFICANT CHANGE UP (ref 22–31)
CREAT SERPL-MCNC: 0.94 MG/DL — SIGNIFICANT CHANGE UP (ref 0.5–1.3)
CREAT SERPL-MCNC: 0.94 MG/DL — SIGNIFICANT CHANGE UP (ref 0.5–1.3)
CULTURE RESULTS: SIGNIFICANT CHANGE UP
CULTURE RESULTS: SIGNIFICANT CHANGE UP
EGFR: 76 ML/MIN/1.73M2 — SIGNIFICANT CHANGE UP
EGFR: 76 ML/MIN/1.73M2 — SIGNIFICANT CHANGE UP
EOSINOPHIL # BLD AUTO: 0.07 K/UL — SIGNIFICANT CHANGE UP (ref 0–0.5)
EOSINOPHIL # BLD AUTO: 0.07 K/UL — SIGNIFICANT CHANGE UP (ref 0–0.5)
EOSINOPHIL NFR BLD AUTO: 2.4 % — SIGNIFICANT CHANGE UP (ref 0–6)
EOSINOPHIL NFR BLD AUTO: 2.4 % — SIGNIFICANT CHANGE UP (ref 0–6)
FOLATE SERPL-MCNC: 12.8 NG/ML — SIGNIFICANT CHANGE UP
FOLATE SERPL-MCNC: 12.8 NG/ML — SIGNIFICANT CHANGE UP
FOLATE SERPL-MCNC: 13.1 NG/ML — SIGNIFICANT CHANGE UP
FOLATE SERPL-MCNC: 13.1 NG/ML — SIGNIFICANT CHANGE UP
GLUCOSE BLDC GLUCOMTR-MCNC: 151 MG/DL — HIGH (ref 70–99)
GLUCOSE BLDC GLUCOMTR-MCNC: 151 MG/DL — HIGH (ref 70–99)
GLUCOSE BLDC GLUCOMTR-MCNC: 176 MG/DL — HIGH (ref 70–99)
GLUCOSE BLDC GLUCOMTR-MCNC: 176 MG/DL — HIGH (ref 70–99)
GLUCOSE BLDC GLUCOMTR-MCNC: 192 MG/DL — HIGH (ref 70–99)
GLUCOSE BLDC GLUCOMTR-MCNC: 192 MG/DL — HIGH (ref 70–99)
GLUCOSE SERPL-MCNC: 151 MG/DL — HIGH (ref 70–99)
GLUCOSE SERPL-MCNC: 151 MG/DL — HIGH (ref 70–99)
HCT VFR BLD CALC: 26.9 % — LOW (ref 34.5–45)
HCT VFR BLD CALC: 26.9 % — LOW (ref 34.5–45)
HGB BLD-MCNC: 8 G/DL — LOW (ref 11.5–15.5)
HGB BLD-MCNC: 8 G/DL — LOW (ref 11.5–15.5)
IMM GRANULOCYTES NFR BLD AUTO: 0 % — SIGNIFICANT CHANGE UP (ref 0–0.9)
IMM GRANULOCYTES NFR BLD AUTO: 0 % — SIGNIFICANT CHANGE UP (ref 0–0.9)
LYMPHOCYTES # BLD AUTO: 1.4 K/UL — SIGNIFICANT CHANGE UP (ref 1–3.3)
LYMPHOCYTES # BLD AUTO: 1.4 K/UL — SIGNIFICANT CHANGE UP (ref 1–3.3)
LYMPHOCYTES # BLD AUTO: 48.6 % — HIGH (ref 13–44)
LYMPHOCYTES # BLD AUTO: 48.6 % — HIGH (ref 13–44)
MAGNESIUM SERPL-MCNC: 1.6 MG/DL — SIGNIFICANT CHANGE UP (ref 1.6–2.6)
MAGNESIUM SERPL-MCNC: 1.6 MG/DL — SIGNIFICANT CHANGE UP (ref 1.6–2.6)
MCHC RBC-ENTMCNC: 23.7 PG — LOW (ref 27–34)
MCHC RBC-ENTMCNC: 23.7 PG — LOW (ref 27–34)
MCHC RBC-ENTMCNC: 29.7 GM/DL — LOW (ref 32–36)
MCHC RBC-ENTMCNC: 29.7 GM/DL — LOW (ref 32–36)
MCV RBC AUTO: 79.6 FL — LOW (ref 80–100)
MCV RBC AUTO: 79.6 FL — LOW (ref 80–100)
MONOCYTES # BLD AUTO: 0.41 K/UL — SIGNIFICANT CHANGE UP (ref 0–0.9)
MONOCYTES # BLD AUTO: 0.41 K/UL — SIGNIFICANT CHANGE UP (ref 0–0.9)
MONOCYTES NFR BLD AUTO: 14.2 % — HIGH (ref 2–14)
MONOCYTES NFR BLD AUTO: 14.2 % — HIGH (ref 2–14)
NEUTROPHILS # BLD AUTO: 0.98 K/UL — LOW (ref 1.8–7.4)
NEUTROPHILS # BLD AUTO: 0.98 K/UL — LOW (ref 1.8–7.4)
NEUTROPHILS NFR BLD AUTO: 34.1 % — LOW (ref 43–77)
NEUTROPHILS NFR BLD AUTO: 34.1 % — LOW (ref 43–77)
NRBC # BLD: 0 /100 WBCS — SIGNIFICANT CHANGE UP (ref 0–0)
NRBC # BLD: 0 /100 WBCS — SIGNIFICANT CHANGE UP (ref 0–0)
PHOSPHATE SERPL-MCNC: 3.5 MG/DL — SIGNIFICANT CHANGE UP (ref 2.5–4.5)
PHOSPHATE SERPL-MCNC: 3.5 MG/DL — SIGNIFICANT CHANGE UP (ref 2.5–4.5)
PLATELET # BLD AUTO: 191 K/UL — SIGNIFICANT CHANGE UP (ref 150–400)
PLATELET # BLD AUTO: 191 K/UL — SIGNIFICANT CHANGE UP (ref 150–400)
POTASSIUM SERPL-MCNC: 3.8 MMOL/L — SIGNIFICANT CHANGE UP (ref 3.5–5.3)
POTASSIUM SERPL-MCNC: 3.8 MMOL/L — SIGNIFICANT CHANGE UP (ref 3.5–5.3)
POTASSIUM SERPL-SCNC: 3.8 MMOL/L — SIGNIFICANT CHANGE UP (ref 3.5–5.3)
POTASSIUM SERPL-SCNC: 3.8 MMOL/L — SIGNIFICANT CHANGE UP (ref 3.5–5.3)
PROT SERPL-MCNC: 6.2 G/DL — SIGNIFICANT CHANGE UP (ref 6–8.3)
PROT SERPL-MCNC: 6.2 G/DL — SIGNIFICANT CHANGE UP (ref 6–8.3)
RBC # BLD: 3.38 M/UL — LOW (ref 3.8–5.2)
RBC # BLD: 3.38 M/UL — LOW (ref 3.8–5.2)
RBC # FLD: 18.8 % — HIGH (ref 10.3–14.5)
RBC # FLD: 18.8 % — HIGH (ref 10.3–14.5)
SODIUM SERPL-SCNC: 137 MMOL/L — SIGNIFICANT CHANGE UP (ref 135–145)
SODIUM SERPL-SCNC: 137 MMOL/L — SIGNIFICANT CHANGE UP (ref 135–145)
SPECIMEN SOURCE: SIGNIFICANT CHANGE UP
SPECIMEN SOURCE: SIGNIFICANT CHANGE UP
WBC # BLD: 2.88 K/UL — LOW (ref 3.8–10.5)
WBC # BLD: 2.88 K/UL — LOW (ref 3.8–10.5)
WBC # FLD AUTO: 2.88 K/UL — LOW (ref 3.8–10.5)
WBC # FLD AUTO: 2.88 K/UL — LOW (ref 3.8–10.5)

## 2023-12-03 PROCEDURE — 87389 HIV-1 AG W/HIV-1&-2 AB AG IA: CPT

## 2023-12-03 PROCEDURE — 86901 BLOOD TYPING SEROLOGIC RH(D): CPT

## 2023-12-03 PROCEDURE — 93005 ELECTROCARDIOGRAM TRACING: CPT

## 2023-12-03 PROCEDURE — 83605 ASSAY OF LACTIC ACID: CPT

## 2023-12-03 PROCEDURE — 86922 COMPATIBILITY TEST ANTIGLOB: CPT

## 2023-12-03 PROCEDURE — 84100 ASSAY OF PHOSPHORUS: CPT

## 2023-12-03 PROCEDURE — P9016: CPT

## 2023-12-03 PROCEDURE — 86850 RBC ANTIBODY SCREEN: CPT

## 2023-12-03 PROCEDURE — 82962 GLUCOSE BLOOD TEST: CPT

## 2023-12-03 PROCEDURE — 84295 ASSAY OF SERUM SODIUM: CPT

## 2023-12-03 PROCEDURE — 83540 ASSAY OF IRON: CPT

## 2023-12-03 PROCEDURE — 0225U NFCT DS DNA&RNA 21 SARSCOV2: CPT

## 2023-12-03 PROCEDURE — 83010 ASSAY OF HAPTOGLOBIN QUANT: CPT

## 2023-12-03 PROCEDURE — 71275 CT ANGIOGRAPHY CHEST: CPT | Mod: MA

## 2023-12-03 PROCEDURE — 36430 TRANSFUSION BLD/BLD COMPNT: CPT

## 2023-12-03 PROCEDURE — 82746 ASSAY OF FOLIC ACID SERUM: CPT

## 2023-12-03 PROCEDURE — G0378: CPT

## 2023-12-03 PROCEDURE — 87040 BLOOD CULTURE FOR BACTERIA: CPT

## 2023-12-03 PROCEDURE — 82803 BLOOD GASES ANY COMBINATION: CPT

## 2023-12-03 PROCEDURE — 83550 IRON BINDING TEST: CPT

## 2023-12-03 PROCEDURE — 84132 ASSAY OF SERUM POTASSIUM: CPT

## 2023-12-03 PROCEDURE — 85730 THROMBOPLASTIN TIME PARTIAL: CPT

## 2023-12-03 PROCEDURE — 71045 X-RAY EXAM CHEST 1 VIEW: CPT

## 2023-12-03 PROCEDURE — 85025 COMPLETE CBC W/AUTO DIFF WBC: CPT

## 2023-12-03 PROCEDURE — 83735 ASSAY OF MAGNESIUM: CPT

## 2023-12-03 PROCEDURE — 82728 ASSAY OF FERRITIN: CPT

## 2023-12-03 PROCEDURE — 86900 BLOOD TYPING SEROLOGIC ABO: CPT

## 2023-12-03 PROCEDURE — 84484 ASSAY OF TROPONIN QUANT: CPT

## 2023-12-03 PROCEDURE — 85610 PROTHROMBIN TIME: CPT

## 2023-12-03 PROCEDURE — 81001 URINALYSIS AUTO W/SCOPE: CPT

## 2023-12-03 PROCEDURE — 84702 CHORIONIC GONADOTROPIN TEST: CPT

## 2023-12-03 PROCEDURE — 80048 BASIC METABOLIC PNL TOTAL CA: CPT

## 2023-12-03 PROCEDURE — 82330 ASSAY OF CALCIUM: CPT

## 2023-12-03 PROCEDURE — 99239 HOSP IP/OBS DSCHRG MGMT >30: CPT

## 2023-12-03 PROCEDURE — 82553 CREATINE MB FRACTION: CPT

## 2023-12-03 PROCEDURE — 82010 KETONE BODYS QUAN: CPT

## 2023-12-03 PROCEDURE — 82607 VITAMIN B-12: CPT

## 2023-12-03 PROCEDURE — 83880 ASSAY OF NATRIURETIC PEPTIDE: CPT

## 2023-12-03 PROCEDURE — 85045 AUTOMATED RETICULOCYTE COUNT: CPT

## 2023-12-03 PROCEDURE — 87086 URINE CULTURE/COLONY COUNT: CPT

## 2023-12-03 PROCEDURE — 83036 HEMOGLOBIN GLYCOSYLATED A1C: CPT

## 2023-12-03 PROCEDURE — 36415 COLL VENOUS BLD VENIPUNCTURE: CPT

## 2023-12-03 PROCEDURE — 83690 ASSAY OF LIPASE: CPT

## 2023-12-03 PROCEDURE — 80053 COMPREHEN METABOLIC PANEL: CPT

## 2023-12-03 PROCEDURE — 99285 EMERGENCY DEPT VISIT HI MDM: CPT

## 2023-12-03 PROCEDURE — 83615 LACTATE (LD) (LDH) ENZYME: CPT

## 2023-12-03 PROCEDURE — 82550 ASSAY OF CK (CPK): CPT

## 2023-12-03 PROCEDURE — 85379 FIBRIN DEGRADATION QUANT: CPT

## 2023-12-03 PROCEDURE — 86902 BLOOD TYPE ANTIGEN DONOR EA: CPT

## 2023-12-03 PROCEDURE — 84443 ASSAY THYROID STIM HORMONE: CPT

## 2023-12-03 PROCEDURE — 80307 DRUG TEST PRSMV CHEM ANLYZR: CPT

## 2023-12-03 RX ORDER — NICOTINE POLACRILEX 2 MG
1 GUM BUCCAL
Qty: 15 | Refills: 0
Start: 2023-12-03 | End: 2023-12-17

## 2023-12-03 RX ORDER — NICOTINE POLACRILEX 2 MG
1 GUM BUCCAL ONCE
Refills: 0 | Status: COMPLETED | OUTPATIENT
Start: 2023-12-03 | End: 2023-12-03

## 2023-12-03 RX ORDER — FERROUS GLUCONATE 100 %
1 POWDER (GRAM) MISCELLANEOUS
Qty: 15 | Refills: 0
Start: 2023-12-03 | End: 2024-01-01

## 2023-12-03 RX ADMIN — LOSARTAN POTASSIUM 100 MILLIGRAM(S): 100 TABLET, FILM COATED ORAL at 09:34

## 2023-12-03 RX ADMIN — Medication 650 MILLIGRAM(S): at 10:34

## 2023-12-03 RX ADMIN — AMLODIPINE BESYLATE 10 MILLIGRAM(S): 2.5 TABLET ORAL at 09:34

## 2023-12-03 RX ADMIN — POLYETHYLENE GLYCOL 3350 17 GRAM(S): 17 POWDER, FOR SOLUTION ORAL at 09:34

## 2023-12-03 RX ADMIN — Medication 1: at 18:24

## 2023-12-03 RX ADMIN — Medication 1: at 12:47

## 2023-12-03 RX ADMIN — Medication 1: at 09:20

## 2023-12-03 RX ADMIN — Medication 200 MILLIGRAM(S): at 15:00

## 2023-12-03 RX ADMIN — Medication 1 PATCH: at 15:01

## 2023-12-03 RX ADMIN — Medication 650 MILLIGRAM(S): at 09:34

## 2023-12-03 RX ADMIN — Medication 1 MILLIGRAM(S): at 09:33

## 2023-12-03 RX ADMIN — PANTOPRAZOLE SODIUM 40 MILLIGRAM(S): 20 TABLET, DELAYED RELEASE ORAL at 06:24

## 2023-12-03 RX ADMIN — IRON SUCROSE 176.67 MILLIGRAM(S): 20 INJECTION, SOLUTION INTRAVENOUS at 15:01

## 2023-12-03 RX ADMIN — REMDESIVIR 200 MILLIGRAM(S): 5 INJECTION INTRAVENOUS at 06:24

## 2023-12-03 NOTE — DISCHARGE NOTE PROVIDER - NSDCCPCAREPLAN_GEN_ALL_CORE_FT
PRINCIPAL DISCHARGE DIAGNOSIS  Diagnosis: Symptomatic anemia  Assessment and Plan of Treatment: Anemia is a low number of red blood cells or a low amount of hemoglobin in your red blood cells. Hemoglobin is a protein that helps carry oxygen throughout your body. Red blood cells use iron to create hemoglobin. Anemia may develop if your body does not have enough iron. It may also develop if your body does not make enough red blood cells or they die faster than your body can make them.  You were found to have low hemoglobin and low iron, suggestive of iron deficiency anemia. You received 1 unit of blood and IV iron. Please continue to take iron tablets every other day with meals. Please follow up with PCP outpatient for further follow up.      SECONDARY DISCHARGE DIAGNOSES  Diagnosis: COVID-19  Assessment and Plan of Treatment: You were found to be positive for COVID.  Symptoms of this condition can range from mild to severe. Symptoms may appear any time from 2 to 14 days after being exposed to the virus. They include:  Fever or chills.  Shortness of breath or trouble breathing.  Feeling tired or very tired.  Headaches, body aches, or muscle aches.  Runny or stuffy nose, sneezing, coughing, or sore throat.  You received remdesivir which is a medication to help lessen the symptoms of COVID. Please continue to rest, drink lots of fluid, at home. Should you have any shortness of breath or worsening in symptoms, please return to the hospital.     PRINCIPAL DISCHARGE DIAGNOSIS  Diagnosis: Symptomatic anemia  Assessment and Plan of Treatment: Anemia is a low number of red blood cells or a low amount of hemoglobin in your red blood cells. Hemoglobin is a protein that helps carry oxygen throughout your body. Red blood cells use iron to create hemoglobin. Anemia may develop if your body does not have enough iron. It may also develop if your body does not make enough red blood cells or they die faster than your body can make them.  You were found to have low hemoglobin and low iron, suggestive of iron deficiency anemia. You received 1 unit of blood and IV iron. Please continue to take iron tablets every other day with meals. Please follow up with PCP outpatient for further follow up.      SECONDARY DISCHARGE DIAGNOSES  Diagnosis: COVID-19  Assessment and Plan of Treatment: You were found to be positive for COVID.  Symptoms of this condition can range from mild to severe. Symptoms may appear any time from 2 to 14 days after being exposed to the virus. They include:  Fever or chills.  Shortness of breath or trouble breathing.  Feeling tired or very tired.  Headaches, body aches, or muscle aches.  Runny or stuffy nose, sneezing, coughing, or sore throat.  Smoking can increase risk of respiratory infections. Consider chantix for smoking cessation and speak with your PCP regarding other resources to help with smoking cessation.   You received remdesivir which is a medication to help lessen the symptoms of COVID. Please continue to rest, drink lots of fluid, at home. Should you have any shortness of breath or worsening in symptoms, please return to the hospital.

## 2023-12-03 NOTE — DISCHARGE NOTE NURSING/CASE MANAGEMENT/SOCIAL WORK - PATIENT PORTAL LINK FT
You can access the FollowMyHealth Patient Portal offered by [facility] by registering at the following website: [email or web address]. By joining [employer]’s FollowMyHealth portal, you will also be able to view your health information using other applications (apps) compatible with our system. You can access the FollowMyHealth Patient Portal offered by [facility] by registering at the following website: [email or web address]. By joining [email or web address]’s FollowMyHealth portal, you will also be able to view your health information using other applications (apps) compatible with our system.

## 2023-12-03 NOTE — DISCHARGE NOTE PROVIDER - HOSPITAL COURSE
48-year-old, history of alcohol abuse, iron deficiency anemia, HTN, DM (A1c 7.1), hld, cholelithiasis, gastric bypass 1998, smoker, cardiac catheterization August with nonobstructive CAD who presents to ED with generalized weakness and fatigue. Found to be positive for SARS-Cov-2. Course c/b anemia of 6.4 s/p 1u pRBC. Admit to [facility] for further management Severe sepsis 2/2 covid and anemia. Pt with stable hgb ( >7) and given no oxygen requirement with symptomatic improvement, medically stable for discharge.       SARS-CoV-2 positive.   Presenting w/ chest discomfort, cough, myalgia, and rhinorrhea, found to be covid 19 positive. Patient on RA and not on any O2. Able to hold conversation. Lung exams clear   - No need for steroids as patient is non-hypoxic.  - Started on remdesivir has hx of risk factors including DM, received 2 doses of remdesivir     Severe sepsis.   Patient found to be severe sepsis w/ 2/4 SIRS (Tachycardia, and leukopenia) and elevated lactate on admission of 2.8. Severe sepsis likely 2/2 covid 19 infection. Lactate down trended to 2.2 s/p fluids in the ED. BCx NGTD    Iron deficiency anemia.   Patient has a history of CONSTANCE 2/2 uterine fibroids. Hgb on admission 8.0 and downtrended to 6.4 on 12/2, and asymptomatic. Patient is s/p 1u pRBC. Report she started her menstrual cycle on 12/2. Hgb 8 post transfusion. folate and b12 wnl  - Reports taking iron supplements at home.   - Iron total 22, TIBC 475, % iron sat 6, ferritin 9  - IV iron 300mg q24 x1    Alcohol use.    Past hx of alcohol use. States she drinks 1-2 pints a day, with last drink being a few days ago. EtOh wnl upon arrival.  CIWA 0    DM (diabetes mellitus).   Well controlled, A1c 7.1 in August 2023.  Home med: metformin 1000 BID  Glucose 170-200 on BMPs.  - c.w home meds on discharge    HLD (hyperlipidemia).   History of HLD, takes home lipitor 40mg qhs.   - c/w home lipitor 40 qhs.    History of smoking.   His tobacco use  Current smoker, 1PPD  Used to take buproprion but does not take currently  - will provide nicotine patch.     Patient was discharged to: home    New medications:   Changes to old medications:  Medications that were stopped:    Items to follow up as outpatient:    Physical exam at the time of discharge:       48-year-old, history of alcohol abuse, iron deficiency anemia, HTN, DM (A1c 7.1), hld, cholelithiasis, gastric bypass 1998, smoker, cardiac catheterization August with nonobstructive CAD who presents to ED with generalized weakness and fatigue. Found to be positive for SARS-Cov-2. Course c/b anemia of 6.4 s/p 1u pRBC. Admit to [facility] for further management Severe sepsis 2/2 covid and anemia. Pt with stable hgb ( >7) and given no oxygen requirement with symptomatic improvement, medically stable for discharge.       SARS-CoV-2 positive.   Presenting w/ chest discomfort, cough, myalgia, and rhinorrhea, found to be covid 19 positive. Patient on RA and not on any O2. Able to hold conversation. Lung exams clear   - No need for steroids as patient is non-hypoxic.  - Started on remdesivir has hx of risk factors including DM, received 2 doses of remdesivir     Severe sepsis.   Patient found to be severe sepsis w/ 2/4 SIRS (Tachycardia, and leukopenia) and elevated lactate on admission of 2.8. Severe sepsis likely 2/2 covid 19 infection. Lactate down trended to 2.2 s/p fluids in the ED. BCx NGTD    Iron deficiency anemia.   Patient has a history of CONSTANCE 2/2 uterine fibroids. Hgb on admission 8.0 and downtrended to 6.4 on 12/2, and asymptomatic. Patient is s/p 1u pRBC. Report she started her menstrual cycle on 12/2. Hgb 8 post transfusion. folate and b12 wnl  - Reports taking iron supplements at home.   - Iron total 22, TIBC 475, % iron sat 6, ferritin 9  - IV iron 300mg q24 x1    Alcohol use.    Past hx of alcohol use. States she drinks 1-2 pints a day, with last drink being a few days ago. EtOh wnl upon arrival.  CIWA 0    DM (diabetes mellitus).   Well controlled, A1c 7.1 in August 2023.  Home med: metformin 1000 BID  Glucose 170-200 on BMPs.  - c.w home meds on discharge    HLD (hyperlipidemia).   History of HLD, takes home lipitor 40mg qhs.   - c/w home lipitor 40 qhs.    History of smoking.   His tobacco use  Current smoker, 1PPD  Used to take buproprion but does not take currently  - will provide nicotine patch.     Patient was discharged to: home    New medications:   Changes to old medications:  Medications that were stopped:    Items to follow up as outpatient:    Physical exam at the time of discharge:       48-year-old, history of alcohol abuse, iron deficiency anemia, HTN, DM (A1c 7.1), hld, cholelithiasis, gastric bypass 1998, smoker, cardiac catheterization August with nonobstructive CAD who presents to ED with generalized weakness and fatigue. Found to be positive for SARS-Cov-2. Course c/b anemia of 6.4 s/p 1u pRBC. Admit to [facility] for further management Severe sepsis 2/2 covid and anemia. Pt with stable hgb ( >7) and given no oxygen requirement with symptomatic improvement, medically stable for discharge.       SARS-CoV-2 positive.   Presenting w/ chest discomfort, cough, myalgia, and rhinorrhea, found to be covid 19 positive. Patient on RA and not on any O2. Able to hold conversation. Lung exams clear   - No need for steroids as patient is non-hypoxic.  - Started on remdesivir has hx of risk factors including DM, received 2 doses of remdesivir     Severe sepsis.   Patient found to be severe sepsis w/ 2/4 SIRS (Tachycardia, and leukopenia) and elevated lactate on admission of 2.8. Severe sepsis likely 2/2 covid 19 infection. Lactate down trended to 2.2 s/p fluids in the ED. BCx NGTD    Iron deficiency anemia.   Patient has a history of CONSTANCE 2/2 uterine fibroids. Hgb on admission 8.0 and downtrended to 6.4 on 12/2, and asymptomatic. Patient is s/p 1u pRBC. Report she started her menstrual cycle on 12/2. Hgb 8 post transfusion. folate and b12 wnl  - Reports taking iron supplements at home.   - Iron total 22, TIBC 475, % iron sat 6, ferritin 9  - IV iron 300mg q24 x1    Alcohol use.    Past hx of alcohol use. States she drinks 1-2 pints a day, with last drink being a few days ago. EtOh wnl upon arrival.  CIWA 0    DM (diabetes mellitus).   Well controlled, A1c 7.1 in August 2023.  Home med: metformin 1000 BID  Glucose 170-200 on BMPs.  - c.w home meds on discharge    HLD (hyperlipidemia).   History of HLD, takes home lipitor 40mg qhs.   - c/w home lipitor 40 qhs.    History of smoking.   His tobacco use  Current smoker, 1PPD  Used to take buproprion but does not take currently  - will provide nicotine patch.     Patient was discharged to: home    New medications:   Changes to old medications:  Medications that were stopped:    Items to follow up as outpatient:    Physical exam at the time of discharge:       48-year-old, history of alcohol abuse, iron deficiency anemia, HTN, DM (A1c 7.1), hld, cholelithiasis, gastric bypass 1998, smoker, cardiac catheterization August with nonobstructive CAD who presents to ED with generalized weakness and fatigue. Found to be positive for SARS-Cov-2. Course c/b anemia of 6.4 s/p 1u pRBC. Admit to [facility] for further management Severe sepsis 2/2 covid and anemia. Pt with stable hgb ( >7) and given no oxygen requirement with symptomatic improvement, medically stable for discharge.       SARS-CoV-2 positive.   Presenting w/ chest discomfort, cough, myalgia, and rhinorrhea, found to be covid 19 positive. Patient on RA and not on any O2. Able to hold conversation. Lung exams clear   - No need for steroids as patient is non-hypoxic.  - Started on remdesivir has hx of risk factors including DM, received 2 doses of remdesivir     Severe sepsis.   Patient found to be severe sepsis w/ 2/4 SIRS (Tachycardia, and leukopenia) and elevated lactate on admission of 2.8. Severe sepsis likely 2/2 covid 19 infection. Lactate down trended to 2.2 s/p fluids in the ED. BCx NGTD    Iron deficiency anemia.   Patient has a history of CONSTANCE 2/2 uterine fibroids. Hgb on admission 8.0 and downtrended to 6.4 on 12/2, and asymptomatic. Patient is s/p 1u pRBC. Report she started her menstrual cycle on 12/2. Hgb 8 post transfusion. folate and b12 wnl  - Reports taking iron supplements at home.   - Iron total 22, TIBC 475, % iron sat 6, ferritin 9  - IV iron 300mg q24 x1    Alcohol use.    Past hx of alcohol use. States she drinks 1-2 pints a day, with last drink being a few days ago. EtOh wnl upon arrival.  CIWA 0    DM (diabetes mellitus).   Well controlled, A1c 7.1 in August 2023.  Home med: metformin 1000 BID  Glucose 170-200 on BMPs.  - c.w home meds on discharge    HLD (hyperlipidemia).   History of HLD, takes home lipitor 40mg qhs.   - c/w home lipitor 40 qhs.    History of smoking.   His tobacco use  Current smoker, 1PPD  Used to take buproprion but does not take currently  - will provide nicotine patch.     Patient was discharged to: home    New medications: ferrous gluconate every other day  Changes to old medications: none  Medications that were stopped: none    Items to follow up as outpatient: anemia with PCP and GYN, leukopenia with PCP    Physical exam at the time of discharge:  General: in no acute distress, non toxic appearing, speaking in full sentences  Eyes: EOMI intact bilaterally.   Lungs: CTA B/L. No wheezes, rales, or rhonchi  Cardiovascular: RRR. No murmurs, rubs, or gallops  Abdomen: +BS Soft, non-tender non-distended; No rebound or guarding  Vasc: Rad and DP intact and equal b/l   Extremities: WWP, No clubbing, cyanosis or edema  Neurological: Alert and oriented x3   Skin: Warm and dry. No obvious rash      48-year-old, history of alcohol abuse, iron deficiency anemia, HTN, DM (A1c 7.1), hld, cholelithiasis, gastric bypass 1998, smoker, cardiac catheterization August with nonobstructive CAD who presents to ED with generalized weakness and fatigue. Found to be positive for SARS-Cov-2. Course c/b anemia of 6.4 s/p 1u pRBC. Admit to [facility] for further management Severe sepsis 2/2 covid and anemia. Pt with stable hgb ( >7) and given no oxygen requirement with symptomatic improvement, medically stable for discharge.       SARS-CoV-2 positive.   Presenting w/ chest discomfort, cough, myalgia, and rhinorrhea, found to be covid 19 positive. Patient on RA and not on any O2. Able to hold conversation. Lung exams clear   - No need for steroids as patient is non-hypoxic.  - Started on remdesivir has hx of risk factors including DM, received 2 doses of remdesivir     Severe sepsis.   Patient found to be severe sepsis w/ 2/4 SIRS (Tachycardia, and leukopenia) and elevated lactate on admission of 2.8. Severe sepsis likely 2/2 covid 19 infection. Lactate down trended to 2.2 s/p fluids in the ED. BCx NGTD    Iron deficiency anemia.   Patient has a history of CONSTANCE 2/2 uterine fibroids. Hgb on admission 8.0 and downtrended to 6.4 on 12/2, and asymptomatic. Patient is s/p 1u pRBC. Report she started her menstrual cycle on 12/2. Hgb 8 post transfusion. folate and b12 wnl  - Reports taking iron supplements at home.   - Iron total 22, TIBC 475, % iron sat 6, ferritin 9  - IV iron 300mg q24 x1    Alcohol use.    Past hx of alcohol use. States she drinks 1-2 pints a day, with last drink being a few days ago. EtOh wnl upon arrival.  CIWA 0    DM (diabetes mellitus).   Well controlled, A1c 7.1 in August 2023.  Home med: metformin 1000 BID  Glucose 170-200 on BMPs.  - c.w home meds on discharge    HLD (hyperlipidemia).   History of HLD, takes home lipitor 40mg qhs.   - c/w home lipitor 40 qhs.    History of smoking.   His tobacco use  Current smoker, 1PPD  Used to take buproprion but does not take currently  - will provide nicotine patch.     Patient was discharged to: home    New medications: ferrous gluconate every other day  Changes to old medications: none  Medications that were stopped: none    Items to follow up as outpatient: anemia with PCP and GYN, leukopenia with PCP    Physical exam at the time of discharge:  General: in no acute distress, non toxic appearing, speaking in full sentences  Eyes: EOMI intact bilaterally.   Lungs: CTA B/L. No wheezes, rales, or rhonchi  Cardiovascular: RRR. No murmurs, rubs, or gallops  Abdomen: +BS Soft, non-tender non-distended; No rebound or guarding  Vasc: Rad and DP intact and equal b/l   Extremities: WWP, No clubbing, cyanosis or edema  Neurological: Alert and oriented x3   Skin: Warm and dry. No obvious rash      48-year-old, history of alcohol abuse, iron deficiency anemia, HTN, DM (A1c 7.1), hld, cholelithiasis, gastric bypass 1998, smoker, cardiac catheterization August with nonobstructive CAD who presents to ED with generalized weakness and fatigue. Found to be positive for SARS-Cov-2. Course c/b anemia of 6.4 s/p 1u pRBC. Admit to [facility] for further management Severe sepsis 2/2 covid and anemia. Pt with stable hgb ( >7) and given no oxygen requirement with symptomatic improvement, medically stable for discharge.       SARS-CoV-2 positive.   Presenting w/ chest discomfort, cough, myalgia, and rhinorrhea, found to be covid 19 positive. Patient on RA and not on any O2. Able to hold conversation. Lung exams clear   - No need for steroids as patient is non-hypoxic.  - Started on remdesivir has hx of risk factors including DM, received 2 doses of remdesivir     Severe sepsis.   Patient found to be severe sepsis w/ 2/4 SIRS (Tachycardia, and leukopenia) and elevated lactate on admission of 2.8. Severe sepsis likely 2/2 covid 19 infection. Lactate down trended to 2.2 s/p fluids in the ED. BCx NGTD    Iron deficiency anemia.   Patient has a history of CONSTANCE 2/2 uterine fibroids. Hgb on admission 8.0 and downtrended to 6.4 on 12/2, and asymptomatic. Patient is s/p 1u pRBC. Report she started her menstrual cycle on 12/2. Hgb 8 post transfusion. folate and b12 wnl  - Reports taking iron supplements at home.   - Iron total 22, TIBC 475, % iron sat 6, ferritin 9  - IV iron 300mg q24 x1    Alcohol use.    Past hx of alcohol use. States she drinks 1-2 pints a day, with last drink being a few days ago. EtOh wnl upon arrival.  CIWA 0    DM (diabetes mellitus).   Well controlled, A1c 7.1 in August 2023.  Home med: metformin 1000 BID  Glucose 170-200 on BMPs.  - c.w home meds on discharge    HLD (hyperlipidemia).   History of HLD, takes home lipitor 40mg qhs.   - c/w home lipitor 40 qhs.    History of smoking.   His tobacco use  Current smoker, 1PPD  Used to take buproprion but does not take currently  - will provide nicotine patch.     Patient was discharged to: home    New medications: ferrous gluconate every other day  Changes to old medications: none  Medications that were stopped: none    Items to follow up as outpatient: anemia with PCP and GYN, leukopenia with PCP    Physical exam at the time of discharge:  General: in no acute distress, non toxic appearing, speaking in full sentences  Eyes: EOMI intact bilaterally.   Lungs: CTA B/L. No wheezes, rales, or rhonchi  Cardiovascular: RRR. No murmurs, rubs, or gallops  Abdomen: +BS Soft, non-tender non-distended; No rebound or guarding  Vasc: Rad and DP intact and equal b/l   Extremities: WWP, No clubbing, cyanosis or edema  Neurological: Alert and oriented x3   Skin: Warm and dry. No obvious rash

## 2023-12-03 NOTE — DISCHARGE NOTE PROVIDER - NSDCMRMEDTOKEN_GEN_ALL_CORE_FT
aspirin 81 mg oral delayed release tablet: 1 tab(s) orally once a day  atorvastatin 40 mg oral tablet: 1 tab(s) orally once a day  ferrous sulfate 325 mg (65 mg elemental iron) oral tablet: 1 tab(s) orally once a day  folic acid 1 mg oral tablet: 1 tab(s) orally once a day  losartan 100 mg oral tablet: 1 orally once a day  metFORMIN 1000 mg oral tablet, extended release: 1 orally 2 times a day  Norvasc 10 mg oral tablet: 1 tab(s) orally once a day  Protonix 40 mg oral delayed release tablet: 1 tab(s) orally once a day    aspirin 81 mg oral delayed release tablet: 1 tab(s) orally once a day  atorvastatin 40 mg oral tablet: 1 tab(s) orally once a day  ferrous gluconate 324 mg (38 mg elemental iron) oral tablet: 1 tab(s) orally every 48 hours Please take one tablet every other day with meals  folic acid 1 mg oral tablet: 1 tab(s) orally once a day  losartan 100 mg oral tablet: 1 orally once a day  metFORMIN 1000 mg oral tablet, extended release: 1 orally 2 times a day  Norvasc 10 mg oral tablet: 1 tab(s) orally once a day  Protonix 40 mg oral delayed release tablet: 1 tab(s) orally once a day

## 2023-12-03 NOTE — DISCHARGE NOTE NURSING/CASE MANAGEMENT/SOCIAL WORK - NSDCPEFALRISK_GEN_ALL_CORE
For information on Fall & Injury Prevention, visit: [email or web address] OR  [email or web address] OR  https://www.cdc.gov/steadi/patient.html For information on Fall & Injury Prevention, visit: [email or web address] OR  [email or web address] OR  https://www.cdc.gov/steadi/patient.html

## 2023-12-03 NOTE — DISCHARGE NOTE PROVIDER - ATTENDING DISCHARGE PHYSICAL EXAMINATION:
General: in no acute distress, non toxic appearing, speaking in full sentences  Eyes: EOMI intact bilaterally. Anicteric sclerae, moist conjunctivae  HENT: Moist mucous membranes  Neck: Trachea midline, supple  Lungs: CTA B/L. No wheezes, rales, or rhonchi  Cardiovascular: RRR. No murmurs, rubs, or gallops  Abdomen: +BS Soft, non-tender non-distended; No rebound or guarding  Vasc: Rad and DP intact and equal b/l   Extremities: WWP, No clubbing, cyanosis or edema  Neurological: Alert and oriented x3   Skin: Warm and dry. No obvious rash

## 2023-12-06 LAB
CULTURE RESULTS: SIGNIFICANT CHANGE UP
SPECIMEN SOURCE: SIGNIFICANT CHANGE UP

## 2023-12-09 DIAGNOSIS — F17.200 NICOTINE DEPENDENCE, UNSPECIFIED, UNCOMPLICATED: ICD-10-CM

## 2023-12-09 DIAGNOSIS — R53.1 WEAKNESS: ICD-10-CM

## 2023-12-09 DIAGNOSIS — Z78.9 OTHER SPECIFIED HEALTH STATUS: ICD-10-CM

## 2023-12-09 DIAGNOSIS — K21.9 GASTRO-ESOPHAGEAL REFLUX DISEASE WITHOUT ESOPHAGITIS: ICD-10-CM

## 2023-12-09 DIAGNOSIS — I25.10 ATHEROSCLEROTIC HEART DISEASE OF NATIVE CORONARY ARTERY WITHOUT ANGINA PECTORIS: ICD-10-CM

## 2023-12-09 DIAGNOSIS — E86.0 DEHYDRATION: ICD-10-CM

## 2023-12-09 DIAGNOSIS — D25.9 LEIOMYOMA OF UTERUS, UNSPECIFIED: ICD-10-CM

## 2023-12-09 DIAGNOSIS — E11.9 TYPE 2 DIABETES MELLITUS WITHOUT COMPLICATIONS: ICD-10-CM

## 2023-12-09 DIAGNOSIS — U07.1 COVID-19: ICD-10-CM

## 2023-12-09 DIAGNOSIS — R65.20 SEVERE SEPSIS WITHOUT SEPTIC SHOCK: ICD-10-CM

## 2023-12-09 DIAGNOSIS — E04.1 NONTOXIC SINGLE THYROID NODULE: ICD-10-CM

## 2023-12-09 DIAGNOSIS — A41.89 OTHER SPECIFIED SEPSIS: ICD-10-CM

## 2023-12-09 DIAGNOSIS — E78.5 HYPERLIPIDEMIA, UNSPECIFIED: ICD-10-CM

## 2023-12-09 DIAGNOSIS — Z98.84 BARIATRIC SURGERY STATUS: ICD-10-CM

## 2023-12-09 DIAGNOSIS — D50.9 IRON DEFICIENCY ANEMIA, UNSPECIFIED: ICD-10-CM

## 2023-12-09 DIAGNOSIS — D64.9 ANEMIA, UNSPECIFIED: ICD-10-CM

## 2023-12-20 NOTE — ED ADULT TRIAGE NOTE - IDEAL BODY WEIGHT(KG)
Patient here for annual wellness visit. Last eye exam 2022 he had cataract surgery and last dental a long time ago he has full upper and lower plates.  Medication Reconciliation: complete.    Kajal Red LPN  12/20/2023 2:56 PM   61

## 2024-02-14 ENCOUNTER — APPOINTMENT (OUTPATIENT)
Dept: BARIATRICS | Facility: CLINIC | Age: 49
End: 2024-02-14

## 2024-02-28 ENCOUNTER — APPOINTMENT (OUTPATIENT)
Dept: BARIATRICS | Facility: CLINIC | Age: 49
End: 2024-02-28
Payer: MEDICAID

## 2024-02-28 VITALS
DIASTOLIC BLOOD PRESSURE: 87 MMHG | HEIGHT: 66 IN | WEIGHT: 230 LBS | HEART RATE: 88 BPM | OXYGEN SATURATION: 99 % | BODY MASS INDEX: 36.96 KG/M2 | TEMPERATURE: 97.2 F | SYSTOLIC BLOOD PRESSURE: 135 MMHG

## 2024-02-28 PROCEDURE — 99215 OFFICE O/P EST HI 40 MIN: CPT

## 2024-02-28 NOTE — HISTORY OF PRESENT ILLNESS
[de-identified] : Dorothy Guan is a 49 y/o F returns to see us today. She had an open bypass numerous years ago and chronic anemia. Today, she's complaining of pain in the left epigastric pain and the back which is exacerbated on bending and feels as though an "organ is moving". Explained that these symptoms seem to not be related to gallbladder. She's had a CT angiography which show high calcium score with no obstructive lesion and no PE. She has had gallstones for the past 3 years with no evidence of acute cholecystitis. She had distended a gallbladder on one ERCT followed by negative nuclear scan. Also, with anemia and drinking regularly following by pass LFTs are normal with no evidence of portal HTN on CT and USG. Can go forward with elective cholecystectomy but I think she should have serial iron infusions, stop ETOH, but also do not believe that pain is on the left side and varies with motion will be improved by cholecystectomy and precede cautiously in patient that i think has more significant ETOH than stated. Will also order EGD to rule out gastritis.

## 2024-02-28 NOTE — PLAN
[FreeTextEntry1] : Will get an EGD to rule out gastritis and refer to hematology for serial iron infusions.

## 2024-02-28 NOTE — END OF VISIT
[Time Spent: ___ minutes] : I have spent [unfilled] minutes of time on the encounter. [FreeTextEntry3] :  All medical record entries made by the Scribe were at my, MAUREEN Wright , direction and personally dictated by me on 02/28/2024 . I have reviewed the chart and agree that the record accurately reflects my personal performance of the history, physical exam, assessment and plan. I have also personally directed, reviewed, and agreed with the chart.

## 2024-02-28 NOTE — ASSESSMENT
[FreeTextEntry1] : Dorothy Guan is a 47 y/o F returns to see us today. She had an open bypass numerous years ago and chronic anemia. Today, she's complaining of pain in the left epigastric pain and the back which is exacerbated on bending and feels as though an "organ is moving". Explained that these symptoms seem to not be related to gallbladder. She's had a CT angiography which show high calcium score with no obstructive lesion and no PE. She has had gallstones for the past 3 years with no evidence of acute cholecystitis. She had distended a gallbladder on one ERCT followed by negative nuclear scan. Also, with anemia and drinking regularly following by pass LFTs are normal with no evidence of portal HTN on CT and USG. Can go forward with elective cholecystectomy but I think she should have serial iron infusions, stop ETOH, but also do not believe that pain is on the left side and varies with motion will be improved by cholecystectomy and precede cautiously in patient that i think has more significant ETOH than stated. Will also order EGD to rule out gastritis.

## 2024-03-05 ENCOUNTER — APPOINTMENT (OUTPATIENT)
Dept: GASTROENTEROLOGY | Facility: CLINIC | Age: 49
End: 2024-03-05
Payer: MEDICAID

## 2024-03-05 VITALS
HEIGHT: 66 IN | RESPIRATION RATE: 17 BRPM | OXYGEN SATURATION: 98 % | TEMPERATURE: 97.7 F | BODY MASS INDEX: 37.48 KG/M2 | WEIGHT: 233.2 LBS | SYSTOLIC BLOOD PRESSURE: 143 MMHG | DIASTOLIC BLOOD PRESSURE: 87 MMHG | HEART RATE: 100 BPM

## 2024-03-05 DIAGNOSIS — R10.9 UNSPECIFIED ABDOMINAL PAIN: ICD-10-CM

## 2024-03-05 DIAGNOSIS — D50.9 IRON DEFICIENCY ANEMIA, UNSPECIFIED: ICD-10-CM

## 2024-03-05 PROCEDURE — 99204 OFFICE O/P NEW MOD 45 MIN: CPT

## 2024-03-05 PROCEDURE — 99214 OFFICE O/P EST MOD 30 MIN: CPT

## 2024-03-05 NOTE — ASSESSMENT
[FreeTextEntry1] : Impression: #Abd pain in LUQ - sounds more MSK than visceral #Postprandial vomiting  #Hx bypass  Plan: - Pending EGD, results, can consider HRM  - Schedule upper endoscopy with biopsies of gastric pouch and duodenum for celiac.  - Risks (including anesthesia complications, bleeding, infection, perforation) as well as benefits, alternatives, and details of both procedures discussed w/ patient. - Written instructions provided.  - Need for chaperone discussed.

## 2024-03-05 NOTE — HISTORY OF PRESENT ILLNESS
[FreeTextEntry1] : QUITA BURGOS is a 48 year F here for abd pain. She has a history of RnYGB many years ago, gallstones, anemia.   GI History: Seen by Dr. Mars for lower abd pain. H pylori was positive on serum testing. EGD bx showed no residual H pylori.  EGD was normal in appearance. Colonoscopy was aborted in sigmoid due to poor prep. Bx wnl.   Current symptoms: Seen recently by Dr. Montejo for L sided/epigastric pain, exacerbated by bending. Pt interested in CCY, but Dr. Montejo unsure if this will benefit pt given location of pain. Is requesting EGD to eval for intraluminal gastric causes.   Right now she reports LUQ pain as above. Worse w/ movement. Sometimes she has postprandial vomiting (after most meals) but not postprandial pain.  BMs are irregular, alternating between constipation and diarrhea, denies blood.   GI ROS negative for unintentional weight loss, fevers/chills, dysphagia, reflux, bloating, nausea, vomiting, early satiety, melena, hematochezia. Patient denies NSAID use.  Current Meds: Metformin, Losartan, folic acid All: NKDA  Relevant Exam:  Labs: CBC, CMP, H pylori, lipase, amylase reviewed

## 2024-03-08 ENCOUNTER — APPOINTMENT (OUTPATIENT)
Dept: GASTROENTEROLOGY | Facility: HOSPITAL | Age: 49
End: 2024-03-08

## 2024-03-15 ENCOUNTER — RESULT REVIEW (OUTPATIENT)
Age: 49
End: 2024-03-15

## 2024-03-15 ENCOUNTER — APPOINTMENT (OUTPATIENT)
Dept: GASTROENTEROLOGY | Facility: HOSPITAL | Age: 49
End: 2024-03-15

## 2024-03-15 ENCOUNTER — OUTPATIENT (OUTPATIENT)
Dept: OUTPATIENT SERVICES | Facility: HOSPITAL | Age: 49
LOS: 1 days | Discharge: HOME | End: 2024-03-15
Payer: MEDICAID

## 2024-03-15 VITALS
SYSTOLIC BLOOD PRESSURE: 129 MMHG | WEIGHT: 220.02 LBS | HEART RATE: 102 BPM | RESPIRATION RATE: 18 BRPM | OXYGEN SATURATION: 99 % | TEMPERATURE: 98 F | HEIGHT: 66 IN | DIASTOLIC BLOOD PRESSURE: 80 MMHG

## 2024-03-15 DIAGNOSIS — Z98.89 OTHER SPECIFIED POSTPROCEDURAL STATES: Chronic | ICD-10-CM

## 2024-03-15 DIAGNOSIS — Z98.890 OTHER SPECIFIED POSTPROCEDURAL STATES: Chronic | ICD-10-CM

## 2024-03-15 LAB
GLUCOSE BLDC GLUCOMTR-MCNC: 191 MG/DL — HIGH (ref 70–99)
GLUCOSE BLDC GLUCOMTR-MCNC: 205 MG/DL — HIGH (ref 70–99)

## 2024-03-15 PROCEDURE — 43239 EGD BIOPSY SINGLE/MULTIPLE: CPT

## 2024-03-15 PROCEDURE — 88305 TISSUE EXAM BY PATHOLOGIST: CPT

## 2024-03-15 PROCEDURE — 82962 GLUCOSE BLOOD TEST: CPT

## 2024-03-15 PROCEDURE — 88305 TISSUE EXAM BY PATHOLOGIST: CPT | Mod: 26

## 2024-03-18 LAB — SURGICAL PATHOLOGY STUDY: SIGNIFICANT CHANGE UP

## 2024-03-19 ENCOUNTER — TRANSCRIPTION ENCOUNTER (OUTPATIENT)
Age: 49
End: 2024-03-19

## 2024-04-10 VITALS
HEIGHT: 66 IN | RESPIRATION RATE: 18 BRPM | SYSTOLIC BLOOD PRESSURE: 148 MMHG | DIASTOLIC BLOOD PRESSURE: 82 MMHG | HEART RATE: 95 BPM | OXYGEN SATURATION: 100 % | TEMPERATURE: 98 F | WEIGHT: 225.97 LBS

## 2024-04-10 LAB
ALBUMIN SERPL ELPH-MCNC: 3.8 G/DL — SIGNIFICANT CHANGE UP (ref 3.3–5)
ALP SERPL-CCNC: 73 U/L — SIGNIFICANT CHANGE UP (ref 40–120)
ALT FLD-CCNC: 22 U/L — SIGNIFICANT CHANGE UP (ref 10–45)
ANION GAP SERPL CALC-SCNC: 12 MMOL/L — SIGNIFICANT CHANGE UP (ref 5–17)
ANISOCYTOSIS BLD QL: SIGNIFICANT CHANGE UP
APTT BLD: 31.3 SEC — SIGNIFICANT CHANGE UP (ref 24.5–35.6)
AST SERPL-CCNC: 25 U/L — SIGNIFICANT CHANGE UP (ref 10–40)
BASOPHILS # BLD AUTO: 0 K/UL — SIGNIFICANT CHANGE UP (ref 0–0.2)
BASOPHILS NFR BLD AUTO: 0 % — SIGNIFICANT CHANGE UP (ref 0–2)
BILIRUB DIRECT SERPL-MCNC: 0.2 MG/DL — SIGNIFICANT CHANGE UP (ref 0–0.3)
BILIRUB SERPL-MCNC: 0.4 MG/DL — SIGNIFICANT CHANGE UP (ref 0.2–1.2)
BLD GP AB SCN SERPL QL: NEGATIVE — SIGNIFICANT CHANGE UP
BUN SERPL-MCNC: 10 MG/DL — SIGNIFICANT CHANGE UP (ref 7–23)
CALCIUM SERPL-MCNC: 9.7 MG/DL — SIGNIFICANT CHANGE UP (ref 8.4–10.5)
CHLORIDE SERPL-SCNC: 98 MMOL/L — SIGNIFICANT CHANGE UP (ref 96–108)
CO2 SERPL-SCNC: 24 MMOL/L — SIGNIFICANT CHANGE UP (ref 22–31)
CREAT SERPL-MCNC: 0.89 MG/DL — SIGNIFICANT CHANGE UP (ref 0.5–1.3)
DACRYOCYTES BLD QL SMEAR: SLIGHT — SIGNIFICANT CHANGE UP
EGFR: 81 ML/MIN/1.73M2 — SIGNIFICANT CHANGE UP
EOSINOPHIL # BLD AUTO: 0.06 K/UL — SIGNIFICANT CHANGE UP (ref 0–0.5)
EOSINOPHIL NFR BLD AUTO: 0.9 % — SIGNIFICANT CHANGE UP (ref 0–6)
GLUCOSE SERPL-MCNC: 190 MG/DL — HIGH (ref 70–99)
HCG SERPL-ACNC: <1 MIU/ML — SIGNIFICANT CHANGE UP
HCT VFR BLD CALC: 31.2 % — LOW (ref 34.5–45)
HGB BLD-MCNC: 8.8 G/DL — LOW (ref 11.5–15.5)
HYPOCHROMIA BLD QL: SIGNIFICANT CHANGE UP
INR BLD: 0.94 — SIGNIFICANT CHANGE UP (ref 0.85–1.18)
LACTATE SERPL-SCNC: 1.9 MMOL/L — SIGNIFICANT CHANGE UP (ref 0.5–2)
LIDOCAIN IGE QN: 47 U/L — SIGNIFICANT CHANGE UP (ref 7–60)
LYMPHOCYTES # BLD AUTO: 1.4 K/UL — SIGNIFICANT CHANGE UP (ref 1–3.3)
LYMPHOCYTES # BLD AUTO: 21.7 % — SIGNIFICANT CHANGE UP (ref 13–44)
MANUAL SMEAR VERIFICATION: SIGNIFICANT CHANGE UP
MCHC RBC-ENTMCNC: 22.2 PG — LOW (ref 27–34)
MCHC RBC-ENTMCNC: 28.2 GM/DL — LOW (ref 32–36)
MCV RBC AUTO: 78.6 FL — LOW (ref 80–100)
MICROCYTES BLD QL: SIGNIFICANT CHANGE UP
MONOCYTES # BLD AUTO: 0.39 K/UL — SIGNIFICANT CHANGE UP (ref 0–0.9)
MONOCYTES NFR BLD AUTO: 6.1 % — SIGNIFICANT CHANGE UP (ref 2–14)
NEUTROPHILS # BLD AUTO: 4.58 K/UL — SIGNIFICANT CHANGE UP (ref 1.8–7.4)
NEUTROPHILS NFR BLD AUTO: 71.3 % — SIGNIFICANT CHANGE UP (ref 43–77)
OVALOCYTES BLD QL SMEAR: SLIGHT — SIGNIFICANT CHANGE UP
PLAT MORPH BLD: ABNORMAL
PLATELET # BLD AUTO: 159 K/UL — SIGNIFICANT CHANGE UP (ref 150–400)
POIKILOCYTOSIS BLD QL AUTO: SLIGHT — SIGNIFICANT CHANGE UP
POLYCHROMASIA BLD QL SMEAR: SLIGHT — SIGNIFICANT CHANGE UP
POTASSIUM SERPL-MCNC: 4 MMOL/L — SIGNIFICANT CHANGE UP (ref 3.5–5.3)
POTASSIUM SERPL-SCNC: 4 MMOL/L — SIGNIFICANT CHANGE UP (ref 3.5–5.3)
PROT SERPL-MCNC: 6.9 G/DL — SIGNIFICANT CHANGE UP (ref 6–8.3)
PROTHROM AB SERPL-ACNC: 10.7 SEC — SIGNIFICANT CHANGE UP (ref 9.5–13)
RBC # BLD: 3.97 M/UL — SIGNIFICANT CHANGE UP (ref 3.8–5.2)
RBC # FLD: 26.5 % — HIGH (ref 10.3–14.5)
RBC BLD AUTO: ABNORMAL
RH IG SCN BLD-IMP: POSITIVE — SIGNIFICANT CHANGE UP
SODIUM SERPL-SCNC: 134 MMOL/L — LOW (ref 135–145)
STOMATOCYTES BLD QL SMEAR: SLIGHT — SIGNIFICANT CHANGE UP
TROPONIN T, HIGH SENSITIVITY RESULT: <6 NG/L — SIGNIFICANT CHANGE UP (ref 0–51)
WBC # BLD: 6.43 K/UL — SIGNIFICANT CHANGE UP (ref 3.8–10.5)
WBC # FLD AUTO: 6.43 K/UL — SIGNIFICANT CHANGE UP (ref 3.8–10.5)

## 2024-04-10 PROCEDURE — 76705 ECHO EXAM OF ABDOMEN: CPT | Mod: 26

## 2024-04-10 PROCEDURE — 74177 CT ABD & PELVIS W/CONTRAST: CPT | Mod: 26,MC

## 2024-04-10 PROCEDURE — 99285 EMERGENCY DEPT VISIT HI MDM: CPT

## 2024-04-10 PROCEDURE — 71045 X-RAY EXAM CHEST 1 VIEW: CPT | Mod: 26

## 2024-04-10 RX ORDER — MORPHINE SULFATE 50 MG/1
4 CAPSULE, EXTENDED RELEASE ORAL ONCE
Refills: 0 | Status: DISCONTINUED | OUTPATIENT
Start: 2024-04-10 | End: 2024-04-10

## 2024-04-10 RX ORDER — FAMOTIDINE 10 MG/ML
20 INJECTION INTRAVENOUS ONCE
Refills: 0 | Status: COMPLETED | OUTPATIENT
Start: 2024-04-10 | End: 2024-04-10

## 2024-04-10 RX ORDER — SODIUM CHLORIDE 9 MG/ML
1000 INJECTION, SOLUTION INTRAVENOUS ONCE
Refills: 0 | Status: COMPLETED | OUTPATIENT
Start: 2024-04-10 | End: 2024-04-10

## 2024-04-10 RX ORDER — ONDANSETRON 8 MG/1
4 TABLET, FILM COATED ORAL ONCE
Refills: 0 | Status: COMPLETED | OUTPATIENT
Start: 2024-04-10 | End: 2024-04-10

## 2024-04-10 RX ORDER — IOHEXOL 300 MG/ML
30 INJECTION, SOLUTION INTRAVENOUS ONCE
Refills: 0 | Status: COMPLETED | OUTPATIENT
Start: 2024-04-10 | End: 2024-04-10

## 2024-04-10 RX ADMIN — MORPHINE SULFATE 4 MILLIGRAM(S): 50 CAPSULE, EXTENDED RELEASE ORAL at 20:02

## 2024-04-10 RX ADMIN — FAMOTIDINE 20 MILLIGRAM(S): 10 INJECTION INTRAVENOUS at 20:00

## 2024-04-10 RX ADMIN — IOHEXOL 30 MILLILITER(S): 300 INJECTION, SOLUTION INTRAVENOUS at 21:33

## 2024-04-10 RX ADMIN — Medication 30 MILLILITER(S): at 21:33

## 2024-04-10 RX ADMIN — MORPHINE SULFATE 4 MILLIGRAM(S): 50 CAPSULE, EXTENDED RELEASE ORAL at 22:22

## 2024-04-10 RX ADMIN — ONDANSETRON 4 MILLIGRAM(S): 8 TABLET, FILM COATED ORAL at 19:59

## 2024-04-10 RX ADMIN — ONDANSETRON 4 MILLIGRAM(S): 8 TABLET, FILM COATED ORAL at 22:22

## 2024-04-10 RX ADMIN — SODIUM CHLORIDE 1000 MILLILITER(S): 9 INJECTION, SOLUTION INTRAVENOUS at 20:04

## 2024-04-10 NOTE — ED ADULT NURSE NOTE - OBJECTIVE STATEMENT
48 y.o. Female c/o midsternal CP, epigastric abd pain since yesterday, nausea/vomiting/indigestion/cough since today. Enodrses cessation of smoking cigarettes last week suddenly. Hx of DM, HTN, gallstones. pt denies SOB. placed on CCM. Denies diarrhea, syncope, fevers/chills, sore throat, headache, vision changes. a/ox3, vss, unlabored even respirations no acute distress.

## 2024-04-10 NOTE — ED PROVIDER NOTE - NS ED ROS FT
CONSTITUTIONAL: No fever, no chills, no fatigue  EYES: No eye redness, no visual changes  ENT: No ear pain, no sore throat  CARDIOVASCULAR: +chest pain, no palpitations  RESPIRATORY: No cough, no SOB  GI: +abdominal pain, +nausea, +vomiting, no constipation, no diarrhea  GENITOURINARY: No dysuria, no frequency, no hematuria  MUSCULOSKELETAL: No back pain, no joint pain, no myalgias  SKIN: No rash, no peripheral edema  NEURO: No headache, no confusion    ALL OTHER SYSTEMS NEGATIVE.

## 2024-04-10 NOTE — ED PROVIDER NOTE - OBJECTIVE STATEMENT
47 yo F w PMH of HTN, DM, HLD, gallstones, non-obstructive CAD, PSH of gastric bypass 1998, fibroid removal, p/w RUQ abd pain, chest pain, and n/v since today. She had several ep of nbnb vomiting throughout the day and feels bloated. After the vomiting, she developed mid-sternal/epigastric burning chest pain that is non-radiating and non-exertional. She has discomfort with breathing, causing mild tachypnia, however denies SOB. She had a normal BM today and is passing flatus in the ED. No leg swelling, cough, hemoptysis, exogenous estrogen, recent travel, surgery, malignancy, personal or FHx of VTE.

## 2024-04-10 NOTE — ED PROVIDER NOTE - CLINICAL SUMMARY MEDICAL DECISION MAKING FREE TEXT BOX
RUQ abd pain, n/v, concern for cholecystitis or other GB pathology. + Sims's sign. No s/s of ascending cholangitis. Pt non-septic, not toxic, well-appearing on exam, low concern for systemic illness.  Will perform US RUQ  Provider decided to perform CT scan of abd/pelv to r/o other pathology.  Will give analgesia, anti-emetic, pepcid, fluids.    Pt also has CP, atypical. Likely gerd from vomiting episodes, will treat. EKG non-ischemic. Will obtain CXR and trop x2, r/o ACS vs PNA. PERC neg. RUQ abd pain, n/v, concern for cholecystitis or other GB pathology. + Sims's sign. No s/s of ascending cholangitis. Pt non-septic, not toxic, well-appearing on exam, low concern for systemic illness.  Will perform US RUQ  Provider decided to perform CT scan of abd/pelv to r/o other pathology.  Will give analgesia, anti-emetic, pepcid, fluids.    Pt also has CP, atypical. Likely GERD from vomiting episodes, will treat. EKG non-ischemic. Will obtain CXR and trop, r/o ACS vs PNA. PERC neg.

## 2024-04-10 NOTE — ED PROVIDER NOTE - PROGRESS NOTE DETAILS
Pt appears to have acute refugio on US. No evidence of choledocho. Surgery consulted. Will still acquire CT d/t limited US and abd bloating/distention.  Low susp ACS, Trop neg.  CXR wnl. CT indicates possible cholecystitis: wall thickening, pericholecystic fluid, and + reynolds's sign. CBD 9 mm.  Pending surgery eval and plan.

## 2024-04-10 NOTE — ED ADULT TRIAGE NOTE - CHIEF COMPLAINT QUOTE
Pt presents to ED c/o midsternal chest pain since yesterday, new nausea / vomiting starting today. hx of DM and HTN. 12 lead ekg initiated in triage.

## 2024-04-10 NOTE — ED PROVIDER NOTE - PHYSICAL EXAMINATION
CONSTITUTIONAL: Non-toxic; in no apparent distress  HEAD: Normocephalic; atraumatic  EYES: PERRL; EOM intact   ENMT: External appears normal  NECK: Supple; non-tender  CARD: Normal S1, S2; no murmurs, rubs, or gallops  RESP:+ tachypneic; breath sounds clear and equal bilaterally, no wheeze  ABD: Soft, + diffusely distended, + ttp of epigastrium and RUQ, + Sims's sign pos, no guarding, no rebound, no CVAT  EXT: Normal ROM in all four extremities; non-tender to palpation, no peripheral edema  SKIN: Warm, dry, no rash  NEURO:  No focal neurological deficiencies

## 2024-04-10 NOTE — ED PROVIDER NOTE - CARE PLAN
1 Principal Discharge DX:	Acute cholecystitis   Principal Discharge DX:	Abdominal pain  Secondary Diagnosis:	Cholelithiasis

## 2024-04-10 NOTE — ED ADULT NURSE NOTE - NSFALLUNIVINTERV_ED_ALL_ED
Bed/Stretcher in lowest position, wheels locked, appropriate side rails in place/Call bell, personal items and telephone in reach/Instruct patient to call for assistance before getting out of bed/chair/stretcher/Non-slip footwear applied when patient is off stretcher/[place] to call system/Physically safe environment - no spills, clutter or unnecessary equipment/Purposeful proactive rounding/Room/bathroom lighting operational, light cord in reach

## 2024-04-11 ENCOUNTER — INPATIENT (INPATIENT)
Facility: HOSPITAL | Age: 49
LOS: 7 days | Discharge: HOME | End: 2024-04-19
Attending: STUDENT IN AN ORGANIZED HEALTH CARE EDUCATION/TRAINING PROGRAM | Admitting: SURGERY
Payer: MEDICAID

## 2024-04-11 ENCOUNTER — TRANSCRIPTION ENCOUNTER (OUTPATIENT)
Age: 49
End: 2024-04-11

## 2024-04-11 DIAGNOSIS — Z98.890 OTHER SPECIFIED POSTPROCEDURAL STATES: Chronic | ICD-10-CM

## 2024-04-11 DIAGNOSIS — Z98.89 OTHER SPECIFIED POSTPROCEDURAL STATES: Chronic | ICD-10-CM

## 2024-04-11 LAB
ALBUMIN SERPL ELPH-MCNC: 3.4 G/DL — SIGNIFICANT CHANGE UP (ref 3.3–5)
ALP SERPL-CCNC: 85 U/L — SIGNIFICANT CHANGE UP (ref 40–120)
ALT FLD-CCNC: 66 U/L — HIGH (ref 10–45)
ANION GAP SERPL CALC-SCNC: 12 MMOL/L — SIGNIFICANT CHANGE UP (ref 5–17)
ANISOCYTOSIS BLD QL: SLIGHT — SIGNIFICANT CHANGE UP
APPEARANCE UR: CLEAR — SIGNIFICANT CHANGE UP
APTT BLD: 28.3 SEC — SIGNIFICANT CHANGE UP (ref 24.5–35.6)
AST SERPL-CCNC: 111 U/L — HIGH (ref 10–40)
BASOPHILS # BLD AUTO: 0.03 K/UL — SIGNIFICANT CHANGE UP (ref 0–0.2)
BASOPHILS NFR BLD AUTO: 0.9 % — SIGNIFICANT CHANGE UP (ref 0–2)
BILIRUB DIRECT SERPL-MCNC: 0.2 MG/DL — SIGNIFICANT CHANGE UP (ref 0–0.3)
BILIRUB INDIRECT FLD-MCNC: 0.3 MG/DL — SIGNIFICANT CHANGE UP (ref 0.2–1)
BILIRUB SERPL-MCNC: 0.5 MG/DL — SIGNIFICANT CHANGE UP (ref 0.2–1.2)
BILIRUB UR-MCNC: NEGATIVE — SIGNIFICANT CHANGE UP
BUN SERPL-MCNC: 8 MG/DL — SIGNIFICANT CHANGE UP (ref 7–23)
CALCIUM SERPL-MCNC: 9.5 MG/DL — SIGNIFICANT CHANGE UP (ref 8.4–10.5)
CHLORIDE SERPL-SCNC: 100 MMOL/L — SIGNIFICANT CHANGE UP (ref 96–108)
CO2 SERPL-SCNC: 23 MMOL/L — SIGNIFICANT CHANGE UP (ref 22–31)
COLOR SPEC: YELLOW — SIGNIFICANT CHANGE UP
CREAT SERPL-MCNC: 0.92 MG/DL — SIGNIFICANT CHANGE UP (ref 0.5–1.3)
DACRYOCYTES BLD QL SMEAR: SLIGHT — SIGNIFICANT CHANGE UP
DIFF PNL FLD: NEGATIVE — SIGNIFICANT CHANGE UP
EGFR: 78 ML/MIN/1.73M2 — SIGNIFICANT CHANGE UP
EOSINOPHIL # BLD AUTO: 0.03 K/UL — SIGNIFICANT CHANGE UP (ref 0–0.5)
EOSINOPHIL NFR BLD AUTO: 0.9 % — SIGNIFICANT CHANGE UP (ref 0–6)
ETHANOL SERPL-MCNC: <10 MG/DL — SIGNIFICANT CHANGE UP (ref 0–10)
GLUCOSE BLDC GLUCOMTR-MCNC: 156 MG/DL — HIGH (ref 70–99)
GLUCOSE BLDC GLUCOMTR-MCNC: 163 MG/DL — HIGH (ref 70–99)
GLUCOSE BLDC GLUCOMTR-MCNC: 195 MG/DL — HIGH (ref 70–99)
GLUCOSE SERPL-MCNC: 190 MG/DL — HIGH (ref 70–99)
GLUCOSE UR QL: NEGATIVE MG/DL — SIGNIFICANT CHANGE UP
HAV IGM SER-ACNC: SIGNIFICANT CHANGE UP
HBV CORE AB SER-ACNC: REACTIVE
HBV CORE IGM SER-ACNC: SIGNIFICANT CHANGE UP
HBV SURFACE AB SER-ACNC: REACTIVE
HBV SURFACE AG SER-ACNC: SIGNIFICANT CHANGE UP
HCT VFR BLD CALC: 26.1 % — LOW (ref 34.5–45)
HCV AB S/CO SERPL IA: 0.04 S/CO — SIGNIFICANT CHANGE UP
HCV AB SERPL-IMP: SIGNIFICANT CHANGE UP
HGB BLD-MCNC: 7.5 G/DL — LOW (ref 11.5–15.5)
HYPOCHROMIA BLD QL: SIGNIFICANT CHANGE UP
INR BLD: 1.03 — SIGNIFICANT CHANGE UP (ref 0.85–1.18)
KETONES UR-MCNC: NEGATIVE MG/DL — SIGNIFICANT CHANGE UP
LEUKOCYTE ESTERASE UR-ACNC: NEGATIVE — SIGNIFICANT CHANGE UP
LYMPHOCYTES # BLD AUTO: 0.98 K/UL — LOW (ref 1–3.3)
LYMPHOCYTES # BLD AUTO: 26.9 % — SIGNIFICANT CHANGE UP (ref 13–44)
MACROCYTES BLD QL: SLIGHT — SIGNIFICANT CHANGE UP
MAGNESIUM SERPL-MCNC: 1.7 MG/DL — SIGNIFICANT CHANGE UP (ref 1.6–2.6)
MANUAL SMEAR VERIFICATION: SIGNIFICANT CHANGE UP
MCHC RBC-ENTMCNC: 22.1 PG — LOW (ref 27–34)
MCHC RBC-ENTMCNC: 28.7 GM/DL — LOW (ref 32–36)
MCV RBC AUTO: 77 FL — LOW (ref 80–100)
MICROCYTES BLD QL: SLIGHT — SIGNIFICANT CHANGE UP
MONOCYTES # BLD AUTO: 0.5 K/UL — SIGNIFICANT CHANGE UP (ref 0–0.9)
MONOCYTES NFR BLD AUTO: 13.9 % — SIGNIFICANT CHANGE UP (ref 2–14)
NEUTROPHILS # BLD AUTO: 2.08 K/UL — SIGNIFICANT CHANGE UP (ref 1.8–7.4)
NEUTROPHILS NFR BLD AUTO: 57.4 % — SIGNIFICANT CHANGE UP (ref 43–77)
NITRITE UR-MCNC: NEGATIVE — SIGNIFICANT CHANGE UP
OVALOCYTES BLD QL SMEAR: SLIGHT — SIGNIFICANT CHANGE UP
PH UR: 6 — SIGNIFICANT CHANGE UP (ref 5–8)
PHOSPHATE SERPL-MCNC: 4.2 MG/DL — SIGNIFICANT CHANGE UP (ref 2.5–4.5)
PLAT MORPH BLD: NORMAL — SIGNIFICANT CHANGE UP
PLATELET # BLD AUTO: 125 K/UL — LOW (ref 150–400)
POIKILOCYTOSIS BLD QL AUTO: SLIGHT — SIGNIFICANT CHANGE UP
POLYCHROMASIA BLD QL SMEAR: SLIGHT — SIGNIFICANT CHANGE UP
POTASSIUM SERPL-MCNC: 4 MMOL/L — SIGNIFICANT CHANGE UP (ref 3.5–5.3)
POTASSIUM SERPL-SCNC: 4 MMOL/L — SIGNIFICANT CHANGE UP (ref 3.5–5.3)
PROT SERPL-MCNC: 6.1 G/DL — SIGNIFICANT CHANGE UP (ref 6–8.3)
PROT UR-MCNC: SIGNIFICANT CHANGE UP MG/DL
PROTHROM AB SERPL-ACNC: 11.7 SEC — SIGNIFICANT CHANGE UP (ref 9.5–13)
RBC # BLD: 3.39 M/UL — LOW (ref 3.8–5.2)
RBC # FLD: 26.4 % — HIGH (ref 10.3–14.5)
RBC BLD AUTO: ABNORMAL
SCHISTOCYTES BLD QL AUTO: SLIGHT — SIGNIFICANT CHANGE UP
SODIUM SERPL-SCNC: 135 MMOL/L — SIGNIFICANT CHANGE UP (ref 135–145)
SP GR SPEC: >1.03 — HIGH (ref 1–1.03)
SPHEROCYTES BLD QL SMEAR: SLIGHT — SIGNIFICANT CHANGE UP
UROBILINOGEN FLD QL: 2 MG/DL (ref 0.2–1)
WBC # BLD: 3.63 K/UL — LOW (ref 3.8–10.5)
WBC # FLD AUTO: 3.63 K/UL — LOW (ref 3.8–10.5)

## 2024-04-11 PROCEDURE — 99223 1ST HOSP IP/OBS HIGH 75: CPT | Mod: 57

## 2024-04-11 PROCEDURE — 74183 MRI ABD W/O CNTR FLWD CNTR: CPT | Mod: 26

## 2024-04-11 RX ORDER — SODIUM CHLORIDE 9 MG/ML
1000 INJECTION, SOLUTION INTRAVENOUS
Refills: 0 | Status: DISCONTINUED | OUTPATIENT
Start: 2024-04-11 | End: 2024-04-12

## 2024-04-11 RX ORDER — HYDROMORPHONE HYDROCHLORIDE 2 MG/ML
0.5 INJECTION INTRAMUSCULAR; INTRAVENOUS; SUBCUTANEOUS
Refills: 0 | Status: DISCONTINUED | OUTPATIENT
Start: 2024-04-11 | End: 2024-04-12

## 2024-04-11 RX ORDER — ACETAMINOPHEN 500 MG
650 TABLET ORAL EVERY 6 HOURS
Refills: 0 | Status: DISCONTINUED | OUTPATIENT
Start: 2024-04-11 | End: 2024-04-12

## 2024-04-11 RX ORDER — DEXTROSE 50 % IN WATER 50 %
15 SYRINGE (ML) INTRAVENOUS ONCE
Refills: 0 | Status: DISCONTINUED | OUTPATIENT
Start: 2024-04-11 | End: 2024-04-12

## 2024-04-11 RX ORDER — PANTOPRAZOLE SODIUM 20 MG/1
40 TABLET, DELAYED RELEASE ORAL DAILY
Refills: 0 | Status: DISCONTINUED | OUTPATIENT
Start: 2024-04-11 | End: 2024-04-12

## 2024-04-11 RX ORDER — CEFTRIAXONE 500 MG/1
1000 INJECTION, POWDER, FOR SOLUTION INTRAMUSCULAR; INTRAVENOUS EVERY 24 HOURS
Refills: 0 | Status: DISCONTINUED | OUTPATIENT
Start: 2024-04-11 | End: 2024-04-12

## 2024-04-11 RX ORDER — METRONIDAZOLE 500 MG
500 TABLET ORAL ONCE
Refills: 0 | Status: COMPLETED | OUTPATIENT
Start: 2024-04-11 | End: 2024-04-11

## 2024-04-11 RX ORDER — MAGNESIUM SULFATE 500 MG/ML
2 VIAL (ML) INJECTION ONCE
Refills: 0 | Status: COMPLETED | OUTPATIENT
Start: 2024-04-11 | End: 2024-04-11

## 2024-04-11 RX ORDER — METRONIDAZOLE 500 MG
500 TABLET ORAL EVERY 8 HOURS
Refills: 0 | Status: DISCONTINUED | OUTPATIENT
Start: 2024-04-11 | End: 2024-04-12

## 2024-04-11 RX ORDER — DEXTROSE 50 % IN WATER 50 %
25 SYRINGE (ML) INTRAVENOUS ONCE
Refills: 0 | Status: DISCONTINUED | OUTPATIENT
Start: 2024-04-11 | End: 2024-04-12

## 2024-04-11 RX ORDER — INSULIN LISPRO 100/ML
VIAL (ML) SUBCUTANEOUS EVERY 6 HOURS
Refills: 0 | Status: DISCONTINUED | OUTPATIENT
Start: 2024-04-11 | End: 2024-04-12

## 2024-04-11 RX ORDER — DEXTROSE 50 % IN WATER 50 %
12.5 SYRINGE (ML) INTRAVENOUS ONCE
Refills: 0 | Status: DISCONTINUED | OUTPATIENT
Start: 2024-04-11 | End: 2024-04-12

## 2024-04-11 RX ORDER — ASPIRIN/CALCIUM CARB/MAGNESIUM 324 MG
81 TABLET ORAL DAILY
Refills: 0 | Status: DISCONTINUED | OUTPATIENT
Start: 2024-04-11 | End: 2024-04-12

## 2024-04-11 RX ORDER — ONDANSETRON 8 MG/1
4 TABLET, FILM COATED ORAL EVERY 6 HOURS
Refills: 0 | Status: DISCONTINUED | OUTPATIENT
Start: 2024-04-11 | End: 2024-04-12

## 2024-04-11 RX ORDER — DEXTROSE 10 % IN WATER 10 %
125 INTRAVENOUS SOLUTION INTRAVENOUS ONCE
Refills: 0 | Status: DISCONTINUED | OUTPATIENT
Start: 2024-04-11 | End: 2024-04-12

## 2024-04-11 RX ORDER — GLUCAGON INJECTION, SOLUTION 0.5 MG/.1ML
1 INJECTION, SOLUTION SUBCUTANEOUS ONCE
Refills: 0 | Status: DISCONTINUED | OUTPATIENT
Start: 2024-04-11 | End: 2024-04-12

## 2024-04-11 RX ORDER — CEFTRIAXONE 500 MG/1
1000 INJECTION, POWDER, FOR SOLUTION INTRAMUSCULAR; INTRAVENOUS ONCE
Refills: 0 | Status: COMPLETED | OUTPATIENT
Start: 2024-04-11 | End: 2024-04-11

## 2024-04-11 RX ORDER — HEPARIN SODIUM 5000 [USP'U]/ML
7500 INJECTION INTRAVENOUS; SUBCUTANEOUS EVERY 8 HOURS
Refills: 0 | Status: DISCONTINUED | OUTPATIENT
Start: 2024-04-11 | End: 2024-04-12

## 2024-04-11 RX ORDER — NICOTINE POLACRILEX 2 MG
1 GUM BUCCAL ONCE
Refills: 0 | Status: COMPLETED | OUTPATIENT
Start: 2024-04-11 | End: 2024-04-11

## 2024-04-11 RX ORDER — INFLUENZA VIRUS VACCINE 15; 15; 15; 15 UG/.5ML; UG/.5ML; UG/.5ML; UG/.5ML
0.5 SUSPENSION INTRAMUSCULAR ONCE
Refills: 0 | Status: DISCONTINUED | OUTPATIENT
Start: 2024-04-11 | End: 2024-04-19

## 2024-04-11 RX ORDER — PANTOPRAZOLE SODIUM 20 MG/1
40 TABLET, DELAYED RELEASE ORAL ONCE
Refills: 0 | Status: COMPLETED | OUTPATIENT
Start: 2024-04-11 | End: 2024-04-11

## 2024-04-11 RX ADMIN — Medication 1: at 11:48

## 2024-04-11 RX ADMIN — HYDROMORPHONE HYDROCHLORIDE 0.5 MILLIGRAM(S): 2 INJECTION INTRAMUSCULAR; INTRAVENOUS; SUBCUTANEOUS at 21:16

## 2024-04-11 RX ADMIN — Medication 25 GRAM(S): at 07:42

## 2024-04-11 RX ADMIN — SODIUM CHLORIDE 1000 MILLILITER(S): 9 INJECTION, SOLUTION INTRAVENOUS at 03:50

## 2024-04-11 RX ADMIN — Medication 1: at 18:53

## 2024-04-11 RX ADMIN — HYDROMORPHONE HYDROCHLORIDE 0.5 MILLIGRAM(S): 2 INJECTION INTRAMUSCULAR; INTRAVENOUS; SUBCUTANEOUS at 16:21

## 2024-04-11 RX ADMIN — Medication 100 MILLIGRAM(S): at 03:49

## 2024-04-11 RX ADMIN — Medication 1 PATCH: at 21:07

## 2024-04-11 RX ADMIN — Medication 81 MILLIGRAM(S): at 02:52

## 2024-04-11 RX ADMIN — HEPARIN SODIUM 7500 UNIT(S): 5000 INJECTION INTRAVENOUS; SUBCUTANEOUS at 21:05

## 2024-04-11 RX ADMIN — HYDROMORPHONE HYDROCHLORIDE 0.5 MILLIGRAM(S): 2 INJECTION INTRAMUSCULAR; INTRAVENOUS; SUBCUTANEOUS at 15:15

## 2024-04-11 RX ADMIN — MORPHINE SULFATE 4 MILLIGRAM(S): 50 CAPSULE, EXTENDED RELEASE ORAL at 03:49

## 2024-04-11 RX ADMIN — CEFTRIAXONE 100 MILLIGRAM(S): 500 INJECTION, POWDER, FOR SOLUTION INTRAMUSCULAR; INTRAVENOUS at 11:48

## 2024-04-11 RX ADMIN — Medication 100 MILLIGRAM(S): at 18:23

## 2024-04-11 RX ADMIN — HEPARIN SODIUM 7500 UNIT(S): 5000 INJECTION INTRAVENOUS; SUBCUTANEOUS at 05:52

## 2024-04-11 RX ADMIN — CEFTRIAXONE 100 MILLIGRAM(S): 500 INJECTION, POWDER, FOR SOLUTION INTRAMUSCULAR; INTRAVENOUS at 02:51

## 2024-04-11 RX ADMIN — HYDROMORPHONE HYDROCHLORIDE 0.5 MILLIGRAM(S): 2 INJECTION INTRAMUSCULAR; INTRAVENOUS; SUBCUTANEOUS at 20:51

## 2024-04-11 RX ADMIN — PANTOPRAZOLE SODIUM 40 MILLIGRAM(S): 20 TABLET, DELAYED RELEASE ORAL at 02:51

## 2024-04-11 RX ADMIN — Medication 1: at 07:09

## 2024-04-11 RX ADMIN — HEPARIN SODIUM 7500 UNIT(S): 5000 INJECTION INTRAVENOUS; SUBCUTANEOUS at 13:40

## 2024-04-11 RX ADMIN — PANTOPRAZOLE SODIUM 40 MILLIGRAM(S): 20 TABLET, DELAYED RELEASE ORAL at 12:01

## 2024-04-11 RX ADMIN — Medication 100 MILLIGRAM(S): at 09:58

## 2024-04-11 NOTE — PROGRESS NOTE ADULT - SUBJECTIVE AND OBJECTIVE BOX
ON: admitted for acute cholecystitis    SUBJECTIVE: Patient seen and examined bedside by chief resident. C/o abdominal pain, -n/-v. Denies sob/dizziness/cp    aspirin enteric coated 81 milliGRAM(s) Oral daily  cefTRIAXone   IVPB 1000 milliGRAM(s) IV Intermittent every 24 hours  heparin   Injectable 7500 Unit(s) SubCutaneous every 8 hours  metroNIDAZOLE  IVPB 500 milliGRAM(s) IV Intermittent every 8 hours      Vital Signs Last 24 Hrs  T(C): 37.5 (11 Apr 2024 04:59), Max: 37.5 (11 Apr 2024 04:59)  T(F): 99.5 (11 Apr 2024 04:59), Max: 99.5 (11 Apr 2024 04:59)  HR: 108 (11 Apr 2024 04:59) (84 - 108)  BP: 121/68 (11 Apr 2024 04:59) (100/64 - 148/82)  BP(mean): --  RR: 17 (11 Apr 2024 04:59) (17 - 18)  SpO2: 100% (11 Apr 2024 04:59) (98% - 100%)    Parameters below as of 11 Apr 2024 04:59  Patient On (Oxygen Delivery Method): room air      I&O's Detail      General: NAD, resting comfortably in bed  C/V: NSR  Pulm: Nonlabored breathing, no respiratory distress  Abd: soft, NT/ND. No rebound or guarding  Extrem: WWP, no edema, SCDs in place        LABS:                        7.5    3.63  )-----------( 125      ( 11 Apr 2024 05:30 )             26.1     04-10    134<L>  |  98  |  10  ----------------------------<  190<H>  4.0   |  24  |  0.89    Ca    9.7      10 Apr 2024 20:01    TPro  6.9  /  Alb  3.8  /  TBili  0.4  /  DBili  0.2  /  AST  25  /  ALT  22  /  AlkPhos  73  04-10    PT/INR - ( 10 Apr 2024 20:01 )   PT: 10.7 sec;   INR: 0.94          PTT - ( 10 Apr 2024 20:01 )  PTT:31.3 sec  Urinalysis Basic - ( 10 Apr 2024 20:01 )    Color: x / Appearance: x / SG: x / pH: x  Gluc: 190 mg/dL / Ketone: x  / Bili: x / Urobili: x   Blood: x / Protein: x / Nitrite: x   Leuk Esterase: x / RBC: x / WBC x   Sq Epi: x / Non Sq Epi: x / Bacteria: x        RADIOLOGY & ADDITIONAL STUDIES:   ON: admitted for acute cholecystitis    SUBJECTIVE: Patient seen and examined bedside by chief resident. C/o abdominal pain, worsening hiccups, -n/-v/ +f inconsistently/-bm. Denies sob/dizziness/cp    aspirin enteric coated 81 milliGRAM(s) Oral daily  cefTRIAXone   IVPB 1000 milliGRAM(s) IV Intermittent every 24 hours  heparin   Injectable 7500 Unit(s) SubCutaneous every 8 hours  metroNIDAZOLE  IVPB 500 milliGRAM(s) IV Intermittent every 8 hours      Vital Signs Last 24 Hrs  T(C): 37.5 (11 Apr 2024 04:59), Max: 37.5 (11 Apr 2024 04:59)  T(F): 99.5 (11 Apr 2024 04:59), Max: 99.5 (11 Apr 2024 04:59)  HR: 108 (11 Apr 2024 04:59) (84 - 108)  BP: 121/68 (11 Apr 2024 04:59) (100/64 - 148/82)  BP(mean): --  RR: 17 (11 Apr 2024 04:59) (17 - 18)  SpO2: 100% (11 Apr 2024 04:59) (98% - 100%)    Parameters below as of 11 Apr 2024 04:59  Patient On (Oxygen Delivery Method): room air      I&O's Detail      General: NAD, resting comfortably in bed  C/V: NSR  Pulm: Nonlabored breathing, no respiratory distress  Abd: soft, Mildly distended, TTP RUQ,. No rebound. +guarding, +_ murphys  Extrem: WWP, no edema, SCDs in place        LABS:                        7.5    3.63  )-----------( 125      ( 11 Apr 2024 05:30 )             26.1     04-10    134<L>  |  98  |  10  ----------------------------<  190<H>  4.0   |  24  |  0.89    Ca    9.7      10 Apr 2024 20:01    TPro  6.9  /  Alb  3.8  /  TBili  0.4  /  DBili  0.2  /  AST  25  /  ALT  22  /  AlkPhos  73  04-10    PT/INR - ( 10 Apr 2024 20:01 )   PT: 10.7 sec;   INR: 0.94          PTT - ( 10 Apr 2024 20:01 )  PTT:31.3 sec  Urinalysis Basic - ( 10 Apr 2024 20:01 )    Color: x / Appearance: x / SG: x / pH: x  Gluc: 190 mg/dL / Ketone: x  / Bili: x / Urobili: x   Blood: x / Protein: x / Nitrite: x   Leuk Esterase: x / RBC: x / WBC x   Sq Epi: x / Non Sq Epi: x / Bacteria: x        RADIOLOGY & ADDITIONAL STUDIES:

## 2024-04-11 NOTE — H&P ADULT - NSHPPHYSICALEXAM_GEN_ALL_CORE
CONSTITUTIONAL: Awake, alert.  Nontoxic, no acute distress.  HEAD: Normocephalic, atraumatic.  EYES: Conjunctivae clear without exudates or hemorrhage. Sclera is non-icteric.  ENT: Normal appearing external ears, nose, mucous membranes moist.  NECK: supple, trachea midline.  HEART:  Normal rate, regular rhythm.    LUNGS:  No acute respiratory distress.  Non-tachypneic and non-labored.  ABDOMEN:  abdomen soft, moderately distended and tympanic on all 4 quadrants, moderately tender to palpation over epigastrium and RUQ, ++ Sims's, no rebound tenderness or guarding. Well healed Pfannestiel incision, well healed laparoscopic incisions  MUSCULOSKELETAL:  Moving all extremities without issue.  SKIN: Skin in warm, dry and intact without rashes or lesions.  Appropriate color for ethnicity.  NEUROLOGICAL:  Patient is alert, oriented x person, place and time.  PSYCH: Appropriate mood and affect. Good judgment and insight.

## 2024-04-11 NOTE — H&P ADULT - NSHPLABSRESULTS_GEN_ALL_CORE
LABS:                        8.8    6.43  )-----------( 159      ( 10 Apr 2024 20:01 )             31.2     04-10    134<L>  |  98  |  10  ----------------------------<  190<H>  4.0   |  24  |  0.89    Ca    9.7      10 Apr 2024 20:01    TPro  6.9  /  Alb  3.8  /  TBili  0.4  /  DBili  0.2  /  AST  25  /  ALT  22  /  AlkPhos  73  04-10    PT/INR - ( 10 Apr 2024 20:01 )   PT: 10.7 sec;   INR: 0.94          PTT - ( 10 Apr 2024 20:01 )  PTT:31.3 sec  Urinalysis Basic - ( 10 Apr 2024 20:01 )    Color: x / Appearance: x / SG: x / pH: x  Gluc: 190 mg/dL / Ketone: x  / Bili: x / Urobili: x   Blood: x / Protein: x / Nitrite: x   Leuk Esterase: x / RBC: x / WBC x   Sq Epi: x / Non Sq Epi: x / Bacteria: x        RADIOLOGY AND ADDITIONAL TESTS: Reviewed

## 2024-04-11 NOTE — H&P ADULT - HISTORY OF PRESENT ILLNESS
48-year-old, history of alcohol abuse (, iron deficiency anemia with chronic fatigue, HTN, DM (A1c 7.1), HLD, cholelithiasis, gastric bypass , smoker (stopped 24), cardiac catheterization in August with nonobstructive CAD who presents to ED for 1 day history chest pain & upper abdominal pain associated with fatigue, nausea, vomiting, chills, constant belching and burping, nausea and 2 to 3 episodes of vomiting. Patient reports that she stopped smoking on  evening as an attempt to leave the habit and started progressively coughing more, at times causing her to have episodes of emesis. Today she woke up with lower central chest pain, epigastric pain radiating towards her right side, not in relation to meals, 7/10 in severity, associated with nausea, vomiting and chills. She did not take anything for the pain and came to the ED for further work up. She denies any headache, runny nose, palpitations, urinary symptoms or changes in bowel pattern.     Of note, patient was admitted in  under Dr. Montejo for similar symptoms, along with transaminitis after binge drinking;  findings of distended gallbladder with pericholecystic fluid on CT, with negative on RUQ US and HIDA scan. Then again patient was admitted in  with substernal and epigastric pain, which underwent cardiact workup and was found to have nonobstructive CAD in a diagnostic catheterization. Patient saw Dr. Montejo in his office (24) where he referred her to GI for further evaluation of her symptoms- underwent EGD 3/15/24 with normal findings.     In the ED /82, HR 95, afebrile, saturating appropriate on RA   On exam, abdomen soft, moderately distended and tympanic on all 4 quadrants, moderately tender to palpation over epigastrium and RUQ, ++ Sims's, no rebound tenderness or guarding. Well healed Pfannestiel incision, well healed laparoscopic incisions  Labs show WBC wnl w no L shift, Hb 8.8, rest wnl   RUQ US cholelithiasis with secondary findings consistent with acute   cholecystitis.  CTAP shows findings corresponding to concurrent right upper quadrant ultrasound concerning for acute cholecystitis with gallbladder wall thickening and small adjacent pericholecystic fluid/stranding, common biliary ductal dilatation up to 9 mm. No radiodense   choledocholithiasis, Bilateral adnexal cysts measuring up to 3.8 cm on the left and 2.1 cm on the right. Leiomyomatous uterus.    PMH: alcohol abuse (, iron deficiency anemia with chronic fatigue, HTN, DM (A1c 7.1), hld, cholelithiasis, gastric bypass , smoker (stopped 24), cardiac catheterization in August with nonobstructive CAD   PSH: fibroid removal, , gastric bypass ,   Social: smoker and prior history of alcohol abuse (binging)   Allergies: NDKA  Scopes: EGD in 3/15/24 w normal findings   Meds: Losartan 100mg, Aspirin 81mg qd, metformin 1000mg

## 2024-04-11 NOTE — H&P ADULT - ASSESSMENT
48-year-old, history of alcohol abuse (, iron deficiency anemia with chronic fatigue, HTN, DM (A1c 7.1), HLD, cholelithiasis, gastric bypass 1998, smoker (stopped 4/7/24), cardiac catheterization in August with nonobstructive CAD who presents to ED for 1 day history chest pain & upper abdominal pain associated with fatigue, nausea, vomiting, chills, constant belching and burping, nausea and 2 to 3 episodes of vomiting. Clinical and imaging findings with differential diagnosis of symptomatic cholelithasis, early acute cholecystitis vs marginal ulcers in GJ anastomosis vs duodenal.     Plan     Admit to Dr. Harding, [facility], Team 4- will transfer care to Team 2 in AM    NPO/IVF  CEF FLAGYL  PPI/BID   AM labs w LFTs, hepatitis panel, blood alcohol level   Pain and nausea control PRN   MRCP   HIDA scan  Holding home meds as appropriate   Rest of plans pending clinical course   Plan discussed with attending and chief resident on call

## 2024-04-11 NOTE — H&P ADULT - ATTENDING COMMENTS
Patient is a 48 year old woman with history of EtOH use, iron deficiency anemia, chronic fatigue, hypertension, diabetes, hyperlipidemia, known cholelithiasis, morbid obesity s/p laparoscopic gastric bypass surgery in 1998, tobacco use, nonobstructive CAD who presents with clinical, laboratory, radiographic findings concerning for acute cholecystitis with possible choledocholithiasis. She had one day history of epigastric abdominal pain, nausea,  vomiting. At time of evaluation afebrile, hemodynamically stable, abdomen soft, tender in epigastrum, mild distension, no rebound or guarding,  CT and RUQ US demonstrate thickened and inflamed gallbladder wall with fluid consistent with acute cholecystitis. No hyperbilirubinemia, but sligthly dilated CBD. Plan for admission, bowel rest, IV fluids, NPO, IV antibiotics, MRCP, eventual OR for cholecystectomy with possible choledochoscopy and IOC. Late entry, date of service 4/11/24.

## 2024-04-11 NOTE — PROVIDER CONTACT NOTE (OTHER) - BACKGROUND
47YO F presents to ED for 1 day history chest pain & upper abdominal pain associated with fatigue, nausea, vomiting, chills, constant belching and burping, nausea and 2 to 3 episodes of vomiting

## 2024-04-11 NOTE — PROGRESS NOTE ADULT - ASSESSMENT
48F history of alcohol abuse, iron deficiency anemia with chronic fatigue, HTN, DM (A1c 7.1), HLD, cholelithiasis, gastric bypass 1998, smoker (stopped 4/7/24), cardiac catheterization in August with nonobstructive CAD who presents to ED for 1 day history chest pain & upper abdominal pain associated with fatigue, nausea, vomiting, chills, constant belching and burping, nausea and 2 to 3 episodes of vomiting. Clinical and imaging findings with differential diagnosis of symptomatic cholelithasis, early acute cholecystitis vs marginal ulcers in GJ anastomosis vs duodenal.     NPO/IVF  CEF FLAGYL  PPI/BID   mISS  AM labs w LFTs, hepatitis panel, blood alcohol level   Pain and nausea control PRN   MRCP pending  HIDA scan pending   48F history of alcohol abuse, iron deficiency anemia with chronic fatigue, HTN, DM (A1c 7.1), HLD, cholelithiasis, gastric bypass 1998, smoker (stopped 4/7/24), cardiac catheterization in August with nonobstructive CAD who presents to ED for 1 day history chest pain & upper abdominal pain associated with fatigue, nausea, vomiting, chills, constant belching and burping, nausea and 2 to 3 episodes of vomiting. Clinical and imaging findings with differential diagnosis of symptomatic cholelithasis, early acute cholecystitis vs marginal ulcers in GJ anastomosis vs duodenal.     NPO/IVF  CEF FLAGYL  PPI/BID   mISS  CIWA  AM labs w LFTs, hepatitis panel, blood alcohol level   Pain and nausea control PRN   MRCP pending  HIDA scan pending  Transfer to team 2

## 2024-04-11 NOTE — PROVIDER CONTACT NOTE (OTHER) - SITUATION
Bp low, pt denying chest pain, lightheadedness, headache. Per patient, her blood pressure runs low and MAP 70.

## 2024-04-11 NOTE — H&P ADULT - TIME BILLING
evaluation and management of acute cholecystitis and choledocholithiasis, review of labs and imaging, discussion with patient, benefits and risks of operative intervention

## 2024-04-12 ENCOUNTER — RESULT REVIEW (OUTPATIENT)
Age: 49
End: 2024-04-12

## 2024-04-12 LAB
A1C WITH ESTIMATED AVERAGE GLUCOSE RESULT: 7.6 % — HIGH (ref 4–5.6)
ADD ON TEST-SPECIMEN IN LAB: SIGNIFICANT CHANGE UP
ALBUMIN SERPL ELPH-MCNC: 3.2 G/DL — LOW (ref 3.3–5)
ALP SERPL-CCNC: 250 U/L — HIGH (ref 40–120)
ALT FLD-CCNC: 339 U/L — HIGH (ref 10–45)
ANION GAP SERPL CALC-SCNC: 7 MMOL/L — SIGNIFICANT CHANGE UP (ref 5–17)
AST SERPL-CCNC: 599 U/L — HIGH (ref 10–40)
BASE EXCESS BLDA CALC-SCNC: -1 MMOL/L — SIGNIFICANT CHANGE UP (ref -2–3)
BILIRUB DIRECT SERPL-MCNC: 0.5 MG/DL — HIGH (ref 0–0.3)
BILIRUB INDIRECT FLD-MCNC: 0.4 MG/DL — SIGNIFICANT CHANGE UP (ref 0.2–1)
BILIRUB SERPL-MCNC: 0.9 MG/DL — SIGNIFICANT CHANGE UP (ref 0.2–1.2)
BLD GP AB SCN SERPL QL: NEGATIVE — SIGNIFICANT CHANGE UP
BUN SERPL-MCNC: 6 MG/DL — LOW (ref 7–23)
CA-I BLDA-SCNC: 1.23 MMOL/L — SIGNIFICANT CHANGE UP (ref 1.15–1.33)
CALCIUM SERPL-MCNC: 9.1 MG/DL — SIGNIFICANT CHANGE UP (ref 8.4–10.5)
CHLORIDE SERPL-SCNC: 100 MMOL/L — SIGNIFICANT CHANGE UP (ref 96–108)
CO2 BLDA-SCNC: 26 MMOL/L — HIGH (ref 19–24)
CO2 SERPL-SCNC: 28 MMOL/L — SIGNIFICANT CHANGE UP (ref 22–31)
COHGB MFR BLDA: 2.3 % — SIGNIFICANT CHANGE UP
CREAT SERPL-MCNC: 0.87 MG/DL — SIGNIFICANT CHANGE UP (ref 0.5–1.3)
EGFR: 83 ML/MIN/1.73M2 — SIGNIFICANT CHANGE UP
ESTIMATED AVERAGE GLUCOSE: 171 MG/DL — HIGH (ref 68–114)
GLUCOSE BLDA-MCNC: 249 MG/DL — HIGH (ref 70–99)
GLUCOSE BLDC GLUCOMTR-MCNC: 121 MG/DL — HIGH (ref 70–99)
GLUCOSE BLDC GLUCOMTR-MCNC: 151 MG/DL — HIGH (ref 70–99)
GLUCOSE BLDC GLUCOMTR-MCNC: 190 MG/DL — HIGH (ref 70–99)
GLUCOSE BLDC GLUCOMTR-MCNC: 193 MG/DL — HIGH (ref 70–99)
GLUCOSE SERPL-MCNC: 141 MG/DL — HIGH (ref 70–99)
HCO3 BLDA-SCNC: 24 MMOL/L — SIGNIFICANT CHANGE UP (ref 21–28)
HCT VFR BLD CALC: 27.1 % — LOW (ref 34.5–45)
HGB BLD-MCNC: 7.6 G/DL — LOW (ref 11.5–15.5)
HGB BLDA-MCNC: 8 G/DL — LOW (ref 11.7–16.1)
INR BLD: 1 — SIGNIFICANT CHANGE UP (ref 0.85–1.18)
MAGNESIUM SERPL-MCNC: 1.8 MG/DL — SIGNIFICANT CHANGE UP (ref 1.6–2.6)
MCHC RBC-ENTMCNC: 22.4 PG — LOW (ref 27–34)
MCHC RBC-ENTMCNC: 28 GM/DL — LOW (ref 32–36)
MCV RBC AUTO: 79.7 FL — LOW (ref 80–100)
METHGB MFR BLDA: 0 % — SIGNIFICANT CHANGE UP
NRBC # BLD: 0 /100 WBCS — SIGNIFICANT CHANGE UP (ref 0–0)
OXYHGB MFR BLDA: 89.1 % — LOW (ref 90–95)
PCO2 BLDA: 42 MMHG — SIGNIFICANT CHANGE UP (ref 32–45)
PH BLDA: 7.37 — SIGNIFICANT CHANGE UP (ref 7.35–7.45)
PHOSPHATE SERPL-MCNC: 4.6 MG/DL — HIGH (ref 2.5–4.5)
PLATELET # BLD AUTO: 121 K/UL — LOW (ref 150–400)
PO2 BLDA: 67 MMHG — LOW (ref 83–108)
POTASSIUM BLDA-SCNC: 4.3 MMOL/L — SIGNIFICANT CHANGE UP (ref 3.5–5.1)
POTASSIUM SERPL-MCNC: 4.1 MMOL/L — SIGNIFICANT CHANGE UP (ref 3.5–5.3)
POTASSIUM SERPL-SCNC: 4.1 MMOL/L — SIGNIFICANT CHANGE UP (ref 3.5–5.3)
PROT SERPL-MCNC: 6.1 G/DL — SIGNIFICANT CHANGE UP (ref 6–8.3)
PROTHROM AB SERPL-ACNC: 11.4 SEC — SIGNIFICANT CHANGE UP (ref 9.5–13)
RBC # BLD: 3.4 M/UL — LOW (ref 3.8–5.2)
RBC # FLD: 26.5 % — HIGH (ref 10.3–14.5)
RH IG SCN BLD-IMP: POSITIVE — SIGNIFICANT CHANGE UP
SAO2 % BLDA: 91.2 % — LOW (ref 94–98)
SODIUM BLDA-SCNC: 131 MMOL/L — LOW (ref 136–145)
SODIUM SERPL-SCNC: 135 MMOL/L — SIGNIFICANT CHANGE UP (ref 135–145)
WBC # BLD: 1.67 K/UL — LOW (ref 3.8–10.5)
WBC # FLD AUTO: 1.67 K/UL — LOW (ref 3.8–10.5)

## 2024-04-12 PROCEDURE — 88304 TISSUE EXAM BY PATHOLOGIST: CPT | Mod: 26

## 2024-04-12 PROCEDURE — S2900 ROBOTIC SURGICAL SYSTEM: CPT | Mod: NC

## 2024-04-12 PROCEDURE — 47562 LAPAROSCOPIC CHOLECYSTECTOMY: CPT

## 2024-04-12 DEVICE — GUIDEWIRE ZIPWIRE STANDARD ANGLED .035" X 150CM: Type: IMPLANTABLE DEVICE | Status: FUNCTIONAL

## 2024-04-12 DEVICE — CHOLANGIOGRAM CATH BOSTON SCIENTIFIC SPYGLASS DISCOVER T/A: Type: IMPLANTABLE DEVICE | Status: FUNCTIONAL

## 2024-04-12 RX ORDER — DEXTROSE 50 % IN WATER 50 %
12.5 SYRINGE (ML) INTRAVENOUS ONCE
Refills: 0 | Status: DISCONTINUED | OUTPATIENT
Start: 2024-04-12 | End: 2024-04-19

## 2024-04-12 RX ORDER — BENZOCAINE AND MENTHOL 5; 1 G/100ML; G/100ML
1 LIQUID ORAL EVERY 8 HOURS
Refills: 0 | Status: DISCONTINUED | OUTPATIENT
Start: 2024-04-12 | End: 2024-04-19

## 2024-04-12 RX ORDER — HYDROMORPHONE HYDROCHLORIDE 2 MG/ML
0.2 INJECTION INTRAMUSCULAR; INTRAVENOUS; SUBCUTANEOUS
Refills: 0 | Status: DISCONTINUED | OUTPATIENT
Start: 2024-04-12 | End: 2024-04-12

## 2024-04-12 RX ORDER — DEXTROSE 50 % IN WATER 50 %
25 SYRINGE (ML) INTRAVENOUS ONCE
Refills: 0 | Status: DISCONTINUED | OUTPATIENT
Start: 2024-04-12 | End: 2024-04-19

## 2024-04-12 RX ORDER — ATORVASTATIN CALCIUM 80 MG/1
40 TABLET, FILM COATED ORAL AT BEDTIME
Refills: 0 | Status: DISCONTINUED | OUTPATIENT
Start: 2024-04-12 | End: 2024-04-19

## 2024-04-12 RX ORDER — SODIUM CHLORIDE 9 MG/ML
1000 INJECTION, SOLUTION INTRAVENOUS
Refills: 0 | Status: DISCONTINUED | OUTPATIENT
Start: 2024-04-12 | End: 2024-04-19

## 2024-04-12 RX ORDER — GLUCAGON INJECTION, SOLUTION 0.5 MG/.1ML
1 INJECTION, SOLUTION SUBCUTANEOUS ONCE
Refills: 0 | Status: DISCONTINUED | OUTPATIENT
Start: 2024-04-12 | End: 2024-04-19

## 2024-04-12 RX ORDER — ONDANSETRON 8 MG/1
4 TABLET, FILM COATED ORAL EVERY 6 HOURS
Refills: 0 | Status: DISCONTINUED | OUTPATIENT
Start: 2024-04-12 | End: 2024-04-18

## 2024-04-12 RX ORDER — INSULIN LISPRO 100/ML
VIAL (ML) SUBCUTANEOUS EVERY 6 HOURS
Refills: 0 | Status: DISCONTINUED | OUTPATIENT
Start: 2024-04-12 | End: 2024-04-19

## 2024-04-12 RX ORDER — FOLIC ACID 0.8 MG
1 TABLET ORAL DAILY
Refills: 0 | Status: DISCONTINUED | OUTPATIENT
Start: 2024-04-12 | End: 2024-04-19

## 2024-04-12 RX ORDER — ACETAMINOPHEN 500 MG
1000 TABLET ORAL EVERY 6 HOURS
Refills: 0 | Status: DISCONTINUED | OUTPATIENT
Start: 2024-04-12 | End: 2024-04-13

## 2024-04-12 RX ORDER — OXYCODONE HYDROCHLORIDE 5 MG/1
10 TABLET ORAL EVERY 6 HOURS
Refills: 0 | Status: DISCONTINUED | OUTPATIENT
Start: 2024-04-12 | End: 2024-04-13

## 2024-04-12 RX ORDER — ACETAMINOPHEN 500 MG
650 TABLET ORAL EVERY 6 HOURS
Refills: 0 | Status: DISCONTINUED | OUTPATIENT
Start: 2024-04-12 | End: 2024-04-12

## 2024-04-12 RX ORDER — PANTOPRAZOLE SODIUM 20 MG/1
40 TABLET, DELAYED RELEASE ORAL
Refills: 0 | Status: DISCONTINUED | OUTPATIENT
Start: 2024-04-12 | End: 2024-04-19

## 2024-04-12 RX ORDER — DEXTROSE 50 % IN WATER 50 %
15 SYRINGE (ML) INTRAVENOUS ONCE
Refills: 0 | Status: DISCONTINUED | OUTPATIENT
Start: 2024-04-12 | End: 2024-04-19

## 2024-04-12 RX ORDER — HEPARIN SODIUM 5000 [USP'U]/ML
7500 INJECTION INTRAVENOUS; SUBCUTANEOUS EVERY 8 HOURS
Refills: 0 | Status: DISCONTINUED | OUTPATIENT
Start: 2024-04-12 | End: 2024-04-15

## 2024-04-12 RX ORDER — OXYCODONE HYDROCHLORIDE 5 MG/1
5 TABLET ORAL EVERY 6 HOURS
Refills: 0 | Status: DISCONTINUED | OUTPATIENT
Start: 2024-04-12 | End: 2024-04-13

## 2024-04-12 RX ORDER — MAGNESIUM SULFATE 500 MG/ML
1 VIAL (ML) INJECTION ONCE
Refills: 0 | Status: COMPLETED | OUTPATIENT
Start: 2024-04-12 | End: 2024-04-12

## 2024-04-12 RX ORDER — ASPIRIN/CALCIUM CARB/MAGNESIUM 324 MG
81 TABLET ORAL DAILY
Refills: 0 | Status: DISCONTINUED | OUTPATIENT
Start: 2024-04-12 | End: 2024-04-15

## 2024-04-12 RX ORDER — DEXTROSE 10 % IN WATER 10 %
125 INTRAVENOUS SOLUTION INTRAVENOUS ONCE
Refills: 0 | Status: DISCONTINUED | OUTPATIENT
Start: 2024-04-12 | End: 2024-04-19

## 2024-04-12 RX ADMIN — Medication 100 MILLIGRAM(S): at 11:05

## 2024-04-12 RX ADMIN — HEPARIN SODIUM 7500 UNIT(S): 5000 INJECTION INTRAVENOUS; SUBCUTANEOUS at 21:56

## 2024-04-12 RX ADMIN — Medication 1: at 07:26

## 2024-04-12 RX ADMIN — Medication 1 PATCH: at 07:33

## 2024-04-12 RX ADMIN — Medication 1000 MILLIGRAM(S): at 22:48

## 2024-04-12 RX ADMIN — ATORVASTATIN CALCIUM 40 MILLIGRAM(S): 80 TABLET, FILM COATED ORAL at 21:56

## 2024-04-12 RX ADMIN — CEFTRIAXONE 100 MILLIGRAM(S): 500 INJECTION, POWDER, FOR SOLUTION INTRAMUSCULAR; INTRAVENOUS at 10:16

## 2024-04-12 RX ADMIN — Medication 1: at 00:37

## 2024-04-12 RX ADMIN — OXYCODONE HYDROCHLORIDE 10 MILLIGRAM(S): 5 TABLET ORAL at 21:56

## 2024-04-12 RX ADMIN — Medication 100 GRAM(S): at 08:43

## 2024-04-12 RX ADMIN — OXYCODONE HYDROCHLORIDE 10 MILLIGRAM(S): 5 TABLET ORAL at 22:56

## 2024-04-12 RX ADMIN — HYDROMORPHONE HYDROCHLORIDE 0.5 MILLIGRAM(S): 2 INJECTION INTRAMUSCULAR; INTRAVENOUS; SUBCUTANEOUS at 00:38

## 2024-04-12 RX ADMIN — Medication 100 MILLIGRAM(S): at 00:13

## 2024-04-12 RX ADMIN — Medication 1000 MILLIGRAM(S): at 23:48

## 2024-04-12 RX ADMIN — HYDROMORPHONE HYDROCHLORIDE 0.5 MILLIGRAM(S): 2 INJECTION INTRAMUSCULAR; INTRAVENOUS; SUBCUTANEOUS at 00:13

## 2024-04-12 RX ADMIN — HEPARIN SODIUM 7500 UNIT(S): 5000 INJECTION INTRAVENOUS; SUBCUTANEOUS at 05:22

## 2024-04-12 RX ADMIN — BENZOCAINE AND MENTHOL 1 LOZENGE: 5; 1 LIQUID ORAL at 22:48

## 2024-04-12 NOTE — PRE-ANESTHESIA EVALUATION ADULT - NSANTHPEFT_GEN_ALL_CORE
cardiac and respiratory exam unremarkable    General: Appearance is consistent with chronological age. No abnormal facies.  EENT: Anicteric sclera; oropharynx clear, moist mucus membranes  Cardiovascular:  Rate and rhythm evaluated  Respiratory: Unlabored breathing  Neurological: Awake and alert, moves all extremities  Constitutional: MET>4

## 2024-04-12 NOTE — PROGRESS NOTE ADULT - ASSESSMENT
48F history of alcohol abuse, iron deficiency anemia with chronic fatigue, HTN, DM (A1c 7.1), HLD, cholelithiasis, gastric bypass 1998, smoker (stopped 4/7/24), cardiac catheterization in August 2023 with nonobstructive CAD who presents to ED for 1 day history chest pain & upper abdominal pain associated with fatigue, nausea, vomiting, chills, constant belching and burping, nausea and 2 to 3 episodes of vomiting. Clinical and imaging findings with differential diagnosis of symptomatic cholelithasis, early acute cholecystitis vs marginal ulcers in GJ anastomosis vs duodenal.     NPO/IVF@125cc  CEF/ FLAGYL  PPI/BID/SCDs/HSQ/IS  CIWA  mISS  Pain and nausea control PRN   MRCP  AM labs

## 2024-04-12 NOTE — PRE-ANESTHESIA EVALUATION ADULT - NSANTHPMHFT_GEN_ALL_CORE
per surgery note:  48F history of alcohol abuse, iron deficiency anemia (Hb 7.6) with chronic fatigue, HTN, DM (A1c 7.1), HLD, cholelithiasis, gastric bypass 1998, smoker (stopped 4/7/24), cardiac catheterization in August 2023 with nonobstructive CAD who presents to ED for 1 day history chest pain & upper abdominal pain associated with fatigue, nausea, vomiting, chills, constant belching and burping, nausea and 2 to 3 episodes of vomiting. Clinical and imaging findings with differential diagnosis of symptomatic cholelithasis, early acute cholecystitis vs marginal ulcers in GJ anastomosis vs duodenal.     NPO/IVF@125cc  CEF/ FLAGYL

## 2024-04-12 NOTE — PRE-ANESTHESIA EVALUATION ADULT - NSANTHADDINFOFT_GEN_ALL_CORE
chart review only    RSI GA ETT small risk dental/oropharyngeal damage, aspiration, and chance of sore throat post RSI GA ETT small risk dental/oropharyngeal damage, aspiration, and chance of sore throat post  anemic Hb 7,6 full cross match and blood in room

## 2024-04-12 NOTE — PROGRESS NOTE ADULT - SUBJECTIVE AND OBJECTIVE BOX
ON: MRCP final read demonstrates choledocholithiasis, discussion w/ pt regarding OR tomorrow, tachy to 105, 2/2 pain,  CBD.  4/11: CIWA, hgb 7.5(8.8),        SUBJECTIVE: Patient seen and examined bedside by Surgical resident. resting comfortably in bed this am.     aspirin enteric coated 81 milliGRAM(s) Oral daily  cefTRIAXone   IVPB 1000 milliGRAM(s) IV Intermittent every 24 hours  heparin   Injectable 7500 Unit(s) SubCutaneous every 8 hours  metroNIDAZOLE  IVPB 500 milliGRAM(s) IV Intermittent every 8 hours    MEDICATIONS  (PRN):  acetaminophen     Tablet .. 650 milliGRAM(s) Oral every 6 hours PRN Temp greater or equal to 38C (100.4F), Mild Pain (1 - 3)  dextrose Oral Gel 15 Gram(s) Oral once PRN Blood Glucose LESS THAN 70 milliGRAM(s)/deciliter  HYDROmorphone  Injectable 0.5 milliGRAM(s) IV Push every 3 hours PRN Severe Pain (7 - 10)  LORazepam   Injectable 2 milliGRAM(s) IV Push every 1 hour PRN CIWA-Ar score 8 or greater  ondansetron Injectable 4 milliGRAM(s) IV Push every 6 hours PRN Nausea and/or Vomiting      I&O's Detail    11 Apr 2024 07:01  -  12 Apr 2024 07:00  --------------------------------------------------------  IN:  Total IN: 0 mL    OUT:    Voided (mL): 1200 mL  Total OUT: 1200 mL    Total NET: -1200 mL          Vital Signs Last 24 Hrs  T(C): 37 (12 Apr 2024 05:00), Max: 37.4 (11 Apr 2024 09:30)  T(F): 98.6 (12 Apr 2024 05:00), Max: 99.4 (11 Apr 2024 09:30)  HR: 81 (12 Apr 2024 05:00) (81 - 105)  BP: 132/65 (12 Apr 2024 05:00) (92/58 - 136/85)  BP(mean): 70 (11 Apr 2024 09:30) (70 - 70)  RR: 17 (12 Apr 2024 05:00) (17 - 17)  SpO2: 100% (12 Apr 2024 05:00) (95% - 100%)    Parameters below as of 12 Apr 2024 05:00  Patient On (Oxygen Delivery Method): room air        General: NAD, resting comfortably in bed  C/V: NSR  Pulm: Nonlabored breathing, no respiratory distress  Abd: soft, NT/ND  Extrem: WWP, no edema, SCDs in place    LABS:                        7.6    1.67  )-----------( 121      ( 12 Apr 2024 05:30 )             27.1     04-12    135  |  100  |  6<L>  ----------------------------<  141<H>  4.1   |  28  |  0.87    Ca    9.1      12 Apr 2024 05:30  Phos  4.6     04-12  Mg     1.8     04-12    TPro  6.1  /  Alb  3.4  /  TBili  0.5  /  DBili  0.2  /  AST  111<H>  /  ALT  66<H>  /  AlkPhos  85  04-11    PT/INR - ( 11 Apr 2024 05:30 )   PT: 11.7 sec;   INR: 1.03          PTT - ( 11 Apr 2024 05:30 )  PTT:28.3 sec  Urinalysis Basic - ( 12 Apr 2024 05:30 )    Color: x / Appearance: x / SG: x / pH: x  Gluc: 141 mg/dL / Ketone: x  / Bili: x / Urobili: x   Blood: x / Protein: x / Nitrite: x   Leuk Esterase: x / RBC: x / WBC x   Sq Epi: x / Non Sq Epi: x / Bacteria: x        RADIOLOGY & ADDITIONAL STUDIES:  CT Abdomen and Pelvis w/ Oral Cont and w/ IV Cont:   ACC: [id number] EXAM:  CT ABDOMEN AND PELVIS OC IC   ORDERED BY: ACE PARIS     PROCEDURE DATE:  04/10/2024          INTERPRETATION:  CLINICAL INFORMATION: Abdominal pain. Nausea vomiting.   Right upper quadrant pain.    COMPARISON: Concurrentright upper quadrant ultrasound. CT abdomen pelvis   performed.    CONTRAST/COMPLICATIONS:  IV Contrast: Isovue 370  95 cc administered   5 cc discarded  Oral Contrast: Omnipaque 350  Complications: None reported at time of study completion    PROCEDURE:  CT of the Abdomen and Pelvis was performed.  Sagittal and coronal reformats were performed.    FINDINGS:  LOWER CHEST: Within normal limits.    LIVER: Reidel lobe  BILE DUCTS: Dilated common bile duct measuring up to 9 mm (5:55). No   radiodensecholedocholithiasis  GALLBLADDER: Wall thickening and cholelithiasis.  SPLEEN: Within normal limits.  PANCREAS: Within normal limits.  ADRENALS: Within normal limits.  KIDNEYS/URETERS: Within normal limits.    BLADDER: Minimally distended.  REPRODUCTIVE ORGANS: Leiomyomatous uterus, no significant change.   Bilateral adnexal cysts measuring 2.5 x 3.8 cm and 2.3 x 1.6 cm on the   left and 2.1 x 1.5 cm on the right.    BOWEL: Status post bariatric surgery. No bowel obstruction. Appendix is   normal.  PERITONEUM: No ascites.  VESSELS: Within normal limits.  RETROPERITONEUM/LYMPH NODES: No lymphadenopathy.  ABDOMINAL WALL: Within normal limits.  BONES: Degenerative changes.    IMPRESSION:  1.  Findings corresponding to concurrent right upper quadrant ultrasound   concerning for acute cholecystitis with gallbladder wall thickening and   small adjacent pericholecystic fluid/stranding..  2.  Common biliary ductal dilatation up to 9 mm. No radiodense   choledocholithiasis.  3.  Bilateral adnexal cysts measuring up to 3.8 cm on the left and 2.1 cm   on the right. Leiomyomatous uterus.    --- End of Report ---          AL RAGSDALE MD; Resident Radiologist  This document has been electronically signed.  ADIS ALVAREZ MD; Attending Radiologist  Thisdocument has been electronically signed. Apr 11 2024 12:43AM (04-10-24 @ 23:08)     ON: MRCP final read demonstrates choledocholithiasis, discussion w/ pt regarding OR tomorrow, tachy to 105, 2/2 pain,  CBD.  4/11: CIWA, hgb 7.5(8.8),        SUBJECTIVE: Patient seen and examined bedside by Surgical resident. resting comfortably in bed this am. states that she feels more distended and bloated than normal, burping. denies any n/v at this time. states that the pain is better controlled today. Aware of plan for OR this morning.     aspirin enteric coated 81 milliGRAM(s) Oral daily  cefTRIAXone   IVPB 1000 milliGRAM(s) IV Intermittent every 24 hours  heparin   Injectable 7500 Unit(s) SubCutaneous every 8 hours  metroNIDAZOLE  IVPB 500 milliGRAM(s) IV Intermittent every 8 hours    MEDICATIONS  (PRN):  acetaminophen     Tablet .. 650 milliGRAM(s) Oral every 6 hours PRN Temp greater or equal to 38C (100.4F), Mild Pain (1 - 3)  dextrose Oral Gel 15 Gram(s) Oral once PRN Blood Glucose LESS THAN 70 milliGRAM(s)/deciliter  HYDROmorphone  Injectable 0.5 milliGRAM(s) IV Push every 3 hours PRN Severe Pain (7 - 10)  LORazepam   Injectable 2 milliGRAM(s) IV Push every 1 hour PRN CIWA-Ar score 8 or greater  ondansetron Injectable 4 milliGRAM(s) IV Push every 6 hours PRN Nausea and/or Vomiting      I&O's Detail    11 Apr 2024 07:01  -  12 Apr 2024 07:00  --------------------------------------------------------  IN:  Total IN: 0 mL    OUT:    Voided (mL): 1200 mL  Total OUT: 1200 mL    Total NET: -1200 mL          Vital Signs Last 24 Hrs  T(C): 37 (12 Apr 2024 05:00), Max: 37.4 (11 Apr 2024 09:30)  T(F): 98.6 (12 Apr 2024 05:00), Max: 99.4 (11 Apr 2024 09:30)  HR: 81 (12 Apr 2024 05:00) (81 - 105)  BP: 132/65 (12 Apr 2024 05:00) (92/58 - 136/85)  BP(mean): 70 (11 Apr 2024 09:30) (70 - 70)  RR: 17 (12 Apr 2024 05:00) (17 - 17)  SpO2: 100% (12 Apr 2024 05:00) (95% - 100%)    Parameters below as of 12 Apr 2024 05:00  Patient On (Oxygen Delivery Method): room air        General: NAD, resting comfortably in bed  C/V: NSR  Pulm: Nonlabored breathing, no respiratory distress  Abd: soft, ttp in the RUQ and epigastrium mild distension   Extrem: WWP, no edema, SCDs in place    LABS:                        7.6    1.67  )-----------( 121      ( 12 Apr 2024 05:30 )             27.1     04-12    135  |  100  |  6<L>  ----------------------------<  141<H>  4.1   |  28  |  0.87    Ca    9.1      12 Apr 2024 05:30  Phos  4.6     04-12  Mg     1.8     04-12    TPro  6.1  /  Alb  3.4  /  TBili  0.5  /  DBili  0.2  /  AST  111<H>  /  ALT  66<H>  /  AlkPhos  85  04-11    PT/INR - ( 11 Apr 2024 05:30 )   PT: 11.7 sec;   INR: 1.03          PTT - ( 11 Apr 2024 05:30 )  PTT:28.3 sec  Urinalysis Basic - ( 12 Apr 2024 05:30 )    Color: x / Appearance: x / SG: x / pH: x  Gluc: 141 mg/dL / Ketone: x  / Bili: x / Urobili: x   Blood: x / Protein: x / Nitrite: x   Leuk Esterase: x / RBC: x / WBC x   Sq Epi: x / Non Sq Epi: x / Bacteria: x        RADIOLOGY & ADDITIONAL STUDIES:  CT Abdomen and Pelvis w/ Oral Cont and w/ IV Cont:   ACC: [id number] EXAM:  CT ABDOMEN AND PELVIS OC IC   ORDERED BY: ACE PARIS     PROCEDURE DATE:  04/10/2024          INTERPRETATION:  CLINICAL INFORMATION: Abdominal pain. Nausea vomiting.   Right upper quadrant pain.    COMPARISON: Concurrentright upper quadrant ultrasound. CT abdomen pelvis   performed.    CONTRAST/COMPLICATIONS:  IV Contrast: Isovue 370  95 cc administered   5 cc discarded  Oral Contrast: Omnipaque 350  Complications: None reported at time of study completion    PROCEDURE:  CT of the Abdomen and Pelvis was performed.  Sagittal and coronal reformats were performed.    FINDINGS:  LOWER CHEST: Within normal limits.    LIVER: Reidel lobe  BILE DUCTS: Dilated common bile duct measuring up to 9 mm (5:55). No   radiodensecholedocholithiasis  GALLBLADDER: Wall thickening and cholelithiasis.  SPLEEN: Within normal limits.  PANCREAS: Within normal limits.  ADRENALS: Within normal limits.  KIDNEYS/URETERS: Within normal limits.    BLADDER: Minimally distended.  REPRODUCTIVE ORGANS: Leiomyomatous uterus, no significant change.   Bilateral adnexal cysts measuring 2.5 x 3.8 cm and 2.3 x 1.6 cm on the   left and 2.1 x 1.5 cm on the right.    BOWEL: Status post bariatric surgery. No bowel obstruction. Appendix is   normal.  PERITONEUM: No ascites.  VESSELS: Within normal limits.  RETROPERITONEUM/LYMPH NODES: No lymphadenopathy.  ABDOMINAL WALL: Within normal limits.  BONES: Degenerative changes.    IMPRESSION:  1.  Findings corresponding to concurrent right upper quadrant ultrasound   concerning for acute cholecystitis with gallbladder wall thickening and   small adjacent pericholecystic fluid/stranding..  2.  Common biliary ductal dilatation up to 9 mm. No radiodense   choledocholithiasis.  3.  Bilateral adnexal cysts measuring up to 3.8 cm on the left and 2.1 cm   on the right. Leiomyomatous uterus.    --- End of Report ---          AL RAGSDALE MD; Resident Radiologist  This document has been electronically signed.  ADIS ALVAREZ MD; Attending Radiologist  Thisdocument has been electronically signed. Apr 11 2024 12:43AM (04-10-24 @ 23:08)

## 2024-04-12 NOTE — BRIEF OPERATIVE NOTE - OPERATION/FINDINGS
Patient prepped and draped in sterile fashion supine. Entry via varess insufflation at [place]. Supra-umbilical incision made and 12mm trocar inserted, three additional 8mm ports placed under direct visualization. Gallbladder visualized and noted to be enlarged, hydrops suspected. Robot docked. Gallbladder dissected off cystic plate. Cystic artery cauterized. ICG utilized to visualize cystic duct and CBD. Cystic duct clipped and transected, glucagon administered 2x and choledochoscopy attempted via transcystic approach, intubation of common bile duct could not be completed. IOC attempted without successful access to CBD and was aborted. Gallbladder dissection from cystic plate completed, and gallbladder collected as specimen. Hemostasis achieved. Fascia closed with vicryl. Skin closed with monocryl and dermabond.

## 2024-04-12 NOTE — CHART NOTE - NSCHARTNOTEFT_GEN_A_CORE
POST-OPERATIVE NOTE    Procedure: Robo refugio, IOC, cbd exploration, intraop cholangiogram     Diagnosis/Indication: Choledocholithiasis     Surgeon: Tyler    S: Pt has no complaints. Denies Headache, CP, SOB, calf pain, nausea, vomiting. Hasn't attempted any PO intake at this point. Pain controlled with medication. has not voided at this point.     O:  T(C): 36.4 (04-12-24 @ 16:07), Max: 36.4 (04-12-24 @ 16:07)  T(F): 97.6 (04-12-24 @ 16:07), Max: 97.6 (04-12-24 @ 16:07)  HR: 101 (04-12-24 @ 16:34) (101 - 113)  BP: 113/62 (04-12-24 @ 16:34) (113/62 - 126/63)  RR: 16 (04-12-24 @ 16:34) (14 - 16)  SpO2: 95% (04-12-24 @ 16:34) (94% - 96%)  Wt(kg): --                        7.6    1.67  )-----------( 121      ( 12 Apr 2024 05:30 )             27.1     04-12    135  |  100  |  6<L>  ----------------------------<  141<H>  4.1   |  28  |  0.87    Ca    9.1      12 Apr 2024 05:30  Phos  4.6     04-12  Mg     1.8     04-12    TPro  6.1  /  Alb  3.2<L>  /  TBili  0.9  /  DBili  0.5<H>  /  AST  599<H>  /  ALT  339<H>  /  AlkPhos  250<H>  04-12      Gen: NAD, resting comfortably in bed  C/V: Regular rate  Pulm: Nonlabored breathing, no respiratory distress, on 2L NC  Abd: Soft, mildly distended, ATTP, incisions c/d/i x5, no rebound or guarding.   Extrem: WWP, no calf edema or tenderness, SCDs in place    48F history of alcohol abuse, iron deficiency anemia with chronic fatigue, HTN, DM (A1c 7.1), HLD, cholelithiasis, gastric bypass 1998, smoker (stopped 4/7/24), cardiac catheterization in August 2023 with nonobstructive CAD who presents to ED for 1 day history chest pain & upper abdominal pain associated with fatigue, nausea, vomiting, chills, constant belching and burping, nausea and 2 to 3 episodes of vomiting. Clinical and imaging findings with differential diagnosis of symptomatic cholelithasis, early acute cholecystitis vs marginal ulcers in GJ anastomosis vs duodenal. Now s/p RA lap refugio, attempted choledochoscope and IOC (aborted)    CLD/IVHL  PPI/BID/SCDs/IS  HSQ/ASA81  [facility]  Pain and nausea control PRN   AM labs

## 2024-04-12 NOTE — BRIEF OPERATIVE NOTE - NSICDXBRIEFPROCEDURE_GEN_ALL_CORE_FT
PROCEDURES:  Robot-assisted laparoscopic cholecystectomy using da Radha Xi 12-Apr-2024 18:01:04  Grace Reyes

## 2024-04-13 LAB
ADD ON TEST-SPECIMEN IN LAB: SIGNIFICANT CHANGE UP
ALBUMIN SERPL ELPH-MCNC: 3.1 G/DL — LOW (ref 3.3–5)
ALP SERPL-CCNC: 217 U/L — HIGH (ref 40–120)
ALT FLD-CCNC: 269 U/L — HIGH (ref 10–45)
ANION GAP SERPL CALC-SCNC: 10 MMOL/L — SIGNIFICANT CHANGE UP (ref 5–17)
AST SERPL-CCNC: 308 U/L — HIGH (ref 10–40)
BILIRUB DIRECT SERPL-MCNC: 2.8 MG/DL — HIGH (ref 0–0.3)
BILIRUB INDIRECT FLD-MCNC: 0.5 MG/DL — SIGNIFICANT CHANGE UP (ref 0.2–1)
BILIRUB SERPL-MCNC: 3.2 MG/DL — HIGH (ref 0.2–1.2)
BUN SERPL-MCNC: 6 MG/DL — LOW (ref 7–23)
CALCIUM SERPL-MCNC: 8.8 MG/DL — SIGNIFICANT CHANGE UP (ref 8.4–10.5)
CHLORIDE SERPL-SCNC: 97 MMOL/L — SIGNIFICANT CHANGE UP (ref 96–108)
CO2 SERPL-SCNC: 24 MMOL/L — SIGNIFICANT CHANGE UP (ref 22–31)
CREAT SERPL-MCNC: 0.97 MG/DL — SIGNIFICANT CHANGE UP (ref 0.5–1.3)
EGFR: 73 ML/MIN/1.73M2 — SIGNIFICANT CHANGE UP
GLUCOSE BLDC GLUCOMTR-MCNC: 147 MG/DL — HIGH (ref 70–99)
GLUCOSE BLDC GLUCOMTR-MCNC: 161 MG/DL — HIGH (ref 70–99)
GLUCOSE BLDC GLUCOMTR-MCNC: 165 MG/DL — HIGH (ref 70–99)
GLUCOSE BLDC GLUCOMTR-MCNC: 199 MG/DL — HIGH (ref 70–99)
GLUCOSE BLDC GLUCOMTR-MCNC: 223 MG/DL — HIGH (ref 70–99)
GLUCOSE SERPL-MCNC: 159 MG/DL — HIGH (ref 70–99)
HCT VFR BLD CALC: 24.8 % — LOW (ref 34.5–45)
HGB BLD-MCNC: 7.3 G/DL — LOW (ref 11.5–15.5)
MAGNESIUM SERPL-MCNC: 1.6 MG/DL — SIGNIFICANT CHANGE UP (ref 1.6–2.6)
MCHC RBC-ENTMCNC: 22.7 PG — LOW (ref 27–34)
MCHC RBC-ENTMCNC: 29.4 GM/DL — LOW (ref 32–36)
MCV RBC AUTO: 77 FL — LOW (ref 80–100)
NRBC # BLD: 0 /100 WBCS — SIGNIFICANT CHANGE UP (ref 0–0)
PHOSPHATE SERPL-MCNC: 4.1 MG/DL — SIGNIFICANT CHANGE UP (ref 2.5–4.5)
PLATELET # BLD AUTO: 112 K/UL — LOW (ref 150–400)
POTASSIUM SERPL-MCNC: 4.3 MMOL/L — SIGNIFICANT CHANGE UP (ref 3.5–5.3)
POTASSIUM SERPL-SCNC: 4.3 MMOL/L — SIGNIFICANT CHANGE UP (ref 3.5–5.3)
PROT SERPL-MCNC: 5.7 G/DL — LOW (ref 6–8.3)
RBC # BLD: 3.22 M/UL — LOW (ref 3.8–5.2)
RBC # FLD: 26.9 % — HIGH (ref 10.3–14.5)
SODIUM SERPL-SCNC: 131 MMOL/L — LOW (ref 135–145)
WBC # BLD: 2.49 K/UL — LOW (ref 3.8–10.5)
WBC # FLD AUTO: 2.49 K/UL — LOW (ref 3.8–10.5)

## 2024-04-13 RX ORDER — ACETAMINOPHEN 500 MG
1000 TABLET ORAL ONCE
Refills: 0 | Status: COMPLETED | OUTPATIENT
Start: 2024-04-14 | End: 2024-04-14

## 2024-04-13 RX ORDER — MAGNESIUM SULFATE 500 MG/ML
2 VIAL (ML) INJECTION ONCE
Refills: 0 | Status: COMPLETED | OUTPATIENT
Start: 2024-04-13 | End: 2024-04-13

## 2024-04-13 RX ORDER — ACETAMINOPHEN 500 MG
1000 TABLET ORAL ONCE
Refills: 0 | Status: COMPLETED | OUTPATIENT
Start: 2024-04-13 | End: 2024-04-13

## 2024-04-13 RX ORDER — SODIUM CHLORIDE 9 MG/ML
1000 INJECTION, SOLUTION INTRAVENOUS
Refills: 0 | Status: DISCONTINUED | OUTPATIENT
Start: 2024-04-13 | End: 2024-04-14

## 2024-04-13 RX ORDER — HYDROMORPHONE HYDROCHLORIDE 2 MG/ML
0.5 INJECTION INTRAMUSCULAR; INTRAVENOUS; SUBCUTANEOUS EVERY 6 HOURS
Refills: 0 | Status: DISCONTINUED | OUTPATIENT
Start: 2024-04-13 | End: 2024-04-15

## 2024-04-13 RX ORDER — SIMETHICONE 80 MG/1
80 TABLET, CHEWABLE ORAL ONCE
Refills: 0 | Status: COMPLETED | OUTPATIENT
Start: 2024-04-13 | End: 2024-04-13

## 2024-04-13 RX ORDER — HYDROMORPHONE HYDROCHLORIDE 2 MG/ML
0.25 INJECTION INTRAMUSCULAR; INTRAVENOUS; SUBCUTANEOUS EVERY 6 HOURS
Refills: 0 | Status: DISCONTINUED | OUTPATIENT
Start: 2024-04-13 | End: 2024-04-15

## 2024-04-13 RX ORDER — KETOROLAC TROMETHAMINE 30 MG/ML
15 SYRINGE (ML) INJECTION ONCE
Refills: 0 | Status: DISCONTINUED | OUTPATIENT
Start: 2024-04-13 | End: 2024-04-13

## 2024-04-13 RX ADMIN — Medication 1000 MILLIGRAM(S): at 23:44

## 2024-04-13 RX ADMIN — Medication 1000 MILLIGRAM(S): at 05:24

## 2024-04-13 RX ADMIN — OXYCODONE HYDROCHLORIDE 10 MILLIGRAM(S): 5 TABLET ORAL at 07:18

## 2024-04-13 RX ADMIN — Medication 15 MILLIGRAM(S): at 15:02

## 2024-04-13 RX ADMIN — HEPARIN SODIUM 7500 UNIT(S): 5000 INJECTION INTRAVENOUS; SUBCUTANEOUS at 05:24

## 2024-04-13 RX ADMIN — Medication 1000 MILLIGRAM(S): at 13:16

## 2024-04-13 RX ADMIN — Medication 1000 MILLIGRAM(S): at 06:24

## 2024-04-13 RX ADMIN — Medication 25 GRAM(S): at 10:41

## 2024-04-13 RX ADMIN — Medication 15 MILLIGRAM(S): at 13:17

## 2024-04-13 RX ADMIN — Medication 4: at 13:15

## 2024-04-13 RX ADMIN — PANTOPRAZOLE SODIUM 40 MILLIGRAM(S): 20 TABLET, DELAYED RELEASE ORAL at 05:24

## 2024-04-13 RX ADMIN — OXYCODONE HYDROCHLORIDE 10 MILLIGRAM(S): 5 TABLET ORAL at 08:00

## 2024-04-13 RX ADMIN — Medication 2: at 00:31

## 2024-04-13 RX ADMIN — Medication 2: at 17:59

## 2024-04-13 RX ADMIN — ATORVASTATIN CALCIUM 40 MILLIGRAM(S): 80 TABLET, FILM COATED ORAL at 21:31

## 2024-04-13 RX ADMIN — HEPARIN SODIUM 7500 UNIT(S): 5000 INJECTION INTRAVENOUS; SUBCUTANEOUS at 13:17

## 2024-04-13 RX ADMIN — Medication 81 MILLIGRAM(S): at 13:16

## 2024-04-13 RX ADMIN — HEPARIN SODIUM 7500 UNIT(S): 5000 INJECTION INTRAVENOUS; SUBCUTANEOUS at 21:32

## 2024-04-13 RX ADMIN — Medication 1 MILLIGRAM(S): at 13:16

## 2024-04-13 RX ADMIN — Medication 400 MILLIGRAM(S): at 17:57

## 2024-04-13 RX ADMIN — Medication 400 MILLIGRAM(S): at 23:12

## 2024-04-13 RX ADMIN — SIMETHICONE 80 MILLIGRAM(S): 80 TABLET, CHEWABLE ORAL at 23:57

## 2024-04-13 RX ADMIN — Medication 2: at 06:14

## 2024-04-13 NOTE — CONSULT NOTE ADULT - ASSESSMENT
48F with PMH of alcohol abuse, iron deficiency anemia, gastric bypass (2001), HTN, DM (A1c 7.1), HLD, cholelithiasis, gastric bypass (in 1998, denies any revisions), and nonobstructive CAD, who presented with acute cholecystitis and choledocholithiasis s/p RA lap refugio, attempted choledochoscope and IOC intraoperatively but was aborted. Still with abdominal pain and bilirubin elevated to 3.2 s/p lap refugio, so GI consulted for transgastric ERCP.     MRCP 04/11/24:   - Choledocholithiasis, with multiple small filling defects in the distal CBD.  - Cholelithiasis and findings of acute cholecystitis.    Patient is currently afebrile, WBC 2.49, and complaining of diffuse abdominal. Total bilirubin 3.2, alk phos 217, AST//269.     Recommendations:   - trend CBC, CMP, and INR daily   - patient ate breakfast this AM, but would recommend CLD   - will review imaging to determine next steps    GI Team will continue to follow.     Sushma Valles D.O.   Gastroenterology Fellow  Weekday 7am-5pm Pager: [phone]  Weeknights/Weekend/Holiday Coverage: Please call the  for contact info     48F with PMH of alcohol abuse, iron deficiency anemia, gastric bypass (2001), HTN, DM (A1c 7.1), HLD, cholelithiasis, gastric bypass (in 1998, denies any revisions), and nonobstructive CAD, who presented with acute cholecystitis and choledocholithiasis s/p RA lap refugio, attempted choledochoscope and IOC intraoperatively but was aborted. Still with abdominal pain and bilirubin elevated to 3.2 s/p lap refugio, so GI consulted for transgastric ERCP.     MRCP 04/11/24:   - Choledocholithiasis, with multiple small filling defects in the distal CBD.  - Cholelithiasis and findings of acute cholecystitis.    Patient is currently afebrile, WBC 2.49, and complaining of diffuse abdominal. Total bilirubin 3.2, alk phos 217, AST//269.     Recommendations:   - continue to trend CBC, CMP, and INR daily   - diet per surgery team, currently on CLD   - plan for EDGE procedure on Tuesday AM   - please ensure patient is NPO except medications after midnight Monday night     Case discussed with Dr. Cowan. GI Team will continue to follow.     Sushma Valles D.O.   Gastroenterology Fellow  Weekday 7am-5pm Pager: [phone]  Weeknights/Weekend/Holiday Coverage: Please call the  for contact info

## 2024-04-13 NOTE — PROGRESS NOTE ADULT - SUBJECTIVE AND OBJECTIVE BOX
Overnight events:       POD#    SUBJECTIVE:      MEDICATIONS  (STANDING):  acetaminophen     Tablet .. 1000 milliGRAM(s) Oral every 6 hours  aspirin enteric coated 81 milliGRAM(s) Oral daily  atorvastatin 40 milliGRAM(s) Oral at bedtime  dextrose 10% Bolus 125 milliLiter(s) IV Bolus once  dextrose 5%. 1000 milliLiter(s) (50 mL/Hr) IV Continuous <Continuous>  dextrose 5%. 1000 milliLiter(s) (100 mL/Hr) IV Continuous <Continuous>  dextrose 50% Injectable 25 Gram(s) IV Push once  dextrose 50% Injectable 12.5 Gram(s) IV Push once  folic acid 1 milliGRAM(s) Oral daily  glucagon  Injectable 1 milliGRAM(s) IntraMuscular once  heparin   Injectable 7500 Unit(s) SubCutaneous every 8 hours  influenza   Vaccine 0.5 milliLiter(s) IntraMuscular once  insulin lispro (ADMELOG) corrective regimen sliding scale   SubCutaneous every 6 hours  pantoprazole    Tablet 40 milliGRAM(s) Oral before breakfast    MEDICATIONS  (PRN):  benzocaine/menthol Lozenge 1 Lozenge Oral every 8 hours PRN Sore Throat  dextrose Oral Gel 15 Gram(s) Oral once PRN Blood Glucose LESS THAN 70 milliGRAM(s)/deciliter  ondansetron Injectable 4 milliGRAM(s) IV Push every 6 hours PRN Nausea  oxyCODONE    IR 10 milliGRAM(s) Oral every 6 hours PRN Severe Pain (7 - 10)  oxyCODONE    IR 5 milliGRAM(s) Oral every 6 hours PRN Moderate Pain (4 - 6)      Vital Signs Last 24 Hrs  T(C): 37.9 (13 Apr 2024 05:21), Max: 37.9 (13 Apr 2024 05:21)  T(F): 100.3 (13 Apr 2024 05:21), Max: 100.3 (13 Apr 2024 05:21)  HR: 94 (13 Apr 2024 05:21) (81 - 115)  BP: 115/74 (13 Apr 2024 05:21) (113/62 - 132/79)  BP(mean): 80 (12 Apr 2024 17:24) (70 - 93)  RR: 15 (13 Apr 2024 05:21) (14 - 18)  SpO2: 100% (13 Apr 2024 05:21) (94% - 100%)    Parameters below as of 13 Apr 2024 05:21  Patient On (Oxygen Delivery Method): room air        Physical Exam:  General: NAD, resting comfortably in bed  Pulmonary: Nonlabored breathing, no respiratory distress  Cardiovascular: NSR  Abdominal: soft, NT/ND  Extremities: WWP, normal strength  Neuro: A/O x 3, CNs II-XII grossly intact, no focal deficits, normal motor/sensation  Pulses: palpable distal pulses    I&O's Summary    11 Apr 2024 07:01  -  12 Apr 2024 07:00  --------------------------------------------------------  IN: 0 mL / OUT: 1200 mL / NET: -1200 mL    12 Apr 2024 07:01  -  13 Apr 2024 06:18  --------------------------------------------------------  IN: 0 mL / OUT: 400 mL / NET: -400 mL        LABS:                        7.6    1.67  )-----------( 121      ( 12 Apr 2024 05:30 )             27.1     04-12    135  |  100  |  6<L>  ----------------------------<  141<H>  4.1   |  28  |  0.87    Ca    9.1      12 Apr 2024 05:30  Phos  4.6     04-12  Mg     1.8     04-12    TPro  6.1  /  Alb  3.2<L>  /  TBili  0.9  /  DBili  0.5<H>  /  AST  599<H>  /  ALT  339<H>  /  AlkPhos  250<H>  04-12    PT/INR - ( 12 Apr 2024 05:30 )   PT: 11.4 sec;   INR: 1.00            Urinalysis Basic - ( 12 Apr 2024 05:30 )    Color: x / Appearance: x / SG: x / pH: x  Gluc: 141 mg/dL / Ketone: x  / Bili: x / Urobili: x   Blood: x / Protein: x / Nitrite: x   Leuk Esterase: x / RBC: x / WBC x   Sq Epi: x / Non Sq Epi: x / Bacteria: x      CAPILLARY BLOOD GLUCOSE      POCT Blood Glucose.: 161 mg/dL (13 Apr 2024 06:11)  POCT Blood Glucose.: 199 mg/dL (13 Apr 2024 00:21)  POCT Blood Glucose.: 121 mg/dL (12 Apr 2024 17:34)  POCT Blood Glucose.: 190 mg/dL (12 Apr 2024 11:05)  POCT Blood Glucose.: 193 mg/dL (12 Apr 2024 07:06)    LIVER FUNCTIONS - ( 12 Apr 2024 05:30 )  Alb: 3.2 g/dL / Pro: 6.1 g/dL / ALK PHOS: 250 U/L / ALT: 339 U/L / AST: 599 U/L / GGT: x             RADIOLOGY & ADDITIONAL STUDIES:   Overnight events: No acute overnight events.       POD#1 MU huff    SUBJECTIVE: Patient seen at bedside with chief resident. Patient denies any nausea. Endorses still having burping.      MEDICATIONS  (STANDING):  acetaminophen     Tablet .. 1000 milliGRAM(s) Oral every 6 hours  aspirin enteric coated 81 milliGRAM(s) Oral daily  atorvastatin 40 milliGRAM(s) Oral at bedtime  dextrose 10% Bolus 125 milliLiter(s) IV Bolus once  dextrose 5%. 1000 milliLiter(s) (50 mL/Hr) IV Continuous <Continuous>  dextrose 5%. 1000 milliLiter(s) (100 mL/Hr) IV Continuous <Continuous>  dextrose 50% Injectable 25 Gram(s) IV Push once  dextrose 50% Injectable 12.5 Gram(s) IV Push once  folic acid 1 milliGRAM(s) Oral daily  glucagon  Injectable 1 milliGRAM(s) IntraMuscular once  heparin   Injectable 7500 Unit(s) SubCutaneous every 8 hours  influenza   Vaccine 0.5 milliLiter(s) IntraMuscular once  insulin lispro (ADMELOG) corrective regimen sliding scale   SubCutaneous every 6 hours  pantoprazole    Tablet 40 milliGRAM(s) Oral before breakfast    MEDICATIONS  (PRN):  benzocaine/menthol Lozenge 1 Lozenge Oral every 8 hours PRN Sore Throat  dextrose Oral Gel 15 Gram(s) Oral once PRN Blood Glucose LESS THAN 70 milliGRAM(s)/deciliter  ondansetron Injectable 4 milliGRAM(s) IV Push every 6 hours PRN Nausea  oxyCODONE    IR 10 milliGRAM(s) Oral every 6 hours PRN Severe Pain (7 - 10)  oxyCODONE    IR 5 milliGRAM(s) Oral every 6 hours PRN Moderate Pain (4 - 6)      Vital Signs Last 24 Hrs  T(C): 37.9 (13 Apr 2024 05:21), Max: 37.9 (13 Apr 2024 05:21)  T(F): 100.3 (13 Apr 2024 05:21), Max: 100.3 (13 Apr 2024 05:21)  HR: 94 (13 Apr 2024 05:21) (81 - 115)  BP: 115/74 (13 Apr 2024 05:21) (113/62 - 132/79)  BP(mean): 80 (12 Apr 2024 17:24) (70 - 93)  RR: 15 (13 Apr 2024 05:21) (14 - 18)  SpO2: 100% (13 Apr 2024 05:21) (94% - 100%)    Parameters below as of 13 Apr 2024 05:21  Patient On (Oxygen Delivery Method): room air        Physical Exam:  General: NAD, resting comfortably in bed  Pulmonary: Nonlabored breathing, no respiratory distress  Cardiovascular: NSR  Abdominal: soft, mildly distended, Appropriate incisional tenderness Incision clean, dry, and intact.   Extremities: WWP, normal strength  Neuro: A/O x 3, CNs II-XII grossly intact,     I&O's Summary    11 Apr 2024 07:01  -  12 Apr 2024 07:00  --------------------------------------------------------  IN: 0 mL / OUT: 1200 mL / NET: -1200 mL    12 Apr 2024 07:01  -  13 Apr 2024 06:18  --------------------------------------------------------  IN: 0 mL / OUT: 400 mL / NET: -400 mL        LABS:                        7.6    1.67  )-----------( 121      ( 12 Apr 2024 05:30 )             27.1     04-12    135  |  100  |  6<L>  ----------------------------<  141<H>  4.1   |  28  |  0.87    Ca    9.1      12 Apr 2024 05:30  Phos  4.6     04-12  Mg     1.8     04-12    TPro  6.1  /  Alb  3.2<L>  /  TBili  0.9  /  DBili  0.5<H>  /  AST  599<H>  /  ALT  339<H>  /  AlkPhos  250<H>  04-12    PT/INR - ( 12 Apr 2024 05:30 )   PT: 11.4 sec;   INR: 1.00            Urinalysis Basic - ( 12 Apr 2024 05:30 )    Color: x / Appearance: x / SG: x / pH: x  Gluc: 141 mg/dL / Ketone: x  / Bili: x / Urobili: x   Blood: x / Protein: x / Nitrite: x   Leuk Esterase: x / RBC: x / WBC x   Sq Epi: x / Non Sq Epi: x / Bacteria: x      CAPILLARY BLOOD GLUCOSE      POCT Blood Glucose.: 161 mg/dL (13 Apr 2024 06:11)  POCT Blood Glucose.: 199 mg/dL (13 Apr 2024 00:21)  POCT Blood Glucose.: 121 mg/dL (12 Apr 2024 17:34)  POCT Blood Glucose.: 190 mg/dL (12 Apr 2024 11:05)  POCT Blood Glucose.: 193 mg/dL (12 Apr 2024 07:06)    LIVER FUNCTIONS - ( 12 Apr 2024 05:30 )  Alb: 3.2 g/dL / Pro: 6.1 g/dL / ALK PHOS: 250 U/L / ALT: 339 U/L / AST: 599 U/L / GGT: x             RADIOLOGY & ADDITIONAL STUDIES:

## 2024-04-13 NOTE — PROGRESS NOTE ADULT - ASSESSMENT
48F history of alcohol abuse, iron deficiency anemia with chronic fatigue, HTN, DM (A1c 7.1), HLD, cholelithiasis, gastric bypass 1998, smoker (stopped 4/7/24), cardiac catheterization in August 2023 with nonobstructive CAD who presented with acute cholecystitis s/p RA lap refugio, attempted choledochoscope and IOC (aborted)    CC Low fat  PPI/BID/SCDs/IS  HSQ/ASA81  [facility]  Pain and nausea control PRN   AM labs

## 2024-04-13 NOTE — CONSULT NOTE ADULT - SUBJECTIVE AND OBJECTIVE BOX
Initial GI Consult Note:     HPI:  48-year-old, history of alcohol abuse (, iron deficiency anemia with chronic fatigue, HTN, DM (A1c 7.1), HLD, cholelithiasis, gastric bypass , smoker (stopped 24), cardiac catheterization in August with nonobstructive CAD who presents to ED for 1 day history chest pain & upper abdominal pain associated with fatigue, nausea, vomiting, chills, constant belching and burping, nausea and 2 to 3 episodes of vomiting. Patient reports that she stopped smoking on  evening as an attempt to leave the habit and started progressively coughing more, at times causing her to have episodes of emesis. Today she woke up with lower central chest pain, epigastric pain radiating towards her right side, not in relation to meals, 7/10 in severity, associated with nausea, vomiting and chills. She did not take anything for the pain and came to the ED for further work up. She denies any headache, runny nose, palpitations, urinary symptoms or changes in bowel pattern.     Of note, patient was admitted in  under Dr. Montejo for similar symptoms, along with transaminitis after binge drinking;  findings of distended gallbladder with pericholecystic fluid on CT, with negative on RUQ US and HIDA scan. Then again patient was admitted in  with substernal and epigastric pain, which underwent cardiact workup and was found to have nonobstructive CAD in a diagnostic catheterization. Patient saw Dr. Montejo in his office (24) where he referred her to GI for further evaluation of her symptoms- underwent EGD 3/15/24 with normal findings.     In the ED /82, HR 95, afebrile, saturating appropriate on RA   On exam, abdomen soft, moderately distended and tympanic on all 4 quadrants, moderately tender to palpation over epigastrium and RUQ, ++ Sims's, no rebound tenderness or guarding. Well healed Pfannestiel incision, well healed laparoscopic incisions  Labs show WBC wnl w no L shift, Hb 8.8, rest wnl   RUQ US cholelithiasis with secondary findings consistent with acute   cholecystitis.  CTAP shows findings corresponding to concurrent right upper quadrant ultrasound concerning for acute cholecystitis with gallbladder wall thickening and small adjacent pericholecystic fluid/stranding, common biliary ductal dilatation up to 9 mm. No radiodense   choledocholithiasis, Bilateral adnexal cysts measuring up to 3.8 cm on the left and 2.1 cm on the right. Leiomyomatous uterus.    PMH: alcohol abuse (, iron deficiency anemia with chronic fatigue, HTN, DM (A1c 7.1), hld, cholelithiasis, gastric bypass , smoker (stopped 24), cardiac catheterization in August with nonobstructive CAD   PSH: fibroid removal, , gastric bypass ,   Social: smoker and prior history of alcohol abuse (binging)   Allergies: NDKA  Scopes: EGD in 3/15/24 w normal findings   Meds: Losartan 100mg, Aspirin 81mg qd, metformin 1000mg   (2024 02:39)    Allergies    No Known Allergies    Intolerances      Home Medications:  folic acid 1 mg oral tablet: 1 tab(s) orally once a day (02 Aug 2023 01:53)  losartan 100 mg oral tablet: 1 orally once a day (02 Aug 2023 01:53)  metFORMIN 1000 mg oral tablet, extended release: 1 orally 2 times a day (02 Aug 2023 01:54)    MEDICATIONS:  MEDICATIONS  (STANDING):  acetaminophen     Tablet .. 1000 milliGRAM(s) Oral every 6 hours  aspirin enteric coated 81 milliGRAM(s) Oral daily  atorvastatin 40 milliGRAM(s) Oral at bedtime  dextrose 10% Bolus 125 milliLiter(s) IV Bolus once  dextrose 5%. 1000 milliLiter(s) (50 mL/Hr) IV Continuous <Continuous>  dextrose 5%. 1000 milliLiter(s) (100 mL/Hr) IV Continuous <Continuous>  dextrose 50% Injectable 25 Gram(s) IV Push once  dextrose 50% Injectable 12.5 Gram(s) IV Push once  folic acid 1 milliGRAM(s) Oral daily  glucagon  Injectable 1 milliGRAM(s) IntraMuscular once  heparin   Injectable 7500 Unit(s) SubCutaneous every 8 hours  influenza   Vaccine 0.5 milliLiter(s) IntraMuscular once  insulin lispro (ADMELOG) corrective regimen sliding scale   SubCutaneous every 6 hours  lactated ringers. 1000 milliLiter(s) (140 mL/Hr) IV Continuous <Continuous>  pantoprazole    Tablet 40 milliGRAM(s) Oral before breakfast    MEDICATIONS  (PRN):  benzocaine/menthol Lozenge 1 Lozenge Oral every 8 hours PRN Sore Throat  dextrose Oral Gel 15 Gram(s) Oral once PRN Blood Glucose LESS THAN 70 milliGRAM(s)/deciliter  ondansetron Injectable 4 milliGRAM(s) IV Push every 6 hours PRN Nausea  oxyCODONE    IR 5 milliGRAM(s) Oral every 6 hours PRN Moderate Pain (4 - 6)  oxyCODONE    IR 10 milliGRAM(s) Oral every 6 hours PRN Severe Pain (7 - 10)    PAST MEDICAL & SURGICAL HISTORY:  Anemia      Fibroids      Thyroid nodule      Diabetes      GERD (gastroesophageal reflux disease)      HTN (hypertension)      HLD (hyperlipidemia)      ETOH abuse      H/O gastric bypass        H/O:   x1      History of D&C      H/O: myomectomy        FAMILY HISTORY:  Family history of diabetes mellitus    Family history of hypertension (Grandparent)      SOCIAL HISTORY:  [place]: [ ] Current, [ ] Former, [ ] Never; Pack Years:  Alcohol:  Illicit Drugs:    REVIEW OF SYSTEMS:  CONSTITUTIONAL: No weakness, fevers or chills  HEENT: No visual changes; No vertigo or throat pain   NECK: No pain or stiffness  RESPIRATORY: No cough, wheezing, hemoptysis; No shortness of breath  CARDIOVASCULAR: No chest pain or palpitations  GASTROINTESTINAL: No abdominal or epigastric pain. No nausea, vomiting, or hematemesis; No diarrhea or constipation. No melena or hematochezia.  GENITOURINARY: No dysuria, frequency or hematuria  NEUROLOGICAL: No numbness or weakness  SKIN: No itching, burning, rashes, or lesions   All other 10 review of systems is negative unless indicated above.    Vital Signs Last 24 Hrs  T(C): 36.7 (2024 08:39), Max: 37.9 (2024 05:21)  T(F): 98 (2024 08:39), Max: 100.3 (2024 05:21)  HR: 93 (2024 08:39) (93 - 115)  BP: 118/75 (2024 08:39) (113/62 - 132/79)  BP(mean): 89 (2024 08:39) (80 - 89)  RR: 17 (2024 08:39) (14 - 18)  SpO2: 94% (2024 08:39) (94% - 100%)    Parameters below as of 2024 08:39  Patient On (Oxygen Delivery Method): room air         @ 07: @ 07:00  --------------------------------------------------------  IN: 0 mL / OUT: 400 mL / NET: -400 mL     @ : @ 16:17  --------------------------------------------------------  IN: 0 mL / OUT: 600 mL / NET: -600 mL        PHYSICAL EXAM:    General: Well developed; well nourished; in no acute distress  Eyes: Anicteric sclerae, moist conjunctivae  HENT: Moist mucous membranes  Neck: Trachea midline, supple  Lungs: Normal respiratory effort, no intercostal retractions  Cardiovascular: RRR  Abdomen: Soft, non-tender non-distended; Normal bowel sounds; No rebound or guarding  Extremities: Normal range of motion, No clubbing, cyanosis or edema  Neurological: Alert and oriented x3  Skin: Warm and dry. No obvious rash    LABS:                        7.3    2.49  )-----------( 112      ( 2024 05:30 )             24.8     04-13    131<L>  |  97  |  6<L>  ----------------------------<  159<H>  4.3   |  24  |  0.97    Ca    8.8      2024 05:30  Phos  4.1     04-13  Mg     1.6         TPro  5.7<L>  /  Alb  3.1<L>  /  TBili  3.2<H>  /  DBili  2.8<H>  /  AST  308<H>  /  ALT  269<H>  /  AlkPhos  217<H>          PT/INR - ( 2024 05:30 )   PT: 11.4 sec;   INR: 1.00              Urinalysis with Rflx Culture (collected 10 Apr 2024 19:46)      RADIOLOGY & ADDITIONAL STUDIES:     Reviewed   Initial GI Consult Note:     HPI:  48-year-old, history of alcohol abuse (, iron deficiency anemia with chronic fatigue, HTN, DM (A1c 7.1), HLD, cholelithiasis, gastric bypass , smoker (stopped 24), cardiac catheterization in August with nonobstructive CAD who presents to ED for 1 day history chest pain & upper abdominal pain associated with fatigue, nausea, vomiting, chills, constant belching and burping, nausea and 2 to 3 episodes of vomiting. Patient reports that she stopped smoking on  evening as an attempt to leave the habit and started progressively coughing more, at times causing her to have episodes of emesis. Today she woke up with lower central chest pain, epigastric pain radiating towards her right side, not in relation to meals, 7/10 in severity, associated with nausea, vomiting and chills. She did not take anything for the pain and came to the ED for further work up. She denies any headache, runny nose, palpitations, urinary symptoms or changes in bowel pattern.     Of note, patient was admitted in  under Dr. Montejo for similar symptoms, along with transaminitis after binge drinking;  findings of distended gallbladder with pericholecystic fluid on CT, with negative on RUQ US and HIDA scan. Then again patient was admitted in  with substernal and epigastric pain, which underwent cardiac workup and was found to have nonobstructive CAD in a diagnostic catheterization. Patient saw Dr. Montejo in his office (24) where he referred her to GI for further evaluation of her symptoms- underwent EGD 3/15/24 with normal findings.     In the ED /82, HR 95, afebrile, saturating appropriate on RA   On exam, abdomen soft, moderately distended and tympanic on all 4 quadrants, moderately tender to palpation over epigastrium and RUQ, ++ Sims's, no rebound tenderness or guarding. Well healed Pfannestiel incision, well healed laparoscopic incisions  Labs show WBC wnl w no L shift, Hb 8.8, rest wnl   RUQ US cholelithiasis with secondary findings consistent with acute cholecystitis.  CTAP shows findings corresponding to concurrent right upper quadrant ultrasound concerning for acute cholecystitis with gallbladder wall thickening and small adjacent pericholecystic fluid/stranding, common biliary ductal dilatation up to 9 mm. No radiodense   choledocholithiasis, Bilateral adnexal cysts measuring up to 3.8 cm on the left and 2.1 cm on the right. Leiomyomatous uterus.    PMH: alcohol abuse (, iron deficiency anemia with chronic fatigue, HTN, DM (A1c 7.1), hld, cholelithiasis, gastric bypass , smoker (stopped 24), cardiac catheterization in August with nonobstructive CAD   PSH: fibroid removal, , gastric bypass ,   Social: smoker and prior history of alcohol abuse (binging)   Allergies: NDKA  Scopes: EGD in 3/15/24 w normal findings   Meds: Losartan 100mg, Aspirin 81mg qd, metformin 1000mg    Underwent RA lap refugio on  and a choledochoscope and IOC was attempted but ultimately aborted. Patient tolerated procedure well, but continued to have abdominal pain, so CMP was repeated, showing bilirubin now 3.2. GI consulted for trransgastric ERCP. Patient seen and examined at bedside. Reports that she has diffuse abdominal pain, which she describes has not really worsened but has persisted since admission. Denies any fevers, n/v/d/c. Tolerated eating a regular diet this AM.       Allergies  No Known Allergies  Intolerances      Home Medications:  folic acid 1 mg oral tablet: 1 tab(s) orally once a day (02 Aug 2023 01:53)  losartan 100 mg oral tablet: 1 orally once a day (02 Aug 2023 01:53)  metFORMIN 1000 mg oral tablet, extended release: 1 orally 2 times a day (02 Aug 2023 01:54)    MEDICATIONS:  MEDICATIONS  (STANDING):  acetaminophen     Tablet .. 1000 milliGRAM(s) Oral every 6 hours  aspirin enteric coated 81 milliGRAM(s) Oral daily  atorvastatin 40 milliGRAM(s) Oral at bedtime  dextrose 10% Bolus 125 milliLiter(s) IV Bolus once  dextrose 5%. 1000 milliLiter(s) (50 mL/Hr) IV Continuous <Continuous>  dextrose 5%. 1000 milliLiter(s) (100 mL/Hr) IV Continuous <Continuous>  dextrose 50% Injectable 25 Gram(s) IV Push once  dextrose 50% Injectable 12.5 Gram(s) IV Push once  folic acid 1 milliGRAM(s) Oral daily  glucagon  Injectable 1 milliGRAM(s) IntraMuscular once  heparin   Injectable 7500 Unit(s) SubCutaneous every 8 hours  influenza   Vaccine 0.5 milliLiter(s) IntraMuscular once  insulin lispro (ADMELOG) corrective regimen sliding scale   SubCutaneous every 6 hours  lactated ringers. 1000 milliLiter(s) (140 mL/Hr) IV Continuous <Continuous>  pantoprazole    Tablet 40 milliGRAM(s) Oral before breakfast    MEDICATIONS  (PRN):  benzocaine/menthol Lozenge 1 Lozenge Oral every 8 hours PRN Sore Throat  dextrose Oral Gel 15 Gram(s) Oral once PRN Blood Glucose LESS THAN 70 milliGRAM(s)/deciliter  ondansetron Injectable 4 milliGRAM(s) IV Push every 6 hours PRN Nausea  oxyCODONE    IR 5 milliGRAM(s) Oral every 6 hours PRN Moderate Pain (4 - 6)  oxyCODONE    IR 10 milliGRAM(s) Oral every 6 hours PRN Severe Pain (7 - 10)    PAST MEDICAL & SURGICAL HISTORY:  Anemia      Fibroids      Thyroid nodule      Diabetes      GERD (gastroesophageal reflux disease)      HTN (hypertension)      HLD (hyperlipidemia)      ETOH abuse      H/O gastric bypass        H/O:   x1      History of D&C      H/O: myomectomy        FAMILY HISTORY:  Family history of diabetes mellitus    Family history of hypertension (Grandparent)      SOCIAL HISTORY:  [place]: [ ] Current, [ ] Former, [ ] Never; Pack Years:  Alcohol:  Illicit Drugs:        Vital Signs Last 24 Hrs  T(C): 36.7 (2024 08:39), Max: 37.9 (2024 05:21)  T(F): 98 (2024 08:39), Max: 100.3 (2024 05:21)  HR: 93 (2024 08:39) (93 - 115)  BP: 118/75 (2024 08:39) (113/62 - 132/79)  BP(mean): 89 (2024 08:39) (80 - 89)  RR: 17 (2024 08:39) (14 - 18)  SpO2: 94% (2024 08:39) (94% - 100%)    Parameters below as of 2024 08:39  Patient On (Oxygen Delivery Method): room air        04-12 @ 07:01  -  -13 @ 07:00  --------------------------------------------------------  IN: 0 mL / OUT: 400 mL / NET: -400 mL     @ 07:01  -   @ 16:17  --------------------------------------------------------  IN: 0 mL / OUT: 600 mL / NET: -600 mL      PHYSICAL EXAM:  General: In mild discomfort   Lungs: Normal respiratory effort and no intercostal retractions  Cardiovascular: RRR  Abdomen: Soft, non-distended, tender to palpation diffusely, visible incision sites from lap refugio   Neurological: Alert and oriented x3  Skin: Warm and dry. No obvious rash      LABS:                        7.3    2.49  )-----------( 112      ( 2024 05:30 )             24.8     04-13    131<L>  |  97  |  6<L>  ----------------------------<  159<H>  4.3   |  24  |  0.97    Ca    8.8      2024 05:30  Phos  4.1     13  Mg     1.6     13    TPro  5.7<L>  /  Alb  3.1<L>  /  TBili  3.2<H>  /  DBili  2.8<H>  /  AST  308<H>  /  ALT  269<H>  /  AlkPhos  217<H>          PT/INR - ( 2024 05:30 )   PT: 11.4 sec;   INR: 1.00              Urinalysis with Rflx Culture (collected 10 Apr 2024 19:46)      RADIOLOGY & ADDITIONAL STUDIES:     Reviewed

## 2024-04-14 LAB
ALBUMIN SERPL ELPH-MCNC: 3 G/DL — LOW (ref 3.3–5)
ALP SERPL-CCNC: 192 U/L — HIGH (ref 40–120)
ALT FLD-CCNC: 189 U/L — HIGH (ref 10–45)
ANION GAP SERPL CALC-SCNC: 12 MMOL/L — SIGNIFICANT CHANGE UP (ref 5–17)
APTT BLD: 39.2 SEC — HIGH (ref 24.5–35.6)
AST SERPL-CCNC: 133 U/L — HIGH (ref 10–40)
BILIRUB DIRECT SERPL-MCNC: 2 MG/DL — HIGH (ref 0–0.3)
BILIRUB INDIRECT FLD-MCNC: 0.5 MG/DL — SIGNIFICANT CHANGE UP (ref 0.2–1)
BILIRUB SERPL-MCNC: 2.5 MG/DL — HIGH (ref 0.2–1.2)
BUN SERPL-MCNC: 5 MG/DL — LOW (ref 7–23)
CALCIUM SERPL-MCNC: 8.9 MG/DL — SIGNIFICANT CHANGE UP (ref 8.4–10.5)
CHLORIDE SERPL-SCNC: 100 MMOL/L — SIGNIFICANT CHANGE UP (ref 96–108)
CO2 SERPL-SCNC: 22 MMOL/L — SIGNIFICANT CHANGE UP (ref 22–31)
CREAT SERPL-MCNC: 0.9 MG/DL — SIGNIFICANT CHANGE UP (ref 0.5–1.3)
EGFR: 80 ML/MIN/1.73M2 — SIGNIFICANT CHANGE UP
GLUCOSE BLDC GLUCOMTR-MCNC: 126 MG/DL — HIGH (ref 70–99)
GLUCOSE BLDC GLUCOMTR-MCNC: 128 MG/DL — HIGH (ref 70–99)
GLUCOSE BLDC GLUCOMTR-MCNC: 138 MG/DL — HIGH (ref 70–99)
GLUCOSE BLDC GLUCOMTR-MCNC: 158 MG/DL — HIGH (ref 70–99)
GLUCOSE BLDC GLUCOMTR-MCNC: 164 MG/DL — HIGH (ref 70–99)
GLUCOSE SERPL-MCNC: 135 MG/DL — HIGH (ref 70–99)
HCT VFR BLD CALC: 24.4 % — LOW (ref 34.5–45)
HGB BLD-MCNC: 7.1 G/DL — LOW (ref 11.5–15.5)
INR BLD: 1.19 — HIGH (ref 0.85–1.18)
MAGNESIUM SERPL-MCNC: 2 MG/DL — SIGNIFICANT CHANGE UP (ref 1.6–2.6)
MCHC RBC-ENTMCNC: 22.5 PG — LOW (ref 27–34)
MCHC RBC-ENTMCNC: 29.1 GM/DL — LOW (ref 32–36)
MCV RBC AUTO: 77.5 FL — LOW (ref 80–100)
NRBC # BLD: 0 /100 WBCS — SIGNIFICANT CHANGE UP (ref 0–0)
PHOSPHATE SERPL-MCNC: 2.6 MG/DL — SIGNIFICANT CHANGE UP (ref 2.5–4.5)
PLATELET # BLD AUTO: 167 K/UL — SIGNIFICANT CHANGE UP (ref 150–400)
POTASSIUM SERPL-MCNC: 3.6 MMOL/L — SIGNIFICANT CHANGE UP (ref 3.5–5.3)
POTASSIUM SERPL-SCNC: 3.6 MMOL/L — SIGNIFICANT CHANGE UP (ref 3.5–5.3)
PROT SERPL-MCNC: 5.8 G/DL — LOW (ref 6–8.3)
PROTHROM AB SERPL-ACNC: 13.5 SEC — HIGH (ref 9.5–13)
RBC # BLD: 3.15 M/UL — LOW (ref 3.8–5.2)
RBC # FLD: 27.4 % — HIGH (ref 10.3–14.5)
SODIUM SERPL-SCNC: 134 MMOL/L — LOW (ref 135–145)
WBC # BLD: 4.91 K/UL — SIGNIFICANT CHANGE UP (ref 3.8–10.5)
WBC # FLD AUTO: 4.91 K/UL — SIGNIFICANT CHANGE UP (ref 3.8–10.5)

## 2024-04-14 RX ORDER — POTASSIUM PHOSPHATE, MONOBASIC POTASSIUM PHOSPHATE, DIBASIC 236; 224 MG/ML; MG/ML
21 INJECTION, SOLUTION INTRAVENOUS ONCE
Refills: 0 | Status: COMPLETED | OUTPATIENT
Start: 2024-04-14 | End: 2024-04-14

## 2024-04-14 RX ORDER — DEXTROSE MONOHYDRATE, SODIUM CHLORIDE, AND POTASSIUM CHLORIDE 50; .745; 4.5 G/1000ML; G/1000ML; G/1000ML
1000 INJECTION, SOLUTION INTRAVENOUS
Refills: 0 | Status: DISCONTINUED | OUTPATIENT
Start: 2024-04-14 | End: 2024-04-18

## 2024-04-14 RX ORDER — ACETAMINOPHEN 500 MG
1000 TABLET ORAL ONCE
Refills: 0 | Status: COMPLETED | OUTPATIENT
Start: 2024-04-14 | End: 2024-04-14

## 2024-04-14 RX ORDER — HYDROMORPHONE HYDROCHLORIDE 2 MG/ML
0.25 INJECTION INTRAMUSCULAR; INTRAVENOUS; SUBCUTANEOUS ONCE
Refills: 0 | Status: DISCONTINUED | OUTPATIENT
Start: 2024-04-14 | End: 2024-04-14

## 2024-04-14 RX ORDER — SIMETHICONE 80 MG/1
80 TABLET, CHEWABLE ORAL ONCE
Refills: 0 | Status: COMPLETED | OUTPATIENT
Start: 2024-04-14 | End: 2024-04-14

## 2024-04-14 RX ORDER — ACETAMINOPHEN 500 MG
1000 TABLET ORAL ONCE
Refills: 0 | Status: COMPLETED | OUTPATIENT
Start: 2024-04-15 | End: 2024-04-15

## 2024-04-14 RX ADMIN — HEPARIN SODIUM 7500 UNIT(S): 5000 INJECTION INTRAVENOUS; SUBCUTANEOUS at 21:25

## 2024-04-14 RX ADMIN — Medication 1000 MILLIGRAM(S): at 05:54

## 2024-04-14 RX ADMIN — HYDROMORPHONE HYDROCHLORIDE 0.5 MILLIGRAM(S): 2 INJECTION INTRAMUSCULAR; INTRAVENOUS; SUBCUTANEOUS at 00:10

## 2024-04-14 RX ADMIN — BENZOCAINE AND MENTHOL 1 LOZENGE: 5; 1 LIQUID ORAL at 14:10

## 2024-04-14 RX ADMIN — BENZOCAINE AND MENTHOL 1 LOZENGE: 5; 1 LIQUID ORAL at 06:27

## 2024-04-14 RX ADMIN — HYDROMORPHONE HYDROCHLORIDE 0.5 MILLIGRAM(S): 2 INJECTION INTRAMUSCULAR; INTRAVENOUS; SUBCUTANEOUS at 00:37

## 2024-04-14 RX ADMIN — Medication 400 MILLIGRAM(S): at 05:08

## 2024-04-14 RX ADMIN — HYDROMORPHONE HYDROCHLORIDE 0.5 MILLIGRAM(S): 2 INJECTION INTRAMUSCULAR; INTRAVENOUS; SUBCUTANEOUS at 07:21

## 2024-04-14 RX ADMIN — Medication 400 MILLIGRAM(S): at 11:25

## 2024-04-14 RX ADMIN — HYDROMORPHONE HYDROCHLORIDE 0.25 MILLIGRAM(S): 2 INJECTION INTRAMUSCULAR; INTRAVENOUS; SUBCUTANEOUS at 03:47

## 2024-04-14 RX ADMIN — HEPARIN SODIUM 7500 UNIT(S): 5000 INJECTION INTRAVENOUS; SUBCUTANEOUS at 05:09

## 2024-04-14 RX ADMIN — DEXTROSE MONOHYDRATE, SODIUM CHLORIDE, AND POTASSIUM CHLORIDE 140 MILLILITER(S): 50; .745; 4.5 INJECTION, SOLUTION INTRAVENOUS at 22:42

## 2024-04-14 RX ADMIN — SIMETHICONE 80 MILLIGRAM(S): 80 TABLET, CHEWABLE ORAL at 22:42

## 2024-04-14 RX ADMIN — Medication 1 MILLIGRAM(S): at 11:26

## 2024-04-14 RX ADMIN — DEXTROSE MONOHYDRATE, SODIUM CHLORIDE, AND POTASSIUM CHLORIDE 140 MILLILITER(S): 50; .745; 4.5 INJECTION, SOLUTION INTRAVENOUS at 11:25

## 2024-04-14 RX ADMIN — HYDROMORPHONE HYDROCHLORIDE 0.5 MILLIGRAM(S): 2 INJECTION INTRAMUSCULAR; INTRAVENOUS; SUBCUTANEOUS at 06:21

## 2024-04-14 RX ADMIN — HYDROMORPHONE HYDROCHLORIDE 0.5 MILLIGRAM(S): 2 INJECTION INTRAMUSCULAR; INTRAVENOUS; SUBCUTANEOUS at 21:25

## 2024-04-14 RX ADMIN — HYDROMORPHONE HYDROCHLORIDE 0.25 MILLIGRAM(S): 2 INJECTION INTRAMUSCULAR; INTRAVENOUS; SUBCUTANEOUS at 04:47

## 2024-04-14 RX ADMIN — HYDROMORPHONE HYDROCHLORIDE 0.5 MILLIGRAM(S): 2 INJECTION INTRAMUSCULAR; INTRAVENOUS; SUBCUTANEOUS at 22:25

## 2024-04-14 RX ADMIN — PANTOPRAZOLE SODIUM 40 MILLIGRAM(S): 20 TABLET, DELAYED RELEASE ORAL at 05:09

## 2024-04-14 RX ADMIN — POTASSIUM PHOSPHATE, MONOBASIC POTASSIUM PHOSPHATE, DIBASIC 62.5 MILLIMOLE(S): 236; 224 INJECTION, SOLUTION INTRAVENOUS at 13:26

## 2024-04-14 RX ADMIN — HEPARIN SODIUM 7500 UNIT(S): 5000 INJECTION INTRAVENOUS; SUBCUTANEOUS at 13:27

## 2024-04-14 RX ADMIN — Medication 81 MILLIGRAM(S): at 11:26

## 2024-04-14 RX ADMIN — ATORVASTATIN CALCIUM 40 MILLIGRAM(S): 80 TABLET, FILM COATED ORAL at 21:26

## 2024-04-14 RX ADMIN — Medication 2: at 00:30

## 2024-04-14 RX ADMIN — Medication 400 MILLIGRAM(S): at 18:18

## 2024-04-14 NOTE — PROGRESS NOTE ADULT - ASSESSMENT
48F history of alcohol abuse, iron deficiency anemia with chronic fatigue, gastric bypass (2001) HTN, DM (A1c 7.1), HLD, cholelithiasis, gastric bypass 1998, smoker (stopped 4/7/24), cardiac catheterization in August 2023 with nonobstructive CAD who presented with acute cholecystitis s/p RA lap refugio, attempted choledochoscope and IOC (aborted)    NPO/IVF  PPI/BID/SCDs/IS  [id number]  [facility]  GI recs  Pain and nausea control PRN   AM labs   48F history of alcohol abuse, iron deficiency anemia with chronic fatigue, gastric bypass (2001) HTN, DM (A1c 7.1), HLD, cholelithiasis, gastric bypass 1998, smoker (stopped 4/7/24), cardiac catheterization in August 2023 with nonobstructive CAD who presented with acute cholecystitis s/p RA lap refugio, attempted choledochoscope and IOC (aborted)    NPO/IVF  PPI/BID/SCDs/IS  [id number]  [facility]  GI recs  Pain and nausea control PRN   AM labs    Plans to be discussed with attending and chief resident for finalization.

## 2024-04-14 NOTE — PROGRESS NOTE ADULT - SUBJECTIVE AND OBJECTIVE BOX
Overnight events:       POD#    SUBJECTIVE:      MEDICATIONS  (STANDING):  acetaminophen   IVPB .. 1000 milliGRAM(s) IV Intermittent once  aspirin enteric coated 81 milliGRAM(s) Oral daily  atorvastatin 40 milliGRAM(s) Oral at bedtime  dextrose 10% Bolus 125 milliLiter(s) IV Bolus once  dextrose 5%. 1000 milliLiter(s) (50 mL/Hr) IV Continuous <Continuous>  dextrose 5%. 1000 milliLiter(s) (100 mL/Hr) IV Continuous <Continuous>  dextrose 50% Injectable 25 Gram(s) IV Push once  dextrose 50% Injectable 12.5 Gram(s) IV Push once  folic acid 1 milliGRAM(s) Oral daily  glucagon  Injectable 1 milliGRAM(s) IntraMuscular once  heparin   Injectable 7500 Unit(s) SubCutaneous every 8 hours  influenza   Vaccine 0.5 milliLiter(s) IntraMuscular once  insulin lispro (ADMELOG) corrective regimen sliding scale   SubCutaneous every 6 hours  lactated ringers. 1000 milliLiter(s) (140 mL/Hr) IV Continuous <Continuous>  pantoprazole    Tablet 40 milliGRAM(s) Oral before breakfast    MEDICATIONS  (PRN):  benzocaine/menthol Lozenge 1 Lozenge Oral every 8 hours PRN Sore Throat  dextrose Oral Gel 15 Gram(s) Oral once PRN Blood Glucose LESS THAN 70 milliGRAM(s)/deciliter  HYDROmorphone  Injectable 0.5 milliGRAM(s) IV Push every 6 hours PRN Severe Pain (7 - 10)  HYDROmorphone  Injectable 0.25 milliGRAM(s) IV Push every 6 hours PRN Moderate Pain (4 - 6)  ondansetron Injectable 4 milliGRAM(s) IV Push every 6 hours PRN Nausea      Vital Signs Last 24 Hrs  T(C): 36.9 (14 Apr 2024 05:09), Max: 37.3 (13 Apr 2024 20:42)  T(F): 98.4 (14 Apr 2024 05:09), Max: 99.1 (13 Apr 2024 20:42)  HR: 108 (14 Apr 2024 05:53) (93 - 108)  BP: 126/80 (14 Apr 2024 05:53) (115/71 - 130/82)  BP(mean): 89 (13 Apr 2024 08:39) (89 - 89)  RR: 17 (14 Apr 2024 05:53) (17 - 18)  SpO2: 99% (14 Apr 2024 05:53) (91% - 99%)    Parameters below as of 14 Apr 2024 05:53  Patient On (Oxygen Delivery Method): room air        Physical Exam:  General: NAD, resting comfortably in bed  Pulmonary: Nonlabored breathing, no respiratory distress  Cardiovascular: NSR  Abdominal: soft, NT/ND  Extremities: WWP, normal strength  Neuro: A/O x 3, CNs II-XII grossly intact, no focal deficits, normal motor/sensation  Pulses: palpable distal pulses    I&O's Summary    12 Apr 2024 07:01  -  13 Apr 2024 07:00  --------------------------------------------------------  IN: 0 mL / OUT: 400 mL / NET: -400 mL    13 Apr 2024 07:01  -  14 Apr 2024 06:48  --------------------------------------------------------  IN: 0 mL / OUT: 1000 mL / NET: -1000 mL        LABS:                        7.3    2.49  )-----------( 112      ( 13 Apr 2024 05:30 )             24.8     04-13    131<L>  |  97  |  6<L>  ----------------------------<  159<H>  4.3   |  24  |  0.97    Ca    8.8      13 Apr 2024 05:30  Phos  4.1     04-13  Mg     1.6     04-13    TPro  5.7<L>  /  Alb  3.1<L>  /  TBili  3.2<H>  /  DBili  2.8<H>  /  AST  308<H>  /  ALT  269<H>  /  AlkPhos  217<H>  04-13      Urinalysis Basic - ( 13 Apr 2024 05:30 )    Color: x / Appearance: x / SG: x / pH: x  Gluc: 159 mg/dL / Ketone: x  / Bili: x / Urobili: x   Blood: x / Protein: x / Nitrite: x   Leuk Esterase: x / RBC: x / WBC x   Sq Epi: x / Non Sq Epi: x / Bacteria: x      CAPILLARY BLOOD GLUCOSE      POCT Blood Glucose.: 138 mg/dL (14 Apr 2024 06:00)  POCT Blood Glucose.: 158 mg/dL (14 Apr 2024 00:03)  POCT Blood Glucose.: 147 mg/dL (13 Apr 2024 21:52)  POCT Blood Glucose.: 165 mg/dL (13 Apr 2024 17:46)  POCT Blood Glucose.: 223 mg/dL (13 Apr 2024 13:11)    LIVER FUNCTIONS - ( 13 Apr 2024 05:30 )  Alb: 3.1 g/dL / Pro: 5.7 g/dL / ALK PHOS: 217 U/L / ALT: 269 U/L / AST: 308 U/L / GGT: x             RADIOLOGY & ADDITIONAL STUDIES:   Overnight events: No acute overnight events.       POD#   4/12: RA lap refugio, attempted choledochoscope and IOC (aborted)      SUBJECTIVE: Patient seen at bedside with chief resident. Patient reports pain has improved slightly. Denies any BM. Endorses flatus and burping.       MEDICATIONS  (STANDING):  acetaminophen   IVPB .. 1000 milliGRAM(s) IV Intermittent once  aspirin enteric coated 81 milliGRAM(s) Oral daily  atorvastatin 40 milliGRAM(s) Oral at bedtime  dextrose 10% Bolus 125 milliLiter(s) IV Bolus once  dextrose 5%. 1000 milliLiter(s) (50 mL/Hr) IV Continuous <Continuous>  dextrose 5%. 1000 milliLiter(s) (100 mL/Hr) IV Continuous <Continuous>  dextrose 50% Injectable 25 Gram(s) IV Push once  dextrose 50% Injectable 12.5 Gram(s) IV Push once  folic acid 1 milliGRAM(s) Oral daily  glucagon  Injectable 1 milliGRAM(s) IntraMuscular once  heparin   Injectable 7500 Unit(s) SubCutaneous every 8 hours  influenza   Vaccine 0.5 milliLiter(s) IntraMuscular once  insulin lispro (ADMELOG) corrective regimen sliding scale   SubCutaneous every 6 hours  lactated ringers. 1000 milliLiter(s) (140 mL/Hr) IV Continuous <Continuous>  pantoprazole    Tablet 40 milliGRAM(s) Oral before breakfast    MEDICATIONS  (PRN):  benzocaine/menthol Lozenge 1 Lozenge Oral every 8 hours PRN Sore Throat  dextrose Oral Gel 15 Gram(s) Oral once PRN Blood Glucose LESS THAN 70 milliGRAM(s)/deciliter  HYDROmorphone  Injectable 0.5 milliGRAM(s) IV Push every 6 hours PRN Severe Pain (7 - 10)  HYDROmorphone  Injectable 0.25 milliGRAM(s) IV Push every 6 hours PRN Moderate Pain (4 - 6)  ondansetron Injectable 4 milliGRAM(s) IV Push every 6 hours PRN Nausea      Vital Signs Last 24 Hrs  T(C): 36.9 (14 Apr 2024 05:09), Max: 37.3 (13 Apr 2024 20:42)  T(F): 98.4 (14 Apr 2024 05:09), Max: 99.1 (13 Apr 2024 20:42)  HR: 108 (14 Apr 2024 05:53) (93 - 108)  BP: 126/80 (14 Apr 2024 05:53) (115/71 - 130/82)  BP(mean): 89 (13 Apr 2024 08:39) (89 - 89)  RR: 17 (14 Apr 2024 05:53) (17 - 18)  SpO2: 99% (14 Apr 2024 05:53) (91% - 99%)    Parameters below as of 14 Apr 2024 05:53  Patient On (Oxygen Delivery Method): room air        Physical Exam:  General: NAD, resting comfortably in bed  Pulmonary: Nonlabored breathing, no respiratory distress  Cardiovascular: NSR  Abdominal: soft, mildly distended, ttp RUQ  Extremities: WWP, normal strength  Neuro: A/O x 3, CNs II-XII grossly intact,     I&O's Summary    12 Apr 2024 07:01  -  13 Apr 2024 07:00  --------------------------------------------------------  IN: 0 mL / OUT: 400 mL / NET: -400 mL    13 Apr 2024 07:01  -  14 Apr 2024 06:48  --------------------------------------------------------  IN: 0 mL / OUT: 1000 mL / NET: -1000 mL        LABS:                        7.3    2.49  )-----------( 112      ( 13 Apr 2024 05:30 )             24.8     04-13    131<L>  |  97  |  6<L>  ----------------------------<  159<H>  4.3   |  24  |  0.97    Ca    8.8      13 Apr 2024 05:30  Phos  4.1     04-13  Mg     1.6     04-13    TPro  5.7<L>  /  Alb  3.1<L>  /  TBili  3.2<H>  /  DBili  2.8<H>  /  AST  308<H>  /  ALT  269<H>  /  AlkPhos  217<H>  04-13      Urinalysis Basic - ( 13 Apr 2024 05:30 )    Color: x / Appearance: x / SG: x / pH: x  Gluc: 159 mg/dL / Ketone: x  / Bili: x / Urobili: x   Blood: x / Protein: x / Nitrite: x   Leuk Esterase: x / RBC: x / WBC x   Sq Epi: x / Non Sq Epi: x / Bacteria: x      CAPILLARY BLOOD GLUCOSE      POCT Blood Glucose.: 138 mg/dL (14 Apr 2024 06:00)  POCT Blood Glucose.: 158 mg/dL (14 Apr 2024 00:03)  POCT Blood Glucose.: 147 mg/dL (13 Apr 2024 21:52)  POCT Blood Glucose.: 165 mg/dL (13 Apr 2024 17:46)  POCT Blood Glucose.: 223 mg/dL (13 Apr 2024 13:11)    LIVER FUNCTIONS - ( 13 Apr 2024 05:30 )  Alb: 3.1 g/dL / Pro: 5.7 g/dL / ALK PHOS: 217 U/L / ALT: 269 U/L / AST: 308 U/L / GGT: x             RADIOLOGY & ADDITIONAL STUDIES:

## 2024-04-15 LAB
ADD ON TEST-SPECIMEN IN LAB: SIGNIFICANT CHANGE UP
ALBUMIN SERPL ELPH-MCNC: 2.6 G/DL — LOW (ref 3.3–5)
ALBUMIN SERPL ELPH-MCNC: 2.8 G/DL — LOW (ref 3.3–5)
ALP SERPL-CCNC: 199 U/L — HIGH (ref 40–120)
ALP SERPL-CCNC: 204 U/L — HIGH (ref 40–120)
ALT FLD-CCNC: 113 U/L — HIGH (ref 10–45)
ALT FLD-CCNC: 130 U/L — HIGH (ref 10–45)
ANION GAP SERPL CALC-SCNC: 10 MMOL/L — SIGNIFICANT CHANGE UP (ref 5–17)
ANION GAP SERPL CALC-SCNC: 10 MMOL/L — SIGNIFICANT CHANGE UP (ref 5–17)
AST SERPL-CCNC: 59 U/L — HIGH (ref 10–40)
AST SERPL-CCNC: 61 U/L — HIGH (ref 10–40)
BILIRUB DIRECT SERPL-MCNC: 0.8 MG/DL — HIGH (ref 0–0.3)
BILIRUB DIRECT SERPL-MCNC: 1.9 MG/DL — HIGH (ref 0–0.3)
BILIRUB INDIRECT FLD-MCNC: 0.4 MG/DL — SIGNIFICANT CHANGE UP (ref 0.2–1)
BILIRUB INDIRECT FLD-MCNC: 0.9 MG/DL — SIGNIFICANT CHANGE UP (ref 0.2–1)
BILIRUB SERPL-MCNC: 1.7 MG/DL — HIGH (ref 0.2–1.2)
BILIRUB SERPL-MCNC: 1.7 MG/DL — HIGH (ref 0.2–1.2)
BILIRUB SERPL-MCNC: 2.3 MG/DL — HIGH (ref 0.2–1.2)
BUN SERPL-MCNC: 2 MG/DL — LOW (ref 7–23)
BUN SERPL-MCNC: 3 MG/DL — LOW (ref 7–23)
CALCIUM SERPL-MCNC: 8.7 MG/DL — SIGNIFICANT CHANGE UP (ref 8.4–10.5)
CALCIUM SERPL-MCNC: 8.8 MG/DL — SIGNIFICANT CHANGE UP (ref 8.4–10.5)
CHLORIDE SERPL-SCNC: 102 MMOL/L — SIGNIFICANT CHANGE UP (ref 96–108)
CHLORIDE SERPL-SCNC: 105 MMOL/L — SIGNIFICANT CHANGE UP (ref 96–108)
CO2 SERPL-SCNC: 19 MMOL/L — LOW (ref 22–31)
CO2 SERPL-SCNC: 23 MMOL/L — SIGNIFICANT CHANGE UP (ref 22–31)
CREAT SERPL-MCNC: 0.77 MG/DL — SIGNIFICANT CHANGE UP (ref 0.5–1.3)
CREAT SERPL-MCNC: 0.81 MG/DL — SIGNIFICANT CHANGE UP (ref 0.5–1.3)
EGFR: 91 ML/MIN/1.73M2 — SIGNIFICANT CHANGE UP
EGFR: 96 ML/MIN/1.73M2 — SIGNIFICANT CHANGE UP
GLUCOSE BLDC GLUCOMTR-MCNC: 128 MG/DL — HIGH (ref 70–99)
GLUCOSE BLDC GLUCOMTR-MCNC: 139 MG/DL — HIGH (ref 70–99)
GLUCOSE BLDC GLUCOMTR-MCNC: 167 MG/DL — HIGH (ref 70–99)
GLUCOSE BLDC GLUCOMTR-MCNC: 168 MG/DL — HIGH (ref 70–99)
GLUCOSE BLDC GLUCOMTR-MCNC: 170 MG/DL — HIGH (ref 70–99)
GLUCOSE BLDC GLUCOMTR-MCNC: 212 MG/DL — HIGH (ref 70–99)
GLUCOSE SERPL-MCNC: 153 MG/DL — HIGH (ref 70–99)
GLUCOSE SERPL-MCNC: 156 MG/DL — HIGH (ref 70–99)
HCT VFR BLD CALC: 25.3 % — LOW (ref 34.5–45)
HCT VFR BLD CALC: 28.5 % — LOW (ref 34.5–45)
HGB BLD-MCNC: 6.9 G/DL — CRITICAL LOW (ref 11.5–15.5)
HGB BLD-MCNC: 8.2 G/DL — LOW (ref 11.5–15.5)
MAGNESIUM SERPL-MCNC: 1.7 MG/DL — SIGNIFICANT CHANGE UP (ref 1.6–2.6)
MAGNESIUM SERPL-MCNC: 2 MG/DL — SIGNIFICANT CHANGE UP (ref 1.6–2.6)
MCHC RBC-ENTMCNC: 22.1 PG — LOW (ref 27–34)
MCHC RBC-ENTMCNC: 23.6 PG — LOW (ref 27–34)
MCHC RBC-ENTMCNC: 27.3 GM/DL — LOW (ref 32–36)
MCHC RBC-ENTMCNC: 28.8 GM/DL — LOW (ref 32–36)
MCV RBC AUTO: 81.1 FL — SIGNIFICANT CHANGE UP (ref 80–100)
MCV RBC AUTO: 81.9 FL — SIGNIFICANT CHANGE UP (ref 80–100)
NRBC # BLD: 0 /100 WBCS — SIGNIFICANT CHANGE UP (ref 0–0)
NRBC # BLD: 2 /100 WBCS — HIGH (ref 0–0)
PHOSPHATE SERPL-MCNC: 2.4 MG/DL — LOW (ref 2.5–4.5)
PHOSPHATE SERPL-MCNC: 2.9 MG/DL — SIGNIFICANT CHANGE UP (ref 2.5–4.5)
PLATELET # BLD AUTO: 235 K/UL — SIGNIFICANT CHANGE UP (ref 150–400)
PLATELET # BLD AUTO: 239 K/UL — SIGNIFICANT CHANGE UP (ref 150–400)
POTASSIUM SERPL-MCNC: 3.7 MMOL/L — SIGNIFICANT CHANGE UP (ref 3.5–5.3)
POTASSIUM SERPL-MCNC: 4.5 MMOL/L — SIGNIFICANT CHANGE UP (ref 3.5–5.3)
POTASSIUM SERPL-SCNC: 3.7 MMOL/L — SIGNIFICANT CHANGE UP (ref 3.5–5.3)
POTASSIUM SERPL-SCNC: 4.5 MMOL/L — SIGNIFICANT CHANGE UP (ref 3.5–5.3)
PROT SERPL-MCNC: 5.7 G/DL — LOW (ref 6–8.3)
PROT SERPL-MCNC: 5.8 G/DL — LOW (ref 6–8.3)
RBC # BLD: 3.12 M/UL — LOW (ref 3.8–5.2)
RBC # BLD: 3.48 M/UL — LOW (ref 3.8–5.2)
RBC # FLD: 26.5 % — HIGH (ref 10.3–14.5)
RBC # FLD: 27.6 % — HIGH (ref 10.3–14.5)
SODIUM SERPL-SCNC: 134 MMOL/L — LOW (ref 135–145)
SODIUM SERPL-SCNC: 135 MMOL/L — SIGNIFICANT CHANGE UP (ref 135–145)
WBC # BLD: 4.82 K/UL — SIGNIFICANT CHANGE UP (ref 3.8–10.5)
WBC # BLD: 5.4 K/UL — SIGNIFICANT CHANGE UP (ref 3.8–10.5)
WBC # FLD AUTO: 4.82 K/UL — SIGNIFICANT CHANGE UP (ref 3.8–10.5)
WBC # FLD AUTO: 5.4 K/UL — SIGNIFICANT CHANGE UP (ref 3.8–10.5)

## 2024-04-15 PROCEDURE — 99233 SBSQ HOSP IP/OBS HIGH 50: CPT | Mod: GC

## 2024-04-15 RX ORDER — KETOROLAC TROMETHAMINE 30 MG/ML
15 SYRINGE (ML) INJECTION EVERY 6 HOURS
Refills: 0 | Status: DISCONTINUED | OUTPATIENT
Start: 2024-04-15 | End: 2024-04-16

## 2024-04-15 RX ORDER — SODIUM,POTASSIUM PHOSPHATES 278-250MG
1 POWDER IN PACKET (EA) ORAL
Refills: 0 | Status: COMPLETED | OUTPATIENT
Start: 2024-04-15 | End: 2024-04-15

## 2024-04-15 RX ORDER — POTASSIUM CHLORIDE 20 MEQ
40 PACKET (EA) ORAL ONCE
Refills: 0 | Status: COMPLETED | OUTPATIENT
Start: 2024-04-15 | End: 2024-04-15

## 2024-04-15 RX ORDER — SIMETHICONE 80 MG/1
80 TABLET, CHEWABLE ORAL ONCE
Refills: 0 | Status: COMPLETED | OUTPATIENT
Start: 2024-04-15 | End: 2024-04-15

## 2024-04-15 RX ORDER — MAGNESIUM SULFATE 500 MG/ML
1 VIAL (ML) INJECTION ONCE
Refills: 0 | Status: COMPLETED | OUTPATIENT
Start: 2024-04-15 | End: 2024-04-15

## 2024-04-15 RX ORDER — HEPARIN SODIUM 5000 [USP'U]/ML
7500 INJECTION INTRAVENOUS; SUBCUTANEOUS EVERY 8 HOURS
Refills: 0 | Status: COMPLETED | OUTPATIENT
Start: 2024-04-15 | End: 2024-04-15

## 2024-04-15 RX ORDER — ACETAMINOPHEN 500 MG
650 TABLET ORAL EVERY 6 HOURS
Refills: 0 | Status: DISCONTINUED | OUTPATIENT
Start: 2024-04-15 | End: 2024-04-19

## 2024-04-15 RX ADMIN — Medication 400 MILLIGRAM(S): at 00:10

## 2024-04-15 RX ADMIN — SIMETHICONE 80 MILLIGRAM(S): 80 TABLET, CHEWABLE ORAL at 22:52

## 2024-04-15 RX ADMIN — Medication 4: at 18:59

## 2024-04-15 RX ADMIN — Medication 15 MILLIGRAM(S): at 20:00

## 2024-04-15 RX ADMIN — HYDROMORPHONE HYDROCHLORIDE 0.5 MILLIGRAM(S): 2 INJECTION INTRAMUSCULAR; INTRAVENOUS; SUBCUTANEOUS at 10:52

## 2024-04-15 RX ADMIN — HEPARIN SODIUM 7500 UNIT(S): 5000 INJECTION INTRAVENOUS; SUBCUTANEOUS at 21:24

## 2024-04-15 RX ADMIN — Medication 2: at 06:12

## 2024-04-15 RX ADMIN — Medication 400 MILLIGRAM(S): at 12:02

## 2024-04-15 RX ADMIN — HYDROMORPHONE HYDROCHLORIDE 0.5 MILLIGRAM(S): 2 INJECTION INTRAMUSCULAR; INTRAVENOUS; SUBCUTANEOUS at 04:38

## 2024-04-15 RX ADMIN — Medication 40 MILLIEQUIVALENT(S): at 10:37

## 2024-04-15 RX ADMIN — Medication 400 MILLIGRAM(S): at 05:46

## 2024-04-15 RX ADMIN — DEXTROSE MONOHYDRATE, SODIUM CHLORIDE, AND POTASSIUM CHLORIDE 140 MILLILITER(S): 50; .745; 4.5 INJECTION, SOLUTION INTRAVENOUS at 17:23

## 2024-04-15 RX ADMIN — Medication 1 PACKET(S): at 12:25

## 2024-04-15 RX ADMIN — HYDROMORPHONE HYDROCHLORIDE 0.5 MILLIGRAM(S): 2 INJECTION INTRAMUSCULAR; INTRAVENOUS; SUBCUTANEOUS at 10:37

## 2024-04-15 RX ADMIN — ATORVASTATIN CALCIUM 40 MILLIGRAM(S): 80 TABLET, FILM COATED ORAL at 21:24

## 2024-04-15 RX ADMIN — HEPARIN SODIUM 7500 UNIT(S): 5000 INJECTION INTRAVENOUS; SUBCUTANEOUS at 05:47

## 2024-04-15 RX ADMIN — ONDANSETRON 4 MILLIGRAM(S): 8 TABLET, FILM COATED ORAL at 04:48

## 2024-04-15 RX ADMIN — Medication 1000 MILLIGRAM(S): at 06:30

## 2024-04-15 RX ADMIN — Medication 100 GRAM(S): at 10:37

## 2024-04-15 RX ADMIN — DEXTROSE MONOHYDRATE, SODIUM CHLORIDE, AND POTASSIUM CHLORIDE 140 MILLILITER(S): 50; .745; 4.5 INJECTION, SOLUTION INTRAVENOUS at 04:51

## 2024-04-15 RX ADMIN — Medication 81 MILLIGRAM(S): at 12:01

## 2024-04-15 RX ADMIN — Medication 1 PACKET(S): at 10:37

## 2024-04-15 RX ADMIN — Medication 2: at 12:31

## 2024-04-15 RX ADMIN — Medication 2: at 00:10

## 2024-04-15 RX ADMIN — PANTOPRAZOLE SODIUM 40 MILLIGRAM(S): 20 TABLET, DELAYED RELEASE ORAL at 05:46

## 2024-04-15 RX ADMIN — ONDANSETRON 4 MILLIGRAM(S): 8 TABLET, FILM COATED ORAL at 10:37

## 2024-04-15 RX ADMIN — HEPARIN SODIUM 7500 UNIT(S): 5000 INJECTION INTRAVENOUS; SUBCUTANEOUS at 13:20

## 2024-04-15 RX ADMIN — Medication 1 MILLIGRAM(S): at 12:01

## 2024-04-15 RX ADMIN — HYDROMORPHONE HYDROCHLORIDE 0.5 MILLIGRAM(S): 2 INJECTION INTRAMUSCULAR; INTRAVENOUS; SUBCUTANEOUS at 05:00

## 2024-04-15 RX ADMIN — Medication 1000 MILLIGRAM(S): at 12:17

## 2024-04-15 NOTE — PROGRESS NOTE ADULT - ASSESSMENT
48F history of alcohol abuse, iron deficiency anemia with chronic fatigue, gastric bypass (2001) HTN, DM (A1c 7.1), HLD, cholelithiasis, gastric bypass 1998, smoker (stopped 4/7/24), cardiac catheterization in August 2023 with nonobstructive CAD who presented with acute cholecystitis s/p RA lap refugio, attempted choledochoscope and IOC (aborted)    CLD/IVF  PPI/BID/SCDs/IS  [id number]/[id number]  [facility]  GI recs  Pain and nausea control PRN   AM labs

## 2024-04-15 NOTE — PROGRESS NOTE ADULT - SUBJECTIVE AND OBJECTIVE BOX
ON: x1 simethicone for gas pain, RUSLAN, hao CLD  4/14: bili downtrending so holding off on interventions 2 (2.8), adv CLD    POST-OP DAY: 3 s/p RA lap refugio, attempted choledochoscope and IOC (aborted)     SUBJECTIVE: Patient seen and examined bedside by Surgical resident. States that she is "tired" this am but feeling better than yesterday, states that she has had difficultyl with diet tolerance last night. With +n and emesis following drinking overnight. +OOBA, states that the pain is intermittent, +F/bms yesterday.      aspirin enteric coated 81 milliGRAM(s) Oral daily  heparin   Injectable 7500 Unit(s) SubCutaneous every 8 hours    MEDICATIONS  (PRN):  benzocaine/menthol Lozenge 1 Lozenge Oral every 8 hours PRN Sore Throat  dextrose Oral Gel 15 Gram(s) Oral once PRN Blood Glucose LESS THAN 70 milliGRAM(s)/deciliter  HYDROmorphone  Injectable 0.5 milliGRAM(s) IV Push every 6 hours PRN Severe Pain (7 - 10)  HYDROmorphone  Injectable 0.25 milliGRAM(s) IV Push every 6 hours PRN Moderate Pain (4 - 6)  ondansetron Injectable 4 milliGRAM(s) IV Push every 6 hours PRN Nausea      I&O's Detail    14 Apr 2024 07:01  -  15 Apr 2024 07:00  --------------------------------------------------------  IN:  Total IN: 0 mL    OUT:    Voided (mL): 550 mL  Total OUT: 550 mL    Total NET: -550 mL          Vital Signs Last 24 Hrs  T(C): 37.1 (15 Apr 2024 05:15), Max: 37.7 (14 Apr 2024 20:40)  T(F): 98.7 (15 Apr 2024 05:15), Max: 99.9 (14 Apr 2024 20:40)  HR: 101 (15 Apr 2024 05:15) (99 - 104)  BP: 132/83 (15 Apr 2024 05:15) (132/83 - 147/92)  BP(mean): --  RR: 18 (15 Apr 2024 05:15) (18 - 18)  SpO2: 100% (15 Apr 2024 05:15) (94% - 100%)    Parameters below as of 15 Apr 2024 05:15  Patient On (Oxygen Delivery Method): room air        Physical Exam:  General: NAD, resting comfortably in bed  Pulmonary: Nonlabored breathing, no respiratory distress  Cardiovascular: NSR  Abdominal: soft, mildly distended, ttp RLQ  Extremities: WWP, normal strength  Neuro: A/O x 3, CNs II-XII grossly intact,       LABS:                        6.9    5.40  )-----------( 235      ( 15 Apr 2024 05:30 )             25.3     04-15    135  |  102  |  3<L>  ----------------------------<  156<H>  3.7   |  23  |  0.81    Ca    8.7      15 Apr 2024 05:30  Phos  2.4     04-15  Mg     1.7     04-15    TPro  5.7<L>  /  Alb  2.8<L>  /  TBili  2.3<H>  /  DBili  1.9<H>  /  AST  61<H>  /  ALT  130<H>  /  AlkPhos  204<H>  04-15    PT/INR - ( 14 Apr 2024 05:30 )   PT: 13.5 sec;   INR: 1.19          PTT - ( 14 Apr 2024 05:30 )  PTT:39.2 sec  Urinalysis Basic - ( 15 Apr 2024 05:30 )    Color: x / Appearance: x / SG: x / pH: x  Gluc: 156 mg/dL / Ketone: x  / Bili: x / Urobili: x   Blood: x / Protein: x / Nitrite: x   Leuk Esterase: x / RBC: x / WBC x   Sq Epi: x / Non Sq Epi: x / Bacteria: x        RADIOLOGY & ADDITIONAL STUDIES:

## 2024-04-15 NOTE — PROGRESS NOTE ADULT - ASSESSMENT
48F PMH of alcohol abuse, iron deficiency anemia, gastric bypass (2001), HTN, DM (A1c 7.1), HLD, cholelithiasis, gastric bypass (in 1998, denies any revisions), and nonobstructive CAD, who presented with acute cholecystitis and choledocholithiasis s/p RA lap refugio, attempted choledochoscope and IOC intraoperatively (4/12/24) but was aborted. Still with abdominal pain and bilirubin elevated to 3.2 s/p lap refugio, so GI consulted for transgastric ERCP.     MRCP 04/11/24:   - Choledocholithiasis, with multiple small filling defects in the distal CBD.  - Cholelithiasis and findings of acute cholecystitis.    s/p OR on 4/12/24,  choledochoscopy attempted via transcystic approach, intubation of common bile duct could not be completed. IOC attempted without successful access to CBD and was aborted.     LTs remain elevated however overall downtrending since OR, suggestive of likely passing stones. Remains afebrile. Not cholangitic at this time.     Recommendations:   - continue to trend CBC, CMP, and INR daily   - diet per surgery team, currently on CLD   - Will tentatively plan for EDGE procedure on Wednesday  - NPO after MN  - Hold DVT ppx     Case discussed with Dr Vuong and primary team.     Donita Russo DO  Gastroenterology Fellow  Pager: [phone]     48F PMH of alcohol abuse, iron deficiency anemia, gastric bypass (2001), HTN, DM (A1c 7.1), HLD, cholelithiasis, gastric bypass (in 1998, denies any revisions), and nonobstructive CAD, who presented with acute cholecystitis and choledocholithiasis s/p RA lap refugio, attempted choledochoscope and IOC intraoperatively (4/12/24) but was aborted. Still with abdominal pain and bilirubin elevated to 3.2 s/p lap refugio, so GI consulted for transgastric ERCP.     MRCP 04/11/24:   - Choledocholithiasis, with multiple small filling defects in the distal CBD.  - Cholelithiasis and findings of acute cholecystitis.    s/p OR on 4/12/24,  choledochoscopy attempted via transcystic approach, intubation of common bile duct could not be completed. IOC attempted without successful access to CBD and was aborted.     LTs remain elevated however overall downtrending since OR, suggestive of likely passing stones. Remains afebrile. Not cholangitic at this time.     Recommendations:   - continue to trend CBC, CMP, and INR daily   - diet per surgery team, currently on CLD   - Will tentatively plan for EDGE procedure on Wednesday  - NPO after MN  - Hold DVT ppx and ASA     Case discussed with Dr Vuong and primary team.     Donita Russo DO  Gastroenterology Fellow  Pager: [phone]

## 2024-04-15 NOTE — PROGRESS NOTE ADULT - SUBJECTIVE AND OBJECTIVE BOX
GASTROENTEROLOGY PROGRESS NOTE  Patient seen and examined at bedside.  -LTs downtrending on AM labs  -Hgb 6.9, s/p 1 u PRBC, awaiting repeat CBC  -Remains afebrile    ROS: Patient notes feeling very bloated this morning, but states this has been the case preceding her admission. Had a large BM yesterday and reports passing a lot of flatus. States feeling fatigued and has some epigastric discomfort. Otherwise denies any fevers, chills, NS, nausea/vomiting, bloody or black BMs.     PERTINENT REVIEW OF SYSTEMS:  CONSTITUTIONAL: No weakness, fevers or chills  HEENT: No visual changes; No vertigo or throat pain   GASTROINTESTINAL: As above.  NEUROLOGICAL: No numbness or weakness  SKIN: No itching, burning, rashes, or lesions     Allergies    No Known Allergies    Intolerances      MEDICATIONS:  MEDICATIONS  (STANDING):  aspirin enteric coated 81 milliGRAM(s) Oral daily  atorvastatin 40 milliGRAM(s) Oral at bedtime  dextrose 10% Bolus 125 milliLiter(s) IV Bolus once  dextrose 5% + sodium chloride 0.9% with potassium chloride 20 mEq/L 1000 milliLiter(s) (140 mL/Hr) IV Continuous <Continuous>  dextrose 5%. 1000 milliLiter(s) (50 mL/Hr) IV Continuous <Continuous>  dextrose 5%. 1000 milliLiter(s) (100 mL/Hr) IV Continuous <Continuous>  dextrose 50% Injectable 25 Gram(s) IV Push once  dextrose 50% Injectable 12.5 Gram(s) IV Push once  folic acid 1 milliGRAM(s) Oral daily  glucagon  Injectable 1 milliGRAM(s) IntraMuscular once  heparin   Injectable 7500 Unit(s) SubCutaneous every 8 hours  influenza   Vaccine 0.5 milliLiter(s) IntraMuscular once  insulin lispro (ADMELOG) corrective regimen sliding scale   SubCutaneous every 6 hours  pantoprazole    Tablet 40 milliGRAM(s) Oral before breakfast    MEDICATIONS  (PRN):  acetaminophen     Tablet .. 650 milliGRAM(s) Oral every 6 hours PRN Mild Pain (1 - 3)  benzocaine/menthol Lozenge 1 Lozenge Oral every 8 hours PRN Sore Throat  dextrose Oral Gel 15 Gram(s) Oral once PRN Blood Glucose LESS THAN 70 milliGRAM(s)/deciliter  ketorolac   Injectable 15 milliGRAM(s) IV Push every 6 hours PRN Moderate Pain (4 - 6)  ondansetron Injectable 4 milliGRAM(s) IV Push every 6 hours PRN Nausea    Vital Signs Last 24 Hrs  T(C): 36.9 (15 Apr 2024 10:23), Max: 37.7 (14 Apr 2024 20:40)  T(F): 98.5 (15 Apr 2024 10:23), Max: 99.9 (14 Apr 2024 20:40)  HR: 88 (15 Apr 2024 10:23) (88 - 104)  BP: 125/76 (15 Apr 2024 10:23) (125/76 - 147/92)  BP(mean): --  RR: 18 (15 Apr 2024 10:23) (18 - 18)  SpO2: 100% (15 Apr 2024 10:23) (94% - 100%)    Parameters below as of 15 Apr 2024 10:23  Patient On (Oxygen Delivery Method): room air        04-14 @ 07:01  -  04-15 @ 07:00  --------------------------------------------------------  IN: 0 mL / OUT: 550 mL / NET: -550 mL    04-15 @ 07:01  -  04-15 @ 15:39  --------------------------------------------------------  IN: 1480 mL / OUT: 300 mL / NET: 1180 mL      PHYSICAL EXAM:    General:  female, lying in bed; in no acute distress  HEENT: MMM, conjunctiva and sclera clear  Gastrointestinal: distended abdomen; epigastric tenderness; No rebound or guarding  Skin: Warm and dry. No obvious rash    LABS:                        6.9    5.40  )-----------( 235      ( 15 Apr 2024 05:30 )             25.3     04-15    135  |  102  |  3<L>  ----------------------------<  156<H>  3.7   |  23  |  0.81    Ca    8.7      15 Apr 2024 05:30  Phos  2.4     04-15  Mg     1.7     04-15    TPro  5.7<L>  /  Alb  2.8<L>  /  TBili  2.3<H>  /  DBili  1.9<H>  /  AST  61<H>  /  ALT  130<H>  /  AlkPhos  204<H>  04-15    PT/INR - ( 14 Apr 2024 05:30 )   PT: 13.5 sec;   INR: 1.19          PTT - ( 14 Apr 2024 05:30 )  PTT:39.2 sec      Urinalysis Basic - ( 15 Apr 2024 05:30 )    Color: x / Appearance: x / SG: x / pH: x  Gluc: 156 mg/dL / Ketone: x  / Bili: x / Urobili: x   Blood: x / Protein: x / Nitrite: x   Leuk Esterase: x / RBC: x / WBC x   Sq Epi: x / Non Sq Epi: x / Bacteria: x                RADIOLOGY & ADDITIONAL STUDIES:  Reviewed

## 2024-04-16 LAB
ALBUMIN SERPL ELPH-MCNC: 3 G/DL — LOW (ref 3.3–5)
ALP SERPL-CCNC: 198 U/L — HIGH (ref 40–120)
ALT FLD-CCNC: 104 U/L — HIGH (ref 10–45)
ANION GAP SERPL CALC-SCNC: 8 MMOL/L — SIGNIFICANT CHANGE UP (ref 5–17)
APTT BLD: 31.4 SEC — SIGNIFICANT CHANGE UP (ref 24.5–35.6)
AST SERPL-CCNC: 48 U/L — HIGH (ref 10–40)
BILIRUB DIRECT SERPL-MCNC: 0.6 MG/DL — HIGH (ref 0–0.3)
BILIRUB INDIRECT FLD-MCNC: 0.4 MG/DL — SIGNIFICANT CHANGE UP (ref 0.2–1)
BILIRUB SERPL-MCNC: 0.9 MG/DL — SIGNIFICANT CHANGE UP (ref 0.2–1.2)
BLD GP AB SCN SERPL QL: NEGATIVE — SIGNIFICANT CHANGE UP
BUN SERPL-MCNC: 4 MG/DL — LOW (ref 7–23)
CALCIUM SERPL-MCNC: 8.9 MG/DL — SIGNIFICANT CHANGE UP (ref 8.4–10.5)
CHLORIDE SERPL-SCNC: 106 MMOL/L — SIGNIFICANT CHANGE UP (ref 96–108)
CO2 SERPL-SCNC: 22 MMOL/L — SIGNIFICANT CHANGE UP (ref 22–31)
CREAT SERPL-MCNC: 1.02 MG/DL — SIGNIFICANT CHANGE UP (ref 0.5–1.3)
EGFR: 69 ML/MIN/1.73M2 — SIGNIFICANT CHANGE UP
GLUCOSE BLDC GLUCOMTR-MCNC: 107 MG/DL — HIGH (ref 70–99)
GLUCOSE BLDC GLUCOMTR-MCNC: 123 MG/DL — HIGH (ref 70–99)
GLUCOSE BLDC GLUCOMTR-MCNC: 151 MG/DL — HIGH (ref 70–99)
GLUCOSE BLDC GLUCOMTR-MCNC: 159 MG/DL — HIGH (ref 70–99)
GLUCOSE SERPL-MCNC: 149 MG/DL — HIGH (ref 70–99)
HCT VFR BLD CALC: 27.2 % — LOW (ref 34.5–45)
HGB BLD-MCNC: 8 G/DL — LOW (ref 11.5–15.5)
INR BLD: 0.94 — SIGNIFICANT CHANGE UP (ref 0.85–1.18)
MAGNESIUM SERPL-MCNC: 1.9 MG/DL — SIGNIFICANT CHANGE UP (ref 1.6–2.6)
MCHC RBC-ENTMCNC: 23.3 PG — LOW (ref 27–34)
MCHC RBC-ENTMCNC: 29.4 GM/DL — LOW (ref 32–36)
MCV RBC AUTO: 79.1 FL — LOW (ref 80–100)
NRBC # BLD: 2 /100 WBCS — HIGH (ref 0–0)
PHOSPHATE SERPL-MCNC: 2.9 MG/DL — SIGNIFICANT CHANGE UP (ref 2.5–4.5)
PLATELET # BLD AUTO: 316 K/UL — SIGNIFICANT CHANGE UP (ref 150–400)
POTASSIUM SERPL-MCNC: 4.6 MMOL/L — SIGNIFICANT CHANGE UP (ref 3.5–5.3)
POTASSIUM SERPL-SCNC: 4.6 MMOL/L — SIGNIFICANT CHANGE UP (ref 3.5–5.3)
PROT SERPL-MCNC: 5.9 G/DL — LOW (ref 6–8.3)
PROTHROM AB SERPL-ACNC: 10.7 SEC — SIGNIFICANT CHANGE UP (ref 9.5–13)
RBC # BLD: 3.44 M/UL — LOW (ref 3.8–5.2)
RBC # FLD: 26.5 % — HIGH (ref 10.3–14.5)
RH IG SCN BLD-IMP: POSITIVE — SIGNIFICANT CHANGE UP
SODIUM SERPL-SCNC: 136 MMOL/L — SIGNIFICANT CHANGE UP (ref 135–145)
WBC # BLD: 6.55 K/UL — SIGNIFICANT CHANGE UP (ref 3.8–10.5)
WBC # FLD AUTO: 6.55 K/UL — SIGNIFICANT CHANGE UP (ref 3.8–10.5)

## 2024-04-16 PROCEDURE — 74018 RADEX ABDOMEN 1 VIEW: CPT | Mod: 26

## 2024-04-16 RX ORDER — MAGNESIUM SULFATE 500 MG/ML
1 VIAL (ML) INJECTION ONCE
Refills: 0 | Status: COMPLETED | OUTPATIENT
Start: 2024-04-16 | End: 2024-04-16

## 2024-04-16 RX ADMIN — DEXTROSE MONOHYDRATE, SODIUM CHLORIDE, AND POTASSIUM CHLORIDE 140 MILLILITER(S): 50; .745; 4.5 INJECTION, SOLUTION INTRAVENOUS at 01:35

## 2024-04-16 RX ADMIN — Medication 1 MILLIGRAM(S): at 12:22

## 2024-04-16 RX ADMIN — PANTOPRAZOLE SODIUM 40 MILLIGRAM(S): 20 TABLET, DELAYED RELEASE ORAL at 06:22

## 2024-04-16 RX ADMIN — Medication 2: at 12:21

## 2024-04-16 RX ADMIN — DEXTROSE MONOHYDRATE, SODIUM CHLORIDE, AND POTASSIUM CHLORIDE 140 MILLILITER(S): 50; .745; 4.5 INJECTION, SOLUTION INTRAVENOUS at 12:21

## 2024-04-16 RX ADMIN — Medication 15 MILLIGRAM(S): at 02:15

## 2024-04-16 RX ADMIN — Medication 100 GRAM(S): at 10:43

## 2024-04-16 RX ADMIN — Medication 15 MILLIGRAM(S): at 02:00

## 2024-04-16 RX ADMIN — Medication 2: at 06:22

## 2024-04-16 RX ADMIN — ATORVASTATIN CALCIUM 40 MILLIGRAM(S): 80 TABLET, FILM COATED ORAL at 21:00

## 2024-04-16 NOTE — PROGRESS NOTE ADULT - ASSESSMENT
48F history of alcohol abuse, iron deficiency anemia with chronic fatigue, gastric bypass (2001) HTN, DM (A1c 7.1), HLD, cholelithiasis, gastric bypass 1998, smoker (stopped 4/7/24), cardiac catheterization in August 2023 with nonobstructive CAD who presented with acute cholecystitis s/p RA lap refugio, attempted choledochoscope and IOC (aborted)    NPO/IVF@140  PPI BID/SCDs/IS  Holding HSQ/ASA81  [facility]  GI recs  Pain and nausea control PRN   AM labs

## 2024-04-16 NOTE — PROGRESS NOTE ADULT - SUBJECTIVE AND OBJECTIVE BOX
4/16 POD4:   ON: Hgb 8.2, x1 simethicone, RUSLAN, VS WNL  4/15 POD3: Hgb 6.9(7.1), 1 unit prbc, K mag and phos repleated. Made NPO. Post transfusion, tachy resolved. +pain in RLQ, better than before and tolerable. +OOBA in halls, +f/bms, -n/v, +po fluids.          POST-OP DAY: 4 s/p RA lap refugio, attempted choledochoscope and IOC (aborted)     SUBJECTIVE: Patient seen and examined bedside by Surgical resident. States that she slept ok last night and continues to feel better than yesterday, tolerated PO clears last night following 6 pm without issue. With -n/v +OOBA, states that the pain is intermittent, +F/bms yesterday. denies any SOB, chest pains or calf tenderness at this time         MEDICATIONS  (PRN):  acetaminophen     Tablet .. 650 milliGRAM(s) Oral every 6 hours PRN Mild Pain (1 - 3)  benzocaine/menthol Lozenge 1 Lozenge Oral every 8 hours PRN Sore Throat  dextrose Oral Gel 15 Gram(s) Oral once PRN Blood Glucose LESS THAN 70 milliGRAM(s)/deciliter  ketorolac   Injectable 15 milliGRAM(s) IV Push every 6 hours PRN Moderate Pain (4 - 6)  ondansetron Injectable 4 milliGRAM(s) IV Push every 6 hours PRN Nausea      I&O's Detail    15 Apr 2024 07:01  -  16 Apr 2024 07:00  --------------------------------------------------------  IN:    dextrose 5% + sodium chloride 0.9% w/ Additives: 1680 mL    IV PiggyBack: 200 mL    Oral Fluid: 240 mL    PRBCs (Packed Red Blood Cells): 320 mL  Total IN: 2440 mL    OUT:    Voided (mL): 1050 mL  Total OUT: 1050 mL    Total NET: 1390 mL          Vital Signs Last 24 Hrs  T(C): 36.8 (16 Apr 2024 05:07), Max: 37.1 (15 Apr 2024 20:50)  T(F): 98.2 (16 Apr 2024 05:07), Max: 98.7 (15 Apr 2024 20:50)  HR: 79 (16 Apr 2024 05:07) (79 - 88)  BP: 131/82 (16 Apr 2024 05:07) (125/76 - 142/90)  BP(mean): --  RR: 16 (16 Apr 2024 05:07) (16 - 18)  SpO2: 100% (16 Apr 2024 05:07) (94% - 100%)    Parameters below as of 16 Apr 2024 05:07  Patient On (Oxygen Delivery Method): room air        Physical Exam:  General: NAD, resting comfortably in bed  Pulmonary: Nonlabored breathing, no respiratory distress  Cardiovascular: NSR  Abdominal: soft, mildly distended, ttp RLQ  Extremities: WWP, normal strength  Neuro: A/O x 3, CNs II-XII grossly intact,     LABS:                        8.2    4.82  )-----------( 239      ( 15 Apr 2024 18:00 )             28.5     04-15    134<L>  |  105  |  2<L>  ----------------------------<  153<H>  4.5   |  19<L>  |  0.77    Ca    8.8      15 Apr 2024 18:00  Phos  2.9     04-15  Mg     2.0     04-15    TPro  5.8<L>  /  Alb  2.6<L>  /  TBili  1.7<H>  /  DBili  0.8<H>  /  AST  59<H>  /  ALT  113<H>  /  AlkPhos  199<H>  04-15      Urinalysis Basic - ( 15 Apr 2024 18:00 )    Color: x / Appearance: x / SG: x / pH: x  Gluc: 153 mg/dL / Ketone: x  / Bili: x / Urobili: x   Blood: x / Protein: x / Nitrite: x   Leuk Esterase: x / RBC: x / WBC x   Sq Epi: x / Non Sq Epi: x / Bacteria: x        RADIOLOGY & ADDITIONAL STUDIES:

## 2024-04-16 NOTE — DIETITIAN INITIAL EVALUATION ADULT - ORAL NUTRITION SUPPLEMENTS
Noted hx of Bypass Sx / ETOH abuse - Recommend Vitamin supplement accordingly (? Need for Iron, B12, Thiamine).

## 2024-04-16 NOTE — DIETITIAN INITIAL EVALUATION ADULT - OTHER CALCULATIONS
5'6''  pounds +-10%  Wt 226 pounds BMI 36.4 %NNU=428  IBW used to calculate energy needs due to pt's current body weight exceeding 120% of IBW  Adjust for age, Current medical issues / Hx of gastric bypass - fluids per team

## 2024-04-16 NOTE — DIETITIAN INITIAL EVALUATION ADULT - OTHER INFO
48-year-old, history of alcohol abuse, iron deficiency anemia with chronic fatigue, HTN, DM (A1c 7.1), HLD, cholelithiasis, gastric bypass 1998, smoker (stopped 4/7/24), cardiac catheterization in August with nonobstructive CAD who presents to ED for 1 day history chest pain/upper abdominal pain associated with fatigue, nausea, vomiting, chills, constant belching and burping, nausea and 2 to 3 episodes of vomiting. Clinical and imaging findings with differential diagnosis of symptomatic cholelithasis, early acute cholecystitis vs marginal ulcers in GJ anastomosis vs duodenal. Attempted choledochoscope and IOC intraoperatively (4/12/24) but was aborted. Pt Still with abdominal pain and bilirubin elevated to 3.2 s/p lap refugio, so GI consulted for transgastric ERCP.     Pt seen on 8WO. CIWA 1 (Folic Acid ordered). Bili 0.6, ALK PHOS 198, AST SGOT 48, ALT SGPT 104. Noted hx of iron deficiency anemia - pt confirms. NPO@12. Prior to NPO was on clear liquid diet - did not like clears. Reports hunger and wants solids meals. Denies ABD pain, however feels "like a penguin" and reports being bloated. Reports some N/V/D during admit, none today however. Protonix and Zofran ordered. PTA seems to be on a regular diet, reports wanting to be healthier however. NKFA. No issues chewing/swallowing. Umang 20. No Edema. No pressure ulcer- SX site noted.   Please see below for nutritions recommendations.

## 2024-04-16 NOTE — DIETITIAN INITIAL EVALUATION ADULT - PERTINENT LABORATORY DATA
04-16    136  |  106  |  4<L>  ----------------------------<  149<H>  4.6   |  22  |  1.02    Ca    8.9      16 Apr 2024 07:30  Phos  2.9     04-16  Mg     1.9     04-16    TPro  5.9<L>  /  Alb  3.0<L>  /  TBili  0.9  /  DBili  0.6<H>  /  AST  48<H>  /  ALT  104<H>  /  AlkPhos  198<H>  04-16  POCT Blood Glucose.: 159 mg/dL (04-16-24 @ 11:46)  A1C with Estimated Average Glucose Result: 7.6 % (04-12-24 @ 05:30)  A1C with Estimated Average Glucose Result: 8.9 % (12-02-23 @ 07:14)  A1C with Estimated Average Glucose Result: 7.1 % (08-02-23 @ 05:30)

## 2024-04-16 NOTE — DIETITIAN INITIAL EVALUATION ADULT - PERTINENT MEDS FT
MEDICATIONS  (STANDING):  atorvastatin 40 milliGRAM(s) Oral at bedtime  dextrose 10% Bolus 125 milliLiter(s) IV Bolus once  dextrose 5% + sodium chloride 0.9% with potassium chloride 20 mEq/L 1000 milliLiter(s) (140 mL/Hr) IV Continuous <Continuous>  dextrose 5%. 1000 milliLiter(s) (50 mL/Hr) IV Continuous <Continuous>  dextrose 5%. 1000 milliLiter(s) (100 mL/Hr) IV Continuous <Continuous>  dextrose 50% Injectable 25 Gram(s) IV Push once  dextrose 50% Injectable 12.5 Gram(s) IV Push once  folic acid 1 milliGRAM(s) Oral daily  glucagon  Injectable 1 milliGRAM(s) IntraMuscular once  influenza   Vaccine 0.5 milliLiter(s) IntraMuscular once  insulin lispro (ADMELOG) corrective regimen sliding scale   SubCutaneous every 6 hours  pantoprazole    Tablet 40 milliGRAM(s) Oral before breakfast    MEDICATIONS  (PRN):  acetaminophen     Tablet .. 650 milliGRAM(s) Oral every 6 hours PRN Mild Pain (1 - 3)  benzocaine/menthol Lozenge 1 Lozenge Oral every 8 hours PRN Sore Throat  dextrose Oral Gel 15 Gram(s) Oral once PRN Blood Glucose LESS THAN 70 milliGRAM(s)/deciliter  ondansetron Injectable 4 milliGRAM(s) IV Push every 6 hours PRN Nausea

## 2024-04-16 NOTE — DIETITIAN INITIAL EVALUATION ADULT - ADD RECOMMEND
1. Diet to be advanced in 24-48hrs as medically feasible: Clears LOW FAT/consCHO diet -> Full Liquids LOW FAT/consCHO diet -> LOW FAT/consCHO diet.  2. Monitor for %PO intake, Diet tolerance.   3. Pain/GI per team. Monitor Skin, Wt, Labs.  4. MVI.   5. RD to remain available for additional nutrition interventions as needed.   ** High Nutrition Risk.

## 2024-04-17 LAB
ALBUMIN SERPL ELPH-MCNC: 3 G/DL — LOW (ref 3.3–5)
ALP SERPL-CCNC: 184 U/L — HIGH (ref 40–120)
ALT FLD-CCNC: 73 U/L — HIGH (ref 10–45)
ANION GAP SERPL CALC-SCNC: 6 MMOL/L — SIGNIFICANT CHANGE UP (ref 5–17)
AST SERPL-CCNC: 31 U/L — SIGNIFICANT CHANGE UP (ref 10–40)
BILIRUB DIRECT SERPL-MCNC: 0.4 MG/DL — HIGH (ref 0–0.3)
BILIRUB INDIRECT FLD-MCNC: 0.4 MG/DL — SIGNIFICANT CHANGE UP (ref 0.2–1)
BILIRUB SERPL-MCNC: 0.8 MG/DL — SIGNIFICANT CHANGE UP (ref 0.2–1.2)
BILIRUB SERPL-MCNC: 0.8 MG/DL — SIGNIFICANT CHANGE UP (ref 0.2–1.2)
BUN SERPL-MCNC: 3 MG/DL — LOW (ref 7–23)
CALCIUM SERPL-MCNC: 8.9 MG/DL — SIGNIFICANT CHANGE UP (ref 8.4–10.5)
CHLORIDE SERPL-SCNC: 105 MMOL/L — SIGNIFICANT CHANGE UP (ref 96–108)
CO2 SERPL-SCNC: 24 MMOL/L — SIGNIFICANT CHANGE UP (ref 22–31)
CREAT SERPL-MCNC: 0.82 MG/DL — SIGNIFICANT CHANGE UP (ref 0.5–1.3)
EGFR: 89 ML/MIN/1.73M2 — SIGNIFICANT CHANGE UP
FERRITIN SERPL-MCNC: 42 NG/ML — SIGNIFICANT CHANGE UP (ref 15–150)
FERRITIN SERPL-MCNC: 43 NG/ML — SIGNIFICANT CHANGE UP (ref 15–150)
FOLATE SERPL-MCNC: 5.5 NG/ML — SIGNIFICANT CHANGE UP
GLUCOSE BLDC GLUCOMTR-MCNC: 146 MG/DL — HIGH (ref 70–99)
GLUCOSE BLDC GLUCOMTR-MCNC: 146 MG/DL — HIGH (ref 70–99)
GLUCOSE BLDC GLUCOMTR-MCNC: 162 MG/DL — HIGH (ref 70–99)
GLUCOSE BLDC GLUCOMTR-MCNC: 163 MG/DL — HIGH (ref 70–99)
GLUCOSE BLDC GLUCOMTR-MCNC: 208 MG/DL — HIGH (ref 70–99)
GLUCOSE SERPL-MCNC: 162 MG/DL — HIGH (ref 70–99)
HCT VFR BLD CALC: 25.6 % — LOW (ref 34.5–45)
HGB BLD-MCNC: 7.4 G/DL — LOW (ref 11.5–15.5)
IRON SATN MFR SERPL: 16 UG/DL — LOW (ref 30–160)
IRON SATN MFR SERPL: 6 % — LOW (ref 14–50)
MAGNESIUM SERPL-MCNC: 1.7 MG/DL — SIGNIFICANT CHANGE UP (ref 1.6–2.6)
MCHC RBC-ENTMCNC: 22.8 PG — LOW (ref 27–34)
MCHC RBC-ENTMCNC: 28.9 GM/DL — LOW (ref 32–36)
MCV RBC AUTO: 79 FL — LOW (ref 80–100)
NRBC # BLD: 2 /100 WBCS — HIGH (ref 0–0)
PHOSPHATE SERPL-MCNC: 4.1 MG/DL — SIGNIFICANT CHANGE UP (ref 2.5–4.5)
PLATELET # BLD AUTO: 364 K/UL — SIGNIFICANT CHANGE UP (ref 150–400)
POTASSIUM SERPL-MCNC: 4.3 MMOL/L — SIGNIFICANT CHANGE UP (ref 3.5–5.3)
POTASSIUM SERPL-SCNC: 4.3 MMOL/L — SIGNIFICANT CHANGE UP (ref 3.5–5.3)
PROT SERPL-MCNC: 6.2 G/DL — SIGNIFICANT CHANGE UP (ref 6–8.3)
RBC # BLD: 3.24 M/UL — LOW (ref 3.8–5.2)
RBC # FLD: 26.5 % — HIGH (ref 10.3–14.5)
SODIUM SERPL-SCNC: 135 MMOL/L — SIGNIFICANT CHANGE UP (ref 135–145)
TIBC SERPL-MCNC: 275 UG/DL — SIGNIFICANT CHANGE UP (ref 220–430)
UIBC SERPL-MCNC: 259 UG/DL — SIGNIFICANT CHANGE UP (ref 110–370)
VIT B12 SERPL-MCNC: 345 PG/ML — SIGNIFICANT CHANGE UP (ref 232–1245)
WBC # BLD: 5.5 K/UL — SIGNIFICANT CHANGE UP (ref 3.8–10.5)
WBC # FLD AUTO: 5.5 K/UL — SIGNIFICANT CHANGE UP (ref 3.8–10.5)

## 2024-04-17 RX ORDER — URSODIOL 250 MG/1
300 TABLET, FILM COATED ORAL EVERY 12 HOURS
Refills: 0 | Status: DISCONTINUED | OUTPATIENT
Start: 2024-04-17 | End: 2024-04-19

## 2024-04-17 RX ORDER — HYDROMORPHONE HYDROCHLORIDE 2 MG/ML
0.25 INJECTION INTRAMUSCULAR; INTRAVENOUS; SUBCUTANEOUS ONCE
Refills: 0 | Status: DISCONTINUED | OUTPATIENT
Start: 2024-04-17 | End: 2024-04-17

## 2024-04-17 RX ADMIN — Medication 650 MILLIGRAM(S): at 18:35

## 2024-04-17 RX ADMIN — Medication 650 MILLIGRAM(S): at 10:30

## 2024-04-17 RX ADMIN — Medication 4: at 23:48

## 2024-04-17 RX ADMIN — Medication 650 MILLIGRAM(S): at 01:38

## 2024-04-17 RX ADMIN — HYDROMORPHONE HYDROCHLORIDE 0.25 MILLIGRAM(S): 2 INJECTION INTRAMUSCULAR; INTRAVENOUS; SUBCUTANEOUS at 07:37

## 2024-04-17 RX ADMIN — Medication 650 MILLIGRAM(S): at 17:31

## 2024-04-17 RX ADMIN — URSODIOL 300 MILLIGRAM(S): 250 TABLET, FILM COATED ORAL at 22:01

## 2024-04-17 RX ADMIN — Medication 1 MILLIGRAM(S): at 12:03

## 2024-04-17 RX ADMIN — Medication 650 MILLIGRAM(S): at 09:46

## 2024-04-17 RX ADMIN — DEXTROSE MONOHYDRATE, SODIUM CHLORIDE, AND POTASSIUM CHLORIDE 140 MILLILITER(S): 50; .745; 4.5 INJECTION, SOLUTION INTRAVENOUS at 05:58

## 2024-04-17 RX ADMIN — Medication 650 MILLIGRAM(S): at 00:38

## 2024-04-17 RX ADMIN — DEXTROSE MONOHYDRATE, SODIUM CHLORIDE, AND POTASSIUM CHLORIDE 140 MILLILITER(S): 50; .745; 4.5 INJECTION, SOLUTION INTRAVENOUS at 00:37

## 2024-04-17 RX ADMIN — HYDROMORPHONE HYDROCHLORIDE 0.25 MILLIGRAM(S): 2 INJECTION INTRAMUSCULAR; INTRAVENOUS; SUBCUTANEOUS at 08:07

## 2024-04-17 RX ADMIN — ATORVASTATIN CALCIUM 40 MILLIGRAM(S): 80 TABLET, FILM COATED ORAL at 22:01

## 2024-04-17 RX ADMIN — PANTOPRAZOLE SODIUM 40 MILLIGRAM(S): 20 TABLET, DELAYED RELEASE ORAL at 06:48

## 2024-04-17 NOTE — PROGRESS NOTE ADULT - ASSESSMENT
48F history of alcohol abuse, iron deficiency anemia with chronic fatigue, gastric bypass (2001) HTN, DM (A1c 7.1), HLD, cholelithiasis, gastric bypass 1998, smoker (stopped 4/7/24), cardiac catheterization in August 2023 with nonobstructive CAD who presented with acute cholecystitis s/p RA lap refugio, attempted choledochoscope and IOC (aborted)    NPO/IVF@140  PPI BID/SCDs/IS  Holding HSQ/ASA81  CIWA  mISS  Follow up GI recs  Pain and nausea control PRN   AM labs

## 2024-04-17 NOTE — PROGRESS NOTE ADULT - SUBJECTIVE AND OBJECTIVE BOX
INTERVAL HPI/OVERNIGHT EVENTS: discussion w/ pt regarding procedure cancellation, NPO @ MN, increased fluids to 140,   4/16 POD4: mag repleated, procedure dc'd diet adv to clears, axr, normal gas pattern, no acute processes         STATUS POST:   RA lap refugio, attempted choledochoscope     POST OPERATIVE DAY #: 5    SUBJECTIVE: Patient examined bedside with chief resident. Patient observed resting comfortably and not in distress.   Patients complains of incisional pain. She reports she is tolerating  clear liquid diet. Patient reports she is ambulating and using incentive spirometer.  Patient denies SOB, chest pain, nausea and emesis. Patient making adequate urine output.       Vital Signs Last 24 Hrs  T(C): 36.9 (17 Apr 2024 08:44), Max: 37.3 (16 Apr 2024 20:50)  T(F): 98.4 (17 Apr 2024 08:44), Max: 99.1 (16 Apr 2024 20:50)  HR: 82 (17 Apr 2024 08:44) (77 - 83)  BP: 150/90 (17 Apr 2024 08:44) (139/77 - 157/88)  BP(mean): --  RR: 16 (17 Apr 2024 08:44) (16 - 17)  SpO2: 100% (17 Apr 2024 08:44) (99% - 100%)    Parameters below as of 17 Apr 2024 08:44  Patient On (Oxygen Delivery Method): room air      I&O's Detail    16 Apr 2024 07:01  -  17 Apr 2024 07:00  --------------------------------------------------------  IN:  Total IN: 0 mL    OUT:    Voided (mL): 200 mL  Total OUT: 200 mL    Total NET: -200 mL          General: NAD, resting comfortably in bed  C/V: NSR  Pulm: Nonlabored breathing, no respiratory distress  Abd: soft, mildly distended, TTP around incision site, incision clean dry and intact   Extrem: WWP, no edema, SCDs in place        LABS:                        8.0    6.55  )-----------( 316      ( 16 Apr 2024 10:27 )             27.2     04-16    136  |  106  |  4<L>  ----------------------------<  149<H>  4.6   |  22  |  1.02    Ca    8.9      16 Apr 2024 07:30  Phos  2.9     04-16  Mg     1.9     04-16    TPro  5.9<L>  /  Alb  3.0<L>  /  TBili  0.9  /  DBili  0.6<H>  /  AST  48<H>  /  ALT  104<H>  /  AlkPhos  198<H>  04-16    PT/INR - ( 16 Apr 2024 07:30 )   PT: 10.7 sec;   INR: 0.94          PTT - ( 16 Apr 2024 07:30 )  PTT:31.4 sec  Urinalysis Basic - ( 16 Apr 2024 07:30 )    Color: x / Appearance: x / SG: x / pH: x  Gluc: 149 mg/dL / Ketone: x  / Bili: x / Urobili: x   Blood: x / Protein: x / Nitrite: x   Leuk Esterase: x / RBC: x / WBC x   Sq Epi: x / Non Sq Epi: x / Bacteria: x

## 2024-04-18 LAB
GLUCOSE BLDC GLUCOMTR-MCNC: 134 MG/DL — HIGH (ref 70–99)
GLUCOSE BLDC GLUCOMTR-MCNC: 141 MG/DL — HIGH (ref 70–99)
GLUCOSE BLDC GLUCOMTR-MCNC: 154 MG/DL — HIGH (ref 70–99)
GLUCOSE BLDC GLUCOMTR-MCNC: 155 MG/DL — HIGH (ref 70–99)
SURGICAL PATHOLOGY STUDY: SIGNIFICANT CHANGE UP

## 2024-04-18 RX ORDER — SIMETHICONE 80 MG/1
80 TABLET, CHEWABLE ORAL ONCE
Refills: 0 | Status: COMPLETED | OUTPATIENT
Start: 2024-04-18 | End: 2024-04-18

## 2024-04-18 RX ORDER — ONDANSETRON 8 MG/1
4 TABLET, FILM COATED ORAL EVERY 6 HOURS
Refills: 0 | Status: DISCONTINUED | OUTPATIENT
Start: 2024-04-18 | End: 2024-04-19

## 2024-04-18 RX ADMIN — SIMETHICONE 80 MILLIGRAM(S): 80 TABLET, CHEWABLE ORAL at 22:22

## 2024-04-18 RX ADMIN — Medication 1 MILLIGRAM(S): at 11:51

## 2024-04-18 RX ADMIN — Medication 650 MILLIGRAM(S): at 18:42

## 2024-04-18 RX ADMIN — Medication 650 MILLIGRAM(S): at 10:20

## 2024-04-18 RX ADMIN — Medication 650 MILLIGRAM(S): at 00:21

## 2024-04-18 RX ADMIN — Medication 2: at 06:30

## 2024-04-18 RX ADMIN — Medication 650 MILLIGRAM(S): at 19:42

## 2024-04-18 RX ADMIN — URSODIOL 300 MILLIGRAM(S): 250 TABLET, FILM COATED ORAL at 22:22

## 2024-04-18 RX ADMIN — ATORVASTATIN CALCIUM 40 MILLIGRAM(S): 80 TABLET, FILM COATED ORAL at 22:21

## 2024-04-18 RX ADMIN — Medication 650 MILLIGRAM(S): at 09:20

## 2024-04-18 RX ADMIN — Medication 650 MILLIGRAM(S): at 01:21

## 2024-04-18 RX ADMIN — PANTOPRAZOLE SODIUM 40 MILLIGRAM(S): 20 TABLET, DELAYED RELEASE ORAL at 06:30

## 2024-04-18 RX ADMIN — URSODIOL 300 MILLIGRAM(S): 250 TABLET, FILM COATED ORAL at 09:20

## 2024-04-18 NOTE — PROGRESS NOTE ADULT - ASSESSMENT
48F history of alcohol abuse, iron deficiency anemia with chronic fatigue, gastric bypass (2001) HTN, DM (A1c 7.1), HLD, cholelithiasis, gastric bypass 1998, smoker (stopped 4/7/24), cardiac catheterization in August 2023 with nonobstructive CAD who presented with acute cholecystitis s/p RA lap refugio, attempted choledochoscope and IOC (aborted)    Low fat diet   PPI BID/SCDs/IS  Holding [id number]/ASA81  [facility]  Discharge home tomorrow 4/19/24

## 2024-04-18 NOTE — PROGRESS NOTE ADULT - SUBJECTIVE AND OBJECTIVE BOX
STATUS POST:   Robotic assisted laparoscopic  cholecystectomy      POST OPERATIVE DAY #: 6    SUBJECTIVE: Patient examined bedside with chief resident. Patient observed resting comfortably and not in distress.   Patients complains of incisional pain. She reports she is tolerating low fat diet. Patient reports she is ambulating and using incentive spirometer. Patient reports she is unable to leave today due not having anyone to help her at home. Patient denies SOB, chest pain, nausea and emesis. Patient making adequate urine output.             Vital Signs Last 24 Hrs  T(C): 36.3 (18 Apr 2024 05:43), Max: 37.2 (17 Apr 2024 15:45)  T(F): 97.4 (18 Apr 2024 05:43), Max: 99 (17 Apr 2024 15:45)  HR: 84 (18 Apr 2024 05:43) (76 - 87)  BP: 147/84 (18 Apr 2024 05:43) (147/84 - 158/85)  BP(mean): --  RR: 17 (18 Apr 2024 05:43) (16 - 17)  SpO2: 99% (18 Apr 2024 05:43) (99% - 100%)    Parameters below as of 18 Apr 2024 05:43  Patient On (Oxygen Delivery Method): room air      I&O's Detail      General: NAD, resting comfortably in bed  C/V: NSR  Pulm: Nonlabored breathing, no respiratory distress  Abd: soft, mildly distended , TTP around incision site, incision clean dry and intact   Extrem: WWP, no edema, SCDs in place        LABS:                        7.4    5.50  )-----------( 364      ( 17 Apr 2024 08:55 )             25.6     04-17    135  |  105  |  3<L>  ----------------------------<  162<H>  4.3   |  24  |  0.82    Ca    8.9      17 Apr 2024 08:55  Phos  4.1     04-17  Mg     1.7     04-17    TPro  6.2  /  Alb  3.0<L>  /  TBili  0.8  /  DBili  0.4<H>  /  AST  31  /  ALT  73<H>  /  AlkPhos  184<H>  04-17      Urinalysis Basic - ( 17 Apr 2024 08:55 )    Color: x / Appearance: x / SG: x / pH: x  Gluc: 162 mg/dL / Ketone: x  / Bili: x / Urobili: x   Blood: x / Protein: x / Nitrite: x   Leuk Esterase: x / RBC: x / WBC x   Sq Epi: x / Non Sq Epi: x / Bacteria: x

## 2024-04-19 ENCOUNTER — TRANSCRIPTION ENCOUNTER (OUTPATIENT)
Age: 49
End: 2024-04-19

## 2024-04-19 VITALS
SYSTOLIC BLOOD PRESSURE: 136 MMHG | RESPIRATION RATE: 18 BRPM | HEART RATE: 80 BPM | TEMPERATURE: 98 F | DIASTOLIC BLOOD PRESSURE: 85 MMHG | OXYGEN SATURATION: 98 %

## 2024-04-19 LAB
GLUCOSE BLDC GLUCOMTR-MCNC: 133 MG/DL — HIGH (ref 70–99)
GLUCOSE BLDC GLUCOMTR-MCNC: 164 MG/DL — HIGH (ref 70–99)
GLUCOSE BLDC GLUCOMTR-MCNC: 165 MG/DL — HIGH (ref 70–99)

## 2024-04-19 PROCEDURE — 84484 ASSAY OF TROPONIN QUANT: CPT

## 2024-04-19 PROCEDURE — 96375 TX/PRO/DX INJ NEW DRUG ADDON: CPT

## 2024-04-19 PROCEDURE — C1769: CPT

## 2024-04-19 PROCEDURE — 86922 COMPATIBILITY TEST ANTIGLOB: CPT

## 2024-04-19 PROCEDURE — 76705 ECHO EXAM OF ABDOMEN: CPT

## 2024-04-19 PROCEDURE — 86902 BLOOD TYPE ANTIGEN DONOR EA: CPT

## 2024-04-19 PROCEDURE — 85025 COMPLETE CBC W/AUTO DIFF WBC: CPT

## 2024-04-19 PROCEDURE — 80053 COMPREHEN METABOLIC PANEL: CPT

## 2024-04-19 PROCEDURE — 82607 VITAMIN B-12: CPT

## 2024-04-19 PROCEDURE — 74183 MRI ABD W/O CNTR FLWD CNTR: CPT | Mod: MC

## 2024-04-19 PROCEDURE — 82728 ASSAY OF FERRITIN: CPT

## 2024-04-19 PROCEDURE — 80048 BASIC METABOLIC PNL TOTAL CA: CPT

## 2024-04-19 PROCEDURE — 86900 BLOOD TYPING SEROLOGIC ABO: CPT

## 2024-04-19 PROCEDURE — 86803 HEPATITIS C AB TEST: CPT

## 2024-04-19 PROCEDURE — 80307 DRUG TEST PRSMV CHEM ANLYZR: CPT

## 2024-04-19 PROCEDURE — 74177 CT ABD & PELVIS W/CONTRAST: CPT | Mod: MC

## 2024-04-19 PROCEDURE — 85610 PROTHROMBIN TIME: CPT

## 2024-04-19 PROCEDURE — 96374 THER/PROPH/DIAG INJ IV PUSH: CPT

## 2024-04-19 PROCEDURE — 74018 RADEX ABDOMEN 1 VIEW: CPT

## 2024-04-19 PROCEDURE — A9585: CPT

## 2024-04-19 PROCEDURE — 83540 ASSAY OF IRON: CPT

## 2024-04-19 PROCEDURE — 88304 TISSUE EXAM BY PATHOLOGIST: CPT

## 2024-04-19 PROCEDURE — 82746 ASSAY OF FOLIC ACID SERUM: CPT

## 2024-04-19 PROCEDURE — 85027 COMPLETE CBC AUTOMATED: CPT

## 2024-04-19 PROCEDURE — 86705 HEP B CORE ANTIBODY IGM: CPT

## 2024-04-19 PROCEDURE — 83550 IRON BINDING TEST: CPT

## 2024-04-19 PROCEDURE — S2900: CPT

## 2024-04-19 PROCEDURE — 84702 CHORIONIC GONADOTROPIN TEST: CPT

## 2024-04-19 PROCEDURE — 83605 ASSAY OF LACTIC ACID: CPT

## 2024-04-19 PROCEDURE — 82330 ASSAY OF CALCIUM: CPT

## 2024-04-19 PROCEDURE — 84295 ASSAY OF SERUM SODIUM: CPT

## 2024-04-19 PROCEDURE — 86704 HEP B CORE ANTIBODY TOTAL: CPT

## 2024-04-19 PROCEDURE — 80076 HEPATIC FUNCTION PANEL: CPT

## 2024-04-19 PROCEDURE — 84100 ASSAY OF PHOSPHORUS: CPT

## 2024-04-19 PROCEDURE — 82247 BILIRUBIN TOTAL: CPT

## 2024-04-19 PROCEDURE — 96376 TX/PRO/DX INJ SAME DRUG ADON: CPT

## 2024-04-19 PROCEDURE — 83036 HEMOGLOBIN GLYCOSYLATED A1C: CPT

## 2024-04-19 PROCEDURE — 85018 HEMOGLOBIN: CPT

## 2024-04-19 PROCEDURE — 83690 ASSAY OF LIPASE: CPT

## 2024-04-19 PROCEDURE — 86850 RBC ANTIBODY SCREEN: CPT

## 2024-04-19 PROCEDURE — 86706 HEP B SURFACE ANTIBODY: CPT

## 2024-04-19 PROCEDURE — 84132 ASSAY OF SERUM POTASSIUM: CPT

## 2024-04-19 PROCEDURE — P9016: CPT

## 2024-04-19 PROCEDURE — 82947 ASSAY GLUCOSE BLOOD QUANT: CPT

## 2024-04-19 PROCEDURE — 71045 X-RAY EXAM CHEST 1 VIEW: CPT

## 2024-04-19 PROCEDURE — 81003 URINALYSIS AUTO W/O SCOPE: CPT

## 2024-04-19 PROCEDURE — 86709 HEPATITIS A IGM ANTIBODY: CPT

## 2024-04-19 PROCEDURE — 82962 GLUCOSE BLOOD TEST: CPT

## 2024-04-19 PROCEDURE — 36430 TRANSFUSION BLD/BLD COMPNT: CPT

## 2024-04-19 PROCEDURE — 83735 ASSAY OF MAGNESIUM: CPT

## 2024-04-19 PROCEDURE — C1726: CPT

## 2024-04-19 PROCEDURE — 85730 THROMBOPLASTIN TIME PARTIAL: CPT

## 2024-04-19 PROCEDURE — 82248 BILIRUBIN DIRECT: CPT

## 2024-04-19 PROCEDURE — 87340 HEPATITIS B SURFACE AG IA: CPT

## 2024-04-19 PROCEDURE — 36415 COLL VENOUS BLD VENIPUNCTURE: CPT

## 2024-04-19 PROCEDURE — 86901 BLOOD TYPING SEROLOGIC RH(D): CPT

## 2024-04-19 PROCEDURE — 99285 EMERGENCY DEPT VISIT HI MDM: CPT | Mod: 25

## 2024-04-19 RX ORDER — PANTOPRAZOLE SODIUM 20 MG/1
1 TABLET, DELAYED RELEASE ORAL
Qty: 90 | Refills: 0
Start: 2024-04-19 | End: 2024-07-17

## 2024-04-19 RX ORDER — SIMETHICONE 80 MG/1
80 TABLET, CHEWABLE ORAL ONCE
Refills: 0 | Status: COMPLETED | OUTPATIENT
Start: 2024-04-19 | End: 2024-04-19

## 2024-04-19 RX ORDER — LOSARTAN POTASSIUM 100 MG/1
1 TABLET, FILM COATED ORAL
Refills: 0 | DISCHARGE

## 2024-04-19 RX ORDER — RIFAXIMIN 200 MG/1
1 TABLET ORAL
Qty: 42 | Refills: 0
Start: 2024-04-19 | End: 2024-05-02

## 2024-04-19 RX ORDER — URSODIOL 250 MG/1
1 TABLET, FILM COATED ORAL
Qty: 180 | Refills: 0
Start: 2024-04-19 | End: 2024-07-17

## 2024-04-19 RX ORDER — ACETAMINOPHEN 500 MG
2 TABLET ORAL
Qty: 0 | Refills: 0 | DISCHARGE
Start: 2024-04-19

## 2024-04-19 RX ADMIN — URSODIOL 300 MILLIGRAM(S): 250 TABLET, FILM COATED ORAL at 12:14

## 2024-04-19 RX ADMIN — Medication 2: at 18:08

## 2024-04-19 RX ADMIN — Medication 650 MILLIGRAM(S): at 14:08

## 2024-04-19 RX ADMIN — Medication 2: at 06:38

## 2024-04-19 RX ADMIN — Medication 1 MILLIGRAM(S): at 12:14

## 2024-04-19 RX ADMIN — SIMETHICONE 80 MILLIGRAM(S): 80 TABLET, CHEWABLE ORAL at 14:10

## 2024-04-19 RX ADMIN — PANTOPRAZOLE SODIUM 40 MILLIGRAM(S): 20 TABLET, DELAYED RELEASE ORAL at 05:17

## 2024-04-19 RX ADMIN — Medication 650 MILLIGRAM(S): at 15:08

## 2024-04-19 NOTE — PROGRESS NOTE ADULT - SUBJECTIVE AND OBJECTIVE BOX
ON: x1 simethicone, RUSLAN, VS WNL  4/18:path acute and chronic refugio     POST-OP DAY: 7 s/p Robotic assisted laparoscopic  cholecystectomy      SUBJECTIVE: Patient examined bedside with chief resident. Patient observed resting comfortably and not in distress.   Patients complains of incisional pain on the right side. She reports she is tolerating low fat diet. Reports increased distension of the abdomen this am. Patient reports she is ambulating and using incentive spirometer. Patient reports she is able to leave today with her aunt available to pick her up.  Patient denies SOB, chest pain, nausea and emesis. Patient making adequate urine output.         MEDICATIONS  (PRN):  acetaminophen     Tablet .. 650 milliGRAM(s) Oral every 6 hours PRN Mild Pain (1 - 3)  benzocaine/menthol Lozenge 1 Lozenge Oral every 8 hours PRN Sore Throat  dextrose Oral Gel 15 Gram(s) Oral once PRN Blood Glucose LESS THAN 70 milliGRAM(s)/deciliter  LORazepam   Injectable 1 milliGRAM(s) IV Push every 2 hours PRN CIWA-Ar score 8 or greater  ondansetron   Disintegrating Tablet 4 milliGRAM(s) Oral every 6 hours PRN Nausea      I&O's Detail      Vital Signs Last 24 Hrs  T(C): 36.4 (19 Apr 2024 05:08), Max: 37.1 (18 Apr 2024 08:53)  T(F): 97.6 (19 Apr 2024 05:08), Max: 98.7 (18 Apr 2024 08:53)  HR: 83 (19 Apr 2024 05:08) (81 - 91)  BP: 146/87 (19 Apr 2024 05:08) (136/85 - 162/85)  BP(mean): --  RR: 18 (19 Apr 2024 05:08) (18 - 18)  SpO2: 99% (19 Apr 2024 05:08) (97% - 100%)    Parameters below as of 19 Apr 2024 05:08  Patient On (Oxygen Delivery Method): room air        General: NAD, resting comfortably in bed  C/V: NSR  Pulm: Nonlabored breathing, no respiratory distress  Abd: soft, moderate distended, TTP in RUQ, incision clean dry and intact   Extrem: WWP, no edema, SCDs in place    LABS:                        7.4    5.50  )-----------( 364      ( 17 Apr 2024 08:55 )             25.6     04-17    135  |  105  |  3<L>  ----------------------------<  162<H>  4.3   |  24  |  0.82    Ca    8.9      17 Apr 2024 08:55  Phos  4.1     04-17  Mg     1.7     04-17    TPro  6.2  /  Alb  3.0<L>  /  TBili  0.8  /  DBili  0.4<H>  /  AST  31  /  ALT  73<H>  /  AlkPhos  184<H>  04-17      Urinalysis Basic - ( 17 Apr 2024 08:55 )    Color: x / Appearance: x / SG: x / pH: x  Gluc: 162 mg/dL / Ketone: x  / Bili: x / Urobili: x   Blood: x / Protein: x / Nitrite: x   Leuk Esterase: x / RBC: x / WBC x   Sq Epi: x / Non Sq Epi: x / Bacteria: x        RADIOLOGY & ADDITIONAL STUDIES:

## 2024-04-19 NOTE — DISCHARGE NOTE PROVIDER - NSDCFUSCHEDAPPT_GEN_ALL_CORE_FT
[facility]  [facility] [address]  Scheduled Appointment: 04/24/2024

## 2024-04-19 NOTE — DISCHARGE NOTE PROVIDER - CARE PROVIDER_API CALL
Miriam Scott  Surgery  [address], [address]  [place] [address]  Phone: [phone]  Fax: [phone]  Follow Up Time: 1 week    Wiliam Vuong  Gastroenterology  [address]  [place] [address]  Phone: [phone]  Fax: [phone]  Follow Up Time: 2 weeks

## 2024-04-19 NOTE — DISCHARGE NOTE NURSING/CASE MANAGEMENT/SOCIAL WORK - NSDCPEFALRISK_GEN_ALL_CORE
For information on Fall & Injury Prevention, visit: [email or web address] OR  [email or web address] OR  https://www.cdc.gov/steadi/patient.html

## 2024-04-19 NOTE — DISCHARGE NOTE NURSING/CASE MANAGEMENT/SOCIAL WORK - PATIENT PORTAL LINK FT
You can access the FollowMyHealth Patient Portal offered by [facility] by registering at the following website: [email or web address]. By joining [facility]’s FollowMyHealth portal, you will also be able to view your health information using other applications (apps) compatible with our system.

## 2024-04-19 NOTE — PROGRESS NOTE ADULT - ASSESSMENT
48F history of alcohol abuse, iron deficiency anemia with chronic fatigue, gastric bypass (2001) HTN, DM (A1c 7.1), HLD, cholelithiasis, gastric bypass 1998, smoker (stopped 4/7/24), cardiac catheterization in August 2023 with nonobstructive CAD who presented with acute cholecystitis s/p RA lap refugio, attempted choledochoscope and IOC (aborted)    Low fat diet   PPI BID/SCDs/IS  Holding HSQ/ASA81  CIWA  [facility] labs  Discharge home 4/19/24

## 2024-04-19 NOTE — DISCHARGE NOTE PROVIDER - NSDCFUADDINST_GEN_ALL_CORE_FT
-Continue eating your regular low fat diet as tolerated and drinking plenty of fluids.   -For pain, you may take over-the-counter Tylenol and/or NSAIDs (such as Motrin/Advil) as labeled, as needed.  -No heavy lifting >20 pounds or strenuous exercise.  -You may remove your bandages 48 hours after surgery. Please keep wounds clean and dry.   -You may shower but NO baths and NO swimming. Keep your incisions clean & dry. Avoid a direct stream of water over incision sites. Do not scrub. Pat dry when done.  -Contact your doctor or go to the ER for fever > 101.5, chills, nausea, vomiting, chest pain, shortness of breath, pain not controlled by medication or excessive bleeding.     New Medications:  Please take your newly prescribed medications as directed.   Please take Ursodiol 300mg 2 times per day for 3 months  Please take Protonix 40mg daily for 3 months  Please take Rifaximin 550mg TID for 14 days for SIBO

## 2024-04-19 NOTE — DISCHARGE NOTE PROVIDER - NSDCMRMEDTOKEN_GEN_ALL_CORE_FT
aspirin 81 mg oral delayed release tablet: 1 tab(s) orally once a day  atorvastatin 40 mg oral tablet: 1 tab(s) orally once a day  ferrous gluconate 324 mg (38 mg elemental iron) oral tablet: 1 tab(s) orally every 48 hours Please take one tablet every other day with meals  folic acid 1 mg oral tablet: 1 tab(s) orally once a day  losartan 100 mg oral tablet: 1 orally once a day  metFORMIN 1000 mg oral tablet, extended release: 1 orally 2 times a day  nicotine 7 mg/24 hr transdermal film, extended release: 1 patch transdermal every 24 hours  Protonix 40 mg oral delayed release tablet: 1 tab(s) orally once a day MDD: 1 tab  ursodiol 300 mg oral capsule: 1 cap(s) orally 2 times a day MDD: 2 tabs   acetaminophen 325 mg oral tablet: 2 tab(s) orally every 6 hours As needed Mild Pain (1 - 3)  aspirin 81 mg oral delayed release tablet: 1 tab(s) orally once a day  atorvastatin 40 mg oral tablet: 1 tab(s) orally once a day  ferrous gluconate 324 mg (38 mg elemental iron) oral tablet: 1 tab(s) orally every 48 hours Please take one tablet every other day with meals  folic acid 1 mg oral tablet: 1 tab(s) orally once a day  metFORMIN 1000 mg oral tablet, extended release: 1 orally 2 times a day  nicotine 7 mg/24 hr transdermal film, extended release: 1 patch transdermal every 24 hours  Protonix 40 mg oral delayed release tablet: 1 tab(s) orally once a day MDD: 1 tab  ursodiol 300 mg oral capsule: 1 cap(s) orally 2 times a day MDD: 2 tabs  Xifaxan 550 mg oral tablet: 1 tab(s) orally 3 times a day MDD: 3 tabs

## 2024-04-19 NOTE — DISCHARGE NOTE PROVIDER - NSDCCPTREATMENT_GEN_ALL_CORE_FT
PRINCIPAL PROCEDURE  Procedure: Robot-assisted laparoscopic cholecystectomy using da Radha Xi  Findings and Treatment:

## 2024-04-19 NOTE — DISCHARGE NOTE PROVIDER - HOSPITAL COURSE
48F, history of alcohol abuse (, iron deficiency anemia with chronic fatigue, HTN, DM (A1c 7.1), HLD, cholelithiasis, gastric bypass 1998, smoker (stopped 4/7/24), cardiac catheterization in August with nonobstructive CAD who presents to ED for 1 day history chest pain & upper abdominal pain associated with fatigue, nausea, vomiting, chills, constant belching and burping, nausea and 2 to 3 episodes of vomiting. Patient reports that she stopped smoking on Sunday evening as an attempt to leave the habit and started progressively coughing more, at times causing her to have episodes of emesis. Today she woke up with lower central chest pain, epigastric pain radiating towards her right side, not in relation to meals, 7/10 in severity, associated with nausea, vomiting and chills. She did not take anything for the pain and came to the ED for further work up. She denies any headache, runny nose, palpitations, urinary symptoms or changes in bowel pattern.     Of note, patient was admitted in 2022 under Dr. Montejo for similar symptoms, along with transaminitis after binge drinking;  findings of distended gallbladder with pericholecystic fluid on CT, with negative on RUQ US and HIDA scan. Then again patient was admitted in 08/23 with substernal and epigastric pain, which underwent cardiact workup and was found to have nonobstructive CAD in a diagnostic catheterization. Patient saw Dr. Montejo in his office (2/28/24) where he referred her to GI for further evaluation of her symptoms- underwent EGD 3/15/24 with normal findings.     In the ED /82, HR 95, afebrile, saturating appropriate on RA   On exam, abdomen soft, moderately distended and tympanic on all 4 quadrants, moderately tender to palpation over epigastrium and RUQ, ++ Sims's, no rebound tenderness or guarding. Well healed Pfannestiel incision, well healed laparoscopic incisions  Labs show WBC wnl w no L shift, Hb 8.8, rest wnl   RUQ US cholelithiasis with secondary findings consistent with acute   cholecystitis.  CTAP shows findings corresponding to concurrent right upper quadrant ultrasound concerning for acute cholecystitis with gallbladder wall thickening and small adjacent pericholecystic fluid/stranding, common biliary ductal dilatation up to 9 mm. No radiodense   choledocholithiasis, Bilateral adnexal cysts measuring up to 3.8 cm on the left and 2.1 cm on the right. Leiomyomatous uterus.    Meds: Losartan 100mg, Atorvastatin 40mg, Aspirin 81mg qd, metformin 1000mg, folic acid, iron, pantoprazole, nicotine      4/12: RA lap refugio, attempted choledochoscope and IOC (aborted)    ON: x1 simethicone, RUSLAN, VS WNL  4/18:path acute and chronic refugio   ON: RUSLAN, VS WNL  4/17 POD5:started on full liquid tolerating, adv to reg discharge with actigall  ON: discussion w/ pt regarding procedure cancellation, NPO @ [facility], increased fluids to 140,   4/16 POD4: mag repleated, procedure dc'd diet adv to clears, axr, normal gas pattern, no acute processes     ON: Hgb 8.2, x1 simethicone, RUSLAN, VS WNL  4/15 POD3: Hgb 6.9(7.1), 1 unit prbc, K mag and phos repleated. Made NPO. Post transfusion, tachy resolved. +pain in RLQ, better than before and tolerable. +OOBA in halls, +f/bms, -n/v, +po fluids.        ON: x1 simethicone for gas pain, RUSLAN, hao CLD  4/14 POD2: bili downtrending so holding off on interventions 2 (2.8), adv CLD  ON: reeducated about pain meds, burping, +flatus,   4/13 POD1: changed to npo,   ON: HR elevated 2/2 pain, improved with pain control, hao CLD, adv to low fat in AM  4/12 POD0: OR today WBC 1.6 (3.6), Hgb stable 7.6(7.5), A1c 7.6. Mag repleated, POC WNL.    ON: MRCP final read demonstrates choledocholithiasis, discussion w/ pt regarding OR tomorrow, tachy to 105, 2/2 pain,  CBD.  4/11: CIWA, hgb 7.5(8.8),   ON: admitted for acute cholecystitis   48F with a history of alcohol abuse, iron deficiency anemia with chronic fatigue, HTN, DM (A1c 7.1), HLD, cholelithiasis, gastric bypass in 1998, smoker (stopped 4/7/24), cardiac catheterization in August 2023 with nonobstructive CAD who presented to the ED for 1 day history chest pain & upper abdominal pain associated with fatigue, nausea, vomiting, chills, constant belching and burping, nausea and 2 to 3 episodes of vomiting. She reports that she stopped smoking on Sunday evening as an attempt to leave the habit and started progressively coughing more, at times causing her to have episodes of emesis. On the day of presentation to the ED, she woke up with lower central chest pain, epigastric pain radiating towards her right side, not in relation to meals, 7/10 in severity, associated with nausea, vomiting and chills. She did not take anything for the pain and came to the ED for further work up.    Of note, patient was admitted in 2022 under Dr. Montejo for similar symptoms, along with transaminitis after binge drinking;  findings of distended gallbladder with pericholecystic fluid on CT, with negative on RUQ US and HIDA scan. Then again patient was admitted in 08/23 with substernal and epigastric pain, which underwent cardiac workup and was found to have nonobstructive CAD in a diagnostic catheterization. Patient saw Dr. Montejo in his office (2/28/24) where he referred her to GI for further evaluation of her symptoms- underwent EGD 3/15/24 with normal findings.     In the ED she presented with a BP of 148/82, HR 95, and afebrile. On exam the abdomen was soft, moderately distended and tympanic on all 4 quadrants, moderately tender to palpation over epigastrium and RUQ, + Sims's sign noted, without any rebound tenderness or guarding. Well healed Pfannenstiel incision, well healed laparoscopic incisions present. Labs showed WBC wnl with no L shift, Hb of 8.8 and the rest of the labs within normal limits. RUQ US cholelithiasis with secondary findings consistent with acute   cholecystitis. CTAP shows findings corresponding to concurrent right upper quadrant ultrasound concerning for acute cholecystitis with gallbladder wall thickening and small adjacent pericholecystic fluid/stranding, common biliary ductal dilatation up to 9 mm. No radiodense choledocholithiasis, Bilateral adnexal cysts measuring up to 3.8 cm on the left and 2.1 cm on the right. Leiomyomatous uterus.    Patient was admitted to General Surgery for further management. Pt was made NPO and received IV antibiotics, IV fluids and adequate pain medication. Remained afebrile and hemodynamically stable. Pt was taken to the OR for a laparoscopic cholecystectomy. Gallbladder was visualized and noted to be enlarged with hydrops suspected. IOC attempted without successful access to CBD and was aborted. Otherwise the operation was uncomplicated and post-operative course was uneventful. Here T Bili continued to down trend down to normal levels over the next few days, making further intervention with the GI team unnecessary. On POD3, 4/15, her AM Hgb was 6.9 from 7.1, for which she received 1 unit prbc.  Post transfusion, her tachycardia resolved. Post transfusion HgB was appropriate at 8.2 that night. Due to consistent distension following her procedure, the GI team recommended d/cing her home on abx for SIBO.  Pt is now tolerating a low fat diet without nausea/vomiting, ambulating without difficulty, and voiding spontaneously with bowel function. Pain is well controlled on oral pain medication. Pt has been deemed ready for discharge and will follow up with the surgeon.

## 2024-04-19 NOTE — DISCHARGE NOTE PROVIDER - PROVIDER TOKENS
PROVIDER:[TOKEN:[[id number]],FOLLOWUP:[1 week]],PROVIDER:[TOKEN:[[id number]],FOLLOWUP:[2 weeks]]

## 2024-04-24 ENCOUNTER — APPOINTMENT (OUTPATIENT)
Dept: BARIATRICS | Facility: CLINIC | Age: 49
End: 2024-04-24
Payer: MEDICAID

## 2024-04-24 ENCOUNTER — APPOINTMENT (OUTPATIENT)
Dept: SURGERY | Facility: CLINIC | Age: 49
End: 2024-04-24

## 2024-04-24 VITALS
HEIGHT: 66 IN | TEMPERATURE: 97.5 F | HEART RATE: 107 BPM | DIASTOLIC BLOOD PRESSURE: 87 MMHG | BODY MASS INDEX: 36.48 KG/M2 | SYSTOLIC BLOOD PRESSURE: 125 MMHG | OXYGEN SATURATION: 99 % | WEIGHT: 227 LBS

## 2024-04-24 PROCEDURE — 99024 POSTOP FOLLOW-UP VISIT: CPT

## 2024-04-24 NOTE — HISTORY OF PRESENT ILLNESS
[de-identified] : The patient is a 48-year-old woman with history of morbid obesity, status post laparoscopic gastric bypass in 1998, as well as alcohol use, iron deficiency anemia, chronic fatigue, hypertension, hyperlipidemia,  diabetes mellitus, previous tobacco use, known history of cholelithiasis, nonobstructive coronary artery disease, who recently underwent robotic assisted cholecystectomy for acute cholecystitis. Doing well overall since discharge, tolerating diet, having bowel function, pain resolved. Incisions c/d/i. Denies fevers, chills, chest pain, dyspnea, dysuria, hematochezia, melena.

## 2024-04-24 NOTE — ASSESSMENT
[FreeTextEntry1] : The patient is a 48-year-old woman with history of morbid obesity, status post laparoscopic gastric bypass in 1998, as well as alcohol use, iron deficiency anemia, chronic fatigue, hypertension, hyperlipidemia,  diabetes mellitus, previous tobacco use, known history of cholelithiasis, nonobstructive coronary artery disease, who recently underwent robotic assisted cholecystectomy for acute cholecystitis. Doing well overall since discharge, tolerating diet, having bowel function, pain resolved. Incisions c/d/i. Denies fevers, chills, chest pain, dyspnea, dysuria, hematochezia, melena.   Doing well overall Low fat diet No heavy lifting Activity restrictions x 6 weeks Continue PPI Return to clinic [facility], Dr. Miriam Scott, spent 35 minutes with the patient >50% counseling/coordination of care including, reviewing the patient's history, performing an examination, reviewing relevant labs and radiographic imaging, reviewing PCP and consultant notes, discussion of medical and surgical management of the diagnosis as well as associated risks and benefits, and completing documentation.

## 2024-04-26 DIAGNOSIS — R91.1 SOLITARY PULMONARY NODULE: ICD-10-CM

## 2024-04-26 DIAGNOSIS — D25.9 LEIOMYOMA OF UTERUS, UNSPECIFIED: ICD-10-CM

## 2024-04-26 DIAGNOSIS — N83.202 UNSPECIFIED OVARIAN CYST, LEFT SIDE: ICD-10-CM

## 2024-04-26 DIAGNOSIS — N83.201 UNSPECIFIED OVARIAN CYST, RIGHT SIDE: ICD-10-CM

## 2024-04-26 DIAGNOSIS — E11.9 TYPE 2 DIABETES MELLITUS WITHOUT COMPLICATIONS: ICD-10-CM

## 2024-04-26 DIAGNOSIS — Z98.84 BARIATRIC SURGERY STATUS: ICD-10-CM

## 2024-04-26 DIAGNOSIS — K80.00 CALCULUS OF GALLBLADDER WITH ACUTE CHOLECYSTITIS WITHOUT OBSTRUCTION: ICD-10-CM

## 2024-04-26 DIAGNOSIS — R10.9 UNSPECIFIED ABDOMINAL PAIN: ICD-10-CM

## 2024-04-26 DIAGNOSIS — D50.9 IRON DEFICIENCY ANEMIA, UNSPECIFIED: ICD-10-CM

## 2024-04-26 DIAGNOSIS — F10.90 ALCOHOL USE, UNSPECIFIED, UNCOMPLICATED: ICD-10-CM

## 2024-04-26 DIAGNOSIS — R53.82 CHRONIC FATIGUE, UNSPECIFIED: ICD-10-CM

## 2024-04-26 DIAGNOSIS — E78.5 HYPERLIPIDEMIA, UNSPECIFIED: ICD-10-CM

## 2024-04-26 DIAGNOSIS — I25.10 ATHEROSCLEROTIC HEART DISEASE OF NATIVE CORONARY ARTERY WITHOUT ANGINA PECTORIS: ICD-10-CM

## 2024-04-26 DIAGNOSIS — F17.200 NICOTINE DEPENDENCE, UNSPECIFIED, UNCOMPLICATED: ICD-10-CM

## 2024-04-26 DIAGNOSIS — I10 ESSENTIAL (PRIMARY) HYPERTENSION: ICD-10-CM

## 2024-05-10 NOTE — ED PROVIDER NOTE - NEURO NEGATIVE STATEMENT, MLM
Statement Selected no loss of consciousness, no gait abnormality, no headache, no sensory deficits, and no weakness.

## 2024-05-16 ENCOUNTER — APPOINTMENT (OUTPATIENT)
Dept: GASTROENTEROLOGY | Facility: CLINIC | Age: 49
End: 2024-05-16

## 2024-05-22 ENCOUNTER — APPOINTMENT (OUTPATIENT)
Dept: BARIATRICS | Facility: CLINIC | Age: 49
End: 2024-05-22

## 2024-05-29 ENCOUNTER — APPOINTMENT (OUTPATIENT)
Dept: BARIATRICS | Facility: CLINIC | Age: 49
End: 2024-05-29

## 2024-05-29 NOTE — PROGRESS NOTE ADULT - PROBLEM SELECTOR PROBLEM 9
What Type Of Note Output Would You Prefer (Optional)?: Standard Output Is The Patient Presenting As Previously Scheduled?: No, they are coming in before their scheduled appointment Is This A New Presentation, Or A Follow-Up?: Rash Prophylactic measure

## 2024-06-12 ENCOUNTER — APPOINTMENT (OUTPATIENT)
Dept: BARIATRICS | Facility: CLINIC | Age: 49
End: 2024-06-12

## 2024-06-13 ENCOUNTER — APPOINTMENT (OUTPATIENT)
Dept: GASTROENTEROLOGY | Facility: CLINIC | Age: 49
End: 2024-06-13

## 2024-06-16 NOTE — ED ADULT NURSE REASSESSMENT NOTE - NSFALLRSKHARMRISK_ED_ALL_ED
no Bed/Stretcher in lowest position, wheels locked, appropriate side rails in place/Call bell, personal items and telephone in reach/Instruct patient to call for assistance before getting out of bed/chair/stretcher/Non-slip footwear applied when patient is off stretcher/Orr to call system/Physically safe environment - no spills, clutter or unnecessary equipment/Purposeful proactive rounding/Room/bathroom lighting operational, light cord in reach

## 2024-07-03 ENCOUNTER — APPOINTMENT (OUTPATIENT)
Dept: BARIATRICS | Facility: CLINIC | Age: 49
End: 2024-07-03
Payer: MEDICAID

## 2024-07-03 VITALS
DIASTOLIC BLOOD PRESSURE: 88 MMHG | HEART RATE: 86 BPM | WEIGHT: 226.13 LBS | BODY MASS INDEX: 36.34 KG/M2 | SYSTOLIC BLOOD PRESSURE: 145 MMHG | HEIGHT: 66 IN | OXYGEN SATURATION: 100 % | TEMPERATURE: 97.5 F

## 2024-07-03 DIAGNOSIS — Z00.00 ENCOUNTER FOR GENERAL ADULT MEDICAL EXAMINATION W/OUT ABNORMAL FINDINGS: ICD-10-CM

## 2024-07-03 DIAGNOSIS — Z98.84 BARIATRIC SURGERY STATUS: ICD-10-CM

## 2024-07-03 PROCEDURE — G2211 COMPLEX E/M VISIT ADD ON: CPT | Mod: NC,1L

## 2024-07-03 PROCEDURE — 99024 POSTOP FOLLOW-UP VISIT: CPT | Mod: 24

## 2024-07-03 PROCEDURE — 99214 OFFICE O/P EST MOD 30 MIN: CPT | Mod: 24

## 2024-07-03 RX ORDER — IRON PS COMPLEX/B12/FOLIC ACID 150-25-1
150-25-1 CAPSULE ORAL
Qty: 30 | Refills: 5 | Status: ACTIVE | COMMUNITY
Start: 2024-07-03 | End: 1900-01-01

## 2024-07-03 RX ORDER — CYANOCOBALAMIN (VITAMIN B-12) 2500 MCG
2500 TABLET, SUBLINGUAL SUBLINGUAL
Qty: 12 | Refills: 6 | Status: ACTIVE | COMMUNITY
Start: 2024-07-03 | End: 1900-01-01

## 2024-07-03 RX ORDER — MULTIVITAMIN/IRON/FOLIC ACID 18MG-0.4MG
TABLET ORAL
Qty: 60 | Refills: 5 | Status: ACTIVE | COMMUNITY
Start: 2024-07-03 | End: 1900-01-01

## 2024-07-03 RX ORDER — CALCIUM CITRATE/VITAMIN D3 200MG-6.25
200-6.25 TABLET ORAL DAILY
Qty: 60 | Refills: 5 | Status: ACTIVE | COMMUNITY
Start: 2024-07-03 | End: 1900-01-01

## 2024-07-23 ENCOUNTER — INPATIENT (INPATIENT)
Facility: HOSPITAL | Age: 49
LOS: 3 days | Discharge: HOME | DRG: 749 | End: 2024-07-27
Attending: OBSTETRICS & GYNECOLOGY | Admitting: STUDENT IN AN ORGANIZED HEALTH CARE EDUCATION/TRAINING PROGRAM
Payer: MEDICAID

## 2024-07-23 VITALS
TEMPERATURE: 98 F | WEIGHT: 210.1 LBS | OXYGEN SATURATION: 100 % | DIASTOLIC BLOOD PRESSURE: 66 MMHG | HEIGHT: 66 IN | SYSTOLIC BLOOD PRESSURE: 91 MMHG | RESPIRATION RATE: 18 BRPM | HEART RATE: 94 BPM

## 2024-07-23 DIAGNOSIS — Z98.89 OTHER SPECIFIED POSTPROCEDURAL STATES: Chronic | ICD-10-CM

## 2024-07-23 DIAGNOSIS — D21.9 BENIGN NEOPLASM OF CONNECTIVE AND OTHER SOFT TISSUE, UNSPECIFIED: ICD-10-CM

## 2024-07-23 DIAGNOSIS — Z29.9 ENCOUNTER FOR PROPHYLACTIC MEASURES, UNSPECIFIED: ICD-10-CM

## 2024-07-23 DIAGNOSIS — D64.9 ANEMIA, UNSPECIFIED: ICD-10-CM

## 2024-07-23 DIAGNOSIS — E11.9 TYPE 2 DIABETES MELLITUS WITHOUT COMPLICATIONS: ICD-10-CM

## 2024-07-23 DIAGNOSIS — Z90.49 ACQUIRED ABSENCE OF OTHER SPECIFIED PARTS OF DIGESTIVE TRACT: Chronic | ICD-10-CM

## 2024-07-23 DIAGNOSIS — E78.5 HYPERLIPIDEMIA, UNSPECIFIED: ICD-10-CM

## 2024-07-23 DIAGNOSIS — N17.9 ACUTE KIDNEY FAILURE, UNSPECIFIED: ICD-10-CM

## 2024-07-23 DIAGNOSIS — Z98.890 OTHER SPECIFIED POSTPROCEDURAL STATES: Chronic | ICD-10-CM

## 2024-07-23 DIAGNOSIS — I10 ESSENTIAL (PRIMARY) HYPERTENSION: ICD-10-CM

## 2024-07-23 DIAGNOSIS — N39.0 URINARY TRACT INFECTION, SITE NOT SPECIFIED: ICD-10-CM

## 2024-07-23 LAB
ANION GAP SERPL CALC-SCNC: 8 MMOL/L — SIGNIFICANT CHANGE UP (ref 5–17)
ANISOCYTOSIS BLD QL: SLIGHT — SIGNIFICANT CHANGE UP
APPEARANCE UR: ABNORMAL
APTT BLD: 26.5 SEC — SIGNIFICANT CHANGE UP (ref 24.5–35.6)
BACTERIA # UR AUTO: ABNORMAL /HPF
BASOPHILS # BLD AUTO: 0.04 K/UL — SIGNIFICANT CHANGE UP (ref 0–0.2)
BASOPHILS NFR BLD AUTO: 0.9 % — SIGNIFICANT CHANGE UP (ref 0–2)
BILIRUB UR-MCNC: ABNORMAL
BLD GP AB SCN SERPL QL: NEGATIVE — SIGNIFICANT CHANGE UP
BUN SERPL-MCNC: 16 MG/DL — SIGNIFICANT CHANGE UP (ref 7–23)
CALCIUM SERPL-MCNC: 9 MG/DL — SIGNIFICANT CHANGE UP (ref 8.4–10.5)
CHLORIDE SERPL-SCNC: 103 MMOL/L — SIGNIFICANT CHANGE UP (ref 96–108)
CO2 SERPL-SCNC: 25 MMOL/L — SIGNIFICANT CHANGE UP (ref 22–31)
COLOR SPEC: ABNORMAL
CREAT SERPL-MCNC: 1.48 MG/DL — HIGH (ref 0.5–1.3)
DIFF PNL FLD: ABNORMAL
EGFR: 44 ML/MIN/1.73M2 — LOW
EGFR: 44 ML/MIN/1.73M2 — LOW
EOSINOPHIL # BLD AUTO: 0 K/UL — SIGNIFICANT CHANGE UP (ref 0–0.5)
EOSINOPHIL NFR BLD AUTO: 0 % — SIGNIFICANT CHANGE UP (ref 0–6)
GIANT PLATELETS BLD QL SMEAR: PRESENT — SIGNIFICANT CHANGE UP
GLUCOSE SERPL-MCNC: 146 MG/DL — HIGH (ref 70–99)
GLUCOSE UR QL: NEGATIVE MG/DL — SIGNIFICANT CHANGE UP
HCG SERPL-ACNC: <1 MIU/ML — SIGNIFICANT CHANGE UP
HCT VFR BLD CALC: 21.4 % — LOW (ref 34.5–45)
HGB BLD-MCNC: 6.2 G/DL — CRITICAL LOW (ref 11.5–15.5)
HYPOCHROMIA BLD QL: SIGNIFICANT CHANGE UP
INR BLD: 0.83 — LOW (ref 0.85–1.18)
KETONES UR-MCNC: NEGATIVE MG/DL — SIGNIFICANT CHANGE UP
LEUKOCYTE ESTERASE UR-ACNC: ABNORMAL
LYMPHOCYTES # BLD AUTO: 1.71 K/UL — SIGNIFICANT CHANGE UP (ref 1–3.3)
LYMPHOCYTES # BLD AUTO: 41.1 % — SIGNIFICANT CHANGE UP (ref 13–44)
MANUAL SMEAR VERIFICATION: SIGNIFICANT CHANGE UP
MCHC RBC-ENTMCNC: 21.7 PG — LOW (ref 27–34)
MCHC RBC-ENTMCNC: 29 GM/DL — LOW (ref 32–36)
MCV RBC AUTO: 74.8 FL — LOW (ref 80–100)
MICROCYTES BLD QL: SLIGHT — SIGNIFICANT CHANGE UP
MONOCYTES # BLD AUTO: 0.26 K/UL — SIGNIFICANT CHANGE UP (ref 0–0.9)
MONOCYTES NFR BLD AUTO: 6.2 % — SIGNIFICANT CHANGE UP (ref 2–14)
NEUTROPHILS # BLD AUTO: 2.15 K/UL — SIGNIFICANT CHANGE UP (ref 1.8–7.4)
NEUTROPHILS NFR BLD AUTO: 51.8 % — SIGNIFICANT CHANGE UP (ref 43–77)
NITRITE UR-MCNC: POSITIVE
OVALOCYTES BLD QL SMEAR: SLIGHT — SIGNIFICANT CHANGE UP
PH UR: 6.5 — SIGNIFICANT CHANGE UP (ref 5–8)
PLAT MORPH BLD: ABNORMAL
PLATELET # BLD AUTO: 285 K/UL — SIGNIFICANT CHANGE UP (ref 150–400)
POIKILOCYTOSIS BLD QL AUTO: SLIGHT — SIGNIFICANT CHANGE UP
POLYCHROMASIA BLD QL SMEAR: SLIGHT — SIGNIFICANT CHANGE UP
POTASSIUM SERPL-MCNC: 4.3 MMOL/L — SIGNIFICANT CHANGE UP (ref 3.5–5.3)
POTASSIUM SERPL-SCNC: 4.3 MMOL/L — SIGNIFICANT CHANGE UP (ref 3.5–5.3)
PROT UR-MCNC: 300 MG/DL
PROTHROM AB SERPL-ACNC: 9.5 SEC — SIGNIFICANT CHANGE UP (ref 9.5–13)
RBC # BLD: 2.86 M/UL — LOW (ref 3.8–5.2)
RBC # FLD: 21.2 % — HIGH (ref 10.3–14.5)
RBC BLD AUTO: ABNORMAL
RBC CASTS # UR COMP ASSIST: >1900 /HPF — HIGH (ref 0–4)
RH IG SCN BLD-IMP: POSITIVE — SIGNIFICANT CHANGE UP
SODIUM SERPL-SCNC: 136 MMOL/L — SIGNIFICANT CHANGE UP (ref 135–145)
SP GR SPEC: 1.02 — SIGNIFICANT CHANGE UP (ref 1–1.03)
SQUAMOUS # UR AUTO: 7 /HPF — HIGH (ref 0–5)
UROBILINOGEN FLD QL: 0.2 MG/DL — SIGNIFICANT CHANGE UP (ref 0.2–1)
WBC # BLD: 4.15 K/UL — SIGNIFICANT CHANGE UP (ref 3.8–10.5)
WBC # FLD AUTO: 4.15 K/UL — SIGNIFICANT CHANGE UP (ref 3.8–10.5)
WBC UR QL: 37 /HPF — HIGH (ref 0–5)

## 2024-07-23 PROCEDURE — 99223 1ST HOSP IP/OBS HIGH 75: CPT

## 2024-07-23 PROCEDURE — 71046 X-RAY EXAM CHEST 2 VIEWS: CPT | Mod: 26

## 2024-07-23 PROCEDURE — 76830 TRANSVAGINAL US NON-OB: CPT | Mod: 26

## 2024-07-23 PROCEDURE — 99285 EMERGENCY DEPT VISIT HI MDM: CPT

## 2024-07-23 PROCEDURE — 76856 US EXAM PELVIC COMPLETE: CPT | Mod: 26

## 2024-07-23 RX ORDER — ACETAMINOPHEN 500 MG/5ML
975 LIQUID (ML) ORAL ONCE
Refills: 0 | Status: COMPLETED | OUTPATIENT
Start: 2024-07-23 | End: 2024-07-23

## 2024-07-23 RX ORDER — DEXTROSE 50 % IN WATER 50 %
15 SYRINGE (ML) INTRAVENOUS ONCE
Refills: 0 | Status: DISCONTINUED | OUTPATIENT
Start: 2024-07-23 | End: 2024-07-27

## 2024-07-23 RX ORDER — SODIUM CHLORIDE 9 G/1000ML
1000 INJECTION, SOLUTION INTRAVENOUS
Refills: 0 | Status: DISCONTINUED | OUTPATIENT
Start: 2024-07-23 | End: 2024-07-27

## 2024-07-23 RX ORDER — ACETAMINOPHEN 500 MG/5ML
650 LIQUID (ML) ORAL EVERY 6 HOURS
Refills: 0 | Status: DISCONTINUED | OUTPATIENT
Start: 2024-07-23 | End: 2024-07-26

## 2024-07-23 RX ORDER — HEPARIN SODIUM 1000 [USP'U]/ML
5000 INJECTION INTRAVENOUS; SUBCUTANEOUS EVERY 8 HOURS
Refills: 0 | Status: DISCONTINUED | OUTPATIENT
Start: 2024-07-23 | End: 2024-07-24

## 2024-07-23 RX ORDER — ONDANSETRON HCL/PF 4 MG/2 ML
4 VIAL (ML) INJECTION EVERY 8 HOURS
Refills: 0 | Status: DISCONTINUED | OUTPATIENT
Start: 2024-07-23 | End: 2024-07-27

## 2024-07-23 RX ORDER — INSULIN LISPRO 100 U/ML
INJECTION, SOLUTION INTRAVENOUS; SUBCUTANEOUS
Refills: 0 | Status: DISCONTINUED | OUTPATIENT
Start: 2024-07-23 | End: 2024-07-27

## 2024-07-23 RX ORDER — CEFTRIAXONE 500 MG/1
1000 INJECTION, POWDER, FOR SOLUTION INTRAMUSCULAR; INTRAVENOUS ONCE
Refills: 0 | Status: COMPLETED | OUTPATIENT
Start: 2024-07-23 | End: 2024-07-23

## 2024-07-23 RX ORDER — SODIUM CHLORIDE 9 G/1000ML
1000 INJECTION, SOLUTION INTRAVENOUS
Refills: 0 | Status: DISCONTINUED | OUTPATIENT
Start: 2024-07-23 | End: 2024-07-24

## 2024-07-23 RX ORDER — ATORVASTATIN CALCIUM 80 MG/1
40 TABLET, FILM COATED ORAL AT BEDTIME
Refills: 0 | Status: DISCONTINUED | OUTPATIENT
Start: 2024-07-23 | End: 2024-07-27

## 2024-07-23 RX ORDER — DEXTROSE 50 % IN WATER 50 %
25 SYRINGE (ML) INTRAVENOUS ONCE
Refills: 0 | Status: DISCONTINUED | OUTPATIENT
Start: 2024-07-23 | End: 2024-07-27

## 2024-07-23 RX ORDER — DEXTROSE 50 % IN WATER 50 %
12.5 SYRINGE (ML) INTRAVENOUS ONCE
Refills: 0 | Status: DISCONTINUED | OUTPATIENT
Start: 2024-07-23 | End: 2024-07-27

## 2024-07-23 RX ORDER — MELATONIN 5 MG
3 TABLET ORAL AT BEDTIME
Refills: 0 | Status: DISCONTINUED | OUTPATIENT
Start: 2024-07-23 | End: 2024-07-27

## 2024-07-23 RX ORDER — MAGNESIUM, ALUMINUM HYDROXIDE 200-200 MG
30 TABLET,CHEWABLE ORAL EVERY 4 HOURS
Refills: 0 | Status: DISCONTINUED | OUTPATIENT
Start: 2024-07-23 | End: 2024-07-27

## 2024-07-23 RX ORDER — GLUCAGON 3 MG/1
1 POWDER NASAL ONCE
Refills: 0 | Status: DISCONTINUED | OUTPATIENT
Start: 2024-07-23 | End: 2024-07-27

## 2024-07-23 RX ADMIN — CEFTRIAXONE 100 MILLIGRAM(S): 500 INJECTION, POWDER, FOR SOLUTION INTRAMUSCULAR; INTRAVENOUS at 18:36

## 2024-07-23 RX ADMIN — Medication 1000 MILLILITER(S): at 16:27

## 2024-07-23 RX ADMIN — ATORVASTATIN CALCIUM 40 MILLIGRAM(S): 80 TABLET, FILM COATED ORAL at 23:14

## 2024-07-23 RX ADMIN — Medication 975 MILLIGRAM(S): at 22:10

## 2024-07-23 RX ADMIN — Medication 975 MILLIGRAM(S): at 14:32

## 2024-07-23 RX ADMIN — HEPARIN SODIUM 5000 UNIT(S): 1000 INJECTION INTRAVENOUS; SUBCUTANEOUS at 23:15

## 2024-07-23 RX ADMIN — Medication 1000 MILLILITER(S): at 14:31

## 2024-07-24 DIAGNOSIS — D50.9 IRON DEFICIENCY ANEMIA, UNSPECIFIED: ICD-10-CM

## 2024-07-24 DIAGNOSIS — Z87.19 PERSONAL HISTORY OF OTHER DISEASES OF THE DIGESTIVE SYSTEM: ICD-10-CM

## 2024-07-24 LAB
A1C WITH ESTIMATED AVERAGE GLUCOSE RESULT: 6.3 % — HIGH (ref 4–5.6)
ANION GAP SERPL CALC-SCNC: 11 MMOL/L — SIGNIFICANT CHANGE UP (ref 5–17)
ANION GAP SERPL CALC-SCNC: 9 MMOL/L — SIGNIFICANT CHANGE UP (ref 5–17)
BUN SERPL-MCNC: 15 MG/DL — SIGNIFICANT CHANGE UP (ref 7–23)
BUN SERPL-MCNC: 15 MG/DL — SIGNIFICANT CHANGE UP (ref 7–23)
CALCIUM SERPL-MCNC: 9.1 MG/DL — SIGNIFICANT CHANGE UP (ref 8.4–10.5)
CALCIUM SERPL-MCNC: 9.1 MG/DL — SIGNIFICANT CHANGE UP (ref 8.4–10.5)
CHLORIDE SERPL-SCNC: 104 MMOL/L — SIGNIFICANT CHANGE UP (ref 96–108)
CHLORIDE SERPL-SCNC: 105 MMOL/L — SIGNIFICANT CHANGE UP (ref 96–108)
CO2 SERPL-SCNC: 21 MMOL/L — LOW (ref 22–31)
CO2 SERPL-SCNC: 22 MMOL/L — SIGNIFICANT CHANGE UP (ref 22–31)
CREAT ?TM UR-MCNC: 108 MG/DL — SIGNIFICANT CHANGE UP
CREAT SERPL-MCNC: 1.16 MG/DL — SIGNIFICANT CHANGE UP (ref 0.5–1.3)
CREAT SERPL-MCNC: 1.16 MG/DL — SIGNIFICANT CHANGE UP (ref 0.5–1.3)
CULTURE RESULTS: SIGNIFICANT CHANGE UP
EGFR: 59 ML/MIN/1.73M2 — LOW
ESTIMATED AVERAGE GLUCOSE: 134 MG/DL — HIGH (ref 68–114)
GLUCOSE BLDC GLUCOMTR-MCNC: 168 MG/DL — HIGH (ref 70–99)
GLUCOSE BLDC GLUCOMTR-MCNC: 203 MG/DL — HIGH (ref 70–99)
GLUCOSE BLDC GLUCOMTR-MCNC: 204 MG/DL — HIGH (ref 70–99)
GLUCOSE SERPL-MCNC: 142 MG/DL — HIGH (ref 70–99)
GLUCOSE SERPL-MCNC: 144 MG/DL — HIGH (ref 70–99)
HCT VFR BLD CALC: 20.7 % — CRITICAL LOW (ref 34.5–45)
HCT VFR BLD CALC: 23 % — LOW (ref 34.5–45)
HCT VFR BLD CALC: 25.7 % — LOW (ref 34.5–45)
HGB BLD-MCNC: 6.2 G/DL — CRITICAL LOW (ref 11.5–15.5)
HGB BLD-MCNC: 7.1 G/DL — LOW (ref 11.5–15.5)
HGB BLD-MCNC: 7.7 G/DL — LOW (ref 11.5–15.5)
MAGNESIUM SERPL-MCNC: 1.6 MG/DL — SIGNIFICANT CHANGE UP (ref 1.6–2.6)
MAGNESIUM SERPL-MCNC: 1.7 MG/DL — SIGNIFICANT CHANGE UP (ref 1.6–2.6)
MCHC RBC-ENTMCNC: 23.2 PG — LOW (ref 27–34)
MCHC RBC-ENTMCNC: 23.6 PG — LOW (ref 27–34)
MCHC RBC-ENTMCNC: 24.4 PG — LOW (ref 27–34)
MCHC RBC-ENTMCNC: 30 GM/DL — LOW (ref 32–36)
MCHC RBC-ENTMCNC: 30 GM/DL — LOW (ref 32–36)
MCHC RBC-ENTMCNC: 30.9 GM/DL — LOW (ref 32–36)
MCV RBC AUTO: 77.5 FL — LOW (ref 80–100)
MCV RBC AUTO: 78.8 FL — LOW (ref 80–100)
MCV RBC AUTO: 79 FL — LOW (ref 80–100)
NRBC # BLD: 0 /100 WBCS — SIGNIFICANT CHANGE UP (ref 0–0)
NRBC BLD-RTO: 0 /100 WBCS — SIGNIFICANT CHANGE UP (ref 0–0)
PHOSPHATE SERPL-MCNC: 3.9 MG/DL — SIGNIFICANT CHANGE UP (ref 2.5–4.5)
PHOSPHATE SERPL-MCNC: 4 MG/DL — SIGNIFICANT CHANGE UP (ref 2.5–4.5)
PLATELET # BLD AUTO: 205 K/UL — SIGNIFICANT CHANGE UP (ref 150–400)
PLATELET # BLD AUTO: 221 K/UL — SIGNIFICANT CHANGE UP (ref 150–400)
PLATELET # BLD AUTO: 227 K/UL — SIGNIFICANT CHANGE UP (ref 150–400)
POTASSIUM SERPL-MCNC: 4.5 MMOL/L — SIGNIFICANT CHANGE UP (ref 3.5–5.3)
POTASSIUM SERPL-MCNC: 4.8 MMOL/L — SIGNIFICANT CHANGE UP (ref 3.5–5.3)
POTASSIUM SERPL-SCNC: 4.5 MMOL/L — SIGNIFICANT CHANGE UP (ref 3.5–5.3)
POTASSIUM SERPL-SCNC: 4.8 MMOL/L — SIGNIFICANT CHANGE UP (ref 3.5–5.3)
RBC # BLD: 2.67 M/UL — LOW (ref 3.8–5.2)
RBC # BLD: 2.91 M/UL — LOW (ref 3.8–5.2)
RBC # BLD: 3.26 M/UL — LOW (ref 3.8–5.2)
RBC # FLD: 19.6 % — HIGH (ref 10.3–14.5)
RBC # FLD: 19.6 % — HIGH (ref 10.3–14.5)
RBC # FLD: 20.5 % — HIGH (ref 10.3–14.5)
SODIUM SERPL-SCNC: 135 MMOL/L — SIGNIFICANT CHANGE UP (ref 135–145)
SODIUM SERPL-SCNC: 137 MMOL/L — SIGNIFICANT CHANGE UP (ref 135–145)
SODIUM UR-SCNC: 119 MMOL/L — SIGNIFICANT CHANGE UP
SPECIMEN SOURCE: SIGNIFICANT CHANGE UP
TROPONIN T, HIGH SENSITIVITY RESULT: <6 NG/L — SIGNIFICANT CHANGE UP (ref 0–51)
WBC # BLD: 3.66 K/UL — LOW (ref 3.8–10.5)
WBC # BLD: 3.67 K/UL — LOW (ref 3.8–10.5)
WBC # BLD: 5.27 K/UL — SIGNIFICANT CHANGE UP (ref 3.8–10.5)
WBC # FLD AUTO: 3.66 K/UL — LOW (ref 3.8–10.5)
WBC # FLD AUTO: 3.67 K/UL — LOW (ref 3.8–10.5)
WBC # FLD AUTO: 5.27 K/UL — SIGNIFICANT CHANGE UP (ref 3.8–10.5)

## 2024-07-24 PROCEDURE — 99233 SBSQ HOSP IP/OBS HIGH 50: CPT | Mod: GC

## 2024-07-24 RX ORDER — FERROUS SULFATE 137(45) MG
325 TABLET, EXTENDED RELEASE ORAL DAILY
Refills: 0 | Status: DISCONTINUED | OUTPATIENT
Start: 2024-07-24 | End: 2024-07-27

## 2024-07-24 RX ORDER — FOLIC ACID 1 MG/1
1 TABLET ORAL DAILY
Refills: 0 | Status: DISCONTINUED | OUTPATIENT
Start: 2024-07-24 | End: 2024-07-27

## 2024-07-24 RX ORDER — MAGNESIUM SULFATE 500 MG/ML
2 SYRINGE (ML) INJECTION ONCE
Refills: 0 | Status: COMPLETED | OUTPATIENT
Start: 2024-07-24 | End: 2024-07-24

## 2024-07-24 RX ORDER — MEDROXYPROGESTERONE ACETATE 10 MG
10 TABLET ORAL DAILY
Refills: 0 | Status: DISCONTINUED | OUTPATIENT
Start: 2024-07-24 | End: 2024-07-27

## 2024-07-24 RX ADMIN — INSULIN LISPRO 4: 100 INJECTION, SOLUTION INTRAVENOUS; SUBCUTANEOUS at 17:56

## 2024-07-24 RX ADMIN — FOLIC ACID 1 MILLIGRAM(S): 1 TABLET ORAL at 17:43

## 2024-07-24 RX ADMIN — SODIUM CHLORIDE 125 MILLILITER(S): 9 INJECTION, SOLUTION INTRAVENOUS at 00:26

## 2024-07-24 RX ADMIN — INSULIN LISPRO 2: 100 INJECTION, SOLUTION INTRAVENOUS; SUBCUTANEOUS at 11:44

## 2024-07-24 RX ADMIN — INSULIN LISPRO 4: 100 INJECTION, SOLUTION INTRAVENOUS; SUBCUTANEOUS at 06:34

## 2024-07-24 RX ADMIN — Medication 325 MILLIGRAM(S): at 17:43

## 2024-07-24 RX ADMIN — ATORVASTATIN CALCIUM 40 MILLIGRAM(S): 80 TABLET, FILM COATED ORAL at 22:49

## 2024-07-24 RX ADMIN — Medication 10 MILLIGRAM(S): at 12:56

## 2024-07-24 RX ADMIN — Medication 25 GRAM(S): at 14:52

## 2024-07-25 LAB
ANION GAP SERPL CALC-SCNC: 11 MMOL/L — SIGNIFICANT CHANGE UP (ref 5–17)
BASOPHILS # BLD AUTO: 0.03 K/UL — SIGNIFICANT CHANGE UP (ref 0–0.2)
BASOPHILS # BLD AUTO: 0.03 K/UL — SIGNIFICANT CHANGE UP (ref 0–0.2)
BASOPHILS NFR BLD AUTO: 0.8 % — SIGNIFICANT CHANGE UP (ref 0–2)
BASOPHILS NFR BLD AUTO: 0.9 % — SIGNIFICANT CHANGE UP (ref 0–2)
BILIRUB SERPL-MCNC: <0.2 MG/DL — SIGNIFICANT CHANGE UP (ref 0.2–1.2)
BUN SERPL-MCNC: 15 MG/DL — SIGNIFICANT CHANGE UP (ref 7–23)
CALCIUM SERPL-MCNC: 9.2 MG/DL — SIGNIFICANT CHANGE UP (ref 8.4–10.5)
CHLORIDE SERPL-SCNC: 103 MMOL/L — SIGNIFICANT CHANGE UP (ref 96–108)
CO2 SERPL-SCNC: 22 MMOL/L — SIGNIFICANT CHANGE UP (ref 22–31)
CREAT SERPL-MCNC: 1.2 MG/DL — SIGNIFICANT CHANGE UP (ref 0.5–1.3)
EGFR: 56 ML/MIN/1.73M2 — LOW
EGFR: 56 ML/MIN/1.73M2 — LOW
EOSINOPHIL # BLD AUTO: 0.07 K/UL — SIGNIFICANT CHANGE UP (ref 0–0.5)
EOSINOPHIL # BLD AUTO: 0.08 K/UL — SIGNIFICANT CHANGE UP (ref 0–0.5)
EOSINOPHIL NFR BLD AUTO: 1.8 % — SIGNIFICANT CHANGE UP (ref 0–6)
EOSINOPHIL NFR BLD AUTO: 2.4 % — SIGNIFICANT CHANGE UP (ref 0–6)
GLUCOSE BLDC GLUCOMTR-MCNC: 141 MG/DL — HIGH (ref 70–99)
GLUCOSE BLDC GLUCOMTR-MCNC: 152 MG/DL — HIGH (ref 70–99)
GLUCOSE BLDC GLUCOMTR-MCNC: 184 MG/DL — HIGH (ref 70–99)
GLUCOSE SERPL-MCNC: 133 MG/DL — HIGH (ref 70–99)
HCT VFR BLD CALC: 23.2 % — LOW (ref 34.5–45)
HCT VFR BLD CALC: 23.8 % — LOW (ref 34.5–45)
HGB BLD-MCNC: 7 G/DL — CRITICAL LOW (ref 11.5–15.5)
HGB BLD-MCNC: 7.1 G/DL — LOW (ref 11.5–15.5)
IMM GRANULOCYTES NFR BLD AUTO: 0.3 % — SIGNIFICANT CHANGE UP (ref 0–0.9)
IMM GRANULOCYTES NFR BLD AUTO: 0.3 % — SIGNIFICANT CHANGE UP (ref 0–0.9)
INR BLD: 0.83 — LOW (ref 0.85–1.18)
LYMPHOCYTES # BLD AUTO: 1.41 K/UL — SIGNIFICANT CHANGE UP (ref 1–3.3)
LYMPHOCYTES # BLD AUTO: 1.56 K/UL — SIGNIFICANT CHANGE UP (ref 1–3.3)
LYMPHOCYTES # BLD AUTO: 40.8 % — SIGNIFICANT CHANGE UP (ref 13–44)
LYMPHOCYTES # BLD AUTO: 42 % — SIGNIFICANT CHANGE UP (ref 13–44)
MAGNESIUM SERPL-MCNC: 1.6 MG/DL — SIGNIFICANT CHANGE UP (ref 1.6–2.6)
MCHC RBC-ENTMCNC: 23.9 PG — LOW (ref 27–34)
MCHC RBC-ENTMCNC: 23.9 PG — LOW (ref 27–34)
MCHC RBC-ENTMCNC: 29.4 GM/DL — LOW (ref 32–36)
MCHC RBC-ENTMCNC: 30.6 GM/DL — LOW (ref 32–36)
MCV RBC AUTO: 78.1 FL — LOW (ref 80–100)
MCV RBC AUTO: 81.2 FL — SIGNIFICANT CHANGE UP (ref 80–100)
MELD SCORE WITH DIALYSIS: 21 POINTS — SIGNIFICANT CHANGE UP
MELD SCORE WITHOUT DIALYSIS: 8 POINTS — SIGNIFICANT CHANGE UP
MONOCYTES # BLD AUTO: 0.29 K/UL — SIGNIFICANT CHANGE UP (ref 0–0.9)
MONOCYTES # BLD AUTO: 0.47 K/UL — SIGNIFICANT CHANGE UP (ref 0–0.9)
MONOCYTES NFR BLD AUTO: 12.3 % — SIGNIFICANT CHANGE UP (ref 2–14)
MONOCYTES NFR BLD AUTO: 8.6 % — SIGNIFICANT CHANGE UP (ref 2–14)
NEUTROPHILS # BLD AUTO: 1.54 K/UL — LOW (ref 1.8–7.4)
NEUTROPHILS # BLD AUTO: 1.68 K/UL — LOW (ref 1.8–7.4)
NEUTROPHILS NFR BLD AUTO: 44 % — SIGNIFICANT CHANGE UP (ref 43–77)
NEUTROPHILS NFR BLD AUTO: 45.8 % — SIGNIFICANT CHANGE UP (ref 43–77)
NRBC # BLD: 0 /100 WBCS — SIGNIFICANT CHANGE UP (ref 0–0)
NRBC # BLD: 0 /100 WBCS — SIGNIFICANT CHANGE UP (ref 0–0)
NRBC BLD-RTO: 0 /100 WBCS — SIGNIFICANT CHANGE UP (ref 0–0)
NRBC BLD-RTO: 0 /100 WBCS — SIGNIFICANT CHANGE UP (ref 0–0)
PHOSPHATE SERPL-MCNC: 4.5 MG/DL — SIGNIFICANT CHANGE UP (ref 2.5–4.5)
PLATELET # BLD AUTO: 194 K/UL — SIGNIFICANT CHANGE UP (ref 150–400)
PLATELET # BLD AUTO: 195 K/UL — SIGNIFICANT CHANGE UP (ref 150–400)
POTASSIUM SERPL-MCNC: 4.3 MMOL/L — SIGNIFICANT CHANGE UP (ref 3.5–5.3)
POTASSIUM SERPL-SCNC: 4.3 MMOL/L — SIGNIFICANT CHANGE UP (ref 3.5–5.3)
PROTHROM AB SERPL-ACNC: 9.5 SEC — SIGNIFICANT CHANGE UP (ref 9.5–13)
RBC # BLD: 2.93 M/UL — LOW (ref 3.8–5.2)
RBC # BLD: 2.97 M/UL — LOW (ref 3.8–5.2)
RBC # FLD: 20.2 % — HIGH (ref 10.3–14.5)
RBC # FLD: 20.2 % — HIGH (ref 10.3–14.5)
SODIUM SERPL-SCNC: 136 MMOL/L — SIGNIFICANT CHANGE UP (ref 135–145)
WBC # BLD: 3.36 K/UL — LOW (ref 3.8–10.5)
WBC # BLD: 3.82 K/UL — SIGNIFICANT CHANGE UP (ref 3.8–10.5)
WBC # FLD AUTO: 3.36 K/UL — LOW (ref 3.8–10.5)
WBC # FLD AUTO: 3.82 K/UL — SIGNIFICANT CHANGE UP (ref 3.8–10.5)

## 2024-07-25 PROCEDURE — 99233 SBSQ HOSP IP/OBS HIGH 50: CPT | Mod: GC

## 2024-07-25 RX ORDER — ACETAMINOPHEN 500 MG/5ML
975 LIQUID (ML) ORAL ONCE
Refills: 0 | Status: COMPLETED | OUTPATIENT
Start: 2024-07-25 | End: 2024-07-25

## 2024-07-25 RX ADMIN — INSULIN LISPRO 2: 100 INJECTION, SOLUTION INTRAVENOUS; SUBCUTANEOUS at 21:57

## 2024-07-25 RX ADMIN — Medication 650 MILLIGRAM(S): at 06:48

## 2024-07-25 RX ADMIN — FOLIC ACID 1 MILLIGRAM(S): 1 TABLET ORAL at 11:38

## 2024-07-25 RX ADMIN — ATORVASTATIN CALCIUM 40 MILLIGRAM(S): 80 TABLET, FILM COATED ORAL at 21:37

## 2024-07-25 RX ADMIN — Medication 325 MILLIGRAM(S): at 11:38

## 2024-07-25 RX ADMIN — Medication 10 MILLIGRAM(S): at 16:09

## 2024-07-25 RX ADMIN — Medication 975 MILLIGRAM(S): at 11:38

## 2024-07-25 RX ADMIN — Medication 975 MILLIGRAM(S): at 12:38

## 2024-07-25 RX ADMIN — Medication 650 MILLIGRAM(S): at 07:50

## 2024-07-25 RX ADMIN — INSULIN LISPRO 2: 100 INJECTION, SOLUTION INTRAVENOUS; SUBCUTANEOUS at 12:39

## 2024-07-26 LAB
ANION GAP SERPL CALC-SCNC: 10 MMOL/L — SIGNIFICANT CHANGE UP (ref 5–17)
BASOPHILS # BLD AUTO: 0.03 K/UL — SIGNIFICANT CHANGE UP (ref 0–0.2)
BASOPHILS NFR BLD AUTO: 0.7 % — SIGNIFICANT CHANGE UP (ref 0–2)
BUN SERPL-MCNC: 15 MG/DL — SIGNIFICANT CHANGE UP (ref 7–23)
CALCIUM SERPL-MCNC: 9.5 MG/DL — SIGNIFICANT CHANGE UP (ref 8.4–10.5)
CHLORIDE SERPL-SCNC: 102 MMOL/L — SIGNIFICANT CHANGE UP (ref 96–108)
CO2 SERPL-SCNC: 23 MMOL/L — SIGNIFICANT CHANGE UP (ref 22–31)
CREAT SERPL-MCNC: 1.07 MG/DL — SIGNIFICANT CHANGE UP (ref 0.5–1.3)
EGFR: 64 ML/MIN/1.73M2 — SIGNIFICANT CHANGE UP
EGFR: 64 ML/MIN/1.73M2 — SIGNIFICANT CHANGE UP
EOSINOPHIL # BLD AUTO: 0.08 K/UL — SIGNIFICANT CHANGE UP (ref 0–0.5)
EOSINOPHIL NFR BLD AUTO: 2 % — SIGNIFICANT CHANGE UP (ref 0–6)
GLUCOSE BLDC GLUCOMTR-MCNC: 158 MG/DL — HIGH (ref 70–99)
GLUCOSE BLDC GLUCOMTR-MCNC: 229 MG/DL — HIGH (ref 70–99)
GLUCOSE BLDC GLUCOMTR-MCNC: 281 MG/DL — HIGH (ref 70–99)
GLUCOSE SERPL-MCNC: 150 MG/DL — HIGH (ref 70–99)
HCT VFR BLD CALC: 28.4 % — LOW (ref 34.5–45)
HGB BLD-MCNC: 8.9 G/DL — LOW (ref 11.5–15.5)
IMM GRANULOCYTES NFR BLD AUTO: 0.2 % — SIGNIFICANT CHANGE UP (ref 0–0.9)
LYMPHOCYTES # BLD AUTO: 1.43 K/UL — SIGNIFICANT CHANGE UP (ref 1–3.3)
LYMPHOCYTES # BLD AUTO: 35.2 % — SIGNIFICANT CHANGE UP (ref 13–44)
MAGNESIUM SERPL-MCNC: 1.5 MG/DL — LOW (ref 1.6–2.6)
MCHC RBC-ENTMCNC: 24.9 PG — LOW (ref 27–34)
MCHC RBC-ENTMCNC: 31.3 GM/DL — LOW (ref 32–36)
MCV RBC AUTO: 79.3 FL — LOW (ref 80–100)
MONOCYTES # BLD AUTO: 0.37 K/UL — SIGNIFICANT CHANGE UP (ref 0–0.9)
MONOCYTES NFR BLD AUTO: 9.1 % — SIGNIFICANT CHANGE UP (ref 2–14)
NEUTROPHILS # BLD AUTO: 2.14 K/UL — SIGNIFICANT CHANGE UP (ref 1.8–7.4)
NEUTROPHILS NFR BLD AUTO: 52.8 % — SIGNIFICANT CHANGE UP (ref 43–77)
NRBC # BLD: 0 /100 WBCS — SIGNIFICANT CHANGE UP (ref 0–0)
NRBC BLD-RTO: 0 /100 WBCS — SIGNIFICANT CHANGE UP (ref 0–0)
PHOSPHATE SERPL-MCNC: 4.7 MG/DL — HIGH (ref 2.5–4.5)
PLATELET # BLD AUTO: 180 K/UL — SIGNIFICANT CHANGE UP (ref 150–400)
POTASSIUM SERPL-MCNC: 4.1 MMOL/L — SIGNIFICANT CHANGE UP (ref 3.5–5.3)
POTASSIUM SERPL-SCNC: 4.1 MMOL/L — SIGNIFICANT CHANGE UP (ref 3.5–5.3)
RBC # BLD: 3.58 M/UL — LOW (ref 3.8–5.2)
RBC # FLD: 19.9 % — HIGH (ref 10.3–14.5)
SODIUM SERPL-SCNC: 135 MMOL/L — SIGNIFICANT CHANGE UP (ref 135–145)
WBC # BLD: 4.06 K/UL — SIGNIFICANT CHANGE UP (ref 3.8–10.5)
WBC # FLD AUTO: 4.06 K/UL — SIGNIFICANT CHANGE UP (ref 3.8–10.5)

## 2024-07-26 PROCEDURE — 72197 MRI PELVIS W/O & W/DYE: CPT | Mod: 26

## 2024-07-26 PROCEDURE — 36247 INS CATH ABD/L-EXT ART 3RD: CPT | Mod: RT

## 2024-07-26 PROCEDURE — 37243 VASC EMBOLIZE/OCCLUDE ORGAN: CPT

## 2024-07-26 PROCEDURE — 99233 SBSQ HOSP IP/OBS HIGH 50: CPT | Mod: GC

## 2024-07-26 RX ORDER — OXYCODONE HYDROCHLORIDE 30 MG/1
5 TABLET ORAL EVERY 6 HOURS
Refills: 0 | Status: DISCONTINUED | OUTPATIENT
Start: 2024-07-26 | End: 2024-07-27

## 2024-07-26 RX ORDER — ONDANSETRON HCL/PF 4 MG/2 ML
1 VIAL (ML) INJECTION
Qty: 9 | Refills: 0
Start: 2024-07-26 | End: 2024-07-28

## 2024-07-26 RX ORDER — OMEPRAZOLE 20 MG/1
1 CAPSULE, DELAYED RELEASE ORAL
Qty: 5 | Refills: 0
Start: 2024-07-26 | End: 2024-07-30

## 2024-07-26 RX ORDER — IBUPROFEN 200 MG
1 TABLET ORAL
Qty: 40 | Refills: 0
Start: 2024-07-26 | End: 2024-08-04

## 2024-07-26 RX ORDER — FOLIC ACID 1 MG/1
1 TABLET ORAL
Refills: 0 | DISCHARGE

## 2024-07-26 RX ORDER — DOCUSATE SODIUM 100 MG
1 CAPSULE ORAL
Qty: 10 | Refills: 0
Start: 2024-07-26 | End: 2024-07-30

## 2024-07-26 RX ORDER — ACETAMINOPHEN 500 MG/5ML
1000 LIQUID (ML) ORAL EVERY 6 HOURS
Refills: 0 | Status: DISCONTINUED | OUTPATIENT
Start: 2024-07-26 | End: 2024-07-27

## 2024-07-26 RX ORDER — KETOROLAC TROMETHAMINE 30 MG/ML
30 INJECTION, SOLUTION INTRAMUSCULAR; INTRAVENOUS EVERY 8 HOURS
Refills: 0 | Status: DISCONTINUED | OUTPATIENT
Start: 2024-07-26 | End: 2024-07-26

## 2024-07-26 RX ORDER — URSODIOL 300 MG/1
300 CAPSULE ORAL EVERY 12 HOURS
Refills: 0 | Status: DISCONTINUED | OUTPATIENT
Start: 2024-07-26 | End: 2024-07-27

## 2024-07-26 RX ORDER — IBUPROFEN 200 MG
600 TABLET ORAL EVERY 6 HOURS
Refills: 0 | Status: DISCONTINUED | OUTPATIENT
Start: 2024-07-26 | End: 2024-07-27

## 2024-07-26 RX ORDER — POLYETHYLENE GLYCOL 3350 17 G/17G
17 POWDER, FOR SOLUTION ORAL DAILY
Refills: 0 | Status: DISCONTINUED | OUTPATIENT
Start: 2024-07-26 | End: 2024-07-27

## 2024-07-26 RX ORDER — OXYCODONE HYDROCHLORIDE 30 MG/1
10 TABLET ORAL EVERY 6 HOURS
Refills: 0 | Status: DISCONTINUED | OUTPATIENT
Start: 2024-07-26 | End: 2024-07-27

## 2024-07-26 RX ORDER — OXYCODONE HYDROCHLORIDE 30 MG/1
1 TABLET ORAL
Qty: 20 | Refills: 0
Start: 2024-07-26 | End: 2024-07-30

## 2024-07-26 RX ADMIN — Medication 600 MILLIGRAM(S): at 18:11

## 2024-07-26 RX ADMIN — Medication 1000 MILLIGRAM(S): at 21:48

## 2024-07-26 RX ADMIN — INSULIN LISPRO 4: 100 INJECTION, SOLUTION INTRAVENOUS; SUBCUTANEOUS at 22:31

## 2024-07-26 RX ADMIN — Medication 10 MILLIGRAM(S): at 17:28

## 2024-07-26 RX ADMIN — OXYCODONE HYDROCHLORIDE 5 MILLIGRAM(S): 30 TABLET ORAL at 17:11

## 2024-07-26 RX ADMIN — Medication 325 MILLIGRAM(S): at 17:29

## 2024-07-26 RX ADMIN — OXYCODONE HYDROCHLORIDE 5 MILLIGRAM(S): 30 TABLET ORAL at 18:11

## 2024-07-26 RX ADMIN — ATORVASTATIN CALCIUM 40 MILLIGRAM(S): 80 TABLET, FILM COATED ORAL at 21:48

## 2024-07-26 RX ADMIN — FOLIC ACID 1 MILLIGRAM(S): 1 TABLET ORAL at 17:28

## 2024-07-26 RX ADMIN — INSULIN LISPRO 6: 100 INJECTION, SOLUTION INTRAVENOUS; SUBCUTANEOUS at 17:28

## 2024-07-26 RX ADMIN — INSULIN LISPRO 2: 100 INJECTION, SOLUTION INTRAVENOUS; SUBCUTANEOUS at 08:27

## 2024-07-26 RX ADMIN — Medication 600 MILLIGRAM(S): at 23:57

## 2024-07-26 RX ADMIN — Medication 600 MILLIGRAM(S): at 17:33

## 2024-07-26 RX ADMIN — POLYETHYLENE GLYCOL 3350 17 GRAM(S): 17 POWDER, FOR SOLUTION ORAL at 17:33

## 2024-07-27 ENCOUNTER — TRANSCRIPTION ENCOUNTER (OUTPATIENT)
Age: 49
End: 2024-07-27

## 2024-07-27 VITALS
OXYGEN SATURATION: 98 % | DIASTOLIC BLOOD PRESSURE: 82 MMHG | TEMPERATURE: 98 F | HEART RATE: 80 BPM | SYSTOLIC BLOOD PRESSURE: 139 MMHG | RESPIRATION RATE: 19 BRPM

## 2024-07-27 LAB
ANION GAP SERPL CALC-SCNC: 10 MMOL/L — SIGNIFICANT CHANGE UP (ref 5–17)
BASOPHILS # BLD AUTO: 0.02 K/UL — SIGNIFICANT CHANGE UP (ref 0–0.2)
BASOPHILS NFR BLD AUTO: 0.4 % — SIGNIFICANT CHANGE UP (ref 0–2)
BLD GP AB SCN SERPL QL: NEGATIVE — SIGNIFICANT CHANGE UP
BUN SERPL-MCNC: 17 MG/DL — SIGNIFICANT CHANGE UP (ref 7–23)
CALCIUM SERPL-MCNC: 9.1 MG/DL — SIGNIFICANT CHANGE UP (ref 8.4–10.5)
CHLORIDE SERPL-SCNC: 104 MMOL/L — SIGNIFICANT CHANGE UP (ref 96–108)
CO2 SERPL-SCNC: 22 MMOL/L — SIGNIFICANT CHANGE UP (ref 22–31)
CREAT SERPL-MCNC: 1.25 MG/DL — SIGNIFICANT CHANGE UP (ref 0.5–1.3)
EGFR: 54 ML/MIN/1.73M2 — LOW
EGFR: 54 ML/MIN/1.73M2 — LOW
EOSINOPHIL # BLD AUTO: 0.01 K/UL — SIGNIFICANT CHANGE UP (ref 0–0.5)
EOSINOPHIL NFR BLD AUTO: 0.2 % — SIGNIFICANT CHANGE UP (ref 0–6)
GLUCOSE BLDC GLUCOMTR-MCNC: 173 MG/DL — HIGH (ref 70–99)
GLUCOSE BLDC GLUCOMTR-MCNC: 175 MG/DL — HIGH (ref 70–99)
GLUCOSE SERPL-MCNC: 172 MG/DL — HIGH (ref 70–99)
HCT VFR BLD CALC: 25.3 % — LOW (ref 34.5–45)
HGB BLD-MCNC: 7.8 G/DL — LOW (ref 11.5–15.5)
IMM GRANULOCYTES NFR BLD AUTO: 0.6 % — SIGNIFICANT CHANGE UP (ref 0–0.9)
LYMPHOCYTES # BLD AUTO: 1.15 K/UL — SIGNIFICANT CHANGE UP (ref 1–3.3)
LYMPHOCYTES # BLD AUTO: 23.6 % — SIGNIFICANT CHANGE UP (ref 13–44)
MAGNESIUM SERPL-MCNC: 1.5 MG/DL — LOW (ref 1.6–2.6)
MCHC RBC-ENTMCNC: 24.8 PG — LOW (ref 27–34)
MCHC RBC-ENTMCNC: 30.8 GM/DL — LOW (ref 32–36)
MCV RBC AUTO: 80.6 FL — SIGNIFICANT CHANGE UP (ref 80–100)
MONOCYTES # BLD AUTO: 0.62 K/UL — SIGNIFICANT CHANGE UP (ref 0–0.9)
MONOCYTES NFR BLD AUTO: 12.7 % — SIGNIFICANT CHANGE UP (ref 2–14)
NEUTROPHILS # BLD AUTO: 3.05 K/UL — SIGNIFICANT CHANGE UP (ref 1.8–7.4)
NEUTROPHILS NFR BLD AUTO: 62.5 % — SIGNIFICANT CHANGE UP (ref 43–77)
NRBC # BLD: 0 /100 WBCS — SIGNIFICANT CHANGE UP (ref 0–0)
NRBC BLD-RTO: 0 /100 WBCS — SIGNIFICANT CHANGE UP (ref 0–0)
PHOSPHATE SERPL-MCNC: 3.7 MG/DL — SIGNIFICANT CHANGE UP (ref 2.5–4.5)
PLATELET # BLD AUTO: 149 K/UL — LOW (ref 150–400)
POTASSIUM SERPL-MCNC: 4.3 MMOL/L — SIGNIFICANT CHANGE UP (ref 3.5–5.3)
POTASSIUM SERPL-SCNC: 4.3 MMOL/L — SIGNIFICANT CHANGE UP (ref 3.5–5.3)
RBC # BLD: 3.14 M/UL — LOW (ref 3.8–5.2)
RBC # FLD: 20.4 % — HIGH (ref 10.3–14.5)
RH IG SCN BLD-IMP: POSITIVE — SIGNIFICANT CHANGE UP
SODIUM SERPL-SCNC: 136 MMOL/L — SIGNIFICANT CHANGE UP (ref 135–145)
WBC # BLD: 4.88 K/UL — SIGNIFICANT CHANGE UP (ref 3.8–10.5)
WBC # FLD AUTO: 4.88 K/UL — SIGNIFICANT CHANGE UP (ref 3.8–10.5)

## 2024-07-27 PROCEDURE — 80048 BASIC METABOLIC PNL TOTAL CA: CPT

## 2024-07-27 PROCEDURE — 82962 GLUCOSE BLOOD TEST: CPT

## 2024-07-27 PROCEDURE — 36247 INS CATH ABD/L-EXT ART 3RD: CPT | Mod: 59

## 2024-07-27 PROCEDURE — 82247 BILIRUBIN TOTAL: CPT

## 2024-07-27 PROCEDURE — C1894: CPT

## 2024-07-27 PROCEDURE — 85027 COMPLETE CBC AUTOMATED: CPT

## 2024-07-27 PROCEDURE — 37243 VASC EMBOLIZE/OCCLUDE ORGAN: CPT

## 2024-07-27 PROCEDURE — 86850 RBC ANTIBODY SCREEN: CPT

## 2024-07-27 PROCEDURE — 86900 BLOOD TYPING SEROLOGIC ABO: CPT

## 2024-07-27 PROCEDURE — C1887: CPT

## 2024-07-27 PROCEDURE — 76830 TRANSVAGINAL US NON-OB: CPT

## 2024-07-27 PROCEDURE — A9585: CPT

## 2024-07-27 PROCEDURE — 86922 COMPATIBILITY TEST ANTIGLOB: CPT

## 2024-07-27 PROCEDURE — 84300 ASSAY OF URINE SODIUM: CPT

## 2024-07-27 PROCEDURE — P9040: CPT

## 2024-07-27 PROCEDURE — 83735 ASSAY OF MAGNESIUM: CPT

## 2024-07-27 PROCEDURE — 76856 US EXAM PELVIC COMPLETE: CPT

## 2024-07-27 PROCEDURE — 86901 BLOOD TYPING SEROLOGIC RH(D): CPT

## 2024-07-27 PROCEDURE — 85025 COMPLETE CBC W/AUTO DIFF WBC: CPT

## 2024-07-27 PROCEDURE — 99285 EMERGENCY DEPT VISIT HI MDM: CPT

## 2024-07-27 PROCEDURE — 81001 URINALYSIS AUTO W/SCOPE: CPT

## 2024-07-27 PROCEDURE — 86902 BLOOD TYPE ANTIGEN DONOR EA: CPT

## 2024-07-27 PROCEDURE — 84100 ASSAY OF PHOSPHORUS: CPT

## 2024-07-27 PROCEDURE — P9016: CPT

## 2024-07-27 PROCEDURE — 84702 CHORIONIC GONADOTROPIN TEST: CPT

## 2024-07-27 PROCEDURE — 83036 HEMOGLOBIN GLYCOSYLATED A1C: CPT

## 2024-07-27 PROCEDURE — 84484 ASSAY OF TROPONIN QUANT: CPT

## 2024-07-27 PROCEDURE — 85730 THROMBOPLASTIN TIME PARTIAL: CPT

## 2024-07-27 PROCEDURE — 99233 SBSQ HOSP IP/OBS HIGH 50: CPT | Mod: GC

## 2024-07-27 PROCEDURE — 36415 COLL VENOUS BLD VENIPUNCTURE: CPT

## 2024-07-27 PROCEDURE — C1769: CPT

## 2024-07-27 PROCEDURE — 82570 ASSAY OF URINE CREATININE: CPT

## 2024-07-27 PROCEDURE — 87086 URINE CULTURE/COLONY COUNT: CPT

## 2024-07-27 PROCEDURE — C1889: CPT

## 2024-07-27 PROCEDURE — 36430 TRANSFUSION BLD/BLD COMPNT: CPT

## 2024-07-27 PROCEDURE — 71046 X-RAY EXAM CHEST 2 VIEWS: CPT

## 2024-07-27 PROCEDURE — 85610 PROTHROMBIN TIME: CPT

## 2024-07-27 PROCEDURE — 72197 MRI PELVIS W/O & W/DYE: CPT | Mod: MC

## 2024-07-27 RX ORDER — DOCUSATE SODIUM 100 MG
1 CAPSULE ORAL
Qty: 10 | Refills: 0
Start: 2024-07-27 | End: 2024-07-31

## 2024-07-27 RX ORDER — AMOXICILLIN AND CLAVULANATE POTASSIUM 500; 125 MG/1; MG/1
875 TABLET, FILM COATED ORAL
Qty: 10 | Refills: 0
Start: 2024-07-27 | End: 2024-07-31

## 2024-07-27 RX ORDER — IBUPROFEN 200 MG
1 TABLET ORAL
Qty: 15 | Refills: 0
Start: 2024-07-27 | End: 2024-07-31

## 2024-07-27 RX ORDER — FERROUS SULFATE 137(45) MG
1 TABLET, EXTENDED RELEASE ORAL
Qty: 30 | Refills: 0
Start: 2024-07-27 | End: 2024-08-25

## 2024-07-27 RX ORDER — SIMETHICONE 80 MG
1 TABLET,CHEWABLE ORAL
Qty: 28 | Refills: 0
Start: 2024-07-27 | End: 2024-08-02

## 2024-07-27 RX ORDER — DOCUSATE SODIUM 100 MG
1 CAPSULE ORAL
Qty: 14 | Refills: 0
Start: 2024-07-27 | End: 2024-08-02

## 2024-07-27 RX ORDER — MAGNESIUM SULFATE 500 MG/ML
4 SYRINGE (ML) INJECTION ONCE
Refills: 0 | Status: COMPLETED | OUTPATIENT
Start: 2024-07-27 | End: 2024-07-27

## 2024-07-27 RX ADMIN — URSODIOL 300 MILLIGRAM(S): 300 CAPSULE ORAL at 05:48

## 2024-07-27 RX ADMIN — POLYETHYLENE GLYCOL 3350 17 GRAM(S): 17 POWDER, FOR SOLUTION ORAL at 11:41

## 2024-07-27 RX ADMIN — Medication 325 MILLIGRAM(S): at 11:41

## 2024-07-27 RX ADMIN — Medication 25 GRAM(S): at 11:40

## 2024-07-27 RX ADMIN — Medication 1000 MILLILITER(S): at 10:27

## 2024-07-27 RX ADMIN — OXYCODONE HYDROCHLORIDE 10 MILLIGRAM(S): 30 TABLET ORAL at 10:27

## 2024-07-27 RX ADMIN — Medication 1000 MILLIGRAM(S): at 09:33

## 2024-07-27 RX ADMIN — Medication 600 MILLIGRAM(S): at 11:41

## 2024-07-27 RX ADMIN — OXYCODONE HYDROCHLORIDE 10 MILLIGRAM(S): 30 TABLET ORAL at 02:15

## 2024-07-27 RX ADMIN — Medication 40 MILLIGRAM(S): at 06:02

## 2024-07-27 RX ADMIN — Medication 1000 MILLIGRAM(S): at 16:56

## 2024-07-27 RX ADMIN — Medication 600 MILLIGRAM(S): at 05:47

## 2024-07-27 RX ADMIN — INSULIN LISPRO 2: 100 INJECTION, SOLUTION INTRAVENOUS; SUBCUTANEOUS at 07:43

## 2024-07-27 RX ADMIN — FOLIC ACID 1 MILLIGRAM(S): 1 TABLET ORAL at 11:41

## 2024-07-27 RX ADMIN — INSULIN LISPRO 2: 100 INJECTION, SOLUTION INTRAVENOUS; SUBCUTANEOUS at 13:13

## 2024-07-27 RX ADMIN — OXYCODONE HYDROCHLORIDE 10 MILLIGRAM(S): 30 TABLET ORAL at 01:15

## 2024-07-29 RX ORDER — IBUPROFEN 200 MG
1 TABLET ORAL
Qty: 28 | Refills: 0
Start: 2024-07-29 | End: 2024-08-04

## 2024-07-29 RX ORDER — OXYCODONE HYDROCHLORIDE 30 MG/1
1 TABLET ORAL
Qty: 20 | Refills: 0
Start: 2024-07-29 | End: 2024-08-02

## 2024-07-29 RX ORDER — ONDANSETRON HCL/PF 4 MG/2 ML
1 VIAL (ML) INJECTION
Qty: 9 | Refills: 0
Start: 2024-07-29 | End: 2024-07-31

## 2024-07-31 ENCOUNTER — INPATIENT (INPATIENT)
Facility: HOSPITAL | Age: 49
LOS: 0 days | Discharge: LEFT BEFORE DISCHARGE | DRG: 684 | End: 2024-07-31
Attending: INTERNAL MEDICINE | Admitting: INTERNAL MEDICINE
Payer: MEDICAID

## 2024-07-31 VITALS
RESPIRATION RATE: 16 BRPM | HEIGHT: 66 IN | OXYGEN SATURATION: 100 % | HEART RATE: 98 BPM | SYSTOLIC BLOOD PRESSURE: 114 MMHG | TEMPERATURE: 99 F | DIASTOLIC BLOOD PRESSURE: 71 MMHG

## 2024-07-31 VITALS
SYSTOLIC BLOOD PRESSURE: 95 MMHG | OXYGEN SATURATION: 100 % | HEART RATE: 78 BPM | TEMPERATURE: 98 F | DIASTOLIC BLOOD PRESSURE: 66 MMHG | RESPIRATION RATE: 18 BRPM

## 2024-07-31 DIAGNOSIS — Z87.42 PERSONAL HISTORY OF OTHER DISEASES OF THE FEMALE GENITAL TRACT: ICD-10-CM

## 2024-07-31 DIAGNOSIS — I10 ESSENTIAL (PRIMARY) HYPERTENSION: ICD-10-CM

## 2024-07-31 DIAGNOSIS — Z98.89 OTHER SPECIFIED POSTPROCEDURAL STATES: Chronic | ICD-10-CM

## 2024-07-31 DIAGNOSIS — R42 DIZZINESS AND GIDDINESS: ICD-10-CM

## 2024-07-31 DIAGNOSIS — Z29.9 ENCOUNTER FOR PROPHYLACTIC MEASURES, UNSPECIFIED: ICD-10-CM

## 2024-07-31 DIAGNOSIS — E78.5 HYPERLIPIDEMIA, UNSPECIFIED: ICD-10-CM

## 2024-07-31 DIAGNOSIS — I25.10 ATHEROSCLEROTIC HEART DISEASE OF NATIVE CORONARY ARTERY WITHOUT ANGINA PECTORIS: ICD-10-CM

## 2024-07-31 DIAGNOSIS — E87.6 HYPOKALEMIA: ICD-10-CM

## 2024-07-31 DIAGNOSIS — Z87.19 PERSONAL HISTORY OF OTHER DISEASES OF THE DIGESTIVE SYSTEM: ICD-10-CM

## 2024-07-31 DIAGNOSIS — N17.9 ACUTE KIDNEY FAILURE, UNSPECIFIED: ICD-10-CM

## 2024-07-31 DIAGNOSIS — Z98.890 OTHER SPECIFIED POSTPROCEDURAL STATES: Chronic | ICD-10-CM

## 2024-07-31 DIAGNOSIS — Z90.49 ACQUIRED ABSENCE OF OTHER SPECIFIED PARTS OF DIGESTIVE TRACT: Chronic | ICD-10-CM

## 2024-07-31 DIAGNOSIS — D50.9 IRON DEFICIENCY ANEMIA, UNSPECIFIED: ICD-10-CM

## 2024-07-31 DIAGNOSIS — E11.9 TYPE 2 DIABETES MELLITUS WITHOUT COMPLICATIONS: ICD-10-CM

## 2024-07-31 LAB
ADD ON TEST-SPECIMEN IN LAB: SIGNIFICANT CHANGE UP
ALBUMIN SERPL ELPH-MCNC: 3.2 G/DL — LOW (ref 3.3–5)
ALBUMIN SERPL ELPH-MCNC: 3.6 G/DL — SIGNIFICANT CHANGE UP (ref 3.3–5)
ALP SERPL-CCNC: 66 U/L — SIGNIFICANT CHANGE UP (ref 40–120)
ALP SERPL-CCNC: 69 U/L — SIGNIFICANT CHANGE UP (ref 40–120)
ALT FLD-CCNC: 6 U/L — LOW (ref 10–45)
ALT FLD-CCNC: 6 U/L — LOW (ref 10–45)
ANION GAP SERPL CALC-SCNC: 10 MMOL/L — SIGNIFICANT CHANGE UP (ref 5–17)
ANION GAP SERPL CALC-SCNC: 11 MMOL/L — SIGNIFICANT CHANGE UP (ref 5–17)
APPEARANCE UR: ABNORMAL
APTT BLD: 29.7 SEC — SIGNIFICANT CHANGE UP (ref 24.5–35.6)
AST SERPL-CCNC: 10 U/L — SIGNIFICANT CHANGE UP (ref 10–40)
AST SERPL-CCNC: 8 U/L — LOW (ref 10–40)
BACTERIA # UR AUTO: NEGATIVE /HPF — SIGNIFICANT CHANGE UP
BASOPHILS # BLD AUTO: 0.01 K/UL — SIGNIFICANT CHANGE UP (ref 0–0.2)
BASOPHILS # BLD AUTO: 0.03 K/UL — SIGNIFICANT CHANGE UP (ref 0–0.2)
BASOPHILS NFR BLD AUTO: 0.1 % — SIGNIFICANT CHANGE UP (ref 0–2)
BASOPHILS NFR BLD AUTO: 0.3 % — SIGNIFICANT CHANGE UP (ref 0–2)
BILIRUB SERPL-MCNC: 0.2 MG/DL — SIGNIFICANT CHANGE UP (ref 0.2–1.2)
BILIRUB SERPL-MCNC: 0.3 MG/DL — SIGNIFICANT CHANGE UP (ref 0.2–1.2)
BILIRUB UR-MCNC: NEGATIVE — SIGNIFICANT CHANGE UP
BLD GP AB SCN SERPL QL: NEGATIVE — SIGNIFICANT CHANGE UP
BUN SERPL-MCNC: 17 MG/DL — SIGNIFICANT CHANGE UP (ref 7–23)
BUN SERPL-MCNC: 19 MG/DL — SIGNIFICANT CHANGE UP (ref 7–23)
CALCIUM SERPL-MCNC: 9 MG/DL — SIGNIFICANT CHANGE UP (ref 8.4–10.5)
CALCIUM SERPL-MCNC: 9 MG/DL — SIGNIFICANT CHANGE UP (ref 8.4–10.5)
CAST: 5 /LPF — HIGH (ref 0–4)
CHLORIDE SERPL-SCNC: 100 MMOL/L — SIGNIFICANT CHANGE UP (ref 96–108)
CHLORIDE SERPL-SCNC: 103 MMOL/L — SIGNIFICANT CHANGE UP (ref 96–108)
CO2 SERPL-SCNC: 21 MMOL/L — LOW (ref 22–31)
CO2 SERPL-SCNC: 23 MMOL/L — SIGNIFICANT CHANGE UP (ref 22–31)
COLOR SPEC: YELLOW — SIGNIFICANT CHANGE UP
CREAT ?TM UR-MCNC: 279 MG/DL — SIGNIFICANT CHANGE UP
CREAT ?TM UR-MCNC: 284 MG/DL — SIGNIFICANT CHANGE UP
CREAT SERPL-MCNC: 1.79 MG/DL — HIGH (ref 0.5–1.3)
CREAT SERPL-MCNC: 2.21 MG/DL — HIGH (ref 0.5–1.3)
CYSTATIN C SERPL-MCNC: 1.2 MG/L — HIGH (ref 0.62–1.07)
DIFF PNL FLD: ABNORMAL
EGFR: 27 ML/MIN/1.73M2 — LOW
EGFR: 35 ML/MIN/1.73M2 — LOW
EOSINOPHIL # BLD AUTO: 0.11 K/UL — SIGNIFICANT CHANGE UP (ref 0–0.5)
EOSINOPHIL # BLD AUTO: 0.14 K/UL — SIGNIFICANT CHANGE UP (ref 0–0.5)
EOSINOPHIL NFR BLD AUTO: 1.1 % — SIGNIFICANT CHANGE UP (ref 0–6)
EOSINOPHIL NFR BLD AUTO: 1.8 % — SIGNIFICANT CHANGE UP (ref 0–6)
GFR/BSA.PRED SERPLBLD CYS-BASED-ARV: 60 ML/MIN/1.73M2 — SIGNIFICANT CHANGE UP
GLUCOSE BLDC GLUCOMTR-MCNC: 140 MG/DL — HIGH (ref 70–99)
GLUCOSE BLDC GLUCOMTR-MCNC: 253 MG/DL — HIGH (ref 70–99)
GLUCOSE SERPL-MCNC: 161 MG/DL — HIGH (ref 70–99)
GLUCOSE SERPL-MCNC: 188 MG/DL — HIGH (ref 70–99)
GLUCOSE UR QL: NEGATIVE MG/DL — SIGNIFICANT CHANGE UP
HCG SERPL-ACNC: <1 MIU/ML — SIGNIFICANT CHANGE UP
HCT VFR BLD CALC: 23.9 % — LOW (ref 34.5–45)
HCT VFR BLD CALC: 25 % — LOW (ref 34.5–45)
HCT VFR BLD CALC: 27.6 % — LOW (ref 34.5–45)
HGB BLD-MCNC: 7.3 G/DL — LOW (ref 11.5–15.5)
HGB BLD-MCNC: 7.3 G/DL — LOW (ref 11.5–15.5)
HGB BLD-MCNC: 8.4 G/DL — LOW (ref 11.5–15.5)
IMM GRANULOCYTES NFR BLD AUTO: 0.5 % — SIGNIFICANT CHANGE UP (ref 0–0.9)
IMM GRANULOCYTES NFR BLD AUTO: 0.8 % — SIGNIFICANT CHANGE UP (ref 0–0.9)
INR BLD: 0.97 — SIGNIFICANT CHANGE UP (ref 0.85–1.18)
KETONES UR-MCNC: ABNORMAL MG/DL
LEUKOCYTE ESTERASE UR-ACNC: ABNORMAL
LYMPHOCYTES # BLD AUTO: 1.2 K/UL — SIGNIFICANT CHANGE UP (ref 1–3.3)
LYMPHOCYTES # BLD AUTO: 1.33 K/UL — SIGNIFICANT CHANGE UP (ref 1–3.3)
LYMPHOCYTES # BLD AUTO: 13.4 % — SIGNIFICANT CHANGE UP (ref 13–44)
LYMPHOCYTES # BLD AUTO: 15.7 % — SIGNIFICANT CHANGE UP (ref 13–44)
MAGNESIUM SERPL-MCNC: 2 MG/DL — SIGNIFICANT CHANGE UP (ref 1.6–2.6)
MCHC RBC-ENTMCNC: 24.6 PG — LOW (ref 27–34)
MCHC RBC-ENTMCNC: 25.2 PG — LOW (ref 27–34)
MCHC RBC-ENTMCNC: 25.2 PG — LOW (ref 27–34)
MCHC RBC-ENTMCNC: 29.2 GM/DL — LOW (ref 32–36)
MCHC RBC-ENTMCNC: 30.4 GM/DL — LOW (ref 32–36)
MCHC RBC-ENTMCNC: 30.5 GM/DL — LOW (ref 32–36)
MCV RBC AUTO: 82.4 FL — SIGNIFICANT CHANGE UP (ref 80–100)
MCV RBC AUTO: 82.9 FL — SIGNIFICANT CHANGE UP (ref 80–100)
MCV RBC AUTO: 84.2 FL — SIGNIFICANT CHANGE UP (ref 80–100)
MONOCYTES # BLD AUTO: 0.7 K/UL — SIGNIFICANT CHANGE UP (ref 0–0.9)
MONOCYTES # BLD AUTO: 0.77 K/UL — SIGNIFICANT CHANGE UP (ref 0–0.9)
MONOCYTES NFR BLD AUTO: 7.7 % — SIGNIFICANT CHANGE UP (ref 2–14)
MONOCYTES NFR BLD AUTO: 9.2 % — SIGNIFICANT CHANGE UP (ref 2–14)
NEUTROPHILS # BLD AUTO: 5.54 K/UL — SIGNIFICANT CHANGE UP (ref 1.8–7.4)
NEUTROPHILS # BLD AUTO: 7.62 K/UL — HIGH (ref 1.8–7.4)
NEUTROPHILS NFR BLD AUTO: 72.7 % — SIGNIFICANT CHANGE UP (ref 43–77)
NEUTROPHILS NFR BLD AUTO: 76.7 % — SIGNIFICANT CHANGE UP (ref 43–77)
NITRITE UR-MCNC: NEGATIVE — SIGNIFICANT CHANGE UP
NRBC # BLD: 0 /100 WBCS — SIGNIFICANT CHANGE UP (ref 0–0)
PH UR: 5.5 — SIGNIFICANT CHANGE UP (ref 5–8)
PHOSPHATE SERPL-MCNC: 4.3 MG/DL — SIGNIFICANT CHANGE UP (ref 2.5–4.5)
PLATELET # BLD AUTO: 203 K/UL — SIGNIFICANT CHANGE UP (ref 150–400)
PLATELET # BLD AUTO: 221 K/UL — SIGNIFICANT CHANGE UP (ref 150–400)
PLATELET # BLD AUTO: 224 K/UL — SIGNIFICANT CHANGE UP (ref 150–400)
POTASSIUM SERPL-MCNC: 3.3 MMOL/L — LOW (ref 3.5–5.3)
POTASSIUM SERPL-MCNC: 4.3 MMOL/L — SIGNIFICANT CHANGE UP (ref 3.5–5.3)
POTASSIUM SERPL-SCNC: 3.3 MMOL/L — LOW (ref 3.5–5.3)
POTASSIUM SERPL-SCNC: 4.3 MMOL/L — SIGNIFICANT CHANGE UP (ref 3.5–5.3)
POTASSIUM UR-SCNC: 35 MMOL/L — SIGNIFICANT CHANGE UP
PROT ?TM UR-MCNC: 37 MG/DL — HIGH (ref 0–12)
PROT SERPL-MCNC: 6.7 G/DL — SIGNIFICANT CHANGE UP (ref 6–8.3)
PROT SERPL-MCNC: 7 G/DL — SIGNIFICANT CHANGE UP (ref 6–8.3)
PROT UR-MCNC: 30 MG/DL
PROT/CREAT UR-RTO: 0.1 RATIO — SIGNIFICANT CHANGE UP (ref 0–0.2)
PROTHROM AB SERPL-ACNC: 11.1 SEC — SIGNIFICANT CHANGE UP (ref 9.5–13)
RBC # BLD: 2.9 M/UL — LOW (ref 3.8–5.2)
RBC # BLD: 2.97 M/UL — LOW (ref 3.8–5.2)
RBC # BLD: 3.33 M/UL — LOW (ref 3.8–5.2)
RBC # FLD: 22.3 % — HIGH (ref 10.3–14.5)
RBC # FLD: 22.8 % — HIGH (ref 10.3–14.5)
RBC # FLD: 22.9 % — HIGH (ref 10.3–14.5)
RBC CASTS # UR COMP ASSIST: 18 /HPF — HIGH (ref 0–4)
RH IG SCN BLD-IMP: POSITIVE — SIGNIFICANT CHANGE UP
SODIUM SERPL-SCNC: 134 MMOL/L — LOW (ref 135–145)
SODIUM SERPL-SCNC: 134 MMOL/L — LOW (ref 135–145)
SODIUM UR-SCNC: 90 MMOL/L — SIGNIFICANT CHANGE UP
SP GR SPEC: 1.02 — SIGNIFICANT CHANGE UP (ref 1–1.03)
SQUAMOUS # UR AUTO: 19 /HPF — HIGH (ref 0–5)
TRANS CELLS #/AREA URNS HPF: PRESENT
UROBILINOGEN FLD QL: 1 MG/DL — SIGNIFICANT CHANGE UP (ref 0.2–1)
UUN UR-MCNC: 504 MG/DL — SIGNIFICANT CHANGE UP
WBC # BLD: 7.63 K/UL — SIGNIFICANT CHANGE UP (ref 3.8–10.5)
WBC # BLD: 7.94 K/UL — SIGNIFICANT CHANGE UP (ref 3.8–10.5)
WBC # BLD: 9.85 K/UL — SIGNIFICANT CHANGE UP (ref 3.8–10.5)
WBC # FLD AUTO: 7.63 K/UL — SIGNIFICANT CHANGE UP (ref 3.8–10.5)
WBC # FLD AUTO: 7.94 K/UL — SIGNIFICANT CHANGE UP (ref 3.8–10.5)
WBC # FLD AUTO: 9.85 K/UL — SIGNIFICANT CHANGE UP (ref 3.8–10.5)
WBC UR QL: 46 /HPF — HIGH (ref 0–5)

## 2024-07-31 PROCEDURE — 82962 GLUCOSE BLOOD TEST: CPT

## 2024-07-31 PROCEDURE — 82610 CYSTATIN C: CPT

## 2024-07-31 PROCEDURE — 99285 EMERGENCY DEPT VISIT HI MDM: CPT

## 2024-07-31 PROCEDURE — 96374 THER/PROPH/DIAG INJ IV PUSH: CPT

## 2024-07-31 PROCEDURE — 83735 ASSAY OF MAGNESIUM: CPT

## 2024-07-31 PROCEDURE — 86901 BLOOD TYPING SEROLOGIC RH(D): CPT

## 2024-07-31 PROCEDURE — 80053 COMPREHEN METABOLIC PANEL: CPT

## 2024-07-31 PROCEDURE — 84300 ASSAY OF URINE SODIUM: CPT

## 2024-07-31 PROCEDURE — 82570 ASSAY OF URINE CREATININE: CPT

## 2024-07-31 PROCEDURE — 84540 ASSAY OF URINE/UREA-N: CPT

## 2024-07-31 PROCEDURE — 85730 THROMBOPLASTIN TIME PARTIAL: CPT

## 2024-07-31 PROCEDURE — 93010 ELECTROCARDIOGRAM REPORT: CPT

## 2024-07-31 PROCEDURE — 84156 ASSAY OF PROTEIN URINE: CPT

## 2024-07-31 PROCEDURE — 84133 ASSAY OF URINE POTASSIUM: CPT

## 2024-07-31 PROCEDURE — 36415 COLL VENOUS BLD VENIPUNCTURE: CPT

## 2024-07-31 PROCEDURE — 86900 BLOOD TYPING SEROLOGIC ABO: CPT

## 2024-07-31 PROCEDURE — 85027 COMPLETE CBC AUTOMATED: CPT

## 2024-07-31 PROCEDURE — 85025 COMPLETE CBC W/AUTO DIFF WBC: CPT

## 2024-07-31 PROCEDURE — 84100 ASSAY OF PHOSPHORUS: CPT

## 2024-07-31 PROCEDURE — 84702 CHORIONIC GONADOTROPIN TEST: CPT

## 2024-07-31 PROCEDURE — 99285 EMERGENCY DEPT VISIT HI MDM: CPT | Mod: 25

## 2024-07-31 PROCEDURE — 84484 ASSAY OF TROPONIN QUANT: CPT

## 2024-07-31 PROCEDURE — 81001 URINALYSIS AUTO W/SCOPE: CPT

## 2024-07-31 PROCEDURE — 99223 1ST HOSP IP/OBS HIGH 75: CPT | Mod: GC

## 2024-07-31 PROCEDURE — 86850 RBC ANTIBODY SCREEN: CPT

## 2024-07-31 PROCEDURE — 85610 PROTHROMBIN TIME: CPT

## 2024-07-31 PROCEDURE — 93005 ELECTROCARDIOGRAM TRACING: CPT

## 2024-07-31 RX ORDER — POTASSIUM CHLORIDE 1500 MG/1
40 TABLET, EXTENDED RELEASE ORAL ONCE
Refills: 0 | Status: COMPLETED | OUTPATIENT
Start: 2024-07-31 | End: 2024-07-31

## 2024-07-31 RX ORDER — DEXTROSE MONOHYDRATE, SODIUM CHLORIDE, SODIUM LACTATE, CALCIUM CHLORIDE, MAGNESIUM CHLORIDE 1.5; 538; 448; 18.4; 5.08 G/100ML; MG/100ML; MG/100ML; MG/100ML; MG/100ML
1000 SOLUTION INTRAPERITONEAL
Refills: 0 | Status: DISCONTINUED | OUTPATIENT
Start: 2024-07-31 | End: 2024-07-31

## 2024-07-31 RX ORDER — URSODIOL 300 MG
300 CAPSULE ORAL EVERY 12 HOURS
Refills: 0 | Status: DISCONTINUED | OUTPATIENT
Start: 2024-07-31 | End: 2024-07-31

## 2024-07-31 RX ORDER — GLUCAGON INJECTION, SOLUTION 0.5 MG/.1ML
1 INJECTION, SOLUTION SUBCUTANEOUS ONCE
Refills: 0 | Status: DISCONTINUED | OUTPATIENT
Start: 2024-07-31 | End: 2024-07-31

## 2024-07-31 RX ORDER — BACTERIOSTATIC SODIUM CHLORIDE 0.9 %
1000 VIAL (ML) INJECTION ONCE
Refills: 0 | Status: COMPLETED | OUTPATIENT
Start: 2024-07-31 | End: 2024-07-31

## 2024-07-31 RX ORDER — ASPIRIN 500 MG
81 TABLET ORAL EVERY 24 HOURS
Refills: 0 | Status: DISCONTINUED | OUTPATIENT
Start: 2024-07-31 | End: 2024-07-31

## 2024-07-31 RX ORDER — DEXTROSE 4 G
15 TABLET,CHEWABLE ORAL ONCE
Refills: 0 | Status: DISCONTINUED | OUTPATIENT
Start: 2024-07-31 | End: 2024-07-31

## 2024-07-31 RX ORDER — FERROUS GLUCONATE 325 MG
324 TABLET ORAL
Refills: 0 | DISCHARGE

## 2024-07-31 RX ORDER — ACETAMINOPHEN 500 MG
650 TABLET ORAL EVERY 6 HOURS
Refills: 0 | Status: DISCONTINUED | OUTPATIENT
Start: 2024-07-31 | End: 2024-07-31

## 2024-07-31 RX ORDER — INSULIN LISPRO 100/ML
VIAL (ML) SUBCUTANEOUS
Refills: 0 | Status: DISCONTINUED | OUTPATIENT
Start: 2024-07-31 | End: 2024-07-31

## 2024-07-31 RX ORDER — DEXTROSE 4 G
25 TABLET,CHEWABLE ORAL ONCE
Refills: 0 | Status: DISCONTINUED | OUTPATIENT
Start: 2024-07-31 | End: 2024-07-31

## 2024-07-31 RX ORDER — LORATADINE 10 MG
17 TABLET,DISINTEGRATING ORAL EVERY 12 HOURS
Refills: 0 | Status: DISCONTINUED | OUTPATIENT
Start: 2024-07-31 | End: 2024-07-31

## 2024-07-31 RX ORDER — DEXTROSE 4 G
12.5 TABLET,CHEWABLE ORAL ONCE
Refills: 0 | Status: DISCONTINUED | OUTPATIENT
Start: 2024-07-31 | End: 2024-07-31

## 2024-07-31 RX ORDER — SENNOSIDES 8.6 MG/1
2 TABLET ORAL AT BEDTIME
Refills: 0 | Status: DISCONTINUED | OUTPATIENT
Start: 2024-07-31 | End: 2024-07-31

## 2024-07-31 RX ORDER — MAGNESIUM SULFATE 500 MG/ML
1 VIAL (ML) INJECTION ONCE
Refills: 0 | Status: COMPLETED | OUTPATIENT
Start: 2024-07-31 | End: 2024-07-31

## 2024-07-31 RX ORDER — FERROUS SULFATE 325(65) MG
325 TABLET ORAL DAILY
Refills: 0 | Status: DISCONTINUED | OUTPATIENT
Start: 2024-07-31 | End: 2024-07-31

## 2024-07-31 RX ORDER — ATORVASTATIN CALCIUM 40 MG/1
40 TABLET, FILM COATED ORAL AT BEDTIME
Refills: 0 | Status: DISCONTINUED | OUTPATIENT
Start: 2024-07-31 | End: 2024-07-31

## 2024-07-31 RX ORDER — OXYCODONE HYDROCHLORIDE 30 MG/1
5 TABLET ORAL EVERY 6 HOURS
Refills: 0 | Status: DISCONTINUED | OUTPATIENT
Start: 2024-07-31 | End: 2024-07-31

## 2024-07-31 RX ORDER — KETOROLAC TROMETHAMINE 10 MG
15 TABLET ORAL ONCE
Refills: 0 | Status: DISCONTINUED | OUTPATIENT
Start: 2024-07-31 | End: 2024-07-31

## 2024-07-31 RX ADMIN — Medication 650 MILLIGRAM(S): at 07:43

## 2024-07-31 RX ADMIN — Medication 1000 MILLILITER(S): at 02:05

## 2024-07-31 RX ADMIN — Medication 15 MILLIGRAM(S): at 02:05

## 2024-07-31 RX ADMIN — Medication 650 MILLIGRAM(S): at 06:53

## 2024-07-31 RX ADMIN — Medication 100 GRAM(S): at 09:18

## 2024-07-31 RX ADMIN — Medication 6: at 14:08

## 2024-07-31 RX ADMIN — Medication 325 MILLIGRAM(S): at 11:35

## 2024-07-31 RX ADMIN — DEXTROSE MONOHYDRATE, SODIUM CHLORIDE, SODIUM LACTATE, CALCIUM CHLORIDE, MAGNESIUM CHLORIDE 75 MILLILITER(S): 1.5; 538; 448; 18.4; 5.08 SOLUTION INTRAPERITONEAL at 12:34

## 2024-07-31 RX ADMIN — Medication 81 MILLIGRAM(S): at 11:34

## 2024-07-31 RX ADMIN — POTASSIUM CHLORIDE 40 MILLIEQUIVALENT(S): 1500 TABLET, EXTENDED RELEASE ORAL at 02:05

## 2024-07-31 RX ADMIN — Medication 17 GRAM(S): at 18:34

## 2024-07-31 RX ADMIN — Medication 650 MILLIGRAM(S): at 13:49

## 2024-07-31 RX ADMIN — POTASSIUM CHLORIDE 40 MILLIEQUIVALENT(S): 1500 TABLET, EXTENDED RELEASE ORAL at 07:50

## 2024-07-31 RX ADMIN — Medication 650 MILLIGRAM(S): at 12:58

## 2024-07-31 RX ADMIN — Medication 300 MILLIGRAM(S): at 18:34

## 2024-07-31 NOTE — H&P ADULT - PROBLEM SELECTOR PLAN 10
s/p cholecystectomy, home med Ursodiol 300mg BID s/p cholecystectomy  - c/w home med Ursodiol 300mg BID

## 2024-07-31 NOTE — PATIENT PROFILE ADULT - PATIENT REPRESENTATIVE: ( YOU CAN CHOOSE ANY PERSON THAT CAN ASSIST YOU WITH YOUR HEALTH CARE PREFERENCES, DOES NOT HAVE TO BE A SPOUSE, IMMEDIATE FAMILY OR SIGNIFICANT OTHER/PARTNER)
Consent (Nose)/Introductory Paragraph: The rationale for Mohs was explained to the patient and consent was obtained. The risks, benefits and alternatives to therapy were discussed in detail. Specifically, the risks of nasal deformity, changes in the flow of air through the nose, infection, scarring, bleeding, prolonged wound healing, incomplete removal, allergy to anesthesia, nerve injury and recurrence were addressed. Prior to the procedure, the treatment site was clearly identified and confirmed by the patient. All components of Universal Protocol/PAUSE Rule completed. declines

## 2024-07-31 NOTE — H&P ADULT - PROBLEM SELECTOR PLAN 8
- c/w home med Atorvastatin 40mg qhs Hx DM2, Home med Metformin 1000mg BID, inconsistent compliance  - CC diet, mISS Hx DM2, Home med Metformin 1000mg BID, inconsistent compliance  - CC diet,   - mISS for 24h only. If no usage, d/c mISS

## 2024-07-31 NOTE — H&P ADULT - HISTORY OF PRESENT ILLNESS
49F with a PMHx of T2DM, HTN, HLD, CAD s/c PCI ('21), gastric bypass surgery, cholecystectomy, uterine fibroids s/p myomectomy (2021), recent uterine artery embolization 7/26, presenting w/ 1d dizziness & presyncopal sx. Pt was recently admitted under gyn for menorrhagia & symptomatic anemia 2/2 acute blood loss requiring transfusions, started on Provera & underwent uterine artery embolization with IR on 7/26/24 without complication. States that since discharge on 7/27, has had daily vaginal bleeding however improved compared to pre-embolization. Presented to ED because for last 1 day, reports feeling dizzy & as though she was about to faint, as well as palpitations & weakness.  Since discharge, has continued taking tylenol/motrin & occasionally oxycodone for her abdominal pain post-procedure.   Pt was discharged home with PO Augmentin 7d course which she has been taking (completed 4d of 7). During last admission, pt found w/ SABRINA (Cr 1.48) thought to be pre-renal iso blood loss, which improved to 1.25 on discharge. No fever, chills, syncope, or urinary sx.     ED COURSE:   Vitals: T 98.7, HR 98, /71 --> 91/62. RR 16 sat 100 on RA  Labs: WBC wnl. Hb 8.4 (bl 7-8), Plt 221. Na 134, K 3.3, BUN/Cr 17/2.21 (bl Cr ~1.1), Mag 1.6.   Imaging: None  EKG: HR 89, NSR, no acute ST/T changes, no ST elevations, TWI or U waves. QTc 476  Interventions: Toradol 15mg IVP x1, KCl 40meq PO x1, 1L NS bolus  Consults: Gyn   49F with a PMHx of T2DM, HTN, HLD, CAD s/c PCI ('21), gastric bypass surgery, cholecystectomy, uterine fibroids s/p myomectomy (2021), recent uterine artery embolization 7/26, presenting w/ 1d dizziness & presyncopal sx. Pt was recently admitted under gyn for menorrhagia & symptomatic anemia 2/2 acute blood loss requiring transfusions, started on Provera & underwent uterine artery embolization with IR on 7/26/24 without complication. States that since discharge on 7/27, has had daily vaginal bleeding however improved compared to pre-embolization. Presented to ED because for last 1 day, reports feeling dizzy & as though she was about to faint, as well as palpitations & weakness.  Since discharge, has continued taking tylenol & occasionally oxycodone for her abdominal pain post-procedure. States she did not use motrin for pain relief. No urinary sx or oliguria that she's noticed since discharge, & urine normal in color w/o hematuria.   Pt was discharged home with PO Augmentin 7d course which she has been taking (completed 4d of 7). During last admission, pt found w/ SABRINA (Cr 1.48) thought to be pre-renal iso blood loss, which improved to 1.25 on discharge. No fever, chills, syncope, or urinary sx.     ED COURSE:   Vitals: T 98.7, HR 98, /71 --> 91/62. RR 16 sat 100 on RA  Labs: WBC wnl. Hb 8.4 (bl 7-8), Plt 221. Na 134, K 3.3, BUN/Cr 17/2.21 (bl Cr ~1.1), Mag 1.6.   Imaging: None  EKG: HR 89, NSR, no acute ST/T changes, no ST elevations, TWI or U waves. QTc 476  Interventions: Toradol 15mg IVP x1, KCl 40meq PO x1, 1L NS bolus  Consults: Gyn

## 2024-07-31 NOTE — H&P ADULT - NSICDXPASTSURGICALHX_GEN_ALL_CORE_FT
PAST SURGICAL HISTORY:  H/O dilation and curettage     H/O gastric bypass     H/O:  x1    H/O: myomectomy     History of cholecystectomy     History of D&C

## 2024-07-31 NOTE — H&P ADULT - ATTENDING COMMENTS
Pt. seen and examined by me earlier today; I have read Dr. Holcomb's H&P, I agree w/ her findings and plan of care as documented; case d/w IR attending; suspect etiology of SABRINA is SUKHI s/p recent IR procedure; AIN due to Augmentin also possible, but less likely (OK to d/c abx, per Gyn, in any case); possible component of prerenal dehydration, but Pt. reports she has been staying hydrated; sCr improved w/ IVF; had planned to cont. IVF, hold ARB, check renal U/S, and monitor BMP; however, Pt. opted to leave AMA (was reportedly concerned re: her neighbor's cough); per PGY-3, Pt. A&Ox3 and verbalized understanding of risks of leaving, see PGY-3 note

## 2024-07-31 NOTE — H&P ADULT - NSICDXFAMILYHX_GEN_ALL_CORE_FT
FAMILY HISTORY:  Family history of diabetes mellitus    Mother  Still living? Unknown  Family history of uterine fibroid, Age at diagnosis: Age Unknown    Grandparent  Still living? Unknown  Family history of hypertension, Age at diagnosis: Age Unknown  Family history of uterine fibroid, Age at diagnosis: Age Unknown    Aunt  Still living? Unknown  Family history of uterine fibroid, Age at diagnosis: Age Unknown

## 2024-07-31 NOTE — H&P ADULT - NSHPLABSRESULTS_GEN_ALL_CORE
8.4    9.85  )-----------( 221      ( 31 Jul 2024 00:36 )             27.6       07-31    134<L>  |  100  |  17  ----------------------------<  161<H>  3.3<L>   |  23  |  2.21<H>    Ca    9.0      31 Jul 2024 00:36  Mg     1.6     07-31    TPro  7.0  /  Alb  3.6  /  TBili  0.3  /  DBili  x   /  AST  10  /  ALT  6<L>  /  AlkPhos  69  07-31         CAPILLARY BLOOD GLUCOSE          PT/INR - ( 31 Jul 2024 00:36 )   PT: 11.1 sec;   INR: 0.97          PTT - ( 31 Jul 2024 00:36 )  PTT:29.7 sec               Lactate Trend      Urinalysis Basic - ( 31 Jul 2024 00:36 )    Color: x / Appearance: x / SG: x / pH: x  Gluc: 161 mg/dL / Ketone: x  / Bili: x / Urobili: x   Blood: x / Protein: x / Nitrite: x   Leuk Esterase: x / RBC: x / WBC x   Sq Epi: x / Non Sq Epi: x / Bacteria: x          Culture Results:   <10,000 CFU/mL Normal Urogenital Kimberly (07-23 @ 14:42)      RADIOLOGY, EKG & ADDITIONAL TESTS: Reviewed.

## 2024-07-31 NOTE — H&P ADULT - PROBLEM SELECTOR PLAN 3
Presented w/ K 3.3, no ecg changes. No episodes hypoK+ during last admission, unclear etiology - possible 2/2 SABRINA.   Repleted w/ 40meq KCl in ED. Mag 1.6, slightly low, also repleted.   No potassium-wasting drugs, no e/o GI losses.   Possible urinary potassium loss if pt developed tubulointerstitial disease iso AIN w/ Augmentin use, however urine studies still pending.   - will give additional KCl 40meq PO tablet  - f/u repeat BMP 10AM  - W/u for SABRINA as above Presented w/ K 3.3, no ecg changes. No episodes hypoK+ during last admission, unclear etiology - possible 2/2 SABRINA.   Repleted w/ 40meq KCl in ED. Mag 1.6, slightly low, also repleted.   No potassium-wasting drugs, no e/o GI losses.   Possible urinary potassium loss if pt developed tubulointerstitial disease iso AIN w/ Augmentin use, however urine studies still pending.   Plan:   - will give additional KCl 40meq PO tablet  - f/u repeat BMP 10AM  - W/u for SABRINA as above

## 2024-07-31 NOTE — H&P ADULT - PROBLEM SELECTOR PLAN 7
Hx DM2, Home med Metformin 1000mg BID, inconsistent compliance  - CC diet, mISS C/o constipation last admission, started on Miralax 17qd, Senna 2 tabs qhs, Colace 100mg BID  - Miralax BID & Senna 2 tabs qhs

## 2024-07-31 NOTE — PATIENT PROFILE ADULT - HAVE YOU EXPERIENCED VIOLENCE OR A TRAUMATIC EVENT?
Department of Anesthesiology  Preprocedure Note       Name:  Sapphire Chakraborty   Age:  52 y.o.  :  1970                                          MRN:  [id number]         Date:  2021      Surgeon: Ryan Hayes):  Eleuterio Tanner MD    Procedure: Procedure(s):  EGD BIOPSY    Medications prior to admission:   Prior to Admission medications    Medication Sig Start Date End Date Taking? Authorizing Provider   aspirin-acetaminophen-caffeine (EXCEDRIN EXTRA STRENGTH) 342-969-51 MG per tablet Take 1 tablet by mouth every 6 hours as needed for Headaches   Yes Historical Provider, MD   testosterone (ANDROGEL) 25 MG/2.5GM (1%) GEL 1 % gel Apply 5 g topically daily for 30 days.  Apply 4 clicks  (1ml) daily 14 Yes Tianna Avila MD   PARoxetine (PAXIL) 40 MG tablet Take 1 tablet by mouth every morning 21  Yes Tianna Vázquez MD   thyroid (ARMOUR THYROID) 120 MG tablet Take 120 mg by mouth daily   Yes Historical Provider, MD   SUMAtriptan (IMITREX) 100 MG tablet As directed 21   Jorge Alberto Patiño MD   sildenafil (REVATIO) 20 MG tablet Take 20 mg by mouth as needed    Historical Provider, MD       Current medications:    Current Facility-Administered Medications   Medication Dose Route Frequency Provider Last Rate Last Admin    lactated ringers infusion   Intravenous Continuous Eleuterio Tanner  mL/hr at 21 0838 New Bag at 21 2941       Allergies:  No Known Allergies    Problem List:    Patient Active Problem List   Diagnosis Code    Anxiety F41.9    Hypotestosteronism E34.9    Hypertriglyceridemia E78.1    H/O total thyroidectomy E89.0    Gastroesophageal reflux disease without esophagitis K21.9    Obesity due to excess calories E66.09    Mitral valve prolapse I34.1    Back pain of lumbar region with sciatica M54.40    Intractable migraine without aura and with status migrainosus G43.011    Obstructive sleep apnea G47.33    Snoring R06.83    Witnessed apneic spells R06.81    Somnolence, daytime R40.0    Chronic neck pain M54.2, G89.29    Analgesic rebound headache G44.40, T39.95XA    Hyperglycemia R73.9    Chronic midline low back pain M54.5, S23.27    Umbilical hernia without obstruction and without gangrene K42.9    Diverticulosis of colon K57.30    Facet hypertrophy of lumbar region M47.816    CPAP (continuous positive airway pressure) dependence Z99.89    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 \" polyps\" per pt recall    COLONOSCOPY N/A 11/6/2020    Dr Caden Fan, internal hemorrhoids-Grade 1, 5 yr recall    HERNIA REPAIR      THYROIDECTOMY         Social History:    Social History     Tobacco Use    Smoking status: Current Every Day Smoker     Packs/day: 2.00     Years: 34.00     Pack years: 68.00     Types: Cigarettes     Start date: 1986    Smokeless tobacco: Never Used   Substance Use Topics    Alcohol use: Yes     Frequency: Monthly or less     Drinks per session: 1 or 2     Comment: 2x wkly, of x 0ne week                                Ready to quit: Not Answered  Counseling given: Not Answered      Vital Signs (Current):   Vitals:    05/06/21 0820   BP: 124/66   Pulse: 80   Resp: 18   Temp: 99 °F (37.2 °C)   TempSrc: Temporal   SpO2: 98%   Weight: 195 lb (88.5 kg)   Height: 5' 7\" (1.702 m)                                              BP Readings from Last 3 Encounters:   05/06/21 124/66   04/27/21 120/77 no

## 2024-07-31 NOTE — CONSULT NOTE ADULT - ASSESSMENT
48yo  with known fibroid uterus s/p uterine artery embolization 2024 presents with vaginal bleeding and presyncope. She is hemodynamically stable and afebrile.  - Hb 8.4 (from 7.8 on discharge ), VSS, not tachycardic; symptoms unlikely to be secondary to anemia  - Vaginal bleeding lighter compared to pre-embolization   - Labs notable for Cr 2.21 (1.25 on discharge )  - No acute GYN intervention at this time    Pt seen and discussed with Dr. Romano PGY4    **recommendations not final 48yo  with known fibroid uterus s/p uterine artery embolization 2024 presents with vaginal bleeding and presyncope. She is hemodynamically stable and afebrile.  - Hb 8.4 (from 7.8 on discharge ), VSS, not tachycardic; symptoms unlikely to be secondary to anemia  - Vaginal bleeding lighter compared to pre-embolization   - Labs notable for Cr 2.21 (1.25 on discharge ), defer to medicine for management of possible SABRINA  - Pt to finish her 7 day course of augmentin, currently on day 4  - Pt to f/u with Dr. Schneider at her scheduled appt  - If pt admitted to hospital recommend possible IR consultation for sabrina s/p UAE  - GYN to follow if pt is admitted to hospital for SABRINA  - No acute GYN intervention at this time    Pt seen and discussed with Dr. Romano PGY4, and Dr. Schneider     50yo  with known fibroid uterus s/p uterine artery embolization 2024 presents with vaginal bleeding and presyncope. She is hemodynamically stable and afebrile.  - Hb 8.4 (from 7.8 on discharge ), VSS, not tachycardic; pt now denying s/s of anemia after full GYN evaluation  - Vaginal bleeding lighter compared to pre-embolization   - Labs notable for Cr 2.21 (1.25 on discharge ), defer to medicine for management of possible SABRINA  - Pt to finish her 7 day course of augmentin, currently on day 4  - Pt to f/u with Dr. Schneider at her scheduled appt  - If pt admitted to hospital recommend possible IR consultation for sabrina s/p UAE  - GYN to follow if pt is admitted to hospital for SABRINA  - No acute GYN intervention at this time    Pt seen and discussed with Dr. Romano PGY4, and Dr. Schneider     48yo  with known fibroid uterus s/p uterine artery embolization 2024 presents with vaginal bleeding and presyncope. She is hemodynamically stable and afebrile.  - Hb 8.4 (from 7.8 on discharge ), VSS, not tachycardic; pt now denying s/s of anemia after full GYN evaluation  - Vaginal bleeding lighter compared to pre-embolization   - Labs notable for Cr 2.21 (1.25 on discharge ), defer to medicine for management of possible SABRINA  - Ok to stop Augmentin per Dr Schneider as it was ppx and pt might have potential SABRINA from medication.  - Pt to f/u with Dr. Schneider at her scheduled appt  - If pt admitted to hospital recommend possible IR consultation for sabrina s/p UAE  - GYN to follow if pt is admitted to hospital for SABRINA  - No acute GYN intervention at this time    Pt seen and discussed with Dr. Romano PGY4, and Dr. Schneider

## 2024-07-31 NOTE — CONSULT NOTE ADULT - SUBJECTIVE AND OBJECTIVE BOX
Pt 48yo  with known fibroid uterus s/p uterine artery embolization 2024 presents with vaginal bleeding and presyncope. Patient reports after discharge from hospital on  she has had bleeding every day; she wears 3 pads at once and changes them 5x per day - they are not soaked through but are mostly full. She states the bleeding is lighter now than it was pre-embolization. Today she noticed feeling lightheaded, dizzy and felt she was going to faint which prompted her to come in. She denies actual syncope and reports palpitations, weakness. She states her pain is moderately well-controlled with tylenol/motrin and occasional oxycodone. She denies fever/chills, abnormal vaginal discharge, dysuria. She was discharged from hospital with 7 day course of augmentin which she is currently taking.    OB/GYN Hx: , c section , 7x sab, 1x mab d+c; reports she never had workup for APLA  Last PAP smear: this year, WNL. Uterine artery embolization for fibroid uterus and symptomatic anemia from heavy menstrual bleeding 2024  PMHx: HTN, anemia, fibroids, T2DM, s/p cardiac catheterization in  for substernal chest pain/BURNETT and was found to have non obstructive CAD. Denies history of DVT/VTE.   SHx: abdominal myomectomy , c section , D+C for mab, l/s refugio , l/s gastric bypass 25 yrs   Meds: ASA qd, losartan 25 qd, protonix 40 qd, atorvastatin 40 qd, po fe, metformin 1g qd, ursodiol 300 qd  Allergies: NKDA    PHYSICAL EXAM:   Vital Signs Last 24 Hrs  T(C): 36.6 (2024 03:08), Max: 37.1 (2024 00:13)  T(F): 97.9 (2024 03:08), Max: 98.7 (2024 00:13)  HR: 83 (2024 03:08) (83 - 98)  BP: 91/62 (2024 03:08) (91/62 - 114/71)  BP(mean): --  RR: 16 (2024 03:08) (16 - 16)  SpO2: 98% (2024 03:08) (98% - 100%)    Parameters below as of 2024 03:08  Patient On (Oxygen Delivery Method): room air        **************************  Constitutional: No acute distress, answering questions appropriately  Respiratory: no increased WOB  Gastrointestinal: soft, non tender, nondistended, no rebound or guarding   : pad with small stripe of red blood, no active bleeding   Bimanual exam: ***  Speculum exam: ***  Extremities: no calf tenderness     LABS:                        8.4    9.85  )-----------( 221      ( 2024 00:36 )             27.6         134<L>  |  100  |  17  ----------------------------<  161<H>  3.3<L>   |  23  |  2.21<H>    Ca    9.0      2024 00:36  Mg     1.6         TPro  7.0  /  Alb  3.6  /  TBili  0.3  /  DBili  x   /  AST  10  /  ALT  6<L>  /  AlkPhos  69      PT/INR - ( 2024 00:36 )   PT: 11.1 sec;   INR: 0.97          PTT - ( 2024 00:36 )  PTT:29.7 sec  Urinalysis Basic - ( 2024 00:36 )    Color: x / Appearance: x / SG: x / pH: x  Gluc: 161 mg/dL / Ketone: x  / Bili: x / Urobili: x   Blood: x / Protein: x / Nitrite: x   Leuk Esterase: x / RBC: x / WBC x   Sq Epi: x / Non Sq Epi: x / Bacteria: x      HCG Quantitative, Serum: <1 mIU/mL ( @ 00:36)   Pt 50yo  with known fibroid uterus s/p uterine artery embolization 2024 presents with vaginal bleeding and presyncope. Patient reports after discharge from hospital on  she has had bleeding every day; she wears 3 pads at once and changes them 5x per day - they are not soaked through but are mostly full. She states the bleeding is lighter now than it was pre-embolization. Today she noticed feeling lightheaded, dizzy and felt she was going to faint which prompted her to come in. She denies actual syncope and reports palpitations, weakness. She states her pain is moderately well-controlled with tylenol/motrin and occasional oxycodone. She denies fever/chills, abnormal vaginal discharge, dysuria. She was discharged from hospital with 7 day course of augmentin which she is currently taking.    OB/GYN Hx: , c section , 7x sab, 1x mab d+c; reports she never had workup for APLA  Last PAP smear: this year, WNL. Uterine artery embolization for fibroid uterus and symptomatic anemia from heavy menstrual bleeding 2024  PMHx: HTN, anemia, fibroids, T2DM, s/p cardiac catheterization in  for substernal chest pain/BURNETT and was found to have non obstructive CAD. Denies history of DVT/VTE.   SHx: abdominal myomectomy , c section , D+C for mab, l/s refugio , l/s gastric bypass 25 yrs   Meds: ASA qd, losartan 25 qd, protonix 40 qd, atorvastatin 40 qd, po fe, metformin 1g qd, ursodiol 300 qd  Allergies: NKDA    PHYSICAL EXAM:   Vital Signs Last 24 Hrs  T(C): 36.6 (2024 03:08), Max: 37.1 (2024 00:13)  T(F): 97.9 (2024 03:08), Max: 98.7 (2024 00:13)  HR: 83 (2024 03:08) (83 - 98)  BP: 91/62 (2024 03:08) (91/62 - 114/71)  BP(mean): --  RR: 16 (2024 03:08) (16 - 16)  SpO2: 98% (2024 03:08) (98% - 100%)    Parameters below as of 2024 03:08  Patient On (Oxygen Delivery Method): room air        **************************  Constitutional: No acute distress, answering questions appropriately  Respiratory: no increased WOB  Gastrointestinal: soft, non tender, nondistended, no rebound or guarding   : pad with small stripe of red blood, no active bleeding   Bimanual exam: closed, negative CMT, no adnexal masses palpated, no adnexal tenderness, unable to appreciated full size of uterus due to body habitus  Speculum exam: normal externa; vagina, minimal amount (<5cc) of mixed BRB and older brown blood in vault, no lesions or lacerations appreciated, cervix appears closed and wnl, negative valsalva  Extremities: no calf tenderness     LABS:                        8.4    9.85  )-----------( 221      ( 2024 00:36 )             27.6         134<L>  |  100  |  17  ----------------------------<  161<H>  3.3<L>   |  23  |  2.21<H>    Ca    9.0      2024 00:36  Mg     1.6         TPro  7.0  /  Alb  3.6  /  TBili  0.3  /  DBili  x   /  AST  10  /  ALT  6<L>  /  AlkPhos  69      PT/INR - ( 2024 00:36 )   PT: 11.1 sec;   INR: 0.97          PTT - ( 2024 00:36 )  PTT:29.7 sec  Urinalysis Basic - ( 2024 00:36 )    Color: x / Appearance: x / SG: x / pH: x  Gluc: 161 mg/dL / Ketone: x  / Bili: x / Urobili: x   Blood: x / Protein: x / Nitrite: x   Leuk Esterase: x / RBC: x / WBC x   Sq Epi: x / Non Sq Epi: x / Bacteria: x      HCG Quantitative, Serum: <1 mIU/mL ( @ 00:36)

## 2024-07-31 NOTE — ED ADULT TRIAGE NOTE - CHIEF COMPLAINT QUOTE
Pt. presents to the ED for vaginal bleeding x 2 weeks. Pt. recently had fibroid sx.  Pt. reports dizziness/near syncope/abdominal and pelvic pain.

## 2024-07-31 NOTE — ED PROVIDER NOTE - PROGRESS NOTE DETAILS
Patient cleared by GYN hemodynamically stable admitted to medicine for SABRINA creatinine 2.2 with above baseline of 1.0

## 2024-07-31 NOTE — ED ADULT NURSE NOTE - OBJECTIVE STATEMENT
BIBEMS from home, c/o epigastric & lower abdominal pain started hours ago, pt states she had a uterine artery embolization last Friday & was given meds for the pain.  pt states she spoke to the PA & was instructed not to take Oxycodone if she is coming back to work.  Pt also states she took Motrin instead.   Pt states pain was severe when she was getting ready to work & was about to pass out.

## 2024-07-31 NOTE — ED PROVIDER NOTE - OBJECTIVE STATEMENT
49F with a PMHx of T2DM, HTN, HLD, CAD s/c PCI ('21), gastric bypass surgery, cholecystectomy uterine fibroids s/p myomectomy (2021), multiple miscarriages s/p medical management + D&C that presented to the ED with prominent menorrhagia and dysmenorrhea with associated palpitations, lightheadedness admitted for management of symptomatic anemia secondary to acute blood loss. Patient received total of 3U PRBC with appropriate rise in Hgb. For menorrhagia, patient started on provera 10mg QD with improvement in amount of bleeding. She also underwent UAE with IR on 7/26/24. Patient today states that she has been feeling lightheaded with standing up she was getting ready for work and all of a sudden felt lightheaded denies associated chest pain or shortness of breath or fever chills cough.  No urinary complaints.  Has been having lower abdominal cramping since her last admission which is unchanged.  Normal bowel movements.

## 2024-07-31 NOTE — H&P ADULT - NSHPPHYSICALEXAM_GEN_ALL_CORE
.  VITAL SIGNS:  T(C): 36.4 (07-31-24 @ 06:55), Max: 37.1 (07-31-24 @ 00:13)  T(F): 97.6 (07-31-24 @ 06:55), Max: 98.7 (07-31-24 @ 00:13)  HR: 90 (07-31-24 @ 06:55) (80 - 98)  BP: 101/68 (07-31-24 @ 06:55) (91/62 - 114/71)  BP(mean): --  RR: 18 (07-31-24 @ 06:55) (16 - 18)  SpO2: 100% (07-31-24 @ 06:55) (98% - 100%)  Wt(kg): --    PHYSICAL EXAM:    Constitutional: NAD, sleeping in bed, tired but arousable  HEENT: NC/AT, PERRL/EOMI  Respiratory: CTA B/L; no resp distress  Cardiac: +S1/S2; RRR; no audible murmurs  Gastrointestinal: lower abdomen slightly TTP, ND, no palpable masses.   Genitourinary: no suprapubic tenderness or fullness.   Extremities: WWP, no clubbing or cyanosis; no peripheral edema  Dermatologic: Skin warm. No rashes noted.   Neurologic: AAOx3; CNII-XII grossly intact; no focal deficits  Psychiatric: affect and characteristics of appearance, verbalizations, behaviors are appropriate

## 2024-07-31 NOTE — H&P ADULT - PROBLEM SELECTOR PLAN 4
Recent admission w/ menorrhagia, now s/p uterine artery embolization with IR.   C/o abdominal pain at that time post-procedure, seen by med consult. Pain tx'd with Toradol IVPs, IV Dilaudid PRNs   - Pain control:   - Seen by gyn consult, no acute intervention, advised to f/u outpatient.   - Gyn to follow, appreciate recs  - F/u with gyn regarding indication for Augmentin, could not find in documentation from last adm Recent admission w/ menorrhagia, now s/p uterine artery embolization with IR.   C/o abdominal pain at that time post-procedure, seen by med consult. Pain tx'd with Toradol IVPs, IV Dilaudid PRNs   - Pain control: Tylenol 650 q6h PRN, will consider oxycodone 5mg q6h PRN for severe pain if refractory   - Seen by gyn consult, no acute intervention, advised to f/u outpatient.   - Gyn to follow, appreciate recs  - F/u with gyn regarding indication for Augmentin, could not find in documentation from last adm Recent admission w/ menorrhagia, now s/p uterine artery embolization with IR.   C/o abdominal pain at that time post-procedure, seen by med consult. Pain tx'd with Toradol IVPs, IV Dilaudid PRNs   Plan:   - Pain control: Tylenol 650 q6h PRN, will consider oxycodone 5mg q6h PRN for severe pain if refractory   - Seen by gyn consult, no acute intervention, advised to f/u outpatient.   - Gyn to follow, appreciate recs  - F/u with gyn regarding indication for Augmentin, could not find in documentation from last adm Recent admission w/ menorrhagia, now s/p uterine artery embolization with IR.   C/o abdominal pain at that time post-procedure, seen by med consult. Pain tx'd with Toradol IVPs, IV Dilaudid PRNs   Plan:   - Pain control: Tylenol 650 q6h PRN, will consider oxycodone 5mg q6h PRN for severe pain if refractory   - Seen by gyn consult, no acute intervention, advised to f/u outpatient.   - Gyn to follow, appreciate recs  - F/u with gyn regarding need for continuing Augmentin

## 2024-07-31 NOTE — H&P ADULT - ASSESSMENT
49F with a PMHx of T2DM, HTN, HLD, CAD s/c PCI ('21), gastric bypass surgery, cholecystectomy, uterine fibroids s/p myomectomy (2021), recent discharge following uterine artery embolization 7/26 - p/w dizziness & presyncope w/ continued vaginal bleeding since discharge, found with SABRINA of unclear etiology, admitted for further workup management.

## 2024-07-31 NOTE — ED PROVIDER NOTE - CLINICAL SUMMARY MEDICAL DECISION MAKING FREE TEXT BOX
49-year-old female with  vaginal bleeding status post uterine artery embolization and fibroids on Provera here today with lightheadedness will evaluate for near syncope dehydration UTI hypokalemia hypomagnesemia symptomatic anemia UTI   EKG   plan for CBC BMP EKG cardiac enzyme IV fluids consider transfusion if patient is hemoglobin below baseline and reassess 49-year-old female with  vaginal bleeding status post uterine artery embolization and fibroids on Provera here today with lightheadedness will evaluate for near syncope dehydration UTI hypokalemia hypomagnesemia symptomatic anemia UTI   EKGNormal sinus rhythm 89  leftward axis borderline LVH   plan for CBC BMP EKG cardiac enzyme IV fluids consider transfusion if patient is hemoglobin below baseline and reassess

## 2024-07-31 NOTE — H&P ADULT - PROBLEM SELECTOR PLAN 5
Iso hx menorrhagia, CONSTANCE 2/2 acute blood loss. Home med: ferrous gluconate 324mg q48h  On admission, Hb 8.4 (bl ~7-9, w/ Hb 7.8 on discharge date 7/27)  Iron studies from Dec '23 & Apr '23 consistent w/ iron deficiency anemia. B12 & folate wnl Apr '24.   - c/w home ferrous gluconate 324mg q48h  - monitor vaginal bleeding as above  - maintain active T&S, transfuse Hb Hx HTN,  home med Losartan  - Currently normotensive  - Hold Losartan for now iso SABRINA  - monitor BP, restart as tolerated Hx HTN,  home med Losartan 100qd   - Currently normotensive  - Hold Losartan for now iso SABRINA  - monitor BP, restart as tolerated

## 2024-07-31 NOTE — H&P ADULT - PROBLEM SELECTOR PLAN 9
Home med ASA 81mg PO qd - c/w home med Atorvastatin 40mg qhs      #Hx CAD s/p PCI '21  - Home med ASA 81mg PO qd, continue as pt endorses bleeding improved since prior to UAE  - Discontinue if vaginal bleeding worsens

## 2024-07-31 NOTE — PATIENT PROFILE ADULT - ARE SIGNIFICANT INDICATORS COMPLETE.
Detail Level: Simple Price (Do Not Change): 0.00 Instructions: This plan will send the code FBSD to the PM system.  DO NOT or CHANGE the price. Yes

## 2024-07-31 NOTE — H&P ADULT - PROBLEM SELECTOR PLAN 6
C/o constipation last admission, started on Miralax 17qd, Senna 2 tabs qhs, Colace 100mg BID  - _____ Iso hx menorrhagia, CONSTANCE 2/2 acute blood loss. Home med: ferrous gluconate 324mg q48h  On admission, Hb 8.4 (bl ~7-9, w/ Hb 7.8 on discharge date 7/27)  Iron studies from Dec '23 & Apr '23 consistent w/ iron deficiency anemia. B12 & folate wnl Apr '24.   - c/w home ferrous gluconate 324mg q48h  - monitor vaginal bleeding as above  - maintain active T&S, transfuse Hb Iso hx menorrhagia, CONSTANCE 2/2 acute blood loss. Home med: ferrous gluconate 324mg q48h  On admission, Hb 8.4 (bl ~7-9, w/ Hb 7.8 on discharge date 7/27)  Iron studies from Dec '23 & Apr '23 consistent w/ iron deficiency anemia. B12 & folate wnl Apr '24.   Plan:   - c/w home ferrous gluconate 324mg q48h  - monitor vaginal bleeding as above  - maintain active T&S, transfuse Hb<7

## 2024-07-31 NOTE — PATIENT PROFILE ADULT - ARRIVAL FROM
Is This A New Presentation, Or A Follow-Up?: Skin Lesions
How Severe Is Your Skin Lesion?: mild
Have Your Skin Lesions Been Treated?: not been treated
Home

## 2024-07-31 NOTE — H&P ADULT - PROBLEM SELECTOR PLAN 2
P/w dizziness & pre-syncopal sx. No episodes syncope. Appears associated with acute volume loss iso menorrhagia.   S/p NS 1L bolus.     Plan:  - f/u orthostatics  - Activity: Ambulate w/ assistance (while sx persist) P/w dizziness & pre-syncopal sx. No episodes syncope. Appears associated with acute volume loss iso menorrhagia.   S/p NS 1L bolus.     Plan:  - f/u orthostatics  - encourage PO intake   - pending UA spec grav, will consider light hydration LR 75cc/hr  - Activity: Ambulate w/ assistance (while sx persist)  - Fall precautions P/w dizziness & pre-syncopal sx. No episodes syncope. Appears associated with acute volume loss iso menorrhagia.   S/p NS 1L bolus.     Plan:  - f/u orthostatics  - encourage PO intake   - Light hydration, LR 75cc/hr x24h  - Activity: Ambulate w/ assistance (while sx persist)  - Fall precautions

## 2024-07-31 NOTE — H&P ADULT - PROBLEM SELECTOR PLAN 11
F: s/p 1L NS bolus  E: Replete PRN  N: CC, DASH  P: SCDs   Dispo: [facility] F: s/p 1L NS bolus  E: Replete PRN  N: CC, DASH  P: SCDs given vaginal bleeding  Dispo: F

## 2024-07-31 NOTE — ED PROVIDER NOTE - IV ALTEPLASE ADMIN OUTSIDE HIDDEN
Department of Anesthesiology  Preprocedure Note       Name:  Rogelio Jewell   Age:  64 y.o.  :  1962                                          MRN:  [id number]         Date:  10/14/2022      Surgeon: Arabella Lucia):  Kaylee Arguello MD    Procedure: Procedure(s):  Open reduction internal fixation left distal radius,    Medications prior to admission:   Prior to Admission medications    Medication Sig Start Date End Date Taking? Authorizing Provider   traMADol (ULTRAM) 50 MG tablet Take 1 tablet by mouth every 4 hours as needed for Pain for up to 7 days. Intended supply: 7 days. Take lowest dose possible to manage pain 10/7/22 10/14/22  Marly Randall MD   diphenhydrAMINE HCl (BENADRYL ALLERGY PO) Take by mouth daily    Historical Provider, MD   SUMAtriptan (IMITREX) 50 MG tablet sumatriptan 50 mg tablet   Take one tablet at onset of headache, repeat every 2 hours as needed if persist. No more than 4 tablets in one day. Historical Provider, MD   cetirizine (ZYRTEC) 10 MG tablet cetirizine 10 mg tablet   Take 1 tablet every day by oral route. Historical Provider, MD   aspirin 81 MG EC tablet Take 81 mg by mouth in the morning. Historical Provider, MD   omeprazole (PRILOSEC) 40 MG delayed release capsule Take 1 capsule by mouth in the morning.  22   Marly Randall MD   amLODIPine (NORVASC) 10 MG tablet Take 10 mg by mouth daily 3/23/22   Ar Automatic Reconciliation   fluticasone (FLONASE) 50 MCG/ACT nasal spray 2 sprays by Nasal route daily 3/23/22   Ar Automatic Reconciliation   metoprolol succinate (TOPROL XL) 25 MG extended release tablet Take 25 mg by mouth daily 3/23/22   Ar Automatic Reconciliation   montelukast (SINGULAIR) 10 MG tablet Take 10 mg by mouth daily 3/23/22   Ar Automatic Reconciliation   naproxen (NAPROSYN) 375 MG tablet Take 375 mg by mouth 2 times daily (with meals) 21   Ar Automatic Reconciliation       Current medications:    Current Facility-Administered Medications   Medication Dose Route Frequency Provider Last Rate Last Admin    lidocaine 1 % injection 1 mL  1 mL IntraDERmal Once PRN Salma Carlin MD        fentaNYL (SUBLIMAZE) injection 100 mcg  100 mcg IntraVENous Once PRN Salma Carlin MD        lactated ringers infusion  100 mL/hr IntraVENous Continuous Salma Carlin MD        midazolam PF (VERSED) injection 2 mg  2 mg IntraVENous Once PRN Salma Carlin MD        ceFAZolin (ANCEF) 2000 mg in sterile water 20 mL IV syringe  2,000 mg IntraVENous On Call to Kathy Trimble MD        sodium chloride flush 0.9 % injection 5-40 mL  5-40 mL IntraVENous 2 times per day Sujata Jackman MD        sodium chloride flush 0.9 % injection 5-40 mL  5-40 mL IntraVENous PRN Sujata Jackman MD        0.9 % sodium chloride infusion   IntraVENous PRN Sujata Jackman MD           Allergies:  No Known Allergies    Problem List:    Patient Active Problem List   Diagnosis Code    Hypertension I10    Neck pain M54.2    DDD (degenerative disc disease), cervical M50.30    Closed Colles' fracture of left radius S52.532A       Past Medical History:        Diagnosis Date    Headache     Hypertension        Past Surgical History:        Procedure Laterality Date    TUBAL LIGATION         Social History:    Social History     Tobacco Use    Smoking status: Former     Packs/day: 0.25     Types: Cigarettes     Quit date: 2018     Years since quittin.2    Smokeless tobacco: Never   Substance Use Topics    Alcohol use: Never                                Counseling given: Not Answered      Vital Signs (Current): There were no vitals filed for this visit.                                            BP Readings from Last 3 Encounters:   10/07/22 138/89   10/04/22 (!) 150/102   22 (!) 140/90       NPO Status:                                                                                 BMI:   Wt Readings from Last 3 Encounters:   10/11/22 show 164 lb (74.4 kg)   10/07/22 165 lb (74.8 kg)   10/04/22 163 lb (73.9 kg)     There is no height or weight on file to calculate BMI.    CBC:   Lab Results   Component Value Date/Time    WBC 3.7 03/23/2022 10:22 AM    RBC 6.20 03/23/2022 10:22 AM    HGB 13.6 03/23/2022 10:22 AM    HCT 44.5 03/23/2022 10:22 AM    MCV 72 03/23/2022 10:22 AM    RDW 15.2 03/23/2022 10:22 AM     03/23/2022 10:22 AM       CMP:   Lab Results   Component Value Date/Time     08/08/2022 09:22 AM    K 4.0 08/08/2022 09:22 AM     08/08/2022 09:22 AM    CO2 25 08/08/2022 09:22 AM    BUN 10 08/08/2022 09:22 AM    CREATININE 0.90 08/08/2022 09:22 AM    GFRAA >60 08/08/2022 09:22 AM    AGRATIO 1.5 03/23/2022 10:22 AM    LABGLOM >60 08/08/2022 09:22 AM    GLUCOSE 95 08/08/2022 09:22 AM    PROT 7.4 08/08/2022 09:22 AM    CALCIUM 9.3 08/08/2022 09:22 AM    BILITOT 0.4 08/08/2022 09:22 AM    ALKPHOS 97 08/08/2022 09:22 AM    ALKPHOS 95 03/23/2022 10:22 AM    AST 13 08/08/2022 09:22 AM    ALT 18 08/08/2022 09:22 AM       POC Tests: No results for input(s): POCGLU, POCNA, POCK, POCCL, POCBUN, POCHEMO, POCHCT in the last 72 hours.     Coags: No results found for: PROTIME, INR, APTT    HCG (If Applicable): No results found for: PREGTESTUR, PREGSERUM, HCG, HCGQUANT     ABGs: No results found for: PHART, PO2ART, KZA2KXD, DJR5AFP, BEART, W6YYDTJJ     Type & Screen (If Applicable):  No results found for: LABABO, LABRH    Drug/Infectious Status (If Applicable):  No results found for: HIV, HEPCAB    COVID-19 Screening (If Applicable): No results found for: COVID19        Anesthesia Evaluation    Airway: Mallampati: II  TM distance: >3 FB   Neck ROM: full  Mouth opening: > = 3 FB   Dental: normal exam         Pulmonary:normal exam  breath sounds clear to auscultation                             Cardiovascular:  Exercise tolerance: good (>4 METS),   (+) hypertension:,         Rhythm: regular  Rate: normal                 ROS comment: Stress test 8/22:   Study Impression: The study is normal. This study shows a low prognostic risk of ischemia.   Post-stress Echo: The post-stress echo showed normal wall motion.   Left Ventricle: Normal left ventricular systolic function with a visually estimated EF of 60 - 65%. Left ventricle size is normal. Normal wall thickness. Normal wall motion.   Tricuspid Valve: The estimated RVSP is 31 mmHg. Neuro/Psych:   (+) headaches:,             GI/Hepatic/Renal:   (+) GERD: well controlled,           Endo/Other:                     Abdominal:             Vascular: Other Findings:           Anesthesia Plan      TIVA     ASA 2     (CP thought to be non-cardiac per cardiology note 10/22.)  Induction: intravenous. Anesthetic plan and risks discussed with patient.               Post-op pain plan if not by surgeon: single peripheral nerve block            Karan Nguyen MD   10/14/2022

## 2024-07-31 NOTE — H&P ADULT - PROBLEM SELECTOR PLAN 1
P/w BUN/Cr 17/2.21 (bl Cr ~1.1). Denies urinary sx. Recent adm also w/ SABRINA (Cr 1.48) which was pre-renal, however improved 1.25 on discharge.  DDx: pre-renal (however BUn wnl), Acute interstitial nephritis iso recent beta-lactam use, vs other intrinsic or obstructive process  Plan:   - f/u UA & Urine studies  ---> will likely be affected by 1L NS bolus. FeNa >1 can support tubular damage.   - Strict I/Os  - F/u Cystatin C  - Hold Augmentin for now, hold NSAIDs   - F/u add-on differential to CBC from midnight, if elevated eosinophils would also support Dx AIN  - F/u renal US  - Trend Cr, avoid nephrotoxic agents, renally dose medications  - Per Gyn, consider IR consultation for SABRINA s/p UAE P/w BUN/Cr 17/2.21 (bl Cr ~1.1). Denies urinary sx. Recent adm also w/ SABRINA (Cr 1.48) which was pre-renal, however improved 1.25 on discharge.  DDx: pre-renal (however BUn wnl), Acute interstitial nephritis iso recent beta-lactam use, vs other intrinsic or obstructive process. Also possible Contrast induced nephropathy (IR procedure 7/26), especially w/ post-contrast ARB use (home med Losartan).  Plan:   - f/u UA & Urine studies  ---> will likely be affected by 1L NS bolus. FeNa >1 can support tubular damage. Casts would suggest SUKHI, however not always present.   - Strict I/Os  - F/u Cystatin C  - Hold Augmentin for now, hold NSAIDs   - F/u add-on differential to CBC from midnight, if elevated eosinophils would also support Dx AIN  - F/u renal US  - Trend Cr, avoid nephrotoxic agents, renally dose medications  - Per Gyn, consider IR consultation for SABRINA s/p UAE P/w BUN/Cr 17/2.21 (bl Cr ~1.1). Denies urinary sx. Recent adm also w/ SABRINA (Cr 1.48) which was pre-renal, however improved 1.25 on discharge.  DDx: pre-renal (however BUn wnl), Acute interstitial nephritis iso recent beta-lactam use, vs other intrinsic or obstructive process. Also possible Contrast induced nephropathy (IR procedure 7/26), especially w/ post-contrast ARB use (home med Losartan).  Plan:   - f/u UA & Urine studies  ---> will likely be affected by 1L NS bolus. FeNa >1 can support tubular damage. Casts would suggest SUKHI, however not always present.   - Strict I/Os  - F/u Cystatin C  - Hold Augmentin for now, hold NSAIDs   - F/u renal US  - Trend Cr, avoid nephrotoxic agents, renally dose medications  - Per Gyn, consider IR consultation for SABRINA s/p UAE

## 2024-07-31 NOTE — H&P ADULT - NSHPREVIEWOFSYSTEMS_GEN_ALL_CORE
Review of Systems  General: no fever, chills  Eyes: no vision changes  ENT: no hearing changes, nasal congestion, or sore throat  CV: no chest pain, ++palpitations  Pulm: no SOB, wheezing, cough, or hemoptysis  Abd/GI: ++abd pain, unchanged from discharge   : no dysuria. No hematuria or oliguria. No urinary sx.   MSK: no joint pain or myalgias  Neuro: no syncope. ++dizziness, no episodes syncope  Psych: no anxiety or depression  Endo: no heat or cold intolerance

## 2024-07-31 NOTE — CHART NOTE - NSCHARTNOTEFT_GEN_A_CORE
Notified by RN this evening that patient reporting persistent abdominal pain despite trying PO acetaminophen. Pt was amenable to trying IV tylenol, which was ordered by primary team. However pt reported that pain was unimproved & wanted to speak with a provider.     Primary team communicated to RN that team will come and see patient, & offered to order other forms of pain management if she is still in pain, until provider can see her at bedside. Patient declined to elaborate further regarding what she would like to discuss with primary team, and expressed disappointment that team could not see her immediately. RN communicated to patient that primary team was currently caring for a medically active patient & planned to see her as soon as possible.     RN made several attempts to understand patient's concerns. Patient expressed  she wanted to be transferred to a private room because she had issues with the patient she was sharing a room with. When told that a private room may incur additional costs to her hospital stay, she stated this was unacceptable & continued to request a private room.     Writer came to patient's bedside & found patient dressed, walking towards elevator with the intention of eloping. Primary team made several attempts to understand & explore her concerns, advised her that she was not medically ready for discharge & that she would be leaving against medical advice. Proposed various ways to address her abdominal pain, & offered to discuss any questions or concerns the patient had about her plan of care.     Patient refused to engage in conversation, & continued walking towards elevator. Declined signing a form stating she was leaving against medical advise. Writer encouraged patient to come back to hospital if her symptoms persist or worsen.     Notified attending hospitalist, Dr. Urena, of these events.

## 2024-08-04 DIAGNOSIS — Z87.891 PERSONAL HISTORY OF NICOTINE DEPENDENCE: ICD-10-CM

## 2024-08-04 DIAGNOSIS — Z79.84 LONG TERM (CURRENT) USE OF ORAL HYPOGLYCEMIC DRUGS: ICD-10-CM

## 2024-08-04 DIAGNOSIS — N17.9 ACUTE KIDNEY FAILURE, UNSPECIFIED: ICD-10-CM

## 2024-08-04 DIAGNOSIS — N92.0 EXCESSIVE AND FREQUENT MENSTRUATION WITH REGULAR CYCLE: ICD-10-CM

## 2024-08-04 DIAGNOSIS — K21.9 GASTRO-ESOPHAGEAL REFLUX DISEASE WITHOUT ESOPHAGITIS: ICD-10-CM

## 2024-08-04 DIAGNOSIS — I25.10 ATHEROSCLEROTIC HEART DISEASE OF NATIVE CORONARY ARTERY WITHOUT ANGINA PECTORIS: ICD-10-CM

## 2024-08-04 DIAGNOSIS — D64.9 ANEMIA, UNSPECIFIED: ICD-10-CM

## 2024-08-04 DIAGNOSIS — E11.9 TYPE 2 DIABETES MELLITUS WITHOUT COMPLICATIONS: ICD-10-CM

## 2024-08-04 DIAGNOSIS — D25.9 LEIOMYOMA OF UTERUS, UNSPECIFIED: ICD-10-CM

## 2024-08-04 DIAGNOSIS — Z95.5 PRESENCE OF CORONARY ANGIOPLASTY IMPLANT AND GRAFT: ICD-10-CM

## 2024-08-04 DIAGNOSIS — Z98.84 BARIATRIC SURGERY STATUS: ICD-10-CM

## 2024-08-04 DIAGNOSIS — E78.5 HYPERLIPIDEMIA, UNSPECIFIED: ICD-10-CM

## 2024-08-04 DIAGNOSIS — E66.9 OBESITY, UNSPECIFIED: ICD-10-CM

## 2024-08-04 DIAGNOSIS — D62 ACUTE POSTHEMORRHAGIC ANEMIA: ICD-10-CM

## 2024-08-04 DIAGNOSIS — Z79.82 LONG TERM (CURRENT) USE OF ASPIRIN: ICD-10-CM

## 2024-08-04 DIAGNOSIS — I10 ESSENTIAL (PRIMARY) HYPERTENSION: ICD-10-CM

## 2024-08-04 DIAGNOSIS — K59.00 CONSTIPATION, UNSPECIFIED: ICD-10-CM

## 2024-08-04 DIAGNOSIS — N39.0 URINARY TRACT INFECTION, SITE NOT SPECIFIED: ICD-10-CM

## 2024-08-06 ENCOUNTER — APPOINTMENT (OUTPATIENT)
Dept: GASTROENTEROLOGY | Facility: CLINIC | Age: 49
End: 2024-08-06

## 2024-08-08 DIAGNOSIS — Z98.890 OTHER SPECIFIED POSTPROCEDURAL STATES: ICD-10-CM

## 2024-08-08 DIAGNOSIS — I25.10 ATHEROSCLEROTIC HEART DISEASE OF NATIVE CORONARY ARTERY WITHOUT ANGINA PECTORIS: ICD-10-CM

## 2024-08-08 DIAGNOSIS — I10 ESSENTIAL (PRIMARY) HYPERTENSION: ICD-10-CM

## 2024-08-08 DIAGNOSIS — N17.9 ACUTE KIDNEY FAILURE, UNSPECIFIED: ICD-10-CM

## 2024-08-08 DIAGNOSIS — K21.9 GASTRO-ESOPHAGEAL REFLUX DISEASE WITHOUT ESOPHAGITIS: ICD-10-CM

## 2024-08-08 DIAGNOSIS — E11.9 TYPE 2 DIABETES MELLITUS WITHOUT COMPLICATIONS: ICD-10-CM

## 2024-08-08 DIAGNOSIS — D50.9 IRON DEFICIENCY ANEMIA, UNSPECIFIED: ICD-10-CM

## 2024-08-08 DIAGNOSIS — E78.5 HYPERLIPIDEMIA, UNSPECIFIED: ICD-10-CM

## 2024-08-08 DIAGNOSIS — Z98.84 BARIATRIC SURGERY STATUS: ICD-10-CM

## 2024-08-08 DIAGNOSIS — T38.4X5A ADVERSE EFFECT OF ORAL CONTRACEPTIVES, INITIAL ENCOUNTER: ICD-10-CM

## 2024-08-08 DIAGNOSIS — E86.0 DEHYDRATION: ICD-10-CM

## 2024-08-08 DIAGNOSIS — E04.1 NONTOXIC SINGLE THYROID NODULE: ICD-10-CM

## 2024-08-08 DIAGNOSIS — Z53.29 PROCEDURE AND TREATMENT NOT CARRIED OUT BECAUSE OF PATIENT'S DECISION FOR OTHER REASONS: ICD-10-CM
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
## 2024-08-08 DIAGNOSIS — Z90.49 ACQUIRED ABSENCE OF OTHER SPECIFIED PARTS OF DIGESTIVE TRACT: ICD-10-CM

## 2024-08-08 DIAGNOSIS — Z95.5 PRESENCE OF CORONARY ANGIOPLASTY IMPLANT AND GRAFT: ICD-10-CM

## 2024-08-08 DIAGNOSIS — E87.6 HYPOKALEMIA: ICD-10-CM

## 2024-08-20 ENCOUNTER — APPOINTMENT (OUTPATIENT)
Dept: INTERVENTIONAL RADIOLOGY/VASCULAR | Facility: HOSPITAL | Age: 49
End: 2024-08-20

## 2024-08-28 NOTE — PATIENT PROFILE ADULT - FALL HARM RISK - FALL HARM RISK
Detail Level: Detailed
Detail Level: Simple
Detail Level: Generalized
Detail Level: Zone
No indicators present

## 2024-10-14 ENCOUNTER — APPOINTMENT (OUTPATIENT)
Dept: GASTROENTEROLOGY | Facility: CLINIC | Age: 49
End: 2024-10-14

## 2024-10-29 NOTE — ED ADULT NURSE NOTE - NSFALLRSKHARMRISK_ED_ALL_ED
Problem: Rehabilitation (IRF) Plan of Care  Goal: Plan of Care Review  Outcome: Progressing  Goal: Patient-Specific Goal (Individualized)  Outcome: Progressing  Goal: Absence of New-Onset Illness or Injury  Outcome: Progressing  Goal: Optimal Comfort and Wellbeing  Outcome: Progressing  Goal: Home and Community Transition Plan Established  Outcome: Progressing     Problem: Wound  Goal: Optimal Coping  Outcome: Progressing  Goal: Optimal Functional Ability  Outcome: Progressing  Goal: Absence of Infection Signs and Symptoms  Outcome: Progressing  Goal: Improved Oral Intake  Outcome: Progressing  Goal: Optimal Pain Control and Function  Outcome: Progressing  Goal: Skin Health and Integrity  Outcome: Progressing  Goal: Optimal Wound Healing  Outcome: Progressing     Problem: Fall Injury Risk  Goal: Absence of Fall and Fall-Related Injury  Reactivated      no

## 2024-11-07 ENCOUNTER — EMERGENCY (EMERGENCY)
Facility: HOSPITAL | Age: 49
LOS: 1 days | Discharge: LEFT BEFORE DISCHARGE | End: 2024-11-07
Admitting: EMERGENCY MEDICINE
Payer: MEDICAID

## 2024-11-07 VITALS
HEIGHT: 66 IN | DIASTOLIC BLOOD PRESSURE: 65 MMHG | WEIGHT: 210.1 LBS | RESPIRATION RATE: 18 BRPM | TEMPERATURE: 98 F | OXYGEN SATURATION: 100 % | HEART RATE: 118 BPM | SYSTOLIC BLOOD PRESSURE: 132 MMHG

## 2024-11-07 DIAGNOSIS — Z98.890 OTHER SPECIFIED POSTPROCEDURAL STATES: Chronic | ICD-10-CM

## 2024-11-07 DIAGNOSIS — Z90.49 ACQUIRED ABSENCE OF OTHER SPECIFIED PARTS OF DIGESTIVE TRACT: Chronic | ICD-10-CM

## 2024-11-07 DIAGNOSIS — Z98.89 OTHER SPECIFIED POSTPROCEDURAL STATES: Chronic | ICD-10-CM

## 2024-11-07 DIAGNOSIS — M54.2 CERVICALGIA: ICD-10-CM

## 2024-11-07 DIAGNOSIS — Z53.21 PROCEDURE AND TREATMENT NOT CARRIED OUT DUE TO PATIENT LEAVING PRIOR TO BEING SEEN BY HEALTH CARE PROVIDER: ICD-10-CM

## 2024-11-07 PROCEDURE — 82962 GLUCOSE BLOOD TEST: CPT

## 2024-11-07 PROCEDURE — L9991: CPT
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
## 2024-11-07 NOTE — ED ADULT TRIAGE NOTE - OTHER COMPLAINTS
Patient reports neck pain. Patient also reports drinking vodka today. Ambulatory with steady gait. Fingerstick 245 in triage.

## 2024-11-07 NOTE — ED ADULT NURSE NOTE - NSFALLUNIVINTERV_ED_ALL_ED
Bed/Stretcher in lowest position, wheels locked, appropriate side rails in place/Call bell, personal items and telephone in reach/Instruct patient to call for assistance before getting out of bed/chair/stretcher/Non-slip footwear applied when patient is off stretcher/[place] to call system/Physically safe environment - no spills, clutter or unnecessary equipment/Purposeful proactive rounding/Room/bathroom lighting operational, light cord in reach

## 2024-11-07 NOTE — ED ADULT NURSE REASSESSMENT NOTE - NS ED NURSE REASSESS COMMENT FT1
Charge RN note: Code gray called on patient after patient screaming and disruptive towards staff member and other patients in the nurses station. Security at bedside. Patient AOX4, walking with steady gait.

## 2024-11-07 NOTE — ED ADULT NURSE NOTE - BREATHING, MLM
Chadd VENCES Greer discharged to home accompanied by wife.   Patient provided with the following educational materials upon discharge:  Pharmacist delivered 2 drugs to pt personally.   Valuables and belongings sent with patient.   discharge instructions, medications and follow up appointments reviewed with patient and wife.  Patient and wife verbalized understanding. Therapist is doing car transfer with family after pharmacist here. dtrs are present for the transfer with wife. Family knows to p/u scripts at [facility] yet.   Spontaneous, unlabored and symmetrical

## 2024-11-07 NOTE — ED ADULT NURSE REASSESSMENT NOTE - NS ED NURSE REASSESS COMMENT FT1
Patient verbally abusive towards staff and threatening RN. Attempted to ask patient to return to stretcher, refusing and continues to scream. Security called for patient and staff safety.

## 2024-11-26 ENCOUNTER — APPOINTMENT (OUTPATIENT)
Dept: MRI IMAGING | Facility: HOSPITAL | Age: 49
End: 2024-11-26

## 2024-11-29 ENCOUNTER — APPOINTMENT (OUTPATIENT)
Dept: INTERVENTIONAL RADIOLOGY/VASCULAR | Facility: HOSPITAL | Age: 49
End: 2024-11-29

## 2024-12-22 ENCOUNTER — EMERGENCY (EMERGENCY)
Facility: HOSPITAL | Age: 49
LOS: 1 days | Discharge: HOME | End: 2024-12-22
Attending: STUDENT IN AN ORGANIZED HEALTH CARE EDUCATION/TRAINING PROGRAM | Admitting: STUDENT IN AN ORGANIZED HEALTH CARE EDUCATION/TRAINING PROGRAM
Payer: MEDICAID

## 2024-12-22 VITALS
HEART RATE: 100 BPM | TEMPERATURE: 98 F | OXYGEN SATURATION: 98 % | RESPIRATION RATE: 17 BRPM | DIASTOLIC BLOOD PRESSURE: 92 MMHG | WEIGHT: 210.1 LBS | HEIGHT: 66 IN | SYSTOLIC BLOOD PRESSURE: 152 MMHG

## 2024-12-22 VITALS
TEMPERATURE: 99 F | SYSTOLIC BLOOD PRESSURE: 159 MMHG | OXYGEN SATURATION: 99 % | HEART RATE: 98 BPM | RESPIRATION RATE: 18 BRPM | DIASTOLIC BLOOD PRESSURE: 97 MMHG

## 2024-12-22 DIAGNOSIS — Z98.890 OTHER SPECIFIED POSTPROCEDURAL STATES: Chronic | ICD-10-CM

## 2024-12-22 DIAGNOSIS — Z98.89 OTHER SPECIFIED POSTPROCEDURAL STATES: Chronic | ICD-10-CM

## 2024-12-22 DIAGNOSIS — Z90.49 ACQUIRED ABSENCE OF OTHER SPECIFIED PARTS OF DIGESTIVE TRACT: Chronic | ICD-10-CM

## 2024-12-22 LAB
ADD ON TEST-SPECIMEN IN LAB: SIGNIFICANT CHANGE UP
ALBUMIN SERPL ELPH-MCNC: 4 G/DL — SIGNIFICANT CHANGE UP (ref 3.3–5)
ALP SERPL-CCNC: 84 U/L — SIGNIFICANT CHANGE UP (ref 40–120)
ALT FLD-CCNC: 30 U/L — SIGNIFICANT CHANGE UP (ref 10–45)
ANION GAP SERPL CALC-SCNC: 8 MMOL/L — SIGNIFICANT CHANGE UP (ref 5–17)
APPEARANCE UR: CLEAR — SIGNIFICANT CHANGE UP
AST SERPL-CCNC: 43 U/L — HIGH (ref 10–40)
BASOPHILS # BLD AUTO: 0.03 K/UL — SIGNIFICANT CHANGE UP (ref 0–0.2)
BASOPHILS NFR BLD AUTO: 0.9 % — SIGNIFICANT CHANGE UP (ref 0–2)
BILIRUB SERPL-MCNC: <0.2 MG/DL — SIGNIFICANT CHANGE UP (ref 0.2–1.2)
BILIRUB UR-MCNC: NEGATIVE — SIGNIFICANT CHANGE UP
BUN SERPL-MCNC: 15 MG/DL — SIGNIFICANT CHANGE UP (ref 7–23)
CALCIUM SERPL-MCNC: 8.4 MG/DL — SIGNIFICANT CHANGE UP (ref 8.4–10.5)
CHLORIDE SERPL-SCNC: 106 MMOL/L — SIGNIFICANT CHANGE UP (ref 96–108)
CO2 SERPL-SCNC: 18 MMOL/L — LOW (ref 22–31)
COLOR SPEC: YELLOW — SIGNIFICANT CHANGE UP
CREAT SERPL-MCNC: 0.98 MG/DL — SIGNIFICANT CHANGE UP (ref 0.5–1.3)
DIFF PNL FLD: ABNORMAL
EGFR: 72 ML/MIN/1.73M2 — SIGNIFICANT CHANGE UP
EOSINOPHIL # BLD AUTO: 0.06 K/UL — SIGNIFICANT CHANGE UP (ref 0–0.5)
EOSINOPHIL NFR BLD AUTO: 1.8 % — SIGNIFICANT CHANGE UP (ref 0–6)
FLUAV AG NPH QL: SIGNIFICANT CHANGE UP
FLUBV AG NPH QL: SIGNIFICANT CHANGE UP
GLUCOSE SERPL-MCNC: 197 MG/DL — HIGH (ref 70–99)
GLUCOSE UR QL: 100 MG/DL
HCT VFR BLD CALC: 34.7 % — SIGNIFICANT CHANGE UP (ref 34.5–45)
HGB BLD-MCNC: 10.1 G/DL — LOW (ref 11.5–15.5)
KETONES UR-MCNC: ABNORMAL MG/DL
LEUKOCYTE ESTERASE UR-ACNC: NEGATIVE — SIGNIFICANT CHANGE UP
LIDOCAIN IGE QN: 70 U/L — HIGH (ref 7–60)
LYMPHOCYTES # BLD AUTO: 1.55 K/UL — SIGNIFICANT CHANGE UP (ref 1–3.3)
LYMPHOCYTES # BLD AUTO: 42.9 % — SIGNIFICANT CHANGE UP (ref 13–44)
MCHC RBC-ENTMCNC: 22.8 PG — LOW (ref 27–34)
MCHC RBC-ENTMCNC: 29.1 G/DL — LOW (ref 32–36)
MCV RBC AUTO: 78.3 FL — LOW (ref 80–100)
MONOCYTES # BLD AUTO: 0.26 K/UL — SIGNIFICANT CHANGE UP (ref 0–0.9)
MONOCYTES NFR BLD AUTO: 7.1 % — SIGNIFICANT CHANGE UP (ref 2–14)
NEUTROPHILS # BLD AUTO: 1.71 K/UL — LOW (ref 1.8–7.4)
NEUTROPHILS NFR BLD AUTO: 47.3 % — SIGNIFICANT CHANGE UP (ref 43–77)
NITRITE UR-MCNC: NEGATIVE — SIGNIFICANT CHANGE UP
PH UR: 5.5 — SIGNIFICANT CHANGE UP (ref 5–8)
PLATELET # BLD AUTO: 617 K/UL — HIGH (ref 150–400)
POTASSIUM SERPL-MCNC: 3.6 MMOL/L — SIGNIFICANT CHANGE UP (ref 3.5–5.3)
POTASSIUM SERPL-SCNC: 3.6 MMOL/L — SIGNIFICANT CHANGE UP (ref 3.5–5.3)
PROT SERPL-MCNC: 6.9 G/DL — SIGNIFICANT CHANGE UP (ref 6–8.3)
PROT UR-MCNC: 100 MG/DL
RBC # BLD: 4.43 M/UL — SIGNIFICANT CHANGE UP (ref 3.8–5.2)
RBC # FLD: 23.1 % — HIGH (ref 10.3–14.5)
RSV RNA NPH QL NAA+NON-PROBE: SIGNIFICANT CHANGE UP
SARS-COV-2 RNA SPEC QL NAA+PROBE: SIGNIFICANT CHANGE UP
SODIUM SERPL-SCNC: 132 MMOL/L — LOW (ref 135–145)
SP GR SPEC: 1.02 — SIGNIFICANT CHANGE UP (ref 1–1.03)
UROBILINOGEN FLD QL: 1 MG/DL — SIGNIFICANT CHANGE UP (ref 0.2–1)
WBC # BLD: 3.61 K/UL — LOW (ref 3.8–10.5)
WBC # FLD AUTO: 3.61 K/UL — LOW (ref 3.8–10.5)

## 2024-12-22 PROCEDURE — 74177 CT ABD & PELVIS W/CONTRAST: CPT | Mod: MC

## 2024-12-22 PROCEDURE — 96374 THER/PROPH/DIAG INJ IV PUSH: CPT | Mod: XU

## 2024-12-22 PROCEDURE — 74177 CT ABD & PELVIS W/CONTRAST: CPT | Mod: 26,MC

## 2024-12-22 PROCEDURE — 96375 TX/PRO/DX INJ NEW DRUG ADDON: CPT

## 2024-12-22 PROCEDURE — 99285 EMERGENCY DEPT VISIT HI MDM: CPT | Mod: 25

## 2024-12-22 PROCEDURE — 93005 ELECTROCARDIOGRAM TRACING: CPT

## 2024-12-22 PROCEDURE — 87637 SARSCOV2&INF A&B&RSV AMP PRB: CPT

## 2024-12-22 PROCEDURE — 99285 EMERGENCY DEPT VISIT HI MDM: CPT

## 2024-12-22 PROCEDURE — 80053 COMPREHEN METABOLIC PANEL: CPT

## 2024-12-22 PROCEDURE — 93010 ELECTROCARDIOGRAM REPORT: CPT

## 2024-12-22 PROCEDURE — 36415 COLL VENOUS BLD VENIPUNCTURE: CPT

## 2024-12-22 PROCEDURE — 71046 X-RAY EXAM CHEST 2 VIEWS: CPT

## 2024-12-22 PROCEDURE — 71046 X-RAY EXAM CHEST 2 VIEWS: CPT | Mod: 26

## 2024-12-22 PROCEDURE — 81001 URINALYSIS AUTO W/SCOPE: CPT

## 2024-12-22 PROCEDURE — 85025 COMPLETE CBC W/AUTO DIFF WBC: CPT

## 2024-12-22 PROCEDURE — 83690 ASSAY OF LIPASE: CPT

## 2024-12-22 PROCEDURE — 84484 ASSAY OF TROPONIN QUANT: CPT

## 2024-12-22 PROCEDURE — 81025 URINE PREGNANCY TEST: CPT

## 2024-12-22 RX ORDER — IOHEXOL 300 MG/ML
30 INJECTION, SOLUTION INTRAVENOUS ONCE
Refills: 0 | Status: COMPLETED | OUTPATIENT
Start: 2024-12-22 | End: 2024-12-22

## 2024-12-22 RX ORDER — FAMOTIDINE 20 MG/1
20 TABLET, FILM COATED ORAL ONCE
Refills: 0 | Status: COMPLETED | OUTPATIENT
Start: 2024-12-22 | End: 2024-12-22

## 2024-12-22 RX ORDER — KETOROLAC TROMETHAMINE 30 MG/ML
15 INJECTION INTRAMUSCULAR; INTRAVENOUS ONCE
Refills: 0 | Status: DISCONTINUED | OUTPATIENT
Start: 2024-12-22 | End: 2024-12-22

## 2024-12-22 RX ORDER — ONDANSETRON HYDROCHLORIDE 4 MG/1
4 TABLET, FILM COATED ORAL ONCE
Refills: 0 | Status: COMPLETED | OUTPATIENT
Start: 2024-12-22 | End: 2024-12-22

## 2024-12-22 RX ORDER — SODIUM CHLORIDE 9 MG/ML
1000 INJECTION, SOLUTION INTRAMUSCULAR; INTRAVENOUS; SUBCUTANEOUS ONCE
Refills: 0 | Status: COMPLETED | OUTPATIENT
Start: 2024-12-22 | End: 2024-12-22

## 2024-12-22 RX ADMIN — ONDANSETRON HYDROCHLORIDE 4 MILLIGRAM(S): 4 TABLET, FILM COATED ORAL at 16:32

## 2024-12-22 RX ADMIN — IOHEXOL 30 MILLILITER(S): 300 INJECTION, SOLUTION INTRAVENOUS at 16:31

## 2024-12-22 RX ADMIN — KETOROLAC TROMETHAMINE 15 MILLIGRAM(S): 30 INJECTION INTRAMUSCULAR; INTRAVENOUS at 17:01

## 2024-12-22 RX ADMIN — SODIUM CHLORIDE 1000 MILLILITER(S): 9 INJECTION, SOLUTION INTRAMUSCULAR; INTRAVENOUS; SUBCUTANEOUS at 16:32

## 2024-12-22 RX ADMIN — KETOROLAC TROMETHAMINE 15 MILLIGRAM(S): 30 INJECTION INTRAMUSCULAR; INTRAVENOUS at 16:31

## 2024-12-22 RX ADMIN — FAMOTIDINE 20 MILLIGRAM(S): 20 TABLET, FILM COATED ORAL at 16:31

## 2024-12-22 NOTE — ED ADULT TRIAGE NOTE - CHIEF COMPLAINT QUOTE
Pt presents to ED c/o abdominal pain, nausea, vomiting, flu like symptoms (body aches, chills) since this morning.

## 2024-12-22 NOTE — ED PROVIDER NOTE - NSFOLLOWUPINSTRUCTIONS_ED_ALL_ED_FT
Thank you for visiting [facility] Emergency Department.      We saw you today for abdominal pain.  Your CT scan showed gastritis.  You may try pepcid and maalox as needed for pain.  Please avoid spicy foods and alcohol.    Please follow up with gastroenterologist in 1 week for re-evaluation.   Please know that no emergency visit is complete without follow-up with your primary care provider in 1 week.  Please bring copies of all discharge papers and results and show to your doctor.      Please continue taking all previous medications as instructed unless we discussed otherwise.     I appreciated your patience and hope you feel better soon.     Return to ER immediately if you develop fevers, chills, chest pain, shortness of breath, worsening pain, and/or any concerning symptoms.

## 2024-12-22 NOTE — PATIENT PROFILE ADULT - DO YOU FEEL UNSAFE AT HOME, WORK, OR SCHOOL?
Per Medicare Guidelines -no authorization is required for RIGHT pes anserine bursa CSI under ultrasound guidance CPT 42730,      Status: Authorization is not required however may be subject to review once claim is submitted-Covered Benefit     Per Dr. Angie Maddox keith after 6/20/23  Patient scheduled 6/21/23 at 
84-year-old male past medical history of HTN, HLD, A-fib on Xarelto, prostates CA, recurrent urinary retention presents emergency department for Vera catheter issues.  Patient is accompanied by daughter who is helping provide history.  Patient had Vera catheter placed on 12/20/2024 for urinary retention.  Vera has not been draining for the last 5 to 6 hours and patient is endorsing abdominal discomfort and suprapubic pain.  Patient noted to have hematuria for the last few weeks according to the daughter and noted that there was bloody urine in the Vera bag prior to it stopping draining. Pt currently on abx for UTI. Pt endorses generalized weakness worsening today. Patient denies fevers, chills, chest pain, shortness of breath, nausea/vomiting, diarrhea/constipation.    Patient was offered interpretation services but deferred as they would like family member at bedside to provide interpretation.
no

## 2024-12-22 NOTE — ED PROVIDER NOTE - ATTENDING APP SHARED VISIT CONTRIBUTION OF CARE
I performed a significant portion of the history , exam, and medical decision making for this patient. I agree with PA's note and assessment as above, except as otherwise edited directly in the note, documented in this section, and/or as documented in ED course and updates section.    49F with a PMHx of T2DM, HTN, HLD, CAD s/c PCI ('21), gastric bypass surgery, cholecystectomy uterine fibroids s/p myomectomy (2021), multiple miscarriages s/p medical management + D&C I performed a significant portion of the history , exam, and medical decision making for this patient. I agree with PA's note and assessment as above, except as otherwise edited directly in the note, documented in this section, and/or as documented in ED course and updates section.    49F with a PMHx of T2DM, HTN, HLD, CAD s/c PCI ('21), gastric bypass surgery, cholecystectomy uterine fibroids s/p myomectomy (2021), multiple miscarriages s/p medical management + D&C, p/w acute on chronic upper abdominal pain today. states she intermittently gets this pain for the past few months. admitted to spicy food everyday and had etoh yesterday. pain not worse with exertion. not pleuritic. Denied cp, sob, dysuria. no vomiting. + belching. has watery stool today. Well appearing. obese. Alerted and orientated. head atraumatic, normocephalic. HEENT unremarkable. neck with FROM, RRR. CTAB. abd soft, nontender, no mass, no guarding/rebound. equal b/l UE and LE strength. no LE swelling. DDx: gastritis vs GERD vs pancreatitis. will r/o ACS. will get trop and ekg. given extensive abdominal history, will get CT to r/o any intraabdominal pathology. labs, cxray, CTAP. pain control. reassess

## 2024-12-22 NOTE — ED PROVIDER NOTE - PHYSICAL EXAMINATION
CONSTITUTIONAL: Awake, alert.  Nontoxic, no acute distress.  +belching frequently during exam    HEAD: Normocephalic, atraumatic.    EYES: Conjunctivae clear without exudates or hemorrhage. Sclera is non-icteric.    ENT: Normal appearing external ears, nose, mucous membranes moist.    NECK: supple, trachea midline.    HEART:  Normal rate, regular rhythm.  Heart sounds S1, S2.  No murmurs, rubs or gallops.    LUNGS:  No acute respiratory distress.  Non-tachypneic and non-labored.  Lungs are clear bilaterally with good aeration.  No wheezing, rales, rhonchi.    ABDOMEN: Normal appearing skin without lesions, rashes.  Normal bowel sounds x 4.  Soft, non-distended, mild discomfort to b/l upper abd.  increased belching with palpation. No rebound or guarding. No hernias or masses palpable.  No pulsatile abdominal mass.   No CVA tenderness b/l.    MUSCULOSKELETAL:  Moving all extremities without issue.    SKIN: Skin in warm, dry and intact without rashes or lesions.  Appropriate color for ethnicity.    NEUROLOGICAL:  Patient is alert, oriented x person, place and time.    PSYCH: Appropriate mood and affect. Good judgment and insight.

## 2024-12-22 NOTE — ED PROVIDER NOTE - PATIENT PORTAL LINK FT
You can access the FollowMyHealth Patient Portal offered by [facility] by registering at the following website: [email or web address]. By joining [facility]’s FollowMyHealth portal, you will also be able to view your health information using other applications (apps) compatible with our system.

## 2024-12-22 NOTE — ED PROVIDER NOTE - CARE PROVIDER_API CALL
Durga Jack  Gastroenterology  [address]  [place] [address]  Phone: [phone]  Fax: [phone]  Follow Up Time:

## 2024-12-22 NOTE — ED PROVIDER NOTE - CLINICAL SUMMARY MEDICAL DECISION MAKING FREE TEXT BOX
50 y/o female w/ hx T2DM, HTN, HLD, CAD s/c PCI ('21), gastric bypass surgery, cholecystectomy uterine fibroids s/p myomectomy (2021), multiple miscarriages s/p medical management + D&C, p/w b/l intermittent upper abd pain, nausea, nonbloody vomiting, and diarrhea today.  +belching.  Pain not pleuritic or exertional.  Notes hx similar over past few mo.  Admits to eating spicy food everyday and had etoh yesterday.  Denies f/c, cp, sob, dysuria, hematuria.  Denies recent travel or sick contacts.  Smokes.  Denies exogenous hormone use or hx dvt/pe.  VS notable for /92, . Otherwise wnl  Exam with mild upper abd discomfort with palpation.  No rebound/guarding. +belching  Overall suspect likely gastritis, gerd, pud,vs  pancreatitis.  Will check basic labs, ekg, cxr, ctap to r/o acs, electrolyte ab, infx, pna, ptx, or alternative intraabominal pathology given extensive surg hx  Trial gi cocktail/meds, re-eval

## 2024-12-22 NOTE — ED ADULT NURSE NOTE - OBJECTIVE STATEMENT
The patient is a 49y Female complaining of LUQ and RUQ abdominal pain, HA, chills, body aches x this morning.

## 2024-12-25 DIAGNOSIS — K29.70 GASTRITIS, UNSPECIFIED, WITHOUT BLEEDING: ICD-10-CM

## 2024-12-25 DIAGNOSIS — Z90.49 ACQUIRED ABSENCE OF OTHER SPECIFIED PARTS OF DIGESTIVE TRACT: ICD-10-CM

## 2024-12-25 DIAGNOSIS — Z95.5 PRESENCE OF CORONARY ANGIOPLASTY IMPLANT AND GRAFT: ICD-10-CM

## 2024-12-25 DIAGNOSIS — I25.10 ATHEROSCLEROTIC HEART DISEASE OF NATIVE CORONARY ARTERY WITHOUT ANGINA PECTORIS: ICD-10-CM

## 2024-12-25 DIAGNOSIS — Z98.84 BARIATRIC SURGERY STATUS: ICD-10-CM

## 2024-12-25 DIAGNOSIS — E11.9 TYPE 2 DIABETES MELLITUS WITHOUT COMPLICATIONS: ICD-10-CM

## 2024-12-25 DIAGNOSIS — I10 ESSENTIAL (PRIMARY) HYPERTENSION: ICD-10-CM

## 2024-12-25 DIAGNOSIS — R10.9 UNSPECIFIED ABDOMINAL PAIN: ICD-10-CM

## 2024-12-25 DIAGNOSIS — E78.5 HYPERLIPIDEMIA, UNSPECIFIED: ICD-10-CM

## 2025-01-08 ENCOUNTER — APPOINTMENT (OUTPATIENT)
Dept: BARIATRICS | Facility: CLINIC | Age: 50
End: 2025-01-08

## 2025-02-04 NOTE — ED PROVIDER NOTE - TOBACCO USE
"Name: Lin Montelongo      : 1986      MRN: [id number]  Encounter Provider: Rochelle Akbar DO  Encounter Date: 2025   Encounter department: [facility]  :  Assessment & Plan  Mild intermittent asthma without complication  No exacerbation on exam -- will add Singulair for management of asthma/allergies and continue PRN MACK  Orders:  •  CBC and differential; Future  •  montelukast (SINGULAIR) 10 mg tablet; Take 1 tablet (10 mg total) by mouth daily at bedtime    Cigarette nicotine dependence without complication  Pt in pre-contemplative stage of cessation   Orders:  •  CBC and differential; Future    JOSH (generalized anxiety disorder)  Symptoms overall stable with Buspar -- continue   Orders:  •  TSH, 3rd generation with Free T4 reflex; Future    Rheumatoid arthritis involving both knees, unspecified whether rheumatoid factor present (HCC)  Pt defers referral to Rheumatology. Discussed avoiding combining NSAIDs like Mobic and Naproxen given risk of kidney damage/GI erosion.        Healthcare maintenance  BP WNL   Update labs as ordered   Hep C screening ordered, pt agreeable  Pap DUE -- encouraged to schedule   Vaccinations: Pneumo defers, TDap UTD, Flu defers, COVID defers  Encouraged regular physical activity, varied diet, and regular dental/eye exams          Encounter for hepatitis C screening test for low risk patient    Orders:  •  Hepatitis C antibody; Future    Screening for diabetes mellitus    Orders:  •  Comprehensive metabolic panel; Future    Screening, lipid    Orders:  •  Lipid panel; Future           History of Present Illness   HPI    Pt presents with her partner for chronic follow up, annual physical     RA: Using Naproxen + Mobic   Anxiety: Feeling \"okay\"  with Buspar, taking Trazodone PRN   Asthma/Tobacco Use: Rare MACK use, smoking 0.5 ppd. Would like a prescription for singulair   Migraines: Still taking Topamax regularly, has had headache since the flu, " "but otherwise well controlled     Review of Systems   Constitutional:  Negative for unexpected weight change.   HENT:  Negative for congestion, ear pain, rhinorrhea and sore throat.         Recently had the flu   Eyes:  Negative for visual disturbance.   Respiratory:  Negative for cough and shortness of breath.    Cardiovascular:  Negative for chest pain, palpitations and leg swelling.   Gastrointestinal:  Negative for abdominal pain, blood in stool, constipation and diarrhea.   Endocrine: Negative for polyuria.   Genitourinary:  Negative for dysuria, hematuria and menstrual problem.   Neurological:  Negative for dizziness and headaches.   Psychiatric/Behavioral:  Negative for sleep disturbance.        Objective   /86   Pulse 76   Temp 97.5 °F (36.4 °C)   Ht 5' 6\" (1.676 m)   Wt 78.9 kg (174 lb)   SpO2 97%   BMI 28.08 kg/m²      Physical Exam  Vitals and nursing note reviewed.   Constitutional:       General: She is not in acute distress.     Appearance: She is well-developed.   HENT:      Head: Normocephalic and atraumatic.      Right Ear: Tympanic membrane, ear canal and external ear normal.      Left Ear: Tympanic membrane, ear canal and external ear normal.      Nose: Nose normal. No rhinorrhea.      Mouth/Throat:      Mouth: Mucous membranes are moist.      Pharynx: No oropharyngeal exudate or posterior oropharyngeal erythema.   Eyes:      Conjunctiva/sclera: Conjunctivae normal.   Cardiovascular:      Rate and Rhythm: Normal rate and regular rhythm.   Pulmonary:      Effort: Pulmonary effort is normal. No respiratory distress.      Breath sounds: Normal breath sounds.   Abdominal:      General: Bowel sounds are normal. There is no distension.      Palpations: Abdomen is soft.      Tenderness: There is no abdominal tenderness.   Musculoskeletal:      Right lower leg: No edema.      Left lower leg: No edema.   Lymphadenopathy:      Cervical: No cervical adenopathy.   Skin:     General: Skin is warm " and dry.   Neurological:      Mental Status: She is alert.      Comments: Grossly intact   Psychiatric:         Mood and Affect: Mood normal.          Current some day smoker

## 2025-02-16 ENCOUNTER — EMERGENCY (EMERGENCY)
Facility: HOSPITAL | Age: 50
LOS: 1 days | Discharge: HOME | End: 2025-02-16
Attending: EMERGENCY MEDICINE | Admitting: EMERGENCY MEDICINE
Payer: MEDICAID

## 2025-02-16 VITALS
TEMPERATURE: 98 F | HEIGHT: 66 IN | WEIGHT: 210.1 LBS | DIASTOLIC BLOOD PRESSURE: 87 MMHG | SYSTOLIC BLOOD PRESSURE: 134 MMHG | RESPIRATION RATE: 17 BRPM | OXYGEN SATURATION: 100 % | HEART RATE: 100 BPM

## 2025-02-16 DIAGNOSIS — Z98.89 OTHER SPECIFIED POSTPROCEDURAL STATES: Chronic | ICD-10-CM

## 2025-02-16 DIAGNOSIS — Z98.890 OTHER SPECIFIED POSTPROCEDURAL STATES: Chronic | ICD-10-CM

## 2025-02-16 DIAGNOSIS — Z90.49 ACQUIRED ABSENCE OF OTHER SPECIFIED PARTS OF DIGESTIVE TRACT: Chronic | ICD-10-CM

## 2025-02-16 LAB
ALBUMIN SERPL ELPH-MCNC: 3.9 G/DL — SIGNIFICANT CHANGE UP (ref 3.3–5)
ALP SERPL-CCNC: 62 U/L — SIGNIFICANT CHANGE UP (ref 40–120)
ALT FLD-CCNC: 15 U/L — SIGNIFICANT CHANGE UP (ref 10–45)
ANION GAP SERPL CALC-SCNC: 10 MMOL/L — SIGNIFICANT CHANGE UP (ref 5–17)
APPEARANCE UR: CLEAR — SIGNIFICANT CHANGE UP
AST SERPL-CCNC: 25 U/L — SIGNIFICANT CHANGE UP (ref 10–40)
BASOPHILS # BLD AUTO: 0 K/UL — SIGNIFICANT CHANGE UP (ref 0–0.2)
BASOPHILS NFR BLD AUTO: 0 % — SIGNIFICANT CHANGE UP (ref 0–2)
BILIRUB SERPL-MCNC: <0.2 MG/DL — SIGNIFICANT CHANGE UP (ref 0.2–1.2)
BILIRUB UR-MCNC: NEGATIVE — SIGNIFICANT CHANGE UP
BUN SERPL-MCNC: 13 MG/DL — SIGNIFICANT CHANGE UP (ref 7–23)
CALCIUM SERPL-MCNC: 9 MG/DL — SIGNIFICANT CHANGE UP (ref 8.4–10.5)
CHLORIDE SERPL-SCNC: 102 MMOL/L — SIGNIFICANT CHANGE UP (ref 96–108)
CO2 SERPL-SCNC: 22 MMOL/L — SIGNIFICANT CHANGE UP (ref 22–31)
COLOR SPEC: SIGNIFICANT CHANGE UP
CREAT SERPL-MCNC: 0.86 MG/DL — SIGNIFICANT CHANGE UP (ref 0.5–1.3)
DIFF PNL FLD: NEGATIVE — SIGNIFICANT CHANGE UP
EGFR: 84 ML/MIN/1.73M2 — SIGNIFICANT CHANGE UP
EOSINOPHIL # BLD AUTO: 0 K/UL — SIGNIFICANT CHANGE UP (ref 0–0.5)
EOSINOPHIL NFR BLD AUTO: 0 % — SIGNIFICANT CHANGE UP (ref 0–6)
GLUCOSE SERPL-MCNC: 123 MG/DL — HIGH (ref 70–99)
GLUCOSE UR QL: NEGATIVE MG/DL — SIGNIFICANT CHANGE UP
HCG SERPL-ACNC: <1 MIU/ML — SIGNIFICANT CHANGE UP
HCT VFR BLD CALC: 38.2 % — SIGNIFICANT CHANGE UP (ref 34.5–45)
HGB BLD-MCNC: 10.9 G/DL — LOW (ref 11.5–15.5)
KETONES UR-MCNC: 15 MG/DL
LEUKOCYTE ESTERASE UR-ACNC: ABNORMAL
LIDOCAIN IGE QN: 74 U/L — HIGH (ref 7–60)
LYMPHOCYTES # BLD AUTO: 1.36 K/UL — SIGNIFICANT CHANGE UP (ref 1–3.3)
LYMPHOCYTES # BLD AUTO: 29.2 % — SIGNIFICANT CHANGE UP (ref 13–44)
MCHC RBC-ENTMCNC: 23 PG — LOW (ref 27–34)
MCHC RBC-ENTMCNC: 28.5 G/DL — LOW (ref 32–36)
MCV RBC AUTO: 80.6 FL — SIGNIFICANT CHANGE UP (ref 80–100)
MONOCYTES # BLD AUTO: 0.57 K/UL — SIGNIFICANT CHANGE UP (ref 0–0.9)
MONOCYTES NFR BLD AUTO: 12.3 % — SIGNIFICANT CHANGE UP (ref 2–14)
NEUTROPHILS # BLD AUTO: 2.72 K/UL — SIGNIFICANT CHANGE UP (ref 1.8–7.4)
NEUTROPHILS NFR BLD AUTO: 58.5 % — SIGNIFICANT CHANGE UP (ref 43–77)
NITRITE UR-MCNC: NEGATIVE — SIGNIFICANT CHANGE UP
PH UR: 5.5 — SIGNIFICANT CHANGE UP (ref 5–8)
PLATELET # BLD AUTO: 482 K/UL — HIGH (ref 150–400)
POTASSIUM SERPL-MCNC: 4.6 MMOL/L — SIGNIFICANT CHANGE UP (ref 3.5–5.3)
POTASSIUM SERPL-SCNC: 4.6 MMOL/L — SIGNIFICANT CHANGE UP (ref 3.5–5.3)
PROT SERPL-MCNC: 7.2 G/DL — SIGNIFICANT CHANGE UP (ref 6–8.3)
PROT UR-MCNC: 100 MG/DL
RBC # BLD: 4.74 M/UL — SIGNIFICANT CHANGE UP (ref 3.8–5.2)
RBC # FLD: 21.8 % — HIGH (ref 10.3–14.5)
SODIUM SERPL-SCNC: 134 MMOL/L — LOW (ref 135–145)
SP GR SPEC: >1.03 — HIGH (ref 1–1.03)
UROBILINOGEN FLD QL: 1 MG/DL — SIGNIFICANT CHANGE UP (ref 0.2–1)
WBC # BLD: 4.65 K/UL — SIGNIFICANT CHANGE UP (ref 3.8–10.5)
WBC # FLD AUTO: 4.65 K/UL — SIGNIFICANT CHANGE UP (ref 3.8–10.5)

## 2025-02-16 PROCEDURE — 74176 CT ABD & PELVIS W/O CONTRAST: CPT | Mod: 26,59

## 2025-02-16 PROCEDURE — 87086 URINE CULTURE/COLONY COUNT: CPT

## 2025-02-16 PROCEDURE — 74019 RADEX ABDOMEN 2 VIEWS: CPT

## 2025-02-16 PROCEDURE — 83690 ASSAY OF LIPASE: CPT

## 2025-02-16 PROCEDURE — 74177 CT ABD & PELVIS W/CONTRAST: CPT | Mod: MC

## 2025-02-16 PROCEDURE — 82962 GLUCOSE BLOOD TEST: CPT

## 2025-02-16 PROCEDURE — 74019 RADEX ABDOMEN 2 VIEWS: CPT | Mod: 26

## 2025-02-16 PROCEDURE — 93010 ELECTROCARDIOGRAM REPORT: CPT

## 2025-02-16 PROCEDURE — 99285 EMERGENCY DEPT VISIT HI MDM: CPT

## 2025-02-16 PROCEDURE — 85025 COMPLETE CBC W/AUTO DIFF WBC: CPT

## 2025-02-16 PROCEDURE — 74176 CT ABD & PELVIS W/O CONTRAST: CPT | Mod: MC

## 2025-02-16 PROCEDURE — 81001 URINALYSIS AUTO W/SCOPE: CPT

## 2025-02-16 PROCEDURE — 96361 HYDRATE IV INFUSION ADD-ON: CPT

## 2025-02-16 PROCEDURE — 96374 THER/PROPH/DIAG INJ IV PUSH: CPT | Mod: XU

## 2025-02-16 PROCEDURE — 36415 COLL VENOUS BLD VENIPUNCTURE: CPT

## 2025-02-16 PROCEDURE — 74177 CT ABD & PELVIS W/CONTRAST: CPT | Mod: 26

## 2025-02-16 PROCEDURE — 80053 COMPREHEN METABOLIC PANEL: CPT

## 2025-02-16 PROCEDURE — 84702 CHORIONIC GONADOTROPIN TEST: CPT

## 2025-02-16 PROCEDURE — 93005 ELECTROCARDIOGRAM TRACING: CPT

## 2025-02-16 PROCEDURE — 99284 EMERGENCY DEPT VISIT MOD MDM: CPT | Mod: 25

## 2025-02-16 RX ORDER — IOHEXOL 350 MG/ML
30 INJECTION, SOLUTION INTRAVENOUS ONCE
Refills: 0 | Status: COMPLETED | OUTPATIENT
Start: 2025-02-16 | End: 2025-02-16

## 2025-02-16 RX ORDER — PANTOPRAZOLE 20 MG/1
40 TABLET, DELAYED RELEASE ORAL ONCE
Refills: 0 | Status: COMPLETED | OUTPATIENT
Start: 2025-02-16 | End: 2025-02-16

## 2025-02-16 RX ORDER — KETOROLAC TROMETHAMINE 10 MG
30 TABLET ORAL ONCE
Refills: 0 | Status: DISCONTINUED | OUTPATIENT
Start: 2025-02-16 | End: 2025-02-16

## 2025-02-16 RX ORDER — PANTOPRAZOLE 20 MG/1
1 TABLET, DELAYED RELEASE ORAL
Qty: 30 | Refills: 0
Start: 2025-02-16 | End: 2025-03-17

## 2025-02-16 RX ORDER — BACTERIOSTATIC SODIUM CHLORIDE 0.9 %
1000 VIAL (ML) INJECTION ONCE
Refills: 0 | Status: COMPLETED | OUTPATIENT
Start: 2025-02-16 | End: 2025-02-16

## 2025-02-16 RX ADMIN — Medication 1000 MILLILITER(S): at 20:39

## 2025-02-16 RX ADMIN — PANTOPRAZOLE 40 MILLIGRAM(S): 20 TABLET, DELAYED RELEASE ORAL at 16:31

## 2025-02-16 RX ADMIN — IOHEXOL 30 MILLILITER(S): 350 INJECTION, SOLUTION INTRAVENOUS at 20:50

## 2025-02-16 RX ADMIN — Medication 1000 MILLILITER(S): at 19:41

## 2025-02-16 NOTE — ED ADULT TRIAGE NOTE - CHIEF COMPLAINT QUOTE
Pt c/o LUQ pain x4days worsening today a/w "electric pain throughout my body" and lightheadedness. Denies n/t, focal weakness, N/V/D, urinary sx, f/c.

## 2025-02-16 NOTE — ED PROVIDER NOTE - PHYSICAL EXAMINATION
General:  Well appearing, no distress  HEENT:  No conjunctival injection, neck supple, no congestion   Chest:  Non-tender, no crepitance  Lungs:  Clear to auscultation bilaterally   Heart:  s1s2 normal, no murmur  Abdomen:  soft, mild tenderness LUQ without guarding or rebound.  When area palpated, it causes patient to belch   :  Deferred  Rectal:  Deferred  Extremities: No edema, normal perfusion, no joint swelling or tenderness  Neuro:  Alert, conversant, motor/sensory grossly intact

## 2025-02-16 NOTE — ED PROVIDER NOTE - PROGRESS NOTE DETAILS
patient reevaluated.  We discussed in great detail her final CAT scan report.  Discussed possible  diagnoses treatment follow-up plan and return precautions.  Patient declines anti-inflammatory at this time.

## 2025-02-16 NOTE — ED ADULT NURSE NOTE - OBJECTIVE STATEMENT
49 F / bibems  from home endorsing LUQ pain, nausea  x 3-4 days and worsening since last night  , Patient denies sob/cp/ fevers/ diarrhea/ constipation vomiting ,, urinary complaints .Patient has P hx of gastric bypass , Cholecystectomy ,ETOH  stopped drinking 3 mos ago, GERD , fibroids .

## 2025-02-16 NOTE — ED PROVIDER NOTE - CLINICAL SUMMARY MEDICAL DECISION MAKING FREE TEXT BOX
48 yo F PMH HTN, DM, Choley, fibroid embolization, myomectomy, anemia, d and c presenting for 4 days of LUQ pain that became worse today.  Nausea but no vomiting. No BM today and usually has daily BM.  No prior similar symptoms. Patient notes that if she pushes on the area of pain it induces belching.  No melena/brbpr.  Differential includes GERD, peptic ulcer disease, hiatal hernia, bowel obstruction.  Will obtain labs give Protonix obtain x-rays as my suspicion for SBO is low.     patient had no relief with Protonix and so CT was performed.  CT reading indeterminant due to lack of oral contrast and so patient given contrast to have repeat CT 48 yo F PMH HTN, DM, Choley, fibroid embolization, myomectomy, anemia, d and c presenting for 4 days of LUQ pain that became worse today.  Nausea but no vomiting. No BM today and usually has daily BM.  No prior similar symptoms. Patient notes that if she pushes on the area of pain it induces belching.  No melena/brbpr.  Differential includes GERD, peptic ulcer disease, hiatal hernia, bowel obstruction.  Will obtain labs give Protonix obtain x-rays as my suspicion for SBO is low.    Patient had no relief with Protonix and so CT was performed.  CT reading indeterminant due to lack of oral contrast and so patient given contrast to have repeat CT

## 2025-02-16 NOTE — ED PROVIDER NOTE - NSFOLLOWUPINSTRUCTIONS_ED_ALL_ED_FT
YOU WERE EVALUATED FOR STOMACH PAIN    YOUR LAB TESTS WERE NORMAL     YOUR INITIAL CAT SCAN SHOWED SOME MILD SWELLING IN THE INTESTINE NEAR WHERE YOU HAD PAIN.  THE SECOND CAT SCAN SHOWS THAT THIS SWELLING IS IMPROVING AND THAT THERE ARE NO SIGNS OF BLOCKED INTESTINES AT THIS TIME.      THERE ARE SEVERAL POSSIBLE REASONS FOR THE PAIN. YOU MAY HAVE HAD INCREASED GAS FROM STOMACH ACID, CONSTIPATION OR A VIRUS.      FOR NOW:  MONITOR YOUR SYMPTOMS-KEEP TRACK OF WHERE THE PAIN IS AND WHAT MAKES IT WORSE  EAT LIGHT FOODS LIKE SOUPS, JELLO, PLAIN CHICKEN  KEEP WELL HYDRATED  WE WILL PUT YOU ON AN ACID BLOCKING MEDICINE      SEE YOUR DOCTOR THIS WEEK--IF YOUR PAIN CONTINUES YOU MAY NEED FURTHER TESTING OR TREATMENT.      RETURN TO THE EMERGENCY ROOM IMMEDIATELY FOR:  FEVER  VOMITING  WORSENING PAIN  YOU CANNOT HAVE A BOWEL MOVEMENT   BLACK BOWEL MOVEMENTS   BLOOD IN THE STOOL  ANY NEW, WORSENING OR CONCERNING SYMPTOMS

## 2025-02-16 NOTE — ED PROVIDER NOTE - OBJECTIVE STATEMENT
48 yo F PMH HTN, DM, Choley, fibroid embolization, myomectomy, anemia, d and c presenting for 4 days of LUQ pain that became worse today.  Nausea but no vomiting. No BM today and usually has daily BM.  No prior similar symptoms. Patient notes that if she pushes on the area of pain it induces belching.  No melena/brbpr. 50 yo F PMH HTN, DM, Choley, fibroid embolization, myomectomy, anemia, d and c presenting for 4 days of LUQ pain that became worse today.  Nausea but no vomiting. No BM today and usually has daily BM.  No prior similar symptoms. Patient notes that if she pushes on the area of pain it induces belching.  No melena/brbpr.  No longer drinking alcohol

## 2025-02-16 NOTE — ED PROVIDER NOTE - PATIENT PORTAL LINK FT
You can access the FollowMyHealth Patient Portal offered by [facility] by registering at the following website: [email or web address]. By joining [employer]’s FollowMyHealth portal, you will also be able to view your health information using other applications (apps) compatible with our system.

## 2025-02-17 VITALS — HEART RATE: 92 BPM | SYSTOLIC BLOOD PRESSURE: 109 MMHG | DIASTOLIC BLOOD PRESSURE: 72 MMHG | TEMPERATURE: 98 F

## 2025-02-18 DIAGNOSIS — Z84.2 FAMILY HISTORY OF OTHER DISEASES OF THE GENITOURINARY SYSTEM: ICD-10-CM

## 2025-02-18 DIAGNOSIS — R10.12 LEFT UPPER QUADRANT PAIN: ICD-10-CM

## 2025-02-18 DIAGNOSIS — E11.9 TYPE 2 DIABETES MELLITUS WITHOUT COMPLICATIONS: ICD-10-CM

## 2025-02-18 DIAGNOSIS — Z98.84 BARIATRIC SURGERY STATUS: ICD-10-CM

## 2025-02-18 DIAGNOSIS — R11.0 NAUSEA: ICD-10-CM

## 2025-02-18 DIAGNOSIS — Z98.890 OTHER SPECIFIED POSTPROCEDURAL STATES: ICD-10-CM

## 2025-02-18 DIAGNOSIS — I10 ESSENTIAL (PRIMARY) HYPERTENSION: ICD-10-CM

## 2025-04-30 ENCOUNTER — EMERGENCY (EMERGENCY)
Facility: HOSPITAL | Age: 50
LOS: 1 days | End: 2025-04-30
Attending: STUDENT IN AN ORGANIZED HEALTH CARE EDUCATION/TRAINING PROGRAM | Admitting: STUDENT IN AN ORGANIZED HEALTH CARE EDUCATION/TRAINING PROGRAM
Payer: MEDICAID

## 2025-04-30 ENCOUNTER — EMERGENCY (EMERGENCY)
Facility: HOSPITAL | Age: 50
LOS: 1 days | End: 2025-04-30
Attending: EMERGENCY MEDICINE | Admitting: EMERGENCY MEDICINE
Payer: MEDICAID

## 2025-04-30 VITALS
RESPIRATION RATE: 20 BRPM | HEART RATE: 108 BPM | WEIGHT: 210.1 LBS | OXYGEN SATURATION: 97 % | HEIGHT: 66 IN | TEMPERATURE: 98 F | SYSTOLIC BLOOD PRESSURE: 173 MMHG | DIASTOLIC BLOOD PRESSURE: 87 MMHG

## 2025-04-30 VITALS
SYSTOLIC BLOOD PRESSURE: 136 MMHG | DIASTOLIC BLOOD PRESSURE: 84 MMHG | TEMPERATURE: 98 F | OXYGEN SATURATION: 97 % | HEART RATE: 81 BPM | RESPIRATION RATE: 18 BRPM

## 2025-04-30 VITALS
SYSTOLIC BLOOD PRESSURE: 152 MMHG | RESPIRATION RATE: 18 BRPM | OXYGEN SATURATION: 98 % | TEMPERATURE: 98 F | DIASTOLIC BLOOD PRESSURE: 89 MMHG | HEART RATE: 74 BPM

## 2025-04-30 VITALS
SYSTOLIC BLOOD PRESSURE: 152 MMHG | HEIGHT: 66 IN | OXYGEN SATURATION: 98 % | HEART RATE: 82 BPM | RESPIRATION RATE: 20 BRPM | TEMPERATURE: 98 F | WEIGHT: 210.1 LBS | DIASTOLIC BLOOD PRESSURE: 93 MMHG

## 2025-04-30 DIAGNOSIS — Z98.89 OTHER SPECIFIED POSTPROCEDURAL STATES: Chronic | ICD-10-CM

## 2025-04-30 DIAGNOSIS — E11.9 TYPE 2 DIABETES MELLITUS WITHOUT COMPLICATIONS: ICD-10-CM

## 2025-04-30 DIAGNOSIS — M16.0 BILATERAL PRIMARY OSTEOARTHRITIS OF HIP: ICD-10-CM

## 2025-04-30 DIAGNOSIS — F10.10 ALCOHOL ABUSE, UNCOMPLICATED: ICD-10-CM

## 2025-04-30 DIAGNOSIS — M47.816 SPONDYLOSIS WITHOUT MYELOPATHY OR RADICULOPATHY, LUMBAR REGION: ICD-10-CM

## 2025-04-30 DIAGNOSIS — Z86.2 PERSONAL HISTORY OF DISEASES OF THE BLOOD AND BLOOD-FORMING ORGANS AND CERTAIN DISORDERS INVOLVING THE IMMUNE MECHANISM: ICD-10-CM

## 2025-04-30 DIAGNOSIS — M17.12 UNILATERAL PRIMARY OSTEOARTHRITIS, LEFT KNEE: ICD-10-CM

## 2025-04-30 DIAGNOSIS — I10 ESSENTIAL (PRIMARY) HYPERTENSION: ICD-10-CM

## 2025-04-30 DIAGNOSIS — E78.5 HYPERLIPIDEMIA, UNSPECIFIED: ICD-10-CM

## 2025-04-30 DIAGNOSIS — Z98.890 OTHER SPECIFIED POSTPROCEDURAL STATES: Chronic | ICD-10-CM

## 2025-04-30 DIAGNOSIS — K21.9 GASTRO-ESOPHAGEAL REFLUX DISEASE WITHOUT ESOPHAGITIS: ICD-10-CM

## 2025-04-30 DIAGNOSIS — M25.552 PAIN IN LEFT HIP: ICD-10-CM

## 2025-04-30 DIAGNOSIS — M79.662 PAIN IN LEFT LOWER LEG: ICD-10-CM

## 2025-04-30 DIAGNOSIS — D64.9 ANEMIA, UNSPECIFIED: ICD-10-CM

## 2025-04-30 DIAGNOSIS — R42 DIZZINESS AND GIDDINESS: ICD-10-CM

## 2025-04-30 DIAGNOSIS — Z90.49 ACQUIRED ABSENCE OF OTHER SPECIFIED PARTS OF DIGESTIVE TRACT: Chronic | ICD-10-CM

## 2025-04-30 DIAGNOSIS — G57.12 MERALGIA PARESTHETICA, LEFT LOWER LIMB: ICD-10-CM

## 2025-04-30 DIAGNOSIS — M71.22 SYNOVIAL CYST OF POPLITEAL SPACE [BAKER], LEFT KNEE: ICD-10-CM

## 2025-04-30 LAB
ANION GAP SERPL CALC-SCNC: 13 MMOL/L — SIGNIFICANT CHANGE UP (ref 5–17)
BASOPHILS # BLD AUTO: 0 K/UL — SIGNIFICANT CHANGE UP (ref 0–0.2)
BASOPHILS NFR BLD AUTO: 0 % — SIGNIFICANT CHANGE UP (ref 0–2)
BUN SERPL-MCNC: 13 MG/DL — SIGNIFICANT CHANGE UP (ref 7–23)
CALCIUM SERPL-MCNC: 8.6 MG/DL — SIGNIFICANT CHANGE UP (ref 8.4–10.5)
CHLORIDE SERPL-SCNC: 100 MMOL/L — SIGNIFICANT CHANGE UP (ref 96–108)
CO2 SERPL-SCNC: 22 MMOL/L — SIGNIFICANT CHANGE UP (ref 22–31)
CREAT SERPL-MCNC: 0.83 MG/DL — SIGNIFICANT CHANGE UP (ref 0.5–1.3)
EGFR: 87 ML/MIN/1.73M2 — SIGNIFICANT CHANGE UP
EGFR: 87 ML/MIN/1.73M2 — SIGNIFICANT CHANGE UP
EOSINOPHIL # BLD AUTO: 0 K/UL — SIGNIFICANT CHANGE UP (ref 0–0.5)
EOSINOPHIL NFR BLD AUTO: 0 % — SIGNIFICANT CHANGE UP (ref 0–6)
GLUCOSE SERPL-MCNC: 166 MG/DL — HIGH (ref 70–99)
HCT VFR BLD CALC: 35.9 % — SIGNIFICANT CHANGE UP (ref 34.5–45)
HGB BLD-MCNC: 11.4 G/DL — LOW (ref 11.5–15.5)
LYMPHOCYTES # BLD AUTO: 1.4 K/UL — SIGNIFICANT CHANGE UP (ref 1–3.3)
LYMPHOCYTES # BLD AUTO: 33.9 % — SIGNIFICANT CHANGE UP (ref 13–44)
MCHC RBC-ENTMCNC: 25.6 PG — LOW (ref 27–34)
MCHC RBC-ENTMCNC: 31.8 G/DL — LOW (ref 32–36)
MCV RBC AUTO: 80.7 FL — SIGNIFICANT CHANGE UP (ref 80–100)
MONOCYTES # BLD AUTO: 0.22 K/UL — SIGNIFICANT CHANGE UP (ref 0–0.9)
MONOCYTES NFR BLD AUTO: 5.2 % — SIGNIFICANT CHANGE UP (ref 2–14)
NEUTROPHILS # BLD AUTO: 2.52 K/UL — SIGNIFICANT CHANGE UP (ref 1.8–7.4)
NEUTROPHILS NFR BLD AUTO: 60.9 % — SIGNIFICANT CHANGE UP (ref 43–77)
PLATELET # BLD AUTO: 259 K/UL — SIGNIFICANT CHANGE UP (ref 150–400)
POTASSIUM SERPL-MCNC: 4.1 MMOL/L — SIGNIFICANT CHANGE UP (ref 3.5–5.3)
POTASSIUM SERPL-SCNC: 4.1 MMOL/L — SIGNIFICANT CHANGE UP (ref 3.5–5.3)
RBC # BLD: 4.45 M/UL — SIGNIFICANT CHANGE UP (ref 3.8–5.2)
RBC # FLD: 20.1 % — HIGH (ref 10.3–14.5)
SODIUM SERPL-SCNC: 135 MMOL/L — SIGNIFICANT CHANGE UP (ref 135–145)
WBC # BLD: 4.14 K/UL — SIGNIFICANT CHANGE UP (ref 3.8–10.5)
WBC # FLD AUTO: 4.14 K/UL — SIGNIFICANT CHANGE UP (ref 3.8–10.5)

## 2025-04-30 PROCEDURE — 96372 THER/PROPH/DIAG INJ SC/IM: CPT

## 2025-04-30 PROCEDURE — 82962 GLUCOSE BLOOD TEST: CPT

## 2025-04-30 PROCEDURE — 93005 ELECTROCARDIOGRAM TRACING: CPT

## 2025-04-30 PROCEDURE — 99284 EMERGENCY DEPT VISIT MOD MDM: CPT

## 2025-04-30 PROCEDURE — 73502 X-RAY EXAM HIP UNI 2-3 VIEWS: CPT

## 2025-04-30 PROCEDURE — 73552 X-RAY EXAM OF FEMUR 2/>: CPT | Mod: 26,LT

## 2025-04-30 PROCEDURE — 73502 X-RAY EXAM HIP UNI 2-3 VIEWS: CPT | Mod: 26,LT

## 2025-04-30 PROCEDURE — 99284 EMERGENCY DEPT VISIT MOD MDM: CPT | Mod: 25

## 2025-04-30 PROCEDURE — 93971 EXTREMITY STUDY: CPT

## 2025-04-30 PROCEDURE — 93971 EXTREMITY STUDY: CPT | Mod: 26,LT

## 2025-04-30 PROCEDURE — 73552 X-RAY EXAM OF FEMUR 2/>: CPT

## 2025-04-30 PROCEDURE — 96374 THER/PROPH/DIAG INJ IV PUSH: CPT

## 2025-04-30 PROCEDURE — 85025 COMPLETE CBC W/AUTO DIFF WBC: CPT

## 2025-04-30 PROCEDURE — 99285 EMERGENCY DEPT VISIT HI MDM: CPT | Mod: 25

## 2025-04-30 PROCEDURE — 93010 ELECTROCARDIOGRAM REPORT: CPT

## 2025-04-30 PROCEDURE — 36415 COLL VENOUS BLD VENIPUNCTURE: CPT

## 2025-04-30 PROCEDURE — 80048 BASIC METABOLIC PNL TOTAL CA: CPT

## 2025-04-30 RX ORDER — KETOROLAC TROMETHAMINE 30 MG/ML
15 INJECTION, SOLUTION INTRAMUSCULAR; INTRAVENOUS ONCE
Refills: 0 | Status: DISCONTINUED | OUTPATIENT
Start: 2025-04-30 | End: 2025-04-30

## 2025-04-30 RX ORDER — ACETAMINOPHEN 500 MG/5ML
650 LIQUID (ML) ORAL ONCE
Refills: 0 | Status: COMPLETED | OUTPATIENT
Start: 2025-04-30 | End: 2025-04-30

## 2025-04-30 RX ORDER — KETOROLAC TROMETHAMINE 30 MG/ML
30 INJECTION, SOLUTION INTRAMUSCULAR; INTRAVENOUS ONCE
Refills: 0 | Status: DISCONTINUED | OUTPATIENT
Start: 2025-04-30 | End: 2025-04-30

## 2025-04-30 RX ORDER — LIDOCAINE HYDROCHLORIDE 20 MG/ML
1 JELLY TOPICAL ONCE
Refills: 0 | Status: COMPLETED | OUTPATIENT
Start: 2025-04-30 | End: 2025-04-30

## 2025-04-30 RX ORDER — IBUPROFEN 200 MG
1 TABLET ORAL
Qty: 20 | Refills: 0
Start: 2025-04-30 | End: 2025-05-04

## 2025-04-30 RX ORDER — OXYCODONE HYDROCHLORIDE AND ACETAMINOPHEN 10; 325 MG/1; MG/1
1 TABLET ORAL
Qty: 8 | Refills: 0
Start: 2025-04-30 | End: 2025-05-01

## 2025-04-30 RX ADMIN — KETOROLAC TROMETHAMINE 15 MILLIGRAM(S): 30 INJECTION, SOLUTION INTRAMUSCULAR; INTRAVENOUS at 14:20

## 2025-04-30 RX ADMIN — LIDOCAINE HYDROCHLORIDE 1 PATCH: 20 JELLY TOPICAL at 14:19

## 2025-04-30 RX ADMIN — Medication 650 MILLIGRAM(S): at 19:24

## 2025-04-30 RX ADMIN — Medication 1000 MILLILITER(S): at 14:20

## 2025-04-30 RX ADMIN — KETOROLAC TROMETHAMINE 30 MILLIGRAM(S): 30 INJECTION, SOLUTION INTRAMUSCULAR; INTRAVENOUS at 21:31

## 2025-05-03 ENCOUNTER — NON-APPOINTMENT (OUTPATIENT)
Age: 50
End: 2025-05-03

## 2025-05-09 ENCOUNTER — APPOINTMENT (OUTPATIENT)
Dept: ORTHOPEDIC SURGERY | Facility: CLINIC | Age: 50
End: 2025-05-09

## 2025-05-12 ENCOUNTER — APPOINTMENT (OUTPATIENT)
Dept: ORTHOPEDIC SURGERY | Facility: CLINIC | Age: 50
End: 2025-05-12

## 2025-05-15 ENCOUNTER — NON-APPOINTMENT (OUTPATIENT)
Age: 50
End: 2025-05-15

## 2025-05-16 ENCOUNTER — APPOINTMENT (OUTPATIENT)
Dept: ORTHOPEDIC SURGERY | Facility: CLINIC | Age: 50
End: 2025-05-16

## 2025-05-21 ENCOUNTER — APPOINTMENT (OUTPATIENT)
Dept: ORTHOPEDIC SURGERY | Age: 50
End: 2025-05-21

## 2025-05-22 ENCOUNTER — APPOINTMENT (OUTPATIENT)
Dept: ORTHOPEDIC SURGERY | Facility: CLINIC | Age: 50
End: 2025-05-22
Payer: MEDICAID

## 2025-05-22 VITALS
BODY MASS INDEX: 34.55 KG/M2 | OXYGEN SATURATION: 99 % | SYSTOLIC BLOOD PRESSURE: 132 MMHG | HEIGHT: 66 IN | WEIGHT: 215 LBS | DIASTOLIC BLOOD PRESSURE: 93 MMHG | HEART RATE: 81 BPM

## 2025-05-22 DIAGNOSIS — M25.572 PAIN IN LEFT ANKLE AND JOINTS OF LEFT FOOT: ICD-10-CM

## 2025-05-22 DIAGNOSIS — M21.40 FLAT FOOT [PES PLANUS] (ACQUIRED), UNSPECIFIED FOOT: ICD-10-CM

## 2025-05-22 DIAGNOSIS — S83.8X2A SPRAIN OF OTHER SPECIFIED PARTS OF LEFT KNEE, INITIAL ENCOUNTER: ICD-10-CM

## 2025-05-22 DIAGNOSIS — M65.979 UNSPECIFIED SYNOVITIS AND TENOSYNOVITIS, UNSPECIFIED ANK/FT: ICD-10-CM

## 2025-05-22 DIAGNOSIS — M71.22 SYNOVIAL CYST OF POPLITEAL SPACE [BAKER], LEFT KNEE: ICD-10-CM

## 2025-05-22 PROCEDURE — 99204 OFFICE O/P NEW MOD 45 MIN: CPT

## 2025-05-22 RX ORDER — NABUMETONE 750 MG/1
750 TABLET, FILM COATED ORAL
Qty: 30 | Refills: 1 | Status: ACTIVE | COMMUNITY
Start: 2025-05-22 | End: 1900-01-01

## 2025-05-29 ENCOUNTER — APPOINTMENT (OUTPATIENT)
Dept: ORTHOPEDIC SURGERY | Facility: CLINIC | Age: 50
End: 2025-05-29

## 2025-06-10 ENCOUNTER — APPOINTMENT (OUTPATIENT)
Dept: GASTROENTEROLOGY | Facility: CLINIC | Age: 50
End: 2025-06-10
Payer: MEDICAID

## 2025-06-10 ENCOUNTER — NON-APPOINTMENT (OUTPATIENT)
Age: 50
End: 2025-06-10

## 2025-06-10 VITALS
HEIGHT: 66 IN | BODY MASS INDEX: 36.16 KG/M2 | OXYGEN SATURATION: 98 % | WEIGHT: 225 LBS | SYSTOLIC BLOOD PRESSURE: 130 MMHG | TEMPERATURE: 95.7 F | HEART RATE: 99 BPM | DIASTOLIC BLOOD PRESSURE: 80 MMHG

## 2025-06-10 PROBLEM — R10.32 LEFT LOWER QUADRANT ABDOMINAL PAIN: Status: ACTIVE | Noted: 2025-06-10

## 2025-06-10 PROBLEM — K59.09 CHRONIC CONSTIPATION: Status: ACTIVE | Noted: 2025-06-10

## 2025-06-10 PROBLEM — R15.9 FECAL INCONTINENCE WITH INCOMPLETE DEFECATION: Status: ACTIVE | Noted: 2025-06-10

## 2025-06-10 PROCEDURE — 99215 OFFICE O/P EST HI 40 MIN: CPT

## 2025-06-10 PROCEDURE — G2211 COMPLEX E/M VISIT ADD ON: CPT | Mod: NC

## 2025-06-10 RX ORDER — POLYETHYLENE GLYCOL 3350 17 G/17G
17 POWDER, FOR SOLUTION ORAL DAILY
Qty: 5 | Refills: 3 | Status: ACTIVE | COMMUNITY
Start: 2025-06-10 | End: 1900-01-01

## 2025-06-24 ENCOUNTER — INPATIENT (INPATIENT)
Facility: HOSPITAL | Age: 50
LOS: 0 days | Discharge: HOME | DRG: 684 | End: 2025-06-25
Attending: STUDENT IN AN ORGANIZED HEALTH CARE EDUCATION/TRAINING PROGRAM | Admitting: STUDENT IN AN ORGANIZED HEALTH CARE EDUCATION/TRAINING PROGRAM
Payer: MEDICAID

## 2025-06-24 VITALS
TEMPERATURE: 98 F | HEART RATE: 91 BPM | WEIGHT: 210.1 LBS | RESPIRATION RATE: 18 BRPM | OXYGEN SATURATION: 99 % | DIASTOLIC BLOOD PRESSURE: 75 MMHG | SYSTOLIC BLOOD PRESSURE: 105 MMHG | HEIGHT: 66 IN

## 2025-06-24 DIAGNOSIS — Z29.9 ENCOUNTER FOR PROPHYLACTIC MEASURES, UNSPECIFIED: ICD-10-CM

## 2025-06-24 DIAGNOSIS — F10.10 ALCOHOL ABUSE, UNCOMPLICATED: ICD-10-CM

## 2025-06-24 DIAGNOSIS — R00.2 PALPITATIONS: ICD-10-CM

## 2025-06-24 DIAGNOSIS — E78.5 HYPERLIPIDEMIA, UNSPECIFIED: ICD-10-CM

## 2025-06-24 DIAGNOSIS — I10 ESSENTIAL (PRIMARY) HYPERTENSION: ICD-10-CM

## 2025-06-24 DIAGNOSIS — R23.9 UNSPECIFIED SKIN CHANGES: ICD-10-CM

## 2025-06-24 DIAGNOSIS — E11.9 TYPE 2 DIABETES MELLITUS WITHOUT COMPLICATIONS: ICD-10-CM

## 2025-06-24 DIAGNOSIS — Z98.89 OTHER SPECIFIED POSTPROCEDURAL STATES: Chronic | ICD-10-CM

## 2025-06-24 DIAGNOSIS — N17.9 ACUTE KIDNEY FAILURE, UNSPECIFIED: ICD-10-CM

## 2025-06-24 DIAGNOSIS — R76.8 OTHER SPECIFIED ABNORMAL IMMUNOLOGICAL FINDINGS IN SERUM: ICD-10-CM

## 2025-06-24 DIAGNOSIS — Z90.49 ACQUIRED ABSENCE OF OTHER SPECIFIED PARTS OF DIGESTIVE TRACT: Chronic | ICD-10-CM

## 2025-06-24 DIAGNOSIS — Z98.890 OTHER SPECIFIED POSTPROCEDURAL STATES: Chronic | ICD-10-CM

## 2025-06-24 DIAGNOSIS — R07.9 CHEST PAIN, UNSPECIFIED: ICD-10-CM

## 2025-06-24 DIAGNOSIS — R10.9 UNSPECIFIED ABDOMINAL PAIN: ICD-10-CM

## 2025-06-24 DIAGNOSIS — R10.13 EPIGASTRIC PAIN: ICD-10-CM

## 2025-06-24 LAB
ADD ON TEST-SPECIMEN IN LAB: SIGNIFICANT CHANGE UP
ALBUMIN SERPL ELPH-MCNC: 4 G/DL — SIGNIFICANT CHANGE UP (ref 3.3–5)
ALP SERPL-CCNC: 65 U/L — SIGNIFICANT CHANGE UP (ref 40–120)
ALT FLD-CCNC: 20 U/L — SIGNIFICANT CHANGE UP (ref 10–45)
ANION GAP SERPL CALC-SCNC: 14 MMOL/L — SIGNIFICANT CHANGE UP (ref 5–17)
ANION GAP SERPL CALC-SCNC: 16 MMOL/L — SIGNIFICANT CHANGE UP (ref 5–17)
ANION GAP SERPL CALC-SCNC: 18 MMOL/L — HIGH (ref 5–17)
APPEARANCE UR: ABNORMAL
AST SERPL-CCNC: 24 U/L — SIGNIFICANT CHANGE UP (ref 10–40)
BASOPHILS # BLD AUTO: 0.04 K/UL — SIGNIFICANT CHANGE UP (ref 0–0.2)
BASOPHILS NFR BLD AUTO: 0.8 % — SIGNIFICANT CHANGE UP (ref 0–2)
BILIRUB SERPL-MCNC: 0.2 MG/DL — SIGNIFICANT CHANGE UP (ref 0.2–1.2)
BILIRUB UR-MCNC: NEGATIVE — SIGNIFICANT CHANGE UP
BUN SERPL-MCNC: 23 MG/DL — SIGNIFICANT CHANGE UP (ref 7–23)
BUN SERPL-MCNC: 26 MG/DL — HIGH (ref 7–23)
BUN SERPL-MCNC: 26 MG/DL — HIGH (ref 7–23)
CALCIUM SERPL-MCNC: 8.7 MG/DL — SIGNIFICANT CHANGE UP (ref 8.4–10.5)
CALCIUM SERPL-MCNC: 8.8 MG/DL — SIGNIFICANT CHANGE UP (ref 8.4–10.5)
CALCIUM SERPL-MCNC: 9.2 MG/DL — SIGNIFICANT CHANGE UP (ref 8.4–10.5)
CHLORIDE SERPL-SCNC: 104 MMOL/L — SIGNIFICANT CHANGE UP (ref 96–108)
CO2 SERPL-SCNC: 18 MMOL/L — LOW (ref 22–31)
CO2 SERPL-SCNC: 18 MMOL/L — LOW (ref 22–31)
CO2 SERPL-SCNC: 21 MMOL/L — LOW (ref 22–31)
COLOR SPEC: YELLOW — SIGNIFICANT CHANGE UP
CREAT SERPL-MCNC: 1.49 MG/DL — HIGH (ref 0.5–1.3)
CREAT SERPL-MCNC: 1.71 MG/DL — HIGH (ref 0.5–1.3)
CREAT SERPL-MCNC: 1.82 MG/DL — HIGH (ref 0.5–1.3)
DIFF PNL FLD: ABNORMAL
EGFR: 34 ML/MIN/1.73M2 — LOW
EGFR: 34 ML/MIN/1.73M2 — LOW
EGFR: 36 ML/MIN/1.73M2 — LOW
EGFR: 36 ML/MIN/1.73M2 — LOW
EGFR: 43 ML/MIN/1.73M2 — LOW
EGFR: 43 ML/MIN/1.73M2 — LOW
EOSINOPHIL # BLD AUTO: 0.11 K/UL — SIGNIFICANT CHANGE UP (ref 0–0.5)
EOSINOPHIL NFR BLD AUTO: 2.2 % — SIGNIFICANT CHANGE UP (ref 0–6)
GLUCOSE SERPL-MCNC: 116 MG/DL — HIGH (ref 70–99)
GLUCOSE SERPL-MCNC: 125 MG/DL — HIGH (ref 70–99)
GLUCOSE SERPL-MCNC: 142 MG/DL — HIGH (ref 70–99)
GLUCOSE UR QL: NEGATIVE MG/DL — SIGNIFICANT CHANGE UP
HCG SERPL-ACNC: <1 MIU/ML — SIGNIFICANT CHANGE UP
HCT VFR BLD CALC: 41.5 % — SIGNIFICANT CHANGE UP (ref 34.5–45)
HGB BLD-MCNC: 12.7 G/DL — SIGNIFICANT CHANGE UP (ref 11.5–15.5)
IMM GRANULOCYTES # BLD AUTO: 0.01 K/UL — SIGNIFICANT CHANGE UP (ref 0–0.07)
IMM GRANULOCYTES NFR BLD AUTO: 0.2 % — SIGNIFICANT CHANGE UP (ref 0–0.9)
KETONES UR QL: ABNORMAL MG/DL
LEUKOCYTE ESTERASE UR-ACNC: ABNORMAL
LIDOCAIN IGE QN: 61 U/L — HIGH (ref 7–60)
LYMPHOCYTES # BLD AUTO: 1.83 K/UL — SIGNIFICANT CHANGE UP (ref 1–3.3)
LYMPHOCYTES NFR BLD AUTO: 37.4 % — SIGNIFICANT CHANGE UP (ref 13–44)
MAGNESIUM SERPL-MCNC: 1.3 MG/DL — LOW (ref 1.6–2.6)
MAGNESIUM SERPL-MCNC: 1.8 MG/DL — SIGNIFICANT CHANGE UP (ref 1.6–2.6)
MCHC RBC-ENTMCNC: 26.7 PG — LOW (ref 27–34)
MCHC RBC-ENTMCNC: 30.6 G/DL — LOW (ref 32–36)
MCV RBC AUTO: 87.4 FL — SIGNIFICANT CHANGE UP (ref 80–100)
MONOCYTES # BLD AUTO: 0.5 K/UL — SIGNIFICANT CHANGE UP (ref 0–0.9)
MONOCYTES NFR BLD AUTO: 10.2 % — SIGNIFICANT CHANGE UP (ref 2–14)
NEUTROPHILS # BLD AUTO: 2.4 K/UL — SIGNIFICANT CHANGE UP (ref 1.8–7.4)
NEUTROPHILS NFR BLD AUTO: 49.2 % — SIGNIFICANT CHANGE UP (ref 43–77)
NITRITE UR-MCNC: NEGATIVE — SIGNIFICANT CHANGE UP
NRBC # BLD AUTO: 0 K/UL — SIGNIFICANT CHANGE UP (ref 0–0)
NRBC # FLD: 0 K/UL — SIGNIFICANT CHANGE UP (ref 0–0)
NRBC BLD AUTO-RTO: 0 /100 WBCS — SIGNIFICANT CHANGE UP (ref 0–0)
PH UR: 5 — SIGNIFICANT CHANGE UP (ref 5–8)
PHOSPHATE SERPL-MCNC: 4.5 MG/DL — SIGNIFICANT CHANGE UP (ref 2.5–4.5)
PLATELET # BLD AUTO: 414 K/UL — HIGH (ref 150–400)
PMV BLD: 10.3 FL — SIGNIFICANT CHANGE UP (ref 7–13)
POTASSIUM SERPL-MCNC: 3.9 MMOL/L — SIGNIFICANT CHANGE UP (ref 3.5–5.3)
POTASSIUM SERPL-MCNC: 4.3 MMOL/L — SIGNIFICANT CHANGE UP (ref 3.5–5.3)
POTASSIUM SERPL-MCNC: 4.4 MMOL/L — SIGNIFICANT CHANGE UP (ref 3.5–5.3)
POTASSIUM SERPL-SCNC: 3.9 MMOL/L — SIGNIFICANT CHANGE UP (ref 3.5–5.3)
POTASSIUM SERPL-SCNC: 4.3 MMOL/L — SIGNIFICANT CHANGE UP (ref 3.5–5.3)
POTASSIUM SERPL-SCNC: 4.4 MMOL/L — SIGNIFICANT CHANGE UP (ref 3.5–5.3)
PROT SERPL-MCNC: 7 G/DL — SIGNIFICANT CHANGE UP (ref 6–8.3)
PROT UR-MCNC: 300 MG/DL
RBC # BLD: 4.75 M/UL — SIGNIFICANT CHANGE UP (ref 3.8–5.2)
RBC # FLD: 16.9 % — HIGH (ref 10.3–14.5)
SODIUM SERPL-SCNC: 138 MMOL/L — SIGNIFICANT CHANGE UP (ref 135–145)
SODIUM SERPL-SCNC: 139 MMOL/L — SIGNIFICANT CHANGE UP (ref 135–145)
SODIUM SERPL-SCNC: 140 MMOL/L — SIGNIFICANT CHANGE UP (ref 135–145)
SP GR SPEC: 1.02 — SIGNIFICANT CHANGE UP (ref 1–1.03)
TROPONIN T, HIGH SENSITIVITY RESULT: <6 NG/L — SIGNIFICANT CHANGE UP (ref 0–51)
UROBILINOGEN FLD QL: 1 MG/DL — SIGNIFICANT CHANGE UP (ref 0.2–1)
WBC # BLD: 4.89 K/UL — SIGNIFICANT CHANGE UP (ref 3.8–10.5)
WBC # FLD AUTO: 4.89 K/UL — SIGNIFICANT CHANGE UP (ref 3.8–10.5)

## 2025-06-24 PROCEDURE — 84702 CHORIONIC GONADOTROPIN TEST: CPT

## 2025-06-24 PROCEDURE — 84100 ASSAY OF PHOSPHORUS: CPT

## 2025-06-24 PROCEDURE — 80048 BASIC METABOLIC PNL TOTAL CA: CPT

## 2025-06-24 PROCEDURE — 85025 COMPLETE CBC W/AUTO DIFF WBC: CPT

## 2025-06-24 PROCEDURE — 71046 X-RAY EXAM CHEST 2 VIEWS: CPT | Mod: 26

## 2025-06-24 PROCEDURE — 36415 COLL VENOUS BLD VENIPUNCTURE: CPT

## 2025-06-24 PROCEDURE — 82550 ASSAY OF CK (CPK): CPT

## 2025-06-24 PROCEDURE — 99285 EMERGENCY DEPT VISIT HI MDM: CPT

## 2025-06-24 PROCEDURE — 74176 CT ABD & PELVIS W/O CONTRAST: CPT | Mod: 26

## 2025-06-24 PROCEDURE — 82553 CREATINE MB FRACTION: CPT

## 2025-06-24 PROCEDURE — 87086 URINE CULTURE/COLONY COUNT: CPT

## 2025-06-24 PROCEDURE — 83690 ASSAY OF LIPASE: CPT

## 2025-06-24 PROCEDURE — 84484 ASSAY OF TROPONIN QUANT: CPT

## 2025-06-24 PROCEDURE — 80053 COMPREHEN METABOLIC PANEL: CPT

## 2025-06-24 PROCEDURE — 99223 1ST HOSP IP/OBS HIGH 75: CPT

## 2025-06-24 PROCEDURE — 81001 URINALYSIS AUTO W/SCOPE: CPT

## 2025-06-24 PROCEDURE — 83735 ASSAY OF MAGNESIUM: CPT

## 2025-06-24 RX ORDER — ACETAMINOPHEN 500 MG/5ML
650 LIQUID (ML) ORAL EVERY 6 HOURS
Refills: 0 | Status: DISCONTINUED | OUTPATIENT
Start: 2025-06-24 | End: 2025-06-25

## 2025-06-24 RX ORDER — MAGNESIUM, ALUMINUM HYDROXIDE 200-200 MG
30 TABLET,CHEWABLE ORAL ONCE
Refills: 0 | Status: COMPLETED | OUTPATIENT
Start: 2025-06-24 | End: 2025-06-24

## 2025-06-24 RX ORDER — MELATONIN 5 MG
3 TABLET ORAL AT BEDTIME
Refills: 0 | Status: DISCONTINUED | OUTPATIENT
Start: 2025-06-24 | End: 2025-06-25

## 2025-06-24 RX ORDER — ACETAMINOPHEN 500 MG/5ML
650 LIQUID (ML) ORAL ONCE
Refills: 0 | Status: COMPLETED | OUTPATIENT
Start: 2025-06-24 | End: 2025-06-24

## 2025-06-24 RX ORDER — MAGNESIUM SULFATE 500 MG/ML
2 SYRINGE (ML) INJECTION ONCE
Refills: 0 | Status: COMPLETED | OUTPATIENT
Start: 2025-06-24 | End: 2025-06-24

## 2025-06-24 RX ORDER — ONDANSETRON HCL/PF 4 MG/2 ML
4 VIAL (ML) INJECTION ONCE
Refills: 0 | Status: COMPLETED | OUTPATIENT
Start: 2025-06-24 | End: 2025-06-24

## 2025-06-24 RX ADMIN — Medication 30 MILLILITER(S): at 13:30

## 2025-06-24 RX ADMIN — Medication 1000 MILLILITER(S): at 14:49

## 2025-06-24 RX ADMIN — Medication 100 GRAM(S): at 14:49

## 2025-06-24 RX ADMIN — Medication 650 MILLIGRAM(S): at 13:32

## 2025-06-24 RX ADMIN — Medication 4 MILLIGRAM(S): at 13:31

## 2025-06-24 RX ADMIN — Medication 1000 MILLILITER(S): at 13:32

## 2025-06-24 NOTE — H&P ADULT - NSHPLABSRESULTS_GEN_ALL_CORE
12.7   4.89  )-----------( 414      ( 24 Jun 2025 13:15 )             41.5       06-24    139  |  104  |  26[H]  ----------------------------<  116[H]  4.4   |  21[L]  |  1.82[H]    Ca    8.7      24 Jun 2025 18:14  Phos  4.5     06-24  Mg     1.8     06-24    TPro  7.0  /  Alb  4.0  /  TBili  0.2  /  DBili  x   /  AST  24  /  ALT  20  /  AlkPhos  65  06-24              Urinalysis Basic - ( 24 Jun 2025 18:14 )    Color: x / Appearance: x / SG: x / pH: x  Gluc: 116 mg/dL / Ketone: x  / Bili: x / Urobili: x   Blood: x / Protein: x / Nitrite: x   Leuk Esterase: x / RBC: x / WBC x   Sq Epi: x / Non Sq Epi: x / Bacteria: x            Lactate Trend      CARDIAC MARKERS ( 24 Jun 2025 18:14 )  x     / x     / x     / x     / 1.2 ng/mL        CAPILLARY BLOOD GLUCOSE LABS:                         12.7   4.89  )-----------( 414      ( 24 Jun 2025 13:15 )             41.5     06-24    138  |  104  |  26[H]  ----------------------------<  142[H]  3.9   |  18[L]  |  1.71[H]    Ca    8.8      24 Jun 2025 22:11  Phos  4.5     06-24  Mg     1.8     06-24    TPro  7.0  /  Alb  4.0  /  TBili  0.2  /  DBili  x   /  AST  24  /  ALT  20  /  AlkPhos  65  06-24      Urinalysis Basic - ( 24 Jun 2025 22:11 )    Color: x / Appearance: x / SG: x / pH: x  Gluc: 142 mg/dL / Ketone: x  / Bili: x / Urobili: x   Blood: x / Protein: x / Nitrite: x   Leuk Esterase: x / RBC: x / WBC x   Sq Epi: x / Non Sq Epi: x / Bacteria: x      CARDIAC MARKERS ( 24 Jun 2025 18:14 )  x     / x     / x     / x     / 1.2 ng/mL        RADIOLOGY, EKG & ADDITIONAL TESTS: Reviewed.         < from: CT Abdomen and Pelvis No Cont (06.24.25 @ 19:18) >  No urolithiasis or hydronephrosis.  Status post gastric bypass. Patulous jejunojejunal anastomosis again seen   in the in the anterior abdomen, where there is abnormal bowel wall   thickening versus adjacent decompressed small bowel. Recommend further   evaluation with IV and oral contrast.

## 2025-06-24 NOTE — PATIENT PROFILE ADULT - FALL HARM RISK - HARM RISK INTERVENTIONS
Assistance with ambulation/Assistance OOB with selected safe patient handling equipment/Communicate Risk of Fall with Harm to all staff/Discuss with provider need for PT consult/Monitor gait and stability/Provide patient with walking aids - walker, cane, crutches/Reinforce activity limits and safety measures with patient and family/Sit up slowly, dangle for a short time, stand at bedside before walking/Tailored Fall Risk Interventions/Use of alarms - bed, chair and/or voice tab/Visual Cue: Yellow wristband and red socks/Bed in lowest position, wheels locked, appropriate side rails in place/Call bell, personal items and telephone in reach/Instruct patient to call for assistance before getting out of bed or chair/Non-slip footwear when patient is out of bed/[place] to call system/Physically safe environment - no spills, clutter or unnecessary equipment/Purposeful Proactive Rounding/Room/bathroom lighting operational, light cord in reach

## 2025-06-24 NOTE — H&P ADULT - HISTORY OF PRESENT ILLNESS
Patient is a 49 yo F with PMHx alcohol abuse, iron deficiency anemia iso menorrhagia s/p uterine artery embolization, fibroids, gastric bypass (2001), HTN, DM, HLD,  acute cholecystitis s/p lap refugio, gastric bypass (in 1998, denies any revisions), nonobstructive CAD, presenting with NBNB emesis, midsternal CP, decreased urinary frequency.      ED course:   T 97.6, HR 91, /75, RR 18, SpO2 99 on RA   Labs s/f plt 414, BUN 23, Cr 1.49 --> BUN 26, Cr 1.82, AG 18 --> 14   UA trace ketones, moderate blood, WBC 9,   Urine pregnancy test negative   No urolithiasis or hydronephrosis.  CXR: heart size, mediastinal and hilar contours are unchanged   and within normal limits. The lung zones are clear. No pneumothorax or   pleural effusion. No acute soft tissue or osseous pathology.  CTAP: no bowel obstructin. Status post gastric bypass. Patulous jejunojejunal anastomosis again seen in the in the anterior abdomen, where there is abnormal bowel wall thickening versus adjacent decompressed small bowel. This is similar in appearance to 2/16/2025.  Recieved Tylenol, Maalox, Mg 2 g, Zofran 4 mg, 2 L NS    Patient is a 49 yo F with PMHx alcohol abuse, iron deficiency anemia iso menorrhagia s/p uterine artery embolization, fibroids, gastric bypass (2001), HTN, DM, HLD,  GERD. acute cholecystitis s/p lap refugio, gastric bypass (in 1998, denies any revisions), nonobstructive CAD, presenting with palpitations, dizziness, NBNB emesis, midsternal CP, LUQ/LLQ pain radiating to back, decreased urinary frequency, skin changes.      1. Palpitations and dizziness: She's had a history of palpitations and dizziness with normal EKGs. Always experienced this in the past when she was anemic and required blood transfusions. Has only required 1 blood transfusion since her uterine artery embolization las year. Started again yesterday. She feels short of breath for a moment when it happens. At first she thought it might be attributed to the heat but realized she didn't spend any time outside.  2. NBNB emesis: Started a few days ago, also associated with sensation of food getting stuck in her esophagus and belching. Takes Pepcid 20 mg BID for known GERD, w/o relief of sxs. She started taking Ozempic 2 weeks ago and Doxy + Azithro 1 week ago for BV  3. Midsternal chest pain: She was at her Rheumatologist's office today started to have chest pain, at rest. Continues to have it intermittently. No radiation.   4. LUQ/LLQ pain with radiation to back: ongoing for months, feels like a pulling sensation independent of activity. No correlation with food or BMs. Does cycle between diarrhea and constipation.   5. Decreased urinary frequency: Started a few days ago, has been drinking tons of water but urinating like 1x day   6. Skin changes: Desquamating rash on hands bilaterally + hypopigmented macules on bilateral shins started a few days ago, painless, non pruritic.        ED course:   T 97.6, HR 91, /75, RR 18, SpO2 99 on RA   Labs s/f plt 414, BUN 23, Cr 1.49 --> BUN 26, Cr 1.82, AG 18 --> 14   UA trace ketones, moderate blood, WBC 9,   Urine pregnancy test negative   No urolithiasis or hydronephrosis.  CXR: heart size, mediastinal and hilar contours are unchanged   and within normal limits. The lung zones are clear. No pneumothorax or   pleural effusion. No acute soft tissue or osseous pathology.  CTAP: no bowel obstructin. Status post gastric bypass. Patulous jejunojejunal anastomosis again seen in the in the anterior abdomen, where there is abnormal bowel wall thickening versus adjacent decompressed small bowel. This is similar in appearance to 2/16/2025.  Recieved Tylenol, Maalox, Mg 2 g, Zofran 4 mg, 2 L NS    Patient is a 49 yo F with PMHx alcohol abuse, iron deficiency anemia iso menorrhagia s/p uterine artery embolization, fibroids, gastric bypass (2001), HTN, DM, HLD,  GERD. acute cholecystitis s/p lap refugio, gastric bypass (in 1998, denies any revisions), nonobstructive CAD, presenting with palpitations, dizziness, NBNB emesis, midsternal CP, LUQ/LLQ pain radiating to back, decreased urinary frequency, skin changes.      1. Palpitations and dizziness: She's had a history of palpitations and dizziness with normal EKGs. Always experienced this in the past when she was anemic and required blood transfusions. Has only required 1 blood transfusion since her uterine artery embolization las year. Started again yesterday. She feels short of breath for a moment when it happens. At first she thought it might be attributed to the heat but realized she didn't spend any time outside.  2. NBNB emesis: Started a few days ago, also associated with sensation of food getting stuck in her esophagus and belching. Takes Pepcid 20 mg BID for known GERD, w/o relief of sxs. She started taking Ozempic 2 weeks ago and Doxy + Azithro 1 week ago for BV  3. Midsternal chest pain: She was at her Rheumatologist's office today started to have chest pain, at rest. Continues to have it intermittently. No radiation.   4. LUQ/LLQ pain with radiation to back: ongoing for months, feels like a pulling sensation independent of activity. No correlation with food or BMs. Does cycle between diarrhea and constipation.   5. Decreased urinary frequency: Started a few days ago, has been drinking tons of water but urinating like 1x day   6. Skin changes: Desquamating rash on hands bilaterally + hypopigmented macules on bilateral shins started a few days ago, painless, non pruritic.        ED course:   T 97.6, HR 91, /75, RR 18, SpO2 99 on RA   Labs s/f plt 414, BUN 23, Cr 1.49 --> BUN 26, Cr 1.82, AG 18 --> 14   UA trace ketones, moderate blood, WBC 9,   Urine pregnancy test negative   EKG with NSR   CXR: heart size, mediastinal and hilar contours are unchanged   and within normal limits. The lung zones are clear. No pneumothorax or   pleural effusion. No acute soft tissue or osseous pathology.  CTAP: no bowel obstruction. Status post gastric bypass. Patulous jejunojejunal anastomosis again seen in the in the anterior abdomen, where there is abnormal bowel wall thickening versus adjacent decompressed small bowel. This is similar in appearance to 2/16/2025. No urolithiasis or hydronephrosis.  Received Tylenol, Maalox, Mg 2 g, Zofran 4 mg, 2 L NS

## 2025-06-24 NOTE — ED PROVIDER NOTE - OBJECTIVE STATEMENT
50F PMH non-obstructive CAD, HTN, anemia, etoh abuse (states last etoh >1 yr ago), DM, fibroids, GERD, HLD, thyroid nodule, PSxH gastric bypass, , myomectomy, cholecystectomy p/w several symptoms. Several NBNB NV today. Also w/ midsternal CP that began right after vomiting. Was at Rheum today (in process of getting worked up for knee pain) and referred to ED. On ROS pt also notes chronic intermittent LUQ pain. Also intermittent sensation of heart beating fast for last 2-3 days and decreased urination for 2-3 days. No other systemic symptoms.   Cath/echo Aug 2023 showed non obstructive CAD. CT a/p Dec 2024 and 2025 reviewed.   Denies f/c, HA, SOB, diarrhea, lightheaded, diaphoresis, cough, rhinorrhea, black stool, bloody stool, LE pain, LE swelling, focal weakness/numbness, recent travel/immobilization, urinary complaints, back pain. No hormone use.

## 2025-06-24 NOTE — ED ADULT NURSE NOTE - OBJECTIVE STATEMENT
aaox3. states she has pain to the chest and LL abdominal quadrant pain. denies nvd at this time. states he feels generally weak and unlike herself. end of BV med use today was at doctor when felt unwell. Currently states, "I just don't feel like myself." Connected to CM. VSS. Medical eval in progress.

## 2025-06-24 NOTE — H&P ADULT - NSHPPHYSICALEXAM_GEN_ALL_CORE
VITAL SIGNS:  T(C): 36.8 (06-24-25 @ 18:00), Max: 36.8 (06-24-25 @ 13:54)  T(F): 98.2 (06-24-25 @ 18:00), Max: 98.3 (06-24-25 @ 13:54)  HR: 88 (06-24-25 @ 18:00) (82 - 91)  BP: 126/82 (06-24-25 @ 18:00) (105/75 - 126/82)  BP(mean): --  RR: 18 (06-24-25 @ 18:00) (18 - 18)  SpO2: 97% (06-24-25 @ 18:00) (97% - 99%)  Wt(kg): --    PHYSICAL EXAM:    Constitutional: resting comfortably in bed; NAD  Head: NC/AT  Eyes: PERRL, EOMI, anicteric sclera  ENT: no nasal discharge; uvula midline, no oropharyngeal erythema or exudates; MMM  Neck: supple; no JVD or thyromegaly  Respiratory: CTA B/L; no W/R/R, no retractions  Cardiac: +S1/S2; RRR; no M/R/G; PMI non-displaced  Gastrointestinal: abdomen soft, NT/ND; no rebound or guarding; +BSx4  Back: spine midline, no bony tenderness or step-offs; no CVAT B/L  Extremities: WWP, no clubbing or cyanosis; no peripheral edema  Musculoskeletal: NROM x4; no joint swelling, tenderness or erythema  Vascular: 2+ radial, DP/PT pulses B/L  Dermatologic: skin warm, dry and intact; no rashes, wounds, or scars  Neurologic: AAOx3; CNII-XII grossly intact; no focal deficits VITAL SIGNS:  T(C): 36.8 (06-24-25 @ 18:00), Max: 36.8 (06-24-25 @ 13:54)  T(F): 98.2 (06-24-25 @ 18:00), Max: 98.3 (06-24-25 @ 13:54)  HR: 88 (06-24-25 @ 18:00) (82 - 91)  BP: 126/82 (06-24-25 @ 18:00) (105/75 - 126/82)  BP(mean): --  RR: 18 (06-24-25 @ 18:00) (18 - 18)  SpO2: 97% (06-24-25 @ 18:00) (97% - 99%)  Wt(kg): --    PHYSICAL EXAM:    Constitutional: resting comfortably in bed; NAD  Head: NC/AT  Eyes: PERRL, EOMI, anicteric sclera  ENT: no nasal discharge; uvula midline, no oropharyngeal erythema or exudates; MMM  Neck: supple  Respiratory: CTA B/L; no W/R/R  Cardiac: +S1/S2; RRR; no M/R/G  Gastrointestinal: abdomen soft, TTP LUQ, LLQ, SP, of note palpation elicited recurrent belching   Back: spine midline, no bony tenderness or step-offs; no CVAT B/L  Extremities: WWP, no clubbing or cyanosis; no peripheral edema  Musculoskeletal: NROM x4; no joint swelling, tenderness or erythema  Vascular: 2+ radial, DP/PT pulses B/L  Dermatologic: desquamating rash bilateral hands + hypopigmented macules on bilateral shins   Neurologic: AAOx3; CNII-XII grossly intact; no focal deficits VITAL SIGNS:  T(C): 36.8 (06-24-25 @ 18:00), Max: 36.8 (06-24-25 @ 13:54)  T(F): 98.2 (06-24-25 @ 18:00), Max: 98.3 (06-24-25 @ 13:54)  HR: 88 (06-24-25 @ 18:00) (82 - 91)  BP: 126/82 (06-24-25 @ 18:00) (105/75 - 126/82)  BP(mean): --  RR: 18 (06-24-25 @ 18:00) (18 - 18)  SpO2: 97% (06-24-25 @ 18:00) (97% - 99%)  Wt(kg): --    PHYSICAL EXAM:    Constitutional: resting comfortably in bed; NAD  Head: NC/AT  Eyes: PERRL, EOMI, anicteric sclera  ENT: no nasal discharge; uvula midline, no oropharyngeal erythema or exudates; MMM  Neck: supple  Respiratory: CTA B/L; no W/R/R  Cardiac: +S1/S2; RRR; no M/R/G  Gastrointestinal: abdomen soft, TTP LUQ, LLQ, SP, of note palpation elicited recurrent belching   Back: spine midline, no bony tenderness or step-offs; no CVAT B/L  Extremities: WWP, no clubbing or cyanosis; no peripheral edema  Musculoskeletal: NROM x4; no joint swelling, tenderness or erythema  Vascular: 2+ radial, DP/PT pulses B/L  Dermatologic: desquamating rash bilateral hands + hypopigmented macules on bilateral shins   Neurologic: AAOx3;  no focal deficits

## 2025-06-24 NOTE — ED PROVIDER NOTE - CLINICAL SUMMARY MEDICAL DECISION MAKING FREE TEXT BOX
50F PMH non-obstructive CAD, HTN, anemia, etoh abuse (states last etoh >1 yr ago), DM, fibroids, GERD, HLD, thyroid nodule, PSxH gastric bypass, , myomectomy, cholecystectomy p/w several symptoms. Several NBNB NV today. Also w/ midsternal CP that began right after vomiting. Was at Rheum today (in process of getting worked up for knee pain) and referred to ED. On ROS pt also notes chronic intermittent LUQ pain. Also intermittent sensation of heart beating fast for last 2-3 days and decreased urination for 2-3 days. No other systemic symptoms.   Cath/echo Aug 2023 showed non obstructive CAD. CT a/p Dec 2024 and 2025 reviewed.   Vitals wnl, exam as above.   ddx: Unclear etiology. Clinically very low suspicion significant intra-abd pathology or ACS. Clinically not PE, tamponade, dissection, PTX, perf, myocarditis,   mediastinitis.   Labs, CXR, IVF/attempt symptom control, reassess. Possible CT a/p based on pt's symptoms/serial exams/reassessment.

## 2025-06-24 NOTE — ED ADULT NURSE NOTE - NSFALLUNIVINTERV_ED_ALL_ED
Bed/Stretcher in lowest position, wheels locked, appropriate side rails in place/Call bell, personal items and telephone in reach/Instruct patient to call for assistance before getting out of bed/chair/stretcher/Non-slip footwear applied when patient is off stretcher/[place] to call system/Physically safe environment - no spills, clutter or unnecessary equipment/Purposeful proactive rounding/Room/bathroom lighting operational, light cord in reach

## 2025-06-24 NOTE — ED ADULT NURSE REASSESSMENT NOTE - NS ED NURSE REASSESS COMMENT FT1
Pt rec'd from day shift; presented to ED c/o n/v and chest pain. A&Ox3, in NAD, breathing unlabored comfortably on RA; admitted; awaiting transfer to unit

## 2025-06-24 NOTE — H&P ADULT - PROBLEM SELECTOR PLAN 5
1. Palpitations and dizziness: She's had a history of palpitations and dizziness with normal EKGs. Always experienced this in the past when she was anemic and required blood transfusions. Has only required 1 blood transfusion since her uterine artery embolization las year. Started again yesterday. She feels short of breath for a moment when it happens. At first she thought it might be attributed to the heat but realized she didn't spend any time outside.  2. NBNB emesis: Started a few days ago, also associated with sensation of food getting stuck in her esophagus and belching. Takes Pepcid 20 mg BID for known GERD, w/o relief of sxs. She started taking Ozempic 2 weeks ago and Doxy + Azithro 1 week ago for BV  3. Midsternal chest pain: She was at her Rheumatologist's office today started to have chest pain, at rest. Continues to have it intermittently. No radiation.   4. LUQ/LLQ pain with radiation to back: ongoing for months, feels like a pulling sensation independent of activity. No correlation with food or BMs. Does cycle between diarrhea and constipation.   5. Decreased urinary frequency: Started a few days ago, has been drinking tons of water but urinating like 1x day   6. Skin changes: Desquamating rash on hands bilaterally + hypopigmented macules on bilateral shins started a few days ago, painless, non pruritic. Home meds: Ozempic 0.25 mg weekly and Metformin 1000 mg BID  - HOLD home meds  - ISS   - Monitor FS

## 2025-06-24 NOTE — CHART NOTE - NSCHARTNOTEFT_GEN_A_CORE
Prescription Information      PDI Filter:    PDI	My Rx	Current Rx	Drug Type	Rx Written	Rx Dispensed	Drug	Quantity	Days Supply	Prescriber Name	Prescriber BREANNA #	Payment Method	Dispenser      A	N	N	O	04/30/2025	05/01/2025	oxycodone-acetaminophen 5-325 mg tab	8	2	Diane Amezquita	[id number]	Medicaid	[facility]      A	N	N	O	07/29/2024	07/29/2024	oxycodone hcl (ir) 5 mg tablet	20	5	Pooja Angela	[id number]	Medicaid	[facility]

## 2025-06-24 NOTE — PATIENT PROFILE ADULT - VISION (WITH CORRECTIVE LENSES IF THE PATIENT USUALLY WEARS THEM):
Wears glasses at all times/Partially impaired: cannot see medication labels or newsprint, but can see obstacles in path, and the surrounding layout; can count fingers at arm's length

## 2025-06-24 NOTE — ED PROVIDER NOTE - CARE PLAN
Principal Discharge DX:	Chest pain  Secondary Diagnosis:	Abdominal pain  Secondary Diagnosis:	Hypomagnesemia   1 Principal Discharge DX:	Chest pain  Secondary Diagnosis:	Abdominal pain  Secondary Diagnosis:	Hypomagnesemia  Secondary Diagnosis:	SABRINA (acute kidney injury)

## 2025-06-24 NOTE — ED PROVIDER NOTE - NS ED MD DISPO SPECIAL CONSIDERATION1
[facility] Progress Note      Patient Name: Leandra Nuñez  : 1934  MRN: [id number]      Subjective     Follow Up for :  RODERICK      Review of Systems:   14 point review of systems is otherwise negative except for mentioned above on HPI    Personal History     Past Medical History:   Diagnosis Date    Anxiety     Arthritis     Breast nodule     Left breast nodule (fibrocystic disease , no malignancy)     Cancer     Cataract     Chronic kidney disease     HL (hearing loss)     Hyperlipidemia     Hypertension     Obesity     Shortness of breath        Past Surgical History:   Procedure Laterality Date    BREAST BIOPSY Left 2013    Benign fibrocystic disease ,Abstracted from [facility].    CARDIAC CATHETERIZATION      COLONOSCOPY N/A 2024    Procedure: COLONOSCOPY WITH POLYPECTOMY X5 AND ENDOSCOPIC MUCOSAL RESECTION OF RECTAL POLYP;  Surgeon: Joon Martin MD;  Location: [facility] ENDOSCOPY;  Service: Gastroenterology;  Laterality: N/A;  POST: DIVERTICULOSIS, POLYPS    D & C AND LAPAROSCOPY      ENDOSCOPY N/A 2024    Procedure: ESOPHAGOGASTRODUODENOSCOPY WITH BIOPSY X1 AREA;  Surgeon: Joon Martin MD;  Location: [facility] ENDOSCOPY;  Service: Gastroenterology;  Laterality: N/A;  POST: GASTRITIS, ESOPHAGITIS    FULGURATION ENDOMETRIOSIS      surgery    NEPHRECTOMY Right     ORIF HUMERUS FRACTURE Left 3/24/2021    Procedure: HUMERUS PROXIMAL OPEN REDUCTION INTERNAL FIXATION;  Surgeon: Yair Anne MD;  Location: [facility] MAIN OR;  Service: Orthopedics;  Laterality: Left;       Objective     Vitals:   Temp:  [97.8 °F (36.6 °C)-98.8 °F (37.1 °C)] 98.3 °F (36.8 °C)  Heart Rate:  [57-74] 74  Resp:  [14-26] 19  BP: (141-159)/(68-80) 158/77  Flow (L/min):  [0.5] 0.5    Intake/Output Summary (Last 24 hours) at 2024 0807  Last data filed at 2024 0522  Gross per 24 hour   Intake 360 ml   Output 2800 ml   Net -2440 ml       Physical Exam:   Constitutional:  Looking  HEENT: Sclera anicteric, no conjunctival injection  Neck: Supple, no thyromegaly, no lymphadenopathy, trachea at midline, no JVD  Respiratory: Clear to auscultation bilaterally, nonlabored respiration  Cardiovascular: RRR, no murmurs, no rubs or gallops, no carotid bruit  Gastrointestinal: Positive bowel sounds, abdomen is soft, nontender and nondistended  : No palpable bladder  Musculoskeletal: No edema, no clubbing or cyanosis  Psychiatric: Appropriate affect, cooperative  Neurologic: Oriented x3, moving all extremities, normal speech and mental status  Skin: Warm and dry       Scheduled Meds:     allopurinol, 100 mg, Oral, Daily  amLODIPine, 5 mg, Oral, Q24H  clarithromycin, 500 mg, Oral, BID  donepezil, 10 mg, Oral, Nightly  gabapentin, 100 mg, Oral, Nightly  hydrALAZINE, 100 mg, Oral, BID  melatonin, 5 mg, Oral, Nightly  pantoprazole, 40 mg, Oral, Q AM  sodium chloride, 10 mL, Intravenous, Q12H  traZODone, 50 mg, Oral, Nightly      IV Meds:   lactated ringers, 75 mL/hr, Last Rate: 75 mL/hr (06/23/24 0605)        Results Reviewed:   I have personally reviewed the results from the time of this admission to 6/23/2024 08:07 EDT     Lab Results   Component Value Date    GLUCOSE 114 (H) 06/23/2024    CALCIUM 9.7 06/23/2024     06/23/2024    K 3.6 06/23/2024    CO2 24.5 06/23/2024     06/23/2024    BUN 13 06/23/2024    CREATININE 0.98 06/23/2024    EGFRIFNONA 51 (L) 01/14/2022    BCR 13.3 06/23/2024    ANIONGAP 11.5 06/23/2024      Lab Results   Component Value Date    MG 2.1 06/20/2024    PHOS 3.6 06/22/2024    ALBUMIN 3.7 06/22/2024           Assessment / Plan     ASSESSMENT:  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  Serology of worsening kidney function likely secondary to hypertensive urgency in setting of diuretic use.  Patient noted decreased oral intake  Recurrent falls  Hypertensive urgency with systolic blood pressure above 200  started on Norvasc this morning.  But allow permissive hypertension in order to prevent worsening of kidney function  Type 2 diabetes mellitus with CKD  Chronic kidney disease stage IIIa followed by Dr. Mitchell  Dementia  Falls evaluated with no fractures        PLAN:  Stop IVF  Continue norvasc, BP is better  Hold diuretic therapy for now, can resume at discharge, lasix 20 mg PRN    Cleared for discharge from renal side        Alysia Renae MD  06/23/24  08:07 EDT    [facility]  [phone]    Parts of this note may be an electronic transcription/translation of spoken language to printed text using the Dragon dictation system.     None

## 2025-06-24 NOTE — ED ADULT TRIAGE NOTE - CHIEF COMPLAINT QUOTE
Pt BIB ambulance from PCP office regarding left sided cp and weakness. Pt reports being on last day of azithromycin and doxycycline for reported bacterial vaginosis. Pt given 324 mg aspirin by EMS. EKG in prog.

## 2025-06-24 NOTE — H&P ADULT - PROBLEM SELECTOR PLAN 2
Presenting with NBNB emesis that started a few days ago, also associated with sensation of food getting stuck in her esophagus and belching. Denies melena/hematochezia. Takes Pepcid 20 mg BID for known GERD, w/o relief of sxs. She started taking Ozempic 2 weeks ago and Doxy + Azithro 1 week ago for BV. Differentials include drug induced N/V (Ozempic) vs drug induced esophagitis/gastritis (Doxy) vs H. pylori (born and raised in [facility]).   - HOLD Doxycycline   - F/u H. pylori stool assay   - Zofran 4 mg q8h PRN for nausea/vomiting   - Hold GI ppx at this time Presenting with NBNB emesis that started a few days ago, also associated with sensation of food getting stuck in her esophagus and belching. Denies melena/hematochezia. Takes Pepcid 20 mg BID for known GERD, w/o relief of sxs. She started taking Ozempic 2 weeks ago and Doxy + Azithro 1 week ago for BV. Differentials include drug induced N/V (Ozempic) vs drug induced esophagitis/gastritis (Doxy) vs H. pylori (born and raised in [facility]).   - HOLD Doxycycline   - F/u H. pylori stool assay   - Zofran 4 mg q8h PRN for nausea/vomiting   - Mylanta 30 ml q6h PRN for dyspepsia   - Hold GI ppx at this time

## 2025-06-24 NOTE — PATIENT PROFILE ADULT - NSTOBACCOQUITATTEMPT_GEN_A_CORE_SD
Patient quits often the reverts back to smoking  after a month- 3 months. She just started back smoking on 6/23/25/quit on own

## 2025-06-24 NOTE — ED PROVIDER NOTE - AVIAN FLU SYMPTOMS
Social Work -ALS Clinic Progress Note  [facility]    Data/Intervention:    Patient Name:  Bailey Malhotra  /Age:  1954 (68 year old)    Visit Type: telephone    Collaborated With:    -Dr Perkins and members of the ALS interdisciplinary team    Psychosocial info/Concerns:   On 9/15/22 during Pt's video visit, I was asked to meet with her and when I went into the online room, she was no longer there. She apparently was tired and logged off.       Intervention/Education/Resources Provided:  I left a message for Audrey her dtr in law to touch base, whose # was listed as Pt's primary contact.     Assessment/Plan:  Will await return call    Provided patient/family with contact information and encouraged them to contact me between clinic visits as needed.     [place], [facility]    [facility]  [phone]         No

## 2025-06-24 NOTE — H&P ADULT - PROBLEM SELECTOR PLAN 4
Midsternal chest pain: She was at her Rheumatologist's office today started to have chest pain, at rest. Continues to have it intermittently. Worst at night when laying flat. Not associated with exertion. No radiation. Not pleuritic. Not improved with leaning forward. Cardiac markers and EKG wnl.   Suspect this is also secondary to dyspepsia. Plan as above. She was at her Rheumatologist's office today started to have chest pain. Continues to have it intermittently. Worst at night when laying flat. Not associated with exertion. No radiation. Not pleuritic. Not improved with leaning forward. She thinks its associated with sensation of food getting stuck in her esophagus. Cardiac Cath/echo Aug 2023 c/w non obstructive CAD. Cardiac markers and EKG on admission wnl. Don't suspect ACS, pericarditis or myocarditis.    Suspect this is also secondary to dyspepsia. Plan as above.

## 2025-06-24 NOTE — ED PROVIDER NOTE - PROGRESS NOTE DETAILS
Klepfish: Plt 414, bicarb 18, AG 18, Cr 1.49, lipase 61, Mg 1.3, other labs grossly wnl.   CXR wnl.   Pain much improved. Abd remains soft NTND.   UA pending. Will also give additional IVF/mg. Reassess. Updated pt. Klepfish: Abd soft NTND. Remains well appearing. Pt only received ~800cc IVF (pt reportedly intermittently bending her extremity and then pt eventually pulled out IV). Will rpt labs, reassess. Klepfish: UA trace leuk esterase, moderate blood, 9 WBCs, 174 RBCs, 9 epithelial cells. Has no dysuria/urgency. Very unlikely UTI, will hold tx pending urine cx.   Rpt labs w/ improved AG and bicarb but has slight worsening of SABRINA - BUN/cr now 26/1.82. Mg improved. Given SABRINA, hematuria on UA, and intermittent abd pain (none currently, abd soft NTND), will CT to eval stone, low clincial suspicion. IV line has been replaced and pt now continuing to get IVF. Will reassess. Jerica: going through chart/case again, pt has no body aches to suspect rhabdo, will add on CK given SABRINA. Klepfish: CT showed "IMPRESSION: No urolithiasis or hydronephrosis. Status post gastric bypass. Patulous jejunojejunal anastomosis again seen in the in the anterior abdomen, where there is abnormal bowel wall   thickening versus adjacent decompressed small bowel. Recommend further evaluation with IV and oral contrast."  d/w radiology - the abnormal bowel wall thickening may indicate colitis/enteritis or decompressed bowel. Pt has no current abd pain, abd remains soft NTND throughout ED stay. Clinically no indication for emergent CT w/ IV/PO contrast.   Given mildly worsening SABRINA, will admit for further care. Updated pt.

## 2025-06-24 NOTE — H&P ADULT - PROBLEM SELECTOR PLAN 9
Per OP work up, ROSALIA titer elevated 1:160 in association with CCP IgG elevated to 31 (weak positive). RF negative.   Denies any small joint involvement. Seeing rheum for L knee arthritis. Previously diagnosed with primary OA.   - F/u with rheum outpatient

## 2025-06-24 NOTE — H&P ADULT - PROBLEM SELECTOR PLAN 10
Presenting with LUQ/LLQ pain with radiation to back: ongoing for months, feels like a pulling sensation independent of activity. No correlation with food or BMs. Does cycle between diarrhea and constipation. Denies melena/hematochezia. Saw GI 2 weeks ago, is supposed to schedule colonoscopy.   CTAP w/o bowel obstruction. Status post gastric bypass. Patulous jejunojejunal anastomosis again seen in the in the anterior abdomen, where there is abnormal bowel wall thickening versus adjacent decompressed small bowel. This is similar in appearance to 2/16/2025.   - F/u with PCP/GI outpatient

## 2025-06-24 NOTE — H&P ADULT - PROBLEM SELECTOR PLAN 1
-Baseline Cr: Baseline 0.9-1.2  UA, FeNa (Feurea), urine osmol, urine protein, bladder scan/renal US  Prerenal (2/2 poor PO intake, CHF, diuretics, cirrhosis, nephrotic syndrome, sepsis), Instrinsic (2/2 to ATN, AIN, glomerulonephritis, small vessel disease), Postrenal (2/2 to BPH, neurogenic bladder, anticholinergic meds nephrolithiasis)  - s/p: ED  - trend BUN/Cr  - strict I's and O's  - avoid nephrotoxic drugs (NSAIDs, ACEI/ARBS, diuretics), renally dose meds Baseline Cr:  0.9-1.2.   Cr trend this admission: 1.49 --> 1.82 --> 1.71  Appears to be intrinsic given Cr worsened s/p 2 L NS. Unlikely pre-renal.   No urolithiasis or hydronephrosis on CTAP. Unlikely post-obstructive.   Intrinsic maybe 2/2 to ATN vs glomerulonephritis, Denies any NSAID use x 2 weeks. AIN unlikely given doxy not associated with AIN and azithro rarely associated with AIN. Also no eos or WBC casts on UA.   - F/u Ur studies   - Trend BUN/Cr  - Bladder scan q6h  - Strict I's and O's  - Avoid nephrotoxic drugs (NSAIDs, ACEI/ARBS, diuretics), renally dose meds

## 2025-06-24 NOTE — H&P ADULT - ASSESSMENT
Patient is a 51 yo F with PMHx alcohol abuse, iron deficiency anemia iso menorrhagia s/p uterine artery embolization, fibroids, gastric bypass (2001), HTN, DM, HLD,  acute cholecystitis s/p lap refugio, gastric bypass (in 1998, denies any revisions), nonobstructive CAD, presenting with NBNB emesis, midsternal CP, decreased urinary frequency.   Patient is a 49 yo F with PMHx alcohol abuse, iron deficiency anemia iso menorrhagia s/p uterine artery embolization, fibroids, gastric bypass (2001), HTN, DM, HLD,  acute cholecystitis s/p lap refugio, gastric bypass (in 1998, denies any revisions), nonobstructive CAD, presenting with SABRINA, dyspepsia and palpitations.

## 2025-06-24 NOTE — H&P ADULT - PROBLEM SELECTOR PLAN 8
Skin changes: Desquamating rash on hands bilaterally + hypopigmented macules on bilateral shins started a few days ago, painless, non pruritic. Suspect drug induced eruption iso doxy.   - HOLD doxycycline Skin changes: Desquamating rash on hands bilaterally + hypopigmented macules on bilateral shins started a few days ago, painless, non pruritic. Suspect drug induced eruption iso doxy vs CT disorder iso positive ROSALIA.   - HOLD doxycycline  - F/u with rheum outpatient

## 2025-06-24 NOTE — H&P ADULT - PROBLEM SELECTOR PLAN 3
She's had a history of palpitations and dizziness with normal EKGs. Denies presyncope/syncope. Always experienced this in the past when she was anemic and required blood transfusions. Has only required 1 blood transfusion since her uterine artery embolization las year. Started again yesterday. She feels short of breath for a moment when it happens. At first she thought it might be attributed to the heat but realized she didn't spend any time outside. Hb 12; cannot be attributed to anemia.   - F/u TTE  - May require outpatient Holter Monitoring She's had a history of palpitations and dizziness with normal EKGs. Denies presyncope/syncope. Always experienced this in the past when she was anemic and required blood transfusions. Has only required 1 blood transfusion since her uterine artery embolization las year. Started again yesterday. She feels short of breath for a moment when it happens. At first she thought it might be attributed to the heat but realized she didn't spend any time outside. Hb 12; cannot be attributed to anemia.   - F/u TSH   - F/u TTE  - Orthostatic vitals  - May require outpatient Holter Monitoring

## 2025-06-24 NOTE — PATIENT PROFILE ADULT - NSPRESCRALCFREQ_GEN_A_NUR
Used to drink 1 pint of Vodka per day, but stopped 1 month ago. She takes Naltrexone Depade injections monthly to maintain sobriety./Never

## 2025-06-24 NOTE — PATIENT PROFILE ADULT - STATED REASON FOR ADMISSION
"I was feeling dizzy, feeling sick, I was throwing up, I had pain in the left side of my stomach and mid chest. Patient was sent from her PMD's office.

## 2025-06-24 NOTE — H&P ADULT - ATTENDING COMMENTS
51 yo F with PMHx HTN, HLD, CAD (s/p PCI), DM, gastric bypass surgery (2001), hx cholecystitis (s/p cholecystectomy, 4/2024), uterine fibroids (s/p myomectomy, 1/2019) BIBEMS from outpatient rheumatology office with 1d hx of L-sided abdominal pain with nausea/vomiting, found to have SABRINA, admitted for further evaluation and management.      VSS. EKG reviewed. hsTrop T negative x1. Low suspicion for atypical ACS. CP reproducible on exam. Remainder of labs reviewed. Plt 414. Bicarb 18. AG 18. Cr 1.49 > repeat 1.82. No known hx of CKD. Prior Cr 0.83 in 4/2025. Mg 1.3 > repeat 1.8 s/p repletion. Lipase negative. UA with 300 protein, trace LE, moderate blood/RBC, 9 WBC, few bacteria, +hyaline casts. CXR clear. CTAP demonstrating abnormal bowel wall thickening versus adjacent decompressed small bowel in anterior abdomen at location of jejunojejunal anastomosis. Abd soft, NT/ND with BSx4 on exam. Pt reports recent initiation of Ozempic which may have been contributing to initial nausea/vomiting. Sx also suggestive of possible PUD/GERD. Recently seen by outpatient GI (Dr. Falcon) on 6/10 for similar LLQ pain 2/2 chronic constipation.     [ ] Repeat UA + ULytes (SABRINA)   [ ] Bladder scan x1 (R/O urinary retention)   [ ] Strict I/O monitoring   [ ] Hold home Losartan i/s/o SABRINA   [ ] Anti-emetic + Maalox PRN   [ ] Bowel regimen   [ ] Pain control PRN

## 2025-06-25 ENCOUNTER — TRANSCRIPTION ENCOUNTER (OUTPATIENT)
Age: 50
End: 2025-06-25

## 2025-06-25 VITALS
SYSTOLIC BLOOD PRESSURE: 125 MMHG | DIASTOLIC BLOOD PRESSURE: 84 MMHG | HEART RATE: 76 BPM | OXYGEN SATURATION: 99 % | TEMPERATURE: 98 F | RESPIRATION RATE: 17 BRPM

## 2025-06-25 DIAGNOSIS — F17.200 NICOTINE DEPENDENCE, UNSPECIFIED, UNCOMPLICATED: ICD-10-CM

## 2025-06-25 LAB
A1C WITH ESTIMATED AVERAGE GLUCOSE RESULT: 7.8 % — HIGH (ref 4–5.6)
ADD ON TEST-SPECIMEN IN LAB: SIGNIFICANT CHANGE UP
ADD ON TEST-SPECIMEN IN LAB: SIGNIFICANT CHANGE UP
ANION GAP SERPL CALC-SCNC: 13 MMOL/L — SIGNIFICANT CHANGE UP (ref 5–17)
ANION GAP SERPL CALC-SCNC: 13 MMOL/L — SIGNIFICANT CHANGE UP (ref 5–17)
BUN SERPL-MCNC: 21 MG/DL — SIGNIFICANT CHANGE UP (ref 7–23)
BUN SERPL-MCNC: 25 MG/DL — HIGH (ref 7–23)
CALCIUM SERPL-MCNC: 9.1 MG/DL — SIGNIFICANT CHANGE UP (ref 8.4–10.5)
CALCIUM SERPL-MCNC: 9.2 MG/DL — SIGNIFICANT CHANGE UP (ref 8.4–10.5)
CHLORIDE SERPL-SCNC: 104 MMOL/L — SIGNIFICANT CHANGE UP (ref 96–108)
CHLORIDE SERPL-SCNC: 105 MMOL/L — SIGNIFICANT CHANGE UP (ref 96–108)
CO2 SERPL-SCNC: 18 MMOL/L — LOW (ref 22–31)
CO2 SERPL-SCNC: 19 MMOL/L — LOW (ref 22–31)
CREAT SERPL-MCNC: 1.32 MG/DL — HIGH (ref 0.5–1.3)
CREAT SERPL-MCNC: 1.66 MG/DL — HIGH (ref 0.5–1.3)
CULTURE RESULTS: SIGNIFICANT CHANGE UP
EGFR: 38 ML/MIN/1.73M2 — LOW
EGFR: 38 ML/MIN/1.73M2 — LOW
EGFR: 50 ML/MIN/1.73M2 — LOW
EGFR: 50 ML/MIN/1.73M2 — LOW
ESTIMATED AVERAGE GLUCOSE: 177 MG/DL — HIGH (ref 68–114)
GLUCOSE SERPL-MCNC: 139 MG/DL — HIGH (ref 70–99)
GLUCOSE SERPL-MCNC: 149 MG/DL — HIGH (ref 70–99)
HCT VFR BLD CALC: 37.4 % — SIGNIFICANT CHANGE UP (ref 34.5–45)
HGB BLD-MCNC: 11.4 G/DL — LOW (ref 11.5–15.5)
MAGNESIUM SERPL-MCNC: 1.8 MG/DL — SIGNIFICANT CHANGE UP (ref 1.6–2.6)
MCHC RBC-ENTMCNC: 26.8 PG — LOW (ref 27–34)
MCHC RBC-ENTMCNC: 30.5 G/DL — LOW (ref 32–36)
MCV RBC AUTO: 87.8 FL — SIGNIFICANT CHANGE UP (ref 80–100)
NRBC # BLD AUTO: 0 K/UL — SIGNIFICANT CHANGE UP (ref 0–0)
NRBC # FLD: 0 K/UL — SIGNIFICANT CHANGE UP (ref 0–0)
NRBC BLD AUTO-RTO: 0 /100 WBCS — SIGNIFICANT CHANGE UP (ref 0–0)
PHOSPHATE SERPL-MCNC: 4.1 MG/DL — SIGNIFICANT CHANGE UP (ref 2.5–4.5)
PLATELET # BLD AUTO: 356 K/UL — SIGNIFICANT CHANGE UP (ref 150–400)
PMV BLD: 10.2 FL — SIGNIFICANT CHANGE UP (ref 7–13)
POTASSIUM SERPL-MCNC: 3.9 MMOL/L — SIGNIFICANT CHANGE UP (ref 3.5–5.3)
POTASSIUM SERPL-MCNC: 4 MMOL/L — SIGNIFICANT CHANGE UP (ref 3.5–5.3)
POTASSIUM SERPL-SCNC: 3.9 MMOL/L — SIGNIFICANT CHANGE UP (ref 3.5–5.3)
POTASSIUM SERPL-SCNC: 4 MMOL/L — SIGNIFICANT CHANGE UP (ref 3.5–5.3)
RBC # BLD: 4.26 M/UL — SIGNIFICANT CHANGE UP (ref 3.8–5.2)
RBC # FLD: 16.9 % — HIGH (ref 10.3–14.5)
SODIUM SERPL-SCNC: 136 MMOL/L — SIGNIFICANT CHANGE UP (ref 135–145)
SODIUM SERPL-SCNC: 136 MMOL/L — SIGNIFICANT CHANGE UP (ref 135–145)
SPECIMEN SOURCE: SIGNIFICANT CHANGE UP
WBC # BLD: 4.04 K/UL — SIGNIFICANT CHANGE UP (ref 3.8–10.5)
WBC # FLD AUTO: 4.04 K/UL — SIGNIFICANT CHANGE UP (ref 3.8–10.5)

## 2025-06-25 PROCEDURE — 84484 ASSAY OF TROPONIN QUANT: CPT

## 2025-06-25 PROCEDURE — 84300 ASSAY OF URINE SODIUM: CPT

## 2025-06-25 PROCEDURE — 84443 ASSAY THYROID STIM HORMONE: CPT

## 2025-06-25 PROCEDURE — 85027 COMPLETE CBC AUTOMATED: CPT

## 2025-06-25 PROCEDURE — 82962 GLUCOSE BLOOD TEST: CPT

## 2025-06-25 PROCEDURE — 82550 ASSAY OF CK (CPK): CPT

## 2025-06-25 PROCEDURE — 84702 CHORIONIC GONADOTROPIN TEST: CPT

## 2025-06-25 PROCEDURE — 96375 TX/PRO/DX INJ NEW DRUG ADDON: CPT

## 2025-06-25 PROCEDURE — 83935 ASSAY OF URINE OSMOLALITY: CPT

## 2025-06-25 PROCEDURE — 83735 ASSAY OF MAGNESIUM: CPT

## 2025-06-25 PROCEDURE — 74176 CT ABD & PELVIS W/O CONTRAST: CPT

## 2025-06-25 PROCEDURE — 84540 ASSAY OF URINE/UREA-N: CPT

## 2025-06-25 PROCEDURE — 83036 HEMOGLOBIN GLYCOSYLATED A1C: CPT

## 2025-06-25 PROCEDURE — 80048 BASIC METABOLIC PNL TOTAL CA: CPT

## 2025-06-25 PROCEDURE — 84133 ASSAY OF URINE POTASSIUM: CPT

## 2025-06-25 PROCEDURE — 80053 COMPREHEN METABOLIC PANEL: CPT

## 2025-06-25 PROCEDURE — 85025 COMPLETE CBC W/AUTO DIFF WBC: CPT

## 2025-06-25 PROCEDURE — 82553 CREATINE MB FRACTION: CPT

## 2025-06-25 PROCEDURE — 36415 COLL VENOUS BLD VENIPUNCTURE: CPT

## 2025-06-25 PROCEDURE — 99285 EMERGENCY DEPT VISIT HI MDM: CPT | Mod: 25

## 2025-06-25 PROCEDURE — 84100 ASSAY OF PHOSPHORUS: CPT

## 2025-06-25 PROCEDURE — 83690 ASSAY OF LIPASE: CPT

## 2025-06-25 PROCEDURE — 82570 ASSAY OF URINE CREATININE: CPT

## 2025-06-25 PROCEDURE — 87086 URINE CULTURE/COLONY COUNT: CPT

## 2025-06-25 PROCEDURE — 81001 URINALYSIS AUTO W/SCOPE: CPT

## 2025-06-25 PROCEDURE — 71046 X-RAY EXAM CHEST 2 VIEWS: CPT

## 2025-06-25 PROCEDURE — 96374 THER/PROPH/DIAG INJ IV PUSH: CPT

## 2025-06-25 RX ORDER — SODIUM CHLORIDE 9 G/1000ML
1000 INJECTION, SOLUTION INTRAVENOUS
Refills: 0 | Status: DISCONTINUED | OUTPATIENT
Start: 2025-06-25 | End: 2025-06-25

## 2025-06-25 RX ORDER — HEPARIN SODIUM 1000 [USP'U]/ML
5000 INJECTION INTRAVENOUS; SUBCUTANEOUS EVERY 8 HOURS
Refills: 0 | Status: DISCONTINUED | OUTPATIENT
Start: 2025-06-25 | End: 2025-06-25

## 2025-06-25 RX ORDER — NICOTINE POLACRILEX 4 MG/1
1 GUM, CHEWING ORAL DAILY
Refills: 0 | Status: DISCONTINUED | OUTPATIENT
Start: 2025-06-25 | End: 2025-06-25

## 2025-06-25 RX ORDER — DEXTROSE 50 % IN WATER 50 %
12.5 SYRINGE (ML) INTRAVENOUS ONCE
Refills: 0 | Status: DISCONTINUED | OUTPATIENT
Start: 2025-06-25 | End: 2025-06-25

## 2025-06-25 RX ORDER — ACETAMINOPHEN 500 MG/5ML
650 LIQUID (ML) ORAL ONCE
Refills: 0 | Status: COMPLETED | OUTPATIENT
Start: 2025-06-25 | End: 2025-06-25

## 2025-06-25 RX ORDER — MAGNESIUM, ALUMINUM HYDROXIDE 200-200 MG
30 TABLET,CHEWABLE ORAL EVERY 6 HOURS
Refills: 0 | Status: DISCONTINUED | OUTPATIENT
Start: 2025-06-25 | End: 2025-06-25

## 2025-06-25 RX ORDER — SEMAGLUTIDE 1 MG/.5ML
0.25 INJECTION, SOLUTION SUBCUTANEOUS
Refills: 0 | DISCHARGE

## 2025-06-25 RX ORDER — DEXTROSE 50 % IN WATER 50 %
25 SYRINGE (ML) INTRAVENOUS ONCE
Refills: 0 | Status: DISCONTINUED | OUTPATIENT
Start: 2025-06-25 | End: 2025-06-25

## 2025-06-25 RX ORDER — GLUCAGON 3 MG/1
1 POWDER NASAL ONCE
Refills: 0 | Status: DISCONTINUED | OUTPATIENT
Start: 2025-06-25 | End: 2025-06-25

## 2025-06-25 RX ORDER — NALTREXONE HYDROCHLORIDE 50 MG/1
380 TABLET, FILM COATED ORAL
Refills: 0 | DISCHARGE

## 2025-06-25 RX ORDER — MELOXICAM 15 MG/1
1 TABLET ORAL
Refills: 0 | DISCHARGE

## 2025-06-25 RX ORDER — POLYETHYLENE GLYCOL 3350 17 G/17G
17 POWDER, FOR SOLUTION ORAL AT BEDTIME
Refills: 0 | Status: DISCONTINUED | OUTPATIENT
Start: 2025-06-25 | End: 2025-06-25

## 2025-06-25 RX ORDER — ONDANSETRON HCL/PF 4 MG/2 ML
4 VIAL (ML) INJECTION ONCE
Refills: 0 | Status: DISCONTINUED | OUTPATIENT
Start: 2025-06-25 | End: 2025-06-25

## 2025-06-25 RX ORDER — INSULIN LISPRO 100 U/ML
INJECTION, SOLUTION INTRAVENOUS; SUBCUTANEOUS
Refills: 0 | Status: DISCONTINUED | OUTPATIENT
Start: 2025-06-25 | End: 2025-06-25

## 2025-06-25 RX ORDER — FOLIC ACID 1 MG/1
1 TABLET ORAL
Refills: 0 | DISCHARGE

## 2025-06-25 RX ORDER — DEXTROSE 50 % IN WATER 50 %
15 SYRINGE (ML) INTRAVENOUS ONCE
Refills: 0 | Status: DISCONTINUED | OUTPATIENT
Start: 2025-06-25 | End: 2025-06-25

## 2025-06-25 RX ORDER — NALTREXONE HYDROCHLORIDE 50 MG/1
50 TABLET, FILM COATED ORAL DAILY
Refills: 0 | Status: DISCONTINUED | OUTPATIENT
Start: 2025-06-25 | End: 2025-06-25

## 2025-06-25 RX ORDER — LOSARTAN POTASSIUM 100 MG/1
1 TABLET, FILM COATED ORAL
Refills: 0 | DISCHARGE

## 2025-06-25 RX ORDER — FERROUS SULFATE 137(45) MG
325 TABLET, EXTENDED RELEASE ORAL
Refills: 0 | DISCHARGE

## 2025-06-25 RX ORDER — CALCIUM CARBONATE 750 MG/1
1 TABLET ORAL ONCE
Refills: 0 | Status: COMPLETED | OUTPATIENT
Start: 2025-06-25 | End: 2025-06-25

## 2025-06-25 RX ADMIN — Medication 650 MILLIGRAM(S): at 13:01

## 2025-06-25 RX ADMIN — Medication 650 MILLIGRAM(S): at 14:00

## 2025-06-25 RX ADMIN — CALCIUM CARBONATE 1 TABLET(S): 750 TABLET ORAL at 13:01

## 2025-06-25 RX ADMIN — Medication 40 MILLIGRAM(S): at 11:25

## 2025-06-25 RX ADMIN — NICOTINE POLACRILEX 1 PATCH: 4 GUM, CHEWING ORAL at 11:25

## 2025-06-25 RX ADMIN — NICOTINE POLACRILEX 1 PATCH: 4 GUM, CHEWING ORAL at 18:10

## 2025-06-25 RX ADMIN — HEPARIN SODIUM 5000 UNIT(S): 1000 INJECTION INTRAVENOUS; SUBCUTANEOUS at 06:48

## 2025-06-25 RX ADMIN — HEPARIN SODIUM 5000 UNIT(S): 1000 INJECTION INTRAVENOUS; SUBCUTANEOUS at 13:01

## 2025-06-25 RX ADMIN — Medication 1000 MILLILITER(S): at 09:50

## 2025-06-25 NOTE — DISCHARGE NOTE PROVIDER - CARE PROVIDER_API CALL
Pam Malone  [facility] [place] [address]  Phone: [phone]  Fax: (   )    -  Follow Up Time: 1 week

## 2025-06-25 NOTE — PROGRESS NOTE ADULT - PROBLEM SELECTOR PLAN 3
H/o menorrhagia, found w/ enlarged fibroids per admission TVUS. Per gyn, no acute interventions at this time.    - Ensure Outpatient follow up with Dr. Schneider to discuss further medical/surgical management.  - C/w Tylenol 650mg q6h PRN for mild pain

## 2025-06-25 NOTE — DISCHARGE NOTE NURSING/CASE MANAGEMENT/SOCIAL WORK - NSDCPEWEB_GEN_ALL_CORE
[facility] for Tobacco Control website --- [email or web address] website --- [email or web address]

## 2025-06-25 NOTE — PROGRESS NOTE ADULT - PROBLEM SELECTOR PLAN 2
Presenting with NBNB emesis that started a few days ago, also associated with sensation of food getting stuck in her esophagus and belching. Denies melena/hematochezia. Takes Pepcid 20 mg BID for known GERD, w/o relief of sxs. She started taking Ozempic 2 weeks ago and Doxy + Azithro 1 week ago for BV. Differentials include drug induced N/V (Ozempic) vs drug induced esophagitis/gastritis (Doxy) vs H. pylori (born and raised in [facility]).   - HOLD Doxycycline   - F/u H. pylori stool assay   - Zofran 4 mg q8h PRN for nausea/vomiting   - Mylanta 30 ml q6h PRN for dyspepsia   - Hold GI ppx at this time Presenting with NBNB emesis that started a few days ago, also associated with sensation of food getting stuck in her esophagus and belching. Denies melena/hematochezia. Takes Pepcid 20 mg BID for known GERD, w/o relief of sxs. She started taking Ozempic 2 weeks ago and Doxy + Azithro 1 week ago for BV. Differentials include drug induced N/V (Ozempic) vs drug induced esophagitis/gastritis (Doxy) vs H. pylori (born and raised in [facility]).   - HOLD Doxycycline   - F/u H. pylori stool assay   - Zofran 4 mg q8h PRN for nausea/vomiting   - Mylanta 30 ml q6h PRN for dyspepsia   - Start pantoprazole

## 2025-06-25 NOTE — DISCHARGE NOTE PROVIDER - NSDCMRMEDTOKEN_GEN_ALL_CORE_FT
aspirin 81 mg oral delayed release tablet: 1 tab(s) orally once a day  famotidine 20 mg oral tablet: 1 tab(s) orally once a day  ferrous sulfate: 325 milligram(s) orally once a day  folic acid: 1 milligram(s) orally once a day  losartan 100 mg oral tablet: 1 tab(s) orally once a day  meloxicam 7.5 mg oral tablet: 1 tab(s) orally once a day  metFORMIN 1000 mg oral tablet, extended release: 1 orally 2 times a day  naltrexone 380 mg intramuscular injection, extended release: 380 milligram(s) intramuscularly every 4 weeks  semaglutide 0.25 mg/0.5 mL (0.25 mg dose) subcutaneous solution: 0.25 milligram(s) subcutaneously once a week  simvastatin 40 mg oral tablet: 1 tab(s) orally once a day   aspirin 81 mg oral delayed release tablet: 1 tab(s) orally once a day  ferrous sulfate: 325 milligram(s) orally once a day  folic acid: 1 milligram(s) orally once a day  metFORMIN 1000 mg oral tablet, extended release: 1 orally 2 times a day  naltrexone 380 mg intramuscular injection, extended release: 380 milligram(s) intramuscularly every 4 weeks  pantoprazole 40 mg oral delayed release tablet: 1 tab(s) orally every 24 hours  simvastatin 40 mg oral tablet: 1 tab(s) orally once a day

## 2025-06-25 NOTE — PROGRESS NOTE ADULT - PROBLEM SELECTOR PLAN 8
Skin changes: Desquamating rash on hands bilaterally + hypopigmented macules on bilateral shins started a few days ago, painless, non pruritic. Suspect drug induced eruption iso doxy vs CT disorder iso positive ROSALIA.   - HOLD doxycycline  - F/u with rheum outpatient
s/p cholecystecomy   -c/w home ursodiol 300mg bid

## 2025-06-25 NOTE — PROGRESS NOTE ADULT - PROBLEM SELECTOR PLAN 2
Patient's baseline Hgb 7.5, on ferrous gluconate and folic acid at home (nonadherent)  - c/w ferrous sulfate 325 mg QD

## 2025-06-25 NOTE — DISCHARGE NOTE NURSING/CASE MANAGEMENT/SOCIAL WORK - PATIENT PORTAL LINK FT
You can access the FollowMyHealth Patient Portal offered by [facility] by registering at the following website: [email or web address]. By joining [facility] FollowMyHealth portal, you will also be able to view your health information using other applications (apps) compatible with our system.

## 2025-06-25 NOTE — DISCHARGE NOTE PROVIDER - NSDCCPCAREPLAN_GEN_ALL_CORE_FT
PRINCIPAL DISCHARGE DIAGNOSIS  Diagnosis: SABRINA (acute kidney injury)  Assessment and Plan of Treatment: You were found to have elevated blood creatinine levels during your admission which signifies transient injury to the kidneys likely due to dehydration. You were given intravenous fluids and your kidney function improved. Please continue to drink plenty of fluids follow up with your PCP to check routine labs following discharge from the hospital.  ****Please discontinue meloxicam and doxycycline as they may be contributing to your symptoms.  ****If you notice decreasing urine production despite adequate fluid intake, please call your doctor right away.      SECONDARY DISCHARGE DIAGNOSES  Diagnosis: Dyspepsia  Assessment and Plan of Treatment: Dyspepsia, also known as indigestion, is a term that describes discomfort or pain in the upper abdomen, often after eating or drinking. Symptoms can include a gnawing or burning stomach pain, bloating, heartburn, nausea, vomiting, and burping. It's often mild and can be managed with dietary and lifestyle changes, but more severe or frequent cases may require medication.   Certain medications like aspirin, NSAIDs, and some antibiotics can also cause dyspepsia as a side effect.   ****Please discontinue doxycycline, azithromycin, meloxicam, and Ozempic as they may be contributing to your symptoms, and start pantoprazole 40 mg once daily. Please follow up with your PCP in 1 week.  ****If you have chest pain, shortness of breath, worsening severe abdominal pain, high fever, or other severe or concerning symptoms, please return to the emergency department.     PRINCIPAL DISCHARGE DIAGNOSIS  Diagnosis: SABRINA (acute kidney injury)  Assessment and Plan of Treatment: You were found to have elevated blood creatinine levels during your admission which signifies transient injury to the kidneys likely due to dehydration. You were given intravenous fluids and your kidney function improved. Please continue to drink plenty of fluids follow up with your PCP to check your kidney function labs following discharge from the hospital. Your creatinine on discharge is 1.32.  ****Please discontinue meloxicam and doxycycline as they may be contributing to your symptoms.  ****If you notice decreasing urine production despite adequate fluid intake, please call your doctor right away.      SECONDARY DISCHARGE DIAGNOSES  Diagnosis: Dyspepsia  Assessment and Plan of Treatment: Dyspepsia, also known as indigestion, is a term that describes discomfort or pain in the upper abdomen, often after eating or drinking. Symptoms can include a gnawing or burning stomach pain, bloating, heartburn, nausea, vomiting, and burping. It's often mild and can be managed with dietary and lifestyle changes, but more severe or frequent cases may require medication.   Certain medications like aspirin, NSAIDs, and some antibiotics can also cause dyspepsia as a side effect.   ****Please discontinue doxycycline, azithromycin, meloxicam, and Ozempic as they may be contributing to your symptoms, and start pantoprazole 40 mg once daily. Please follow up with your PCP in 1 week.  ****If you have chest pain, shortness of breath, worsening severe abdominal pain, high fever, or other severe or concerning symptoms, please return to the emergency department.

## 2025-06-25 NOTE — PROGRESS NOTE ADULT - PROBLEM SELECTOR PLAN 9
F:   E: Maintain K >4, Mg >2  N: CC  VTE ppx: SCD  Dispo: [facility]  ---  CODE: FULL
Per OP work up, ROSALIA titer elevated 1:160 in association with CCP IgG elevated to 31 (weak positive). RF negative.   Denies any small joint involvement. Seeing rheum for L knee arthritis. Previously diagnosed with primary OA.   - F/u with rheum outpatient

## 2025-06-25 NOTE — PROGRESS NOTE ADULT - SUBJECTIVE AND OBJECTIVE BOX
*****INCOMPLETE NOTE*****    INTERVAL HPI/OVERNIGHT EVENTS:    SUBJECTIVE: Patient seen and examined at bedside, resting comfortably in bed, and does not appear to be in any acute distress. Patient reports    Vital Signs Last 24 Hrs  T(C): 36.4 (25 Jun 2025 05:37), Max: 36.8 (24 Jun 2025 13:54)  T(F): 97.5 (25 Jun 2025 05:37), Max: 98.3 (24 Jun 2025 13:54)  HR: 83 (25 Jun 2025 05:37) (82 - 94)  BP: 113/77 (25 Jun 2025 05:37) (97/62 - 126/82)  BP(mean): 79 (24 Jun 2025 21:23) (79 - 79)  RR: 18 (25 Jun 2025 05:37) (18 - 18)  SpO2: 99% (25 Jun 2025 05:37) (97% - 99%)    Parameters below as of 25 Jun 2025 05:37  Patient On (Oxygen Delivery Method): room air        PHYSICAL EXAM:  General: in no acute distress  Eyes: EOMI intact bilaterally. Anicteric sclerae, moist conjunctivae  HENT: Moist mucous membranes  Neck: Trachea midline, supple  Lungs: CTA B/L. No wheezes, rales, or rhonchi  Cardiovascular: RRR. No murmurs, rubs, or gallops  Abdomen: Soft, non-tender non-distended; No rebound or guarding  Extremities: WWP, No clubbing, cyanosis or edema  Neurological: Alert and oriented  Skin: Warm and dry. No obvious rash     LABS:                        11.4   4.04  )-----------( 356      ( 25 Jun 2025 06:30 )             37.4     06-25    136  |  105  |  25[H]  ----------------------------<  139[H]  3.9   |  18[L]  |  1.66[H]    Ca    9.2      25 Jun 2025 06:30  Phos  4.1     06-25  Mg     1.8     06-25    TPro  7.0  /  Alb  4.0  /  TBili  0.2  /  DBili  x   /  AST  24  /  ALT  20  /  AlkPhos  65  06-24  
INTERVAL HPI/OVERNIGHT EVENTS: RUSLAN o/n    SUBJECTIVE: Patient seen and examined at bedside, resting comfortably in bed, and does not appear to be in any acute distress. Patient reports occasional palpitations, denies shortness of breath, chest pain. LLQ pain with palpation unchanged from months prior. Has sensation of food getting stuck at lower chest/epigastric region but not every time she eats, unclear duration weeks vs months, no regurgitation. Has had 2 episodes of watery diarrhea daily for the past 2 days.    Vital Signs Last 24 Hrs  T(C): 36.4 (25 Jun 2025 05:37), Max: 36.8 (24 Jun 2025 13:54)  T(F): 97.5 (25 Jun 2025 05:37), Max: 98.3 (24 Jun 2025 13:54)  HR: 83 (25 Jun 2025 05:37) (82 - 94)  BP: 113/77 (25 Jun 2025 05:37) (97/62 - 126/82)  BP(mean): 79 (24 Jun 2025 21:23) (79 - 79)  RR: 18 (25 Jun 2025 05:37) (18 - 18)  SpO2: 99% (25 Jun 2025 05:37) (97% - 99%)    Parameters below as of 25 Jun 2025 05:37  Patient On (Oxygen Delivery Method): room air        PHYSICAL EXAM:  General: in no acute distress  Eyes: EOMI intact bilaterally. Anicteric sclerae, moist conjunctivae  HENT: Moist mucous membranes  Neck: Trachea midline, supple  Lungs: CTA B/L. No wheezes, rales, or rhonchi  Cardiovascular: RRR. No murmurs, rubs, or gallops  Abdomen: Soft, LLQ ttp, non-distended; No rebound or guarding  Extremities: WWP, No clubbing, cyanosis or edema  Neurological: Alert and oriented  Skin: Warm and dry. No obvious rash     LABS:                        11.4   4.04  )-----------( 356      ( 25 Jun 2025 06:30 )             37.4     06-25    136  |  105  |  25[H]  ----------------------------<  139[H]  3.9   |  18[L]  |  1.66[H]    Ca    9.2      25 Jun 2025 06:30  Phos  4.1     06-25  Mg     1.8     06-25    TPro  7.0  /  Alb  4.0  /  TBili  0.2  /  DBili  x   /  AST  24  /  ALT  20  /  AlkPhos  65  06-24

## 2025-06-25 NOTE — PROGRESS NOTE ADULT - PROBLEM SELECTOR PROBLEM 1
T/C Contact [facility] PA dept spoke w/ [facility] regarding PA status for Aimovig 70mg/ml #1/30 days  Auth# [id number]  Effective 4/9/19-4/8/2020    PA case closed
Symptomatic anemia
SABRINA (acute kidney injury)

## 2025-06-25 NOTE — PROGRESS NOTE ADULT - PROBLEM SELECTOR PLAN 1
Baseline Cr:  0.9-1.2.   Cr trend this admission: 1.49 --> 1.82 --> 1.71  Appears to be intrinsic given Cr worsened s/p 2 L NS. Unlikely pre-renal.   No urolithiasis or hydronephrosis on CTAP. Unlikely post-obstructive.   Intrinsic maybe 2/2 to ATN vs glomerulonephritis, Denies any NSAID use x 2 weeks. AIN unlikely given doxy not associated with AIN and azithro rarely associated with AIN. Also no eos or WBC casts on UA.   - F/u Ur studies   - Trend BUN/Cr  - Bladder scan q6h  - Strict I's and O's  - Avoid nephrotoxic drugs (NSAIDs, ACEI/ARBS, diuretics), renally dose meds
Patient reports worsening menorrhagia and dysmenorrhea since onset of menstruation with associated symptoms consistent the acute blood loss anemia likely iso uterine fibroids. Hgb in ED 6.2;   TVUS:  enlarged fibroid uterus with a 2.4 x 2.2 x 2.5 cm submucosal myoma  GYN following (Dr. Schneider) - no acute interventions indicated; recs as follows  Hgb 6.2>7.7>7.1>7>8.9 ; s/p  3U PRBC     - c/w Provera 10mg qd for 48h; reassess bleeding (if unimproved, increase to 10mg bid).  - Uterine artery embolization today by IR. Medically stable, no contraindications to anesthesia  - After procedure, will assess for pain control. If post-procedure pain management is necessary, patient will be transferred to GYN service  - Closely monitor H/H  - Transfuse for HGB <7.0  - Ensure 2 large-bore IVs

## 2025-06-25 NOTE — PROGRESS NOTE ADULT - PROBLEM SELECTOR PLAN 3
She's had a history of palpitations and dizziness with normal EKGs. Denies presyncope/syncope. Always experienced this in the past when she was anemic and required blood transfusions. Has only required 1 blood transfusion since her uterine artery embolization las year. Started again yesterday. She feels short of breath for a moment when it happens. At first she thought it might be attributed to the heat but realized she didn't spend any time outside. Hb 12; cannot be attributed to anemia.   - F/u TSH   - F/u TTE  - Orthostatic vitals  - May require outpatient Holter Monitoring She's had a history of palpitations and dizziness with normal EKGs. Denies presyncope/syncope. Always experienced this in the past when she was anemic and required blood transfusions. Has only required 1 blood transfusion since her uterine artery embolization las year. Started again yesterday. She feels short of breath for a moment when it happens. At first she thought it might be attributed to the heat but realized she didn't spend any time outside. Hb 12; cannot be attributed to anemia.   - F/u TSH   - Orthostatic vitals  - May require outpatient Holter Monitoring Statement Selected

## 2025-06-25 NOTE — PROGRESS NOTE ADULT - PROBLEM SELECTOR PLAN 4
Likely pre-renal SABRINA ISO blood loss. Elevated Creatinine of 1.48 (baseline sCr 0.82-1.0),  elevated BUN 1.48, and decreased GFR  FeNa 1.2: prerenal vs intrinsic     - bladder scan 66, unlikely postrenal etiology   - Encourage PO intake  - Closely monitor Cr, last 1.2  - Avoid nephrotoxic meds, renally dose meds as appropriate  - Obtain strict I/Os and daily weights

## 2025-06-25 NOTE — PROGRESS NOTE ADULT - PROBLEM SELECTOR PLAN 12
F: none   E: replete as needed   N: CC  DVT: Heparin SC   GI ppx: HOLD F: none   E: replete as needed   N: CC  DVT: Heparin SC   Dispo: [facility]

## 2025-06-25 NOTE — PROGRESS NOTE ADULT - PROBLEM SELECTOR PLAN 5
Home meds: Ozempic 0.25 mg weekly and Metformin 1000 mg BID  - HOLD home meds  - ISS   - Monitor FS
Home med: Losartan 100mg   -currently held for soft pressures

## 2025-06-25 NOTE — PROGRESS NOTE ADULT - PROBLEM SELECTOR PLAN 4
She was at her Rheumatologist's office today started to have chest pain. Continues to have it intermittently. Worst at night when laying flat. Not associated with exertion. No radiation. Not pleuritic. Not improved with leaning forward. She thinks its associated with sensation of food getting stuck in her esophagus. Cardiac Cath/echo Aug 2023 c/w non obstructive CAD. Cardiac markers and EKG on admission wnl. Don't suspect ACS, pericarditis or myocarditis.    Suspect this is also secondary to dyspepsia. Plan as above. RESOLVED  She was at her Rheumatologist's office today started to have chest pain. Continues to have it intermittently. Worst at night when laying flat. Not associated with exertion. No radiation. Not pleuritic. Not improved with leaning forward. She thinks its associated with sensation of food getting stuck in her esophagus. Cardiac Cath/echo Aug 2023 c/w non obstructive CAD. Cardiac markers and EKG on admission wnl. Don't suspect ACS, pericarditis or myocarditis.    Suspect this is also secondary to dyspepsia. Plan as above.

## 2025-06-25 NOTE — DISCHARGE NOTE PROVIDER - NSDCCPTREATMENT_GEN_ALL_CORE_FT
PRINCIPAL PROCEDURE  Procedure: CT abdomen and pelvis  Findings and Treatment: PROCEDURE:  CT of the Abdomen and Pelvis was performed.  Sagittal and coronal reformats were performed.  FINDINGS:  LOWER CHEST: Coronary artery calcifications.  LIVER: Within normal limits.  BILE DUCTS: Normal caliber.  GALLBLADDER: Cholecystectomy.  SPLEEN: Within normal limits.  PANCREAS: Within normal limits.  ADRENALS: Within normal limits.  KIDNEYS/URETERS: Within normal limits.  BLADDER: Within normal limits.  REPRODUCTIVE ORGANS: Enlarged fibroid uterus. No suspicious adnexal mass.  BOWEL: No bowel obstruction. Status post gastric bypass. Patulous   jejunojejunal anastomosis noted in the anterior abdomen, where there is   abnormal wall thickening versus adjacent decompressed small bowel. This   is similar in appearance to 2/16/2025 but appears new since 12/22/2024.   The appendix is normal.  PERITONEUM/RETROPERITONEUM: Within normal limits.  VESSELS: Within normal limits.  LYMPH NODES: No lymphadenopathy.  ABDOMINAL WALL: Within normal limits.  BONES: Degenerative changes.  IMPRESSION:  No urolithiasis or hydronephrosis.  Status post gastric bypass. Patulous jejunojejunal anastomosis again seen   in the in the anterior abdomen, where there is abnormal bowel wall   thickening versus adjacent decompressed small bowel. Recommend further   evaluation with IV and oral contrast.

## 2025-06-25 NOTE — PROGRESS NOTE ADULT - PROBLEM SELECTOR PLAN 6
Patient reports inconsistent compliance to home medications.   Home med: metformin 1000mg po     - trend POCT-glucose  - C/w mISS
- HOLD home losartan 100 mg iso SABRINA

## 2025-06-25 NOTE — DISCHARGE NOTE PROVIDER - HOSPITAL COURSE
#Discharge: do not delete    Patient is __ yo M/F with past medical history of _____ presented with _____, found to have _____ (one liner)    Hospital course (by problem):   #SABRINA (acute kidney injury).   Baseline Cr:  0.9-1.2.   Cr trend this admission: 1.49 --> 1.82 --> 1.71  Appears to be intrinsic given Cr worsened s/p 2 L NS. Unlikely pre-renal.   No urolithiasis or hydronephrosis on CTAP. Unlikely post-obstructive.   Intrinsic maybe 2/2 to ATN vs glomerulonephritis, Denies any NSAID use x 2 weeks. AIN unlikely given doxy not associated with AIN and azithro rarely associated with AIN. Also no eos or WBC casts on UA.   - F/u Ur studies   - Trend BUN/Cr  - Bladder scan q6h  - Strict I's and O's  - Avoid nephrotoxic drugs (NSAIDs, ACEI/ARBS, diuretics), renally dose meds.     Problem/Plan - 2:  ·  Problem: Dyspepsia.   ·  Plan: Presenting with NBNB emesis that started a few days ago, also associated with sensation of food getting stuck in her esophagus and belching. Denies melena/hematochezia. Takes Pepcid 20 mg BID for known GERD, w/o relief of sxs. She started taking Ozempic 2 weeks ago and Doxy + Azithro 1 week ago for BV. Differentials include drug induced N/V (Ozempic) vs drug induced esophagitis/gastritis (Doxy) vs H. pylori (born and raised in [facility]).   - HOLD Doxycycline   - F/u H. pylori stool assay   - Zofran 4 mg q8h PRN for nausea/vomiting   - Mylanta 30 ml q6h PRN for dyspepsia   - Start pantoprazole.     Problem/Plan - 3:  ·  Problem: Palpitations.   ·  Plan: She's had a history of palpitations and dizziness with normal EKGs. Denies presyncope/syncope. Always experienced this in the past when she was anemic and required blood transfusions. Has only required 1 blood transfusion since her uterine artery embolization las year. Started again yesterday. She feels short of breath for a moment when it happens. At first she thought it might be attributed to the heat but realized she didn't spend any time outside. Hb 12; cannot be attributed to anemia.   - F/u TSH   - Orthostatic vitals  - May require outpatient Holter Monitoring.     Problem/Plan - 4:  ·  Problem: Chest pain.   ·  Plan: RESOLVED  She was at her Rheumatologist's office today started to have chest pain. Continues to have it intermittently. Worst at night when laying flat. Not associated with exertion. No radiation. Not pleuritic. Not improved with leaning forward. She thinks its associated with sensation of food getting stuck in her esophagus. Cardiac Cath/echo Aug 2023 c/w non obstructive CAD. Cardiac markers and EKG on admission wnl. Don't suspect ACS, pericarditis or myocarditis.    Suspect this is also secondary to dyspepsia. Plan as above.     Problem/Plan - 5:  ·  Problem: Diabetes.   ·  Plan: Home meds: Ozempic 0.25 mg weekly and Metformin 1000 mg BID  - HOLD home meds  - ISS   - Monitor FS.     Problem/Plan - 6:  ·  Problem: HTN (hypertension).   ·  Plan: - HOLD home losartan 100 mg iso SABRINA.     Problem/Plan - 7:  ·  Problem: ETOH abuse.   ·  Plan: Last alcohol use over a everett hago. On vivitrol injection [id number].     - Outpatient follow up.     Problem/Plan - 8:  ·  Problem: Recent skin changes.   ·  Plan: Skin changes: Desquamating rash on hands bilaterally + hypopigmented macules on bilateral shins started a few days ago, painless, non pruritic. Suspect drug induced eruption iso doxy vs CT disorder iso positive ROSALIA.   - HOLD doxycycline  - F/u with rheum outpatient.     Problem/Plan - 9:  ·  Problem: ROSALIA positive.   ·  Plan: Per OP work up, ROSALIA titer elevated 1:160 in association with CCP IgG elevated to 31 (weak positive). RF negative.   Denies any small joint involvement. Seeing rheum for L knee arthritis. Previously diagnosed with primary OA.   - F/u with rheum outpatient.     Problem/Plan - 10:  ·  Problem: Chronic abdominal pain.   ·  Plan; Presenting with LUQ/LLQ pain with radiation to back: ongoing for months, feels like a pulling sensation independent of activity. No correlation with food or BMs. Does cycle between diarrhea and constipation. Denies melena/hematochezia. Saw GI 2 weeks ago, is supposed to schedule colonoscopy.   CTAP w/o bowel obstruction. Status post gastric bypass. Patulous jejunojejunal anastomosis again seen in the in the anterior abdomen, where there is abnormal bowel wall thickening versus adjacent decompressed small bowel. This is similar in appearance to 2/16/2025.   - F/u with PCP/GI outpatient.     Problem/Plan - 11:  ·  Problem: Nicotine dependence.   ·  Plan: - Nicotine patch.      Patient was discharged to: (home/[facility]/acute rehab/hospice, etc, and with what services – home health PT/RN? Home O2?)    New medications:   Changes to old medications:  Medications that were stopped:    Items to follow up as outpatient:    Physical exam at the time of discharge:       #Discharge: do not delete    Patient is 51 yo F with PMHx alcohol abuse, iron deficiency anemia iso menorrhagia s/p uterine artery embolization, fibroids, gastric bypass (2001), HTN, DM, HLD, GERD, acute cholecystitis s/p lap refugio, gastric bypass (in 1998, denies any revisions), nonobstructive CAD, presenting with palpitations, chronic left lower quadrant pain unchanged from baseline, and dyspepsia, admitted for SABRINA likely 2/2 dehydration from diarrhea, now resolved. EKG NSR without ischemic changes and troponin wnl. CTAP with abnormal bowel thickening and patient is scheduled for outpatient colonoscopy. Discontinued Ozempic due to GI symptoms and meloxicam due to SABRINA, which improved with fluids. Stable for discharge.     Hospital course (by problem):   #SABRINA (acute kidney injury).   Baseline Cr:  0.9-1.2.   Cr trend this admission: 1.49 --> 1.82 --> 1.71  Appears to be intrinsic given Cr worsened s/p 2 L NS. Unlikely pre-renal.   No urolithiasis or hydronephrosis on CTAP. Unlikely post-obstructive.   Intrinsic maybe 2/2 to ATN vs glomerulonephritis, Denies any NSAID use x 2 weeks. AIN unlikely given doxy not associated with AIN and azithro rarely associated with AIN. Also no eos or WBC casts on UA.   - F/u Ur studies   - Trend BUN/Cr  - Bladder scan q6h  - Strict I's and O's  - Avoid nephrotoxic drugs (NSAIDs, ACEI/ARBS, diuretics), renally dose meds.    #Dyspepsia.   Presenting with NBNB emesis that started a few days ago, also associated with sensation of food getting stuck in her esophagus and belching. Denies melena/hematochezia. Takes Pepcid 20 mg BID for known GERD, w/o relief of sxs. She started taking Ozempic 2 weeks ago and Doxy + Azithro 1 week ago for BV. Differentials include drug induced N/V (Ozempic) vs drug induced esophagitis/gastritis (Doxy) vs H. pylori (born and raised in [facility]).   - HOLD Doxycycline   - F/u H. pylori stool assay   - Zofran 4 mg q8h PRN for nausea/vomiting   - Mylanta 30 ml q6h PRN for dyspepsia   - Start pantoprazole 40 mg qd    #Palpitations.   She's had a history of palpitations and dizziness with normal EKGs. Denies presyncope/syncope. Always experienced this in the past when she was anemic and required blood transfusions. Has only required 1 blood transfusion since her uterine artery embolization las year. Started again yesterday. She feels short of breath for a moment when it happens. At first she thought it might be attributed to the heat but realized she didn't spend any time outside. Hb 12; cannot be attributed to anemia.   - F/u TSH   - Orthostatic vitals  - May require outpatient Holter Monitoring.    #Chest pain.   RESOLVED  She was at her Rheumatologist's office today started to have chest pain. Continues to have it intermittently. Worst at night when laying flat. Not associated with exertion. No radiation. Not pleuritic. Not improved with leaning forward. She thinks its associated with sensation of food getting stuck in her esophagus. Cardiac Cath/echo Aug 2023 c/w non obstructive CAD. Cardiac markers and EKG on admission wnl. Don't suspect ACS, pericarditis or myocarditis.    Suspect this is also secondary to dyspepsia. Plan as above.    #Diabetes.   Home meds: Ozempic 0.25 mg weekly and Metformin 1000 mg BID  - HOLD home meds  - ISS   - Monitor FS.    #HTN (hypertension).   - HOLD home losartan 100 mg iso SABRINA, follow up with PCP to restart    #ETOH abuse.   Last alcohol use over a month ago. On vivitrol injection [id number].   - Outpatient follow up.    #Recent skin changes.   Skin changes: Desquamating rash on hands bilaterally + hypopigmented macules on bilateral shins started a few days ago, painless, non pruritic. Suspect drug induced eruption iso doxy vs CT disorder iso positive ROSALIA.   - HOLD doxycycline  - F/u with rheum outpatient.    #ROSALIA positive.   Per OP work up, ROSALIA titer elevated 1:160 in association with CCP IgG elevated to 31 (weak positive). RF negative.   Denies any small joint involvement. Seeing rheum for L knee arthritis. Previously diagnosed with primary OA.   - F/u with rheum outpatient.    #Chronic abdominal pain.   Presenting with LUQ/LLQ pain with radiation to back: ongoing for months, feels like a pulling sensation independent of activity. No correlation with food or BMs. Does cycle between diarrhea and constipation. Denies melena/hematochezia. Saw GI 2 weeks ago, is supposed to schedule colonoscopy.   CTAP w/o bowel obstruction. Status post gastric bypass. Patulous jejunojejunal anastomosis again seen in the in the anterior abdomen, where there is abnormal bowel wall thickening versus adjacent decompressed small bowel. This is similar in appearance to 2/16/2025.   - F/u with PCP/GI outpatient.    Patient was discharged to: home    New medications: pantoprazole 40 mg qd  Changes to old medications: N/A  Medications that were stopped: ozempic, meloxicam, losartan    Items to follow up as outpatient: PCP    Physical exam at the time of discharge:  General: in no acute distress  Eyes: EOMI intact bilaterally. Anicteric sclerae, moist conjunctivae  HENT: Moist mucous membranes  Neck: Trachea midline, supple  Lungs: CTA B/L. No wheezes, rales, or rhonchi  Cardiovascular: RRR. No murmurs, rubs, or gallops  Abdomen: Soft, LLQ mild ttp, non-distended; No rebound or guarding  Extremities: WWP, No clubbing, cyanosis or edema  Neurological: Alert and oriented  Skin: Desquamation at fingertips. Warm and dry. No obvious rash

## 2025-06-25 NOTE — PROGRESS NOTE ADULT - ATTENDING COMMENTS
49 yo F with a PMH of alcohol use disorder, iron deficiency anemia secondary to menorrhagia s/p uterine artery embolization, fibroids, gastric bypass, HTN, T2DM, HLD, acute cholecystitis s/p lap refugio, nonobstructive CAD, here with SABRINA, dyspepsia, and palpitations. Palpitations resolved by time of my exam though having ongoing burning pain in central chest.     VSS on RA    Exam:   Appears comfortable   MMM  Normal WOB on RA, CTAB, chest pain not reproducible with palpation   RRR, no mrg   Abdomen soft, mild epigastric tenderness no rebound or guarding, belching on exam   Extremities warm and without edema. Mild flaking of skin on R hand, no erythema or pain, no mucosal involvement   AOX3, no focal neuro deficits     Labs reviewed   CBC unremarkable   BMP with Cr improving to 1.66, lytes ok   LFTs ok  Troponin negative   UA with hyaline casts     EKG NSR without signs of ischemia or arrhythmia.   Imaging including CT A/P and EKG reviewed.     A/P:   -SABRINA: indeterminate etiology though suspect some component of prerenal due to nausea/vomiting in setting of discontinuing her famotidine as well as starting ozempic. Give another 500 cc bolus today and recheck labs, if stable or downtrending can discharge with close outpatient follow up with her PCP (discussed with patient and will also call PCP).   -Dyspepsia, chest pain: negative troponin, EKG NSR without signs of ischemia, not consistent with ACS or cardiac etiology of pain. Belching on exam. Suspect this is caused by a combination of stopping her famotidine in the last week as well as starting ozempic. Hold ozempic, restart famotidine, give pantoprazole as well, reevaluate symptoms.   -Palpitations: NSR on EKG, no longer having palpitations on exam, recommend holter monitor on discharge will work on getting cardiology follow up   -T2DM: stop ozempic, hold metformin while here and give ISS   -HTN: normotensive, hold losartan given SABRINA   -Dry skin on hands: does not look like drug eruption but will stop azithro/doxy anyway given no longer having BV symptoms and risk that abx may be worsening her symptoms. No signs of SJS/TENS.    Rest as per resident note.

## 2025-06-25 NOTE — DISCHARGE NOTE NURSING/CASE MANAGEMENT/SOCIAL WORK - NSDCPEEMAIL_GEN_ALL_CORE
[facility] email [email or web address]

## 2025-06-25 NOTE — DISCHARGE NOTE NURSING/CASE MANAGEMENT/SOCIAL WORK - FINANCIAL ASSISTANCE
[facility] provides services at a reduced cost to those who are determined to be eligible through [facility]’s financial assistance program. Information regarding [facility]’s financial assistance program can be found by going to [email or web address] or by calling [phone].

## 2025-06-25 NOTE — PROGRESS NOTE ADULT - PROBLEM SELECTOR PLAN 7
- C/w home naltrexone 50 mg qd Last alcohol use over a everett hago. On vivitrol injection [id number].     - Outpatient follow up

## 2025-06-25 NOTE — DISCHARGE NOTE PROVIDER - PROVIDER TOKENS
FREE:[LAST:[Faisal],FIRST:[Pam],PHONE:[phone]],FAX:[(   )    -],ADDRESS:[[address] [place] [address]],FOLLOWUP:[1 week]]

## 2025-06-25 NOTE — PROGRESS NOTE ADULT - ASSESSMENT
Patient is 49F with a PMHx of T2DM, HTN, HLD, CAD s/c PCI ('21), gastric bypass surgery, cholecystectomy uterine fibroids s/p myomectomy (2021), multiple miscarriages s/p medical management + D&C that presents to the ED with prominent menorrhagia and dysmenorrhea with associated palpitations, lightheadedness admitted for management of symptomatic anemia secondary to acute blood loss.   
Patient is a 51 yo F with PMHx alcohol abuse, iron deficiency anemia iso menorrhagia s/p uterine artery embolization, fibroids, gastric bypass (2001), HTN, DM, HLD,  acute cholecystitis s/p lap refugio, gastric bypass (in 1998, denies any revisions), nonobstructive CAD, presenting with SABRINA, dyspepsia and palpitations.

## 2025-06-30 DIAGNOSIS — Z98.84 BARIATRIC SURGERY STATUS: ICD-10-CM

## 2025-06-30 DIAGNOSIS — K59.00 CONSTIPATION, UNSPECIFIED: ICD-10-CM

## 2025-06-30 DIAGNOSIS — F17.210 NICOTINE DEPENDENCE, CIGARETTES, UNCOMPLICATED: ICD-10-CM

## 2025-06-30 DIAGNOSIS — K21.9 GASTRO-ESOPHAGEAL REFLUX DISEASE WITHOUT ESOPHAGITIS: ICD-10-CM

## 2025-06-30 DIAGNOSIS — E11.9 TYPE 2 DIABETES MELLITUS WITHOUT COMPLICATIONS: ICD-10-CM

## 2025-06-30 DIAGNOSIS — R07.89 OTHER CHEST PAIN: ICD-10-CM

## 2025-06-30 DIAGNOSIS — I25.83 CORONARY ATHEROSCLEROSIS DUE TO LIPID RICH PLAQUE: ICD-10-CM

## 2025-06-30 DIAGNOSIS — F10.10 ALCOHOL ABUSE, UNCOMPLICATED: ICD-10-CM

## 2025-06-30 DIAGNOSIS — Z79.891 LONG TERM (CURRENT) USE OF OPIATE ANALGESIC: ICD-10-CM

## 2025-06-30 DIAGNOSIS — Z79.84 LONG TERM (CURRENT) USE OF ORAL HYPOGLYCEMIC DRUGS: ICD-10-CM

## 2025-06-30 DIAGNOSIS — R76.8 OTHER SPECIFIED ABNORMAL IMMUNOLOGICAL FINDINGS IN SERUM: ICD-10-CM

## 2025-06-30 DIAGNOSIS — Z87.42 PERSONAL HISTORY OF OTHER DISEASES OF THE FEMALE GENITAL TRACT: ICD-10-CM

## 2025-06-30 DIAGNOSIS — Z90.49 ACQUIRED ABSENCE OF OTHER SPECIFIED PARTS OF DIGESTIVE TRACT: ICD-10-CM

## 2025-06-30 DIAGNOSIS — Z79.82 LONG TERM (CURRENT) USE OF ASPIRIN: ICD-10-CM

## 2025-06-30 DIAGNOSIS — R00.2 PALPITATIONS: ICD-10-CM

## 2025-06-30 DIAGNOSIS — N17.9 ACUTE KIDNEY FAILURE, UNSPECIFIED: ICD-10-CM

## 2025-06-30 DIAGNOSIS — R10.13 EPIGASTRIC PAIN: ICD-10-CM

## 2025-06-30 DIAGNOSIS — D50.9 IRON DEFICIENCY ANEMIA, UNSPECIFIED: ICD-10-CM

## 2025-06-30 DIAGNOSIS — E83.42 HYPOMAGNESEMIA: ICD-10-CM

## 2025-06-30 DIAGNOSIS — I10 ESSENTIAL (PRIMARY) HYPERTENSION: ICD-10-CM

## 2025-06-30 DIAGNOSIS — E78.5 HYPERLIPIDEMIA, UNSPECIFIED: ICD-10-CM

## 2025-06-30 DIAGNOSIS — Z79.899 OTHER LONG TERM (CURRENT) DRUG THERAPY: ICD-10-CM

## 2025-06-30 DIAGNOSIS — Z85.850 PERSONAL HISTORY OF MALIGNANT NEOPLASM OF THYROID: ICD-10-CM

## 2025-07-07 ENCOUNTER — APPOINTMENT (OUTPATIENT)
Dept: ORTHOPEDIC SURGERY | Facility: CLINIC | Age: 50
End: 2025-07-07
Payer: MEDICAID

## 2025-07-07 VITALS — OXYGEN SATURATION: 100 % | SYSTOLIC BLOOD PRESSURE: 116 MMHG | DIASTOLIC BLOOD PRESSURE: 82 MMHG | HEART RATE: 87 BPM

## 2025-07-07 PROCEDURE — 99214 OFFICE O/P EST MOD 30 MIN: CPT

## 2025-07-11 PROBLEM — M17.12 PRIMARY OSTEOARTHRITIS OF LEFT KNEE: Status: ACTIVE | Noted: 2025-07-11

## 2025-08-18 ENCOUNTER — APPOINTMENT (OUTPATIENT)
Dept: ORTHOPEDIC SURGERY | Facility: CLINIC | Age: 50
End: 2025-08-18

## 2025-08-25 ENCOUNTER — APPOINTMENT (OUTPATIENT)
Dept: BARIATRICS | Facility: CLINIC | Age: 50
End: 2025-08-25

## 2025-09-02 ENCOUNTER — APPOINTMENT (OUTPATIENT)
Dept: GASTROENTEROLOGY | Facility: CLINIC | Age: 50
End: 2025-09-02
Payer: MEDICAID

## 2025-09-02 VITALS
OXYGEN SATURATION: 99 % | HEART RATE: 74 BPM | RESPIRATION RATE: 15 BRPM | HEIGHT: 66 IN | SYSTOLIC BLOOD PRESSURE: 120 MMHG | DIASTOLIC BLOOD PRESSURE: 84 MMHG | TEMPERATURE: 97.2 F | WEIGHT: 223 LBS | BODY MASS INDEX: 35.84 KG/M2

## 2025-09-02 DIAGNOSIS — Z12.11 ENCOUNTER FOR SCREENING FOR MALIGNANT NEOPLASM OF COLON: ICD-10-CM

## 2025-09-02 DIAGNOSIS — R10.9 UNSPECIFIED ABDOMINAL PAIN: ICD-10-CM

## 2025-09-02 DIAGNOSIS — K59.09 OTHER CONSTIPATION: ICD-10-CM

## 2025-09-02 DIAGNOSIS — R19.7 DIARRHEA, UNSPECIFIED: ICD-10-CM

## 2025-09-02 PROCEDURE — 99215 OFFICE O/P EST HI 40 MIN: CPT

## 2025-09-02 PROCEDURE — G2211 COMPLEX E/M VISIT ADD ON: CPT | Mod: NC

## 2025-09-04 ENCOUNTER — APPOINTMENT (OUTPATIENT)
Dept: GASTROENTEROLOGY | Facility: HOSPITAL | Age: 50
End: 2025-09-04

## 2025-09-17 ENCOUNTER — APPOINTMENT (OUTPATIENT)
Dept: GASTROENTEROLOGY | Facility: HOSPITAL | Age: 50
End: 2025-09-17

## (undated) DEVICE — XI TIP COVER

## (undated) DEVICE — SUT MONOCRYL 4-0 27" PS-2 UNDYED

## (undated) DEVICE — D HELP - CLEARVIEW CLEARIFY SYSTEM

## (undated) DEVICE — XI DRAPE COLUMN

## (undated) DEVICE — DRAPE 1/2 SHEET 40X57"

## (undated) DEVICE — SUT VICRYL 0 27" UR-6

## (undated) DEVICE — XI ENDOWRIST SUCTION IRRIGATOR 8MM

## (undated) DEVICE — DRAPE 3/4 SHEET 52X76"

## (undated) DEVICE — MARKING PEN W RULER

## (undated) DEVICE — DRAPE TOP SHEET 53" X 101"

## (undated) DEVICE — WARMING BLANKET UPPER ADULT

## (undated) DEVICE — SOL IRR BAG NS 0.9% 3000ML

## (undated) DEVICE — ENDOCATCH 10MM SPECIMEN POUCH

## (undated) DEVICE — TIP METZENBAUM SCISSOR MONOPOLAR ENDOCUT (ORANGE)

## (undated) DEVICE — PACK GENERAL LAPAROSCOPY

## (undated) DEVICE — ELCTR BOVIE PENCIL BLADE 10FT

## (undated) DEVICE — TUBING STRYKER PNEUMOCLEAR HIGH FLOW

## (undated) DEVICE — XI DRAPE ARM

## (undated) DEVICE — GLV 7 PROTEXIS (WHITE)

## (undated) DEVICE — XI OBTURATOR OPTICAL BLADELESS 8MM

## (undated) DEVICE — BLADE SCALPEL SAFETY #11 WITH PLASTIC GREEN HANDLE

## (undated) DEVICE — INSUFFLATION NDL COVIDIEN SURGINEEDLE VERESS 120MM

## (undated) DEVICE — FORCEP RADIAL JAW 4 W NDL 2.2MM 2.8MM 240CM ORANGE DISP

## (undated) DEVICE — Device

## (undated) DEVICE — DRSG DERMABOND 0.7ML

## (undated) DEVICE — XI SEAL UNIV 5- 8 MM

## (undated) DEVICE — VENODYNE/SCD SLEEVE CALF MEDIUM